# Patient Record
Sex: MALE | Race: BLACK OR AFRICAN AMERICAN | Employment: OTHER | URBAN - METROPOLITAN AREA
[De-identification: names, ages, dates, MRNs, and addresses within clinical notes are randomized per-mention and may not be internally consistent; named-entity substitution may affect disease eponyms.]

---

## 2019-01-01 ENCOUNTER — ANESTHESIA (OUTPATIENT)
Dept: SURGERY | Age: 74
DRG: 326 | End: 2019-01-01
Payer: MEDICARE

## 2019-01-01 ENCOUNTER — APPOINTMENT (OUTPATIENT)
Dept: GENERAL RADIOLOGY | Age: 74
DRG: 242 | End: 2019-01-01
Attending: INTERNAL MEDICINE
Payer: MEDICARE

## 2019-01-01 ENCOUNTER — APPOINTMENT (OUTPATIENT)
Dept: GENERAL RADIOLOGY | Age: 74
DRG: 326 | End: 2019-01-01
Attending: SURGERY
Payer: MEDICARE

## 2019-01-01 ENCOUNTER — ANESTHESIA EVENT (OUTPATIENT)
Dept: ENDOSCOPY | Age: 74
DRG: 326 | End: 2019-01-01
Payer: MEDICARE

## 2019-01-01 ENCOUNTER — APPOINTMENT (OUTPATIENT)
Dept: GENERAL RADIOLOGY | Age: 74
DRG: 242 | End: 2019-01-01
Attending: HOSPITALIST
Payer: MEDICARE

## 2019-01-01 ENCOUNTER — APPOINTMENT (OUTPATIENT)
Dept: NUCLEAR MEDICINE | Age: 74
DRG: 242 | End: 2019-01-01
Attending: PHYSICIAN ASSISTANT
Payer: MEDICARE

## 2019-01-01 ENCOUNTER — APPOINTMENT (OUTPATIENT)
Dept: NON INVASIVE DIAGNOSTICS | Age: 74
DRG: 242 | End: 2019-01-01
Attending: FAMILY MEDICINE
Payer: MEDICARE

## 2019-01-01 ENCOUNTER — APPOINTMENT (OUTPATIENT)
Dept: VASCULAR SURGERY | Age: 74
DRG: 242 | End: 2019-01-01
Attending: PHYSICIAN ASSISTANT
Payer: MEDICARE

## 2019-01-01 ENCOUNTER — HOSPITAL ENCOUNTER (INPATIENT)
Age: 74
LOS: 4 days | DRG: 326 | End: 2019-08-05
Attending: EMERGENCY MEDICINE | Admitting: INTERNAL MEDICINE
Payer: MEDICARE

## 2019-01-01 ENCOUNTER — APPOINTMENT (OUTPATIENT)
Dept: GENERAL RADIOLOGY | Age: 74
DRG: 242 | End: 2019-01-01
Attending: THORACIC SURGERY (CARDIOTHORACIC VASCULAR SURGERY)
Payer: MEDICARE

## 2019-01-01 ENCOUNTER — DOCUMENTATION ONLY (OUTPATIENT)
Dept: CASE MANAGEMENT | Age: 74
End: 2019-01-01

## 2019-01-01 ENCOUNTER — APPOINTMENT (OUTPATIENT)
Dept: ULTRASOUND IMAGING | Age: 74
DRG: 242 | End: 2019-01-01
Attending: FAMILY MEDICINE
Payer: MEDICARE

## 2019-01-01 ENCOUNTER — ANESTHESIA EVENT (OUTPATIENT)
Dept: SURGERY | Age: 74
DRG: 326 | End: 2019-01-01
Payer: MEDICARE

## 2019-01-01 ENCOUNTER — APPOINTMENT (OUTPATIENT)
Dept: INTERVENTIONAL RADIOLOGY/VASCULAR | Age: 74
DRG: 242 | End: 2019-01-01
Attending: INTERNAL MEDICINE
Payer: MEDICARE

## 2019-01-01 ENCOUNTER — APPOINTMENT (OUTPATIENT)
Dept: ULTRASOUND IMAGING | Age: 74
DRG: 242 | End: 2019-01-01
Attending: INTERNAL MEDICINE
Payer: MEDICARE

## 2019-01-01 ENCOUNTER — APPOINTMENT (OUTPATIENT)
Dept: GENERAL RADIOLOGY | Age: 74
DRG: 326 | End: 2019-01-01
Attending: NURSE PRACTITIONER
Payer: MEDICARE

## 2019-01-01 ENCOUNTER — APPOINTMENT (OUTPATIENT)
Dept: ULTRASOUND IMAGING | Age: 74
DRG: 242 | End: 2019-01-01
Attending: NURSE PRACTITIONER
Payer: MEDICARE

## 2019-01-01 ENCOUNTER — ANESTHESIA (OUTPATIENT)
Dept: ENDOSCOPY | Age: 74
DRG: 326 | End: 2019-01-01
Payer: MEDICARE

## 2019-01-01 ENCOUNTER — APPOINTMENT (OUTPATIENT)
Dept: GENERAL RADIOLOGY | Age: 74
DRG: 326 | End: 2019-01-01
Attending: INTERNAL MEDICINE
Payer: MEDICARE

## 2019-01-01 ENCOUNTER — APPOINTMENT (OUTPATIENT)
Dept: CT IMAGING | Age: 74
DRG: 242 | End: 2019-01-01
Attending: NURSE PRACTITIONER
Payer: MEDICARE

## 2019-01-01 ENCOUNTER — APPOINTMENT (OUTPATIENT)
Dept: GENERAL RADIOLOGY | Age: 74
DRG: 242 | End: 2019-01-01
Attending: PHYSICIAN ASSISTANT
Payer: MEDICARE

## 2019-01-01 ENCOUNTER — APPOINTMENT (OUTPATIENT)
Dept: GENERAL RADIOLOGY | Age: 74
DRG: 242 | End: 2019-01-01
Attending: FAMILY MEDICINE
Payer: MEDICARE

## 2019-01-01 ENCOUNTER — PATIENT OUTREACH (OUTPATIENT)
Dept: CASE MANAGEMENT | Age: 74
End: 2019-01-01

## 2019-01-01 ENCOUNTER — APPOINTMENT (OUTPATIENT)
Dept: CT IMAGING | Age: 74
DRG: 242 | End: 2019-01-01
Attending: PHYSICIAN ASSISTANT
Payer: MEDICARE

## 2019-01-01 ENCOUNTER — APPOINTMENT (OUTPATIENT)
Dept: GENERAL RADIOLOGY | Age: 74
DRG: 242 | End: 2019-01-01
Attending: NURSE PRACTITIONER
Payer: MEDICARE

## 2019-01-01 ENCOUNTER — APPOINTMENT (OUTPATIENT)
Dept: GENERAL RADIOLOGY | Age: 74
DRG: 242 | End: 2019-01-01
Attending: RADIOLOGY
Payer: MEDICARE

## 2019-01-01 ENCOUNTER — HOSPITAL ENCOUNTER (INPATIENT)
Age: 74
LOS: 24 days | Discharge: HOME | DRG: 242 | End: 2019-07-29
Attending: EMERGENCY MEDICINE | Admitting: INTERNAL MEDICINE
Payer: MEDICARE

## 2019-01-01 VITALS
BODY MASS INDEX: 20.11 KG/M2 | RESPIRATION RATE: 15 BRPM | DIASTOLIC BLOOD PRESSURE: 63 MMHG | TEMPERATURE: 97.8 F | OXYGEN SATURATION: 98 % | HEIGHT: 68 IN | WEIGHT: 132.72 LBS | HEART RATE: 78 BPM | SYSTOLIC BLOOD PRESSURE: 129 MMHG

## 2019-01-01 VITALS
BODY MASS INDEX: 21.18 KG/M2 | DIASTOLIC BLOOD PRESSURE: 41 MMHG | RESPIRATION RATE: 17 BRPM | TEMPERATURE: 100.5 F | WEIGHT: 139.77 LBS | SYSTOLIC BLOOD PRESSURE: 142 MMHG | OXYGEN SATURATION: 97 % | HEIGHT: 68 IN | HEART RATE: 90 BPM

## 2019-01-01 DIAGNOSIS — Z71.89 ADVANCED CARE PLANNING/COUNSELING DISCUSSION: ICD-10-CM

## 2019-01-01 DIAGNOSIS — R53.83 FATIGUE, UNSPECIFIED TYPE: ICD-10-CM

## 2019-01-01 DIAGNOSIS — K56.7 ILEUS (HCC): ICD-10-CM

## 2019-01-01 DIAGNOSIS — Z99.2 ESRD (END STAGE RENAL DISEASE) ON DIALYSIS (HCC): ICD-10-CM

## 2019-01-01 DIAGNOSIS — R79.89 ELEVATED BRAIN NATRIURETIC PEPTIDE (BNP) LEVEL: Primary | ICD-10-CM

## 2019-01-01 DIAGNOSIS — I50.9 CONGESTIVE HEART FAILURE, UNSPECIFIED HF CHRONICITY, UNSPECIFIED HEART FAILURE TYPE (HCC): ICD-10-CM

## 2019-01-01 DIAGNOSIS — N18.6 ESRD (END STAGE RENAL DISEASE) ON DIALYSIS (HCC): ICD-10-CM

## 2019-01-01 DIAGNOSIS — M79.89 LEG SWELLING: ICD-10-CM

## 2019-01-01 DIAGNOSIS — R00.1 BRADYCARDIA: ICD-10-CM

## 2019-01-01 DIAGNOSIS — I95.9 TRANSIENT HYPOTENSION: ICD-10-CM

## 2019-01-01 DIAGNOSIS — R06.02 SOB (SHORTNESS OF BREATH): ICD-10-CM

## 2019-01-01 DIAGNOSIS — R09.02 HYPOXIA: ICD-10-CM

## 2019-01-01 DIAGNOSIS — K92.2 UPPER GI BLEED: Primary | ICD-10-CM

## 2019-01-01 DIAGNOSIS — D50.9 HYPOCHROMIC ANEMIA: ICD-10-CM

## 2019-01-01 DIAGNOSIS — J90 PLEURAL EFFUSION: ICD-10-CM

## 2019-01-01 DIAGNOSIS — G93.40 ENCEPHALOPATHY ACUTE: ICD-10-CM

## 2019-01-01 LAB
ABO + RH BLD: NORMAL
ALBUMIN SERPL-MCNC: 1.6 G/DL (ref 3.5–5)
ALBUMIN SERPL-MCNC: 2.4 G/DL (ref 3.5–5)
ALBUMIN SERPL-MCNC: 2.5 G/DL (ref 3.5–5)
ALBUMIN SERPL-MCNC: 2.6 G/DL (ref 3.5–5)
ALBUMIN SERPL-MCNC: 2.7 G/DL (ref 3.5–5)
ALBUMIN SERPL-MCNC: 2.8 G/DL (ref 3.5–5)
ALBUMIN SERPL-MCNC: 2.9 G/DL (ref 3.5–5)
ALBUMIN SERPL-MCNC: 3 G/DL (ref 3.5–5)
ALBUMIN SERPL-MCNC: 3 G/DL (ref 3.5–5)
ALBUMIN SERPL-MCNC: 3.1 G/DL (ref 3.5–5)
ALBUMIN SERPL-MCNC: 3.2 G/DL (ref 3.5–5)
ALBUMIN SERPL-MCNC: 3.4 G/DL (ref 3.5–5)
ALBUMIN/GLOB SERPL: 0.7 {RATIO} (ref 1.1–2.2)
ALBUMIN/GLOB SERPL: 0.8 {RATIO} (ref 1.1–2.2)
ALBUMIN/GLOB SERPL: 0.9 {RATIO} (ref 1.1–2.2)
ALBUMIN/GLOB SERPL: 1 {RATIO} (ref 1.1–2.2)
ALBUMIN/GLOB SERPL: 1 {RATIO} (ref 1.1–2.2)
ALBUMIN/GLOB SERPL: 1.1 {RATIO} (ref 1.1–2.2)
ALBUMIN/GLOB SERPL: 1.2 {RATIO} (ref 1.1–2.2)
ALBUMIN/GLOB SERPL: 1.4 {RATIO} (ref 1.1–2.2)
ALBUMIN/GLOB SERPL: 1.5 {RATIO} (ref 1.1–2.2)
ALBUMIN/GLOB SERPL: 1.6 {RATIO} (ref 1.1–2.2)
ALP SERPL-CCNC: 100 U/L (ref 45–117)
ALP SERPL-CCNC: 104 U/L (ref 45–117)
ALP SERPL-CCNC: 105 U/L (ref 45–117)
ALP SERPL-CCNC: 113 U/L (ref 45–117)
ALP SERPL-CCNC: 129 U/L (ref 45–117)
ALP SERPL-CCNC: 162 U/L (ref 45–117)
ALP SERPL-CCNC: 52 U/L (ref 45–117)
ALP SERPL-CCNC: 57 U/L (ref 45–117)
ALP SERPL-CCNC: 58 U/L (ref 45–117)
ALP SERPL-CCNC: 60 U/L (ref 45–117)
ALP SERPL-CCNC: 61 U/L (ref 45–117)
ALP SERPL-CCNC: 64 U/L (ref 45–117)
ALP SERPL-CCNC: 71 U/L (ref 45–117)
ALP SERPL-CCNC: 75 U/L (ref 45–117)
ALP SERPL-CCNC: 78 U/L (ref 45–117)
ALP SERPL-CCNC: 80 U/L (ref 45–117)
ALP SERPL-CCNC: 83 U/L (ref 45–117)
ALP SERPL-CCNC: 85 U/L (ref 45–117)
ALP SERPL-CCNC: 93 U/L (ref 45–117)
ALP SERPL-CCNC: 94 U/L (ref 45–117)
ALP SERPL-CCNC: 95 U/L (ref 45–117)
ALP SERPL-CCNC: 96 U/L (ref 45–117)
ALP SERPL-CCNC: 98 U/L (ref 45–117)
ALT SERPL-CCNC: 105 U/L (ref 12–78)
ALT SERPL-CCNC: 12 U/L (ref 12–78)
ALT SERPL-CCNC: 124 U/L (ref 12–78)
ALT SERPL-CCNC: 135 U/L (ref 12–78)
ALT SERPL-CCNC: 14 U/L (ref 12–78)
ALT SERPL-CCNC: 172 U/L (ref 12–78)
ALT SERPL-CCNC: 22 U/L (ref 12–78)
ALT SERPL-CCNC: 220 U/L (ref 12–78)
ALT SERPL-CCNC: 236 U/L (ref 12–78)
ALT SERPL-CCNC: 32 U/L (ref 12–78)
ALT SERPL-CCNC: 35 U/L (ref 12–78)
ALT SERPL-CCNC: 42 U/L (ref 12–78)
ALT SERPL-CCNC: 45 U/L (ref 12–78)
ALT SERPL-CCNC: 49 U/L (ref 12–78)
ALT SERPL-CCNC: 51 U/L (ref 12–78)
ALT SERPL-CCNC: 54 U/L (ref 12–78)
ALT SERPL-CCNC: 59 U/L (ref 12–78)
ALT SERPL-CCNC: 60 U/L (ref 12–78)
ALT SERPL-CCNC: 7 U/L (ref 12–78)
ALT SERPL-CCNC: 7 U/L (ref 12–78)
ALT SERPL-CCNC: 75 U/L (ref 12–78)
ALT SERPL-CCNC: 79 U/L (ref 12–78)
ALT SERPL-CCNC: 93 U/L (ref 12–78)
AMYLASE FLD-CCNC: 32 U/L
ANION GAP BLD CALC-SCNC: 15 MMOL/L (ref 10–20)
ANION GAP SERPL CALC-SCNC: 10 MMOL/L (ref 5–15)
ANION GAP SERPL CALC-SCNC: 10 MMOL/L (ref 5–15)
ANION GAP SERPL CALC-SCNC: 11 MMOL/L (ref 5–15)
ANION GAP SERPL CALC-SCNC: 12 MMOL/L (ref 5–15)
ANION GAP SERPL CALC-SCNC: 13 MMOL/L (ref 5–15)
ANION GAP SERPL CALC-SCNC: 15 MMOL/L (ref 5–15)
ANION GAP SERPL CALC-SCNC: 17 MMOL/L (ref 5–15)
ANION GAP SERPL CALC-SCNC: 17 MMOL/L (ref 5–15)
ANION GAP SERPL CALC-SCNC: 6 MMOL/L (ref 5–15)
ANION GAP SERPL CALC-SCNC: 7 MMOL/L (ref 5–15)
ANION GAP SERPL CALC-SCNC: 8 MMOL/L (ref 5–15)
ANION GAP SERPL CALC-SCNC: 9 MMOL/L (ref 5–15)
APPEARANCE FLD: CLEAR
APTT PPP: 29.4 SEC (ref 22.1–32)
ARTERIAL PATENCY WRIST A: ABNORMAL
ARTERIAL PATENCY WRIST A: NO
ARTERIAL PATENCY WRIST A: YES
ARTERIAL PATENCY WRIST A: YES
AST SERPL-CCNC: 113 U/L (ref 15–37)
AST SERPL-CCNC: 14 U/L (ref 15–37)
AST SERPL-CCNC: 17 U/L (ref 15–37)
AST SERPL-CCNC: 178 U/L (ref 15–37)
AST SERPL-CCNC: 23 U/L (ref 15–37)
AST SERPL-CCNC: 26 U/L (ref 15–37)
AST SERPL-CCNC: 262 U/L (ref 15–37)
AST SERPL-CCNC: 27 U/L (ref 15–37)
AST SERPL-CCNC: 27 U/L (ref 15–37)
AST SERPL-CCNC: 30 U/L (ref 15–37)
AST SERPL-CCNC: 31 U/L (ref 15–37)
AST SERPL-CCNC: 32 U/L (ref 15–37)
AST SERPL-CCNC: 322 U/L (ref 15–37)
AST SERPL-CCNC: 39 U/L (ref 15–37)
AST SERPL-CCNC: 39 U/L (ref 15–37)
AST SERPL-CCNC: 41 U/L (ref 15–37)
AST SERPL-CCNC: 42 U/L (ref 15–37)
AST SERPL-CCNC: 50 U/L (ref 15–37)
AST SERPL-CCNC: 52 U/L (ref 15–37)
AST SERPL-CCNC: 56 U/L (ref 15–37)
AST SERPL-CCNC: 61 U/L (ref 15–37)
AST SERPL-CCNC: 72 U/L (ref 15–37)
AST SERPL-CCNC: 88 U/L (ref 15–37)
ATRIAL RATE: 192 BPM
ATRIAL RATE: 208 BPM
ATRIAL RATE: 63 BPM
ATRIAL RATE: 64 BPM
ATRIAL RATE: 72 BPM
BACTERIA SPEC CULT: NORMAL
BACTERIA SPEC CULT: NORMAL
BASE DEFICIT BLD-SCNC: 12 MMOL/L
BASE DEFICIT BLD-SCNC: 16 MMOL/L
BASE DEFICIT BLD-SCNC: 19 MMOL/L
BASE DEFICIT BLD-SCNC: 2 MMOL/L
BASE DEFICIT BLD-SCNC: 3 MMOL/L
BASE DEFICIT BLD-SCNC: 4 MMOL/L
BASE DEFICIT BLD-SCNC: 5 MMOL/L
BASE DEFICIT BLD-SCNC: 7 MMOL/L
BASE EXCESS BLD CALC-SCNC: 0 MMOL/L
BASE EXCESS BLD CALC-SCNC: 2 MMOL/L
BASOPHILS # BLD: 0 K/UL (ref 0–0.1)
BASOPHILS # BLD: 0.1 K/UL (ref 0–0.1)
BASOPHILS # BLD: 0.3 K/UL (ref 0–0.1)
BASOPHILS NFR BLD: 0 % (ref 0–1)
BASOPHILS NFR BLD: 1 % (ref 0–1)
BASOPHILS NFR BLD: 2 % (ref 0–1)
BDY SITE: ABNORMAL
BILIRUB SERPL-MCNC: 0.2 MG/DL (ref 0.2–1)
BILIRUB SERPL-MCNC: 0.2 MG/DL (ref 0.2–1)
BILIRUB SERPL-MCNC: 0.3 MG/DL (ref 0.2–1)
BILIRUB SERPL-MCNC: 0.4 MG/DL (ref 0.2–1)
BILIRUB SERPL-MCNC: 0.5 MG/DL (ref 0.2–1)
BILIRUB SERPL-MCNC: 0.6 MG/DL (ref 0.2–1)
BILIRUB SERPL-MCNC: 0.7 MG/DL (ref 0.2–1)
BILIRUB SERPL-MCNC: 0.8 MG/DL (ref 0.2–1)
BILIRUB SERPL-MCNC: 0.9 MG/DL (ref 0.2–1)
BILIRUB SERPL-MCNC: 1 MG/DL (ref 0.2–1)
BLD PROD TYP BPU: NORMAL
BLOOD GROUP ANTIBODIES SERPL: NORMAL
BNP SERPL-MCNC: 8324 PG/ML
BODY FLD TYPE: NORMAL
BPU ID: NORMAL
BUN BLD-MCNC: 24 MG/DL (ref 9–20)
BUN SERPL-MCNC: 10 MG/DL (ref 6–20)
BUN SERPL-MCNC: 104 MG/DL (ref 6–20)
BUN SERPL-MCNC: 104 MG/DL (ref 6–20)
BUN SERPL-MCNC: 106 MG/DL (ref 6–20)
BUN SERPL-MCNC: 107 MG/DL (ref 6–20)
BUN SERPL-MCNC: 12 MG/DL (ref 6–20)
BUN SERPL-MCNC: 13 MG/DL (ref 6–20)
BUN SERPL-MCNC: 13 MG/DL (ref 6–20)
BUN SERPL-MCNC: 14 MG/DL (ref 6–20)
BUN SERPL-MCNC: 15 MG/DL (ref 6–20)
BUN SERPL-MCNC: 16 MG/DL (ref 6–20)
BUN SERPL-MCNC: 17 MG/DL (ref 6–20)
BUN SERPL-MCNC: 17 MG/DL (ref 6–20)
BUN SERPL-MCNC: 18 MG/DL (ref 6–20)
BUN SERPL-MCNC: 18 MG/DL (ref 6–20)
BUN SERPL-MCNC: 19 MG/DL (ref 6–20)
BUN SERPL-MCNC: 21 MG/DL (ref 6–20)
BUN SERPL-MCNC: 22 MG/DL (ref 6–20)
BUN SERPL-MCNC: 23 MG/DL (ref 6–20)
BUN SERPL-MCNC: 23 MG/DL (ref 6–20)
BUN SERPL-MCNC: 25 MG/DL (ref 6–20)
BUN SERPL-MCNC: 27 MG/DL (ref 6–20)
BUN SERPL-MCNC: 27 MG/DL (ref 6–20)
BUN SERPL-MCNC: 28 MG/DL (ref 6–20)
BUN SERPL-MCNC: 29 MG/DL (ref 6–20)
BUN SERPL-MCNC: 36 MG/DL (ref 6–20)
BUN SERPL-MCNC: 39 MG/DL (ref 6–20)
BUN SERPL-MCNC: 44 MG/DL (ref 6–20)
BUN SERPL-MCNC: 55 MG/DL (ref 6–20)
BUN SERPL-MCNC: 57 MG/DL (ref 6–20)
BUN SERPL-MCNC: 75 MG/DL (ref 6–20)
BUN SERPL-MCNC: 8 MG/DL (ref 6–20)
BUN SERPL-MCNC: 86 MG/DL (ref 6–20)
BUN SERPL-MCNC: 9 MG/DL (ref 6–20)
BUN/CREAT SERPL: 10 (ref 12–20)
BUN/CREAT SERPL: 11 (ref 12–20)
BUN/CREAT SERPL: 13 (ref 12–20)
BUN/CREAT SERPL: 13 (ref 12–20)
BUN/CREAT SERPL: 15 (ref 12–20)
BUN/CREAT SERPL: 16 (ref 12–20)
BUN/CREAT SERPL: 17 (ref 12–20)
BUN/CREAT SERPL: 18 (ref 12–20)
BUN/CREAT SERPL: 19 (ref 12–20)
BUN/CREAT SERPL: 2 (ref 12–20)
BUN/CREAT SERPL: 20 (ref 12–20)
BUN/CREAT SERPL: 20 (ref 12–20)
BUN/CREAT SERPL: 21 (ref 12–20)
BUN/CREAT SERPL: 25 (ref 12–20)
BUN/CREAT SERPL: 26 (ref 12–20)
BUN/CREAT SERPL: 26 (ref 12–20)
BUN/CREAT SERPL: 3 (ref 12–20)
BUN/CREAT SERPL: 4 (ref 12–20)
BUN/CREAT SERPL: 5 (ref 12–20)
BUN/CREAT SERPL: 5 (ref 12–20)
BUN/CREAT SERPL: 6 (ref 12–20)
BUN/CREAT SERPL: 7 (ref 12–20)
BUN/CREAT SERPL: 9 (ref 12–20)
BUN/CREAT SERPL: 9 (ref 12–20)
CA-I BLD-MCNC: 1.2 MMOL/L (ref 1.12–1.32)
CALCIUM SERPL-MCNC: 10.4 MG/DL (ref 8.5–10.1)
CALCIUM SERPL-MCNC: 10.8 MG/DL (ref 8.5–10.1)
CALCIUM SERPL-MCNC: 7.2 MG/DL (ref 8.5–10.1)
CALCIUM SERPL-MCNC: 7.5 MG/DL (ref 8.5–10.1)
CALCIUM SERPL-MCNC: 7.6 MG/DL (ref 8.5–10.1)
CALCIUM SERPL-MCNC: 7.6 MG/DL (ref 8.5–10.1)
CALCIUM SERPL-MCNC: 7.7 MG/DL (ref 8.5–10.1)
CALCIUM SERPL-MCNC: 7.7 MG/DL (ref 8.5–10.1)
CALCIUM SERPL-MCNC: 7.9 MG/DL (ref 8.5–10.1)
CALCIUM SERPL-MCNC: 7.9 MG/DL (ref 8.5–10.1)
CALCIUM SERPL-MCNC: 8 MG/DL (ref 8.5–10.1)
CALCIUM SERPL-MCNC: 8.1 MG/DL (ref 8.5–10.1)
CALCIUM SERPL-MCNC: 8.2 MG/DL (ref 8.5–10.1)
CALCIUM SERPL-MCNC: 8.3 MG/DL (ref 8.5–10.1)
CALCIUM SERPL-MCNC: 8.4 MG/DL (ref 8.5–10.1)
CALCIUM SERPL-MCNC: 8.5 MG/DL (ref 8.5–10.1)
CALCIUM SERPL-MCNC: 8.6 MG/DL (ref 8.5–10.1)
CALCIUM SERPL-MCNC: 8.7 MG/DL (ref 8.5–10.1)
CALCIUM SERPL-MCNC: 8.8 MG/DL (ref 8.5–10.1)
CALCIUM SERPL-MCNC: 8.8 MG/DL (ref 8.5–10.1)
CALCIUM SERPL-MCNC: 8.9 MG/DL (ref 8.5–10.1)
CALCIUM SERPL-MCNC: 9.1 MG/DL (ref 8.5–10.1)
CALCIUM SERPL-MCNC: 9.2 MG/DL (ref 8.5–10.1)
CALCIUM SERPL-MCNC: 9.4 MG/DL (ref 8.5–10.1)
CALCIUM SERPL-MCNC: 9.9 MG/DL (ref 8.5–10.1)
CALCIUM SERPL-MCNC: 9.9 MG/DL (ref 8.5–10.1)
CALCULATED P AXIS, ECG09: -90 DEGREES
CALCULATED P AXIS, ECG09: 38 DEGREES
CALCULATED R AXIS, ECG10: -16 DEGREES
CALCULATED R AXIS, ECG10: -19 DEGREES
CALCULATED R AXIS, ECG10: -50 DEGREES
CALCULATED R AXIS, ECG10: 26 DEGREES
CALCULATED R AXIS, ECG10: 5 DEGREES
CALCULATED T AXIS, ECG11: -13 DEGREES
CALCULATED T AXIS, ECG11: 146 DEGREES
CALCULATED T AXIS, ECG11: 68 DEGREES
CALCULATED T AXIS, ECG11: 68 DEGREES
CALCULATED T AXIS, ECG11: 69 DEGREES
CHLORIDE BLD-SCNC: 112 MMOL/L (ref 98–107)
CHLORIDE SERPL-SCNC: 103 MMOL/L (ref 97–108)
CHLORIDE SERPL-SCNC: 104 MMOL/L (ref 97–108)
CHLORIDE SERPL-SCNC: 105 MMOL/L (ref 97–108)
CHLORIDE SERPL-SCNC: 106 MMOL/L (ref 97–108)
CHLORIDE SERPL-SCNC: 106 MMOL/L (ref 97–108)
CHLORIDE SERPL-SCNC: 107 MMOL/L (ref 97–108)
CHLORIDE SERPL-SCNC: 108 MMOL/L (ref 97–108)
CHLORIDE SERPL-SCNC: 109 MMOL/L (ref 97–108)
CHLORIDE SERPL-SCNC: 109 MMOL/L (ref 97–108)
CHLORIDE SERPL-SCNC: 110 MMOL/L (ref 97–108)
CHLORIDE SERPL-SCNC: 110 MMOL/L (ref 97–108)
CHLORIDE SERPL-SCNC: 111 MMOL/L (ref 97–108)
CHLORIDE SERPL-SCNC: 112 MMOL/L (ref 97–108)
CHLORIDE SERPL-SCNC: 113 MMOL/L (ref 97–108)
CHLORIDE SERPL-SCNC: 114 MMOL/L (ref 97–108)
CHLORIDE SERPL-SCNC: 116 MMOL/L (ref 97–108)
CHLORIDE SERPL-SCNC: 119 MMOL/L (ref 97–108)
CHOLEST SERPL-MCNC: 134 MG/DL
CK SERPL-CCNC: 25 U/L (ref 39–308)
CK SERPL-CCNC: 69 U/L (ref 39–308)
CO2 BLD-SCNC: 22 MMOL/L (ref 21–32)
CO2 SERPL-SCNC: 11 MMOL/L (ref 21–32)
CO2 SERPL-SCNC: 15 MMOL/L (ref 21–32)
CO2 SERPL-SCNC: 17 MMOL/L (ref 21–32)
CO2 SERPL-SCNC: 17 MMOL/L (ref 21–32)
CO2 SERPL-SCNC: 20 MMOL/L (ref 21–32)
CO2 SERPL-SCNC: 20 MMOL/L (ref 21–32)
CO2 SERPL-SCNC: 21 MMOL/L (ref 21–32)
CO2 SERPL-SCNC: 21 MMOL/L (ref 21–32)
CO2 SERPL-SCNC: 24 MMOL/L (ref 21–32)
CO2 SERPL-SCNC: 25 MMOL/L (ref 21–32)
CO2 SERPL-SCNC: 26 MMOL/L (ref 21–32)
CO2 SERPL-SCNC: 27 MMOL/L (ref 21–32)
CO2 SERPL-SCNC: 28 MMOL/L (ref 21–32)
CO2 SERPL-SCNC: 29 MMOL/L (ref 21–32)
COLOR FLD: YELLOW
COMMENT, HOLDF: NORMAL
CORTIS AM PEAK SERPL-MCNC: 13.5 UG/DL (ref 4.3–22.45)
CREAT BLD-MCNC: 4.3 MG/DL (ref 0.6–1.3)
CREAT SERPL-MCNC: 1.23 MG/DL (ref 0.7–1.3)
CREAT SERPL-MCNC: 1.32 MG/DL (ref 0.7–1.3)
CREAT SERPL-MCNC: 1.36 MG/DL (ref 0.7–1.3)
CREAT SERPL-MCNC: 1.42 MG/DL (ref 0.7–1.3)
CREAT SERPL-MCNC: 1.68 MG/DL (ref 0.7–1.3)
CREAT SERPL-MCNC: 2.1 MG/DL (ref 0.7–1.3)
CREAT SERPL-MCNC: 2.1 MG/DL (ref 0.7–1.3)
CREAT SERPL-MCNC: 2.15 MG/DL (ref 0.7–1.3)
CREAT SERPL-MCNC: 2.23 MG/DL (ref 0.7–1.3)
CREAT SERPL-MCNC: 2.31 MG/DL (ref 0.7–1.3)
CREAT SERPL-MCNC: 2.32 MG/DL (ref 0.7–1.3)
CREAT SERPL-MCNC: 2.37 MG/DL (ref 0.7–1.3)
CREAT SERPL-MCNC: 2.44 MG/DL (ref 0.7–1.3)
CREAT SERPL-MCNC: 2.45 MG/DL (ref 0.7–1.3)
CREAT SERPL-MCNC: 2.61 MG/DL (ref 0.7–1.3)
CREAT SERPL-MCNC: 2.65 MG/DL (ref 0.7–1.3)
CREAT SERPL-MCNC: 2.87 MG/DL (ref 0.7–1.3)
CREAT SERPL-MCNC: 2.98 MG/DL (ref 0.7–1.3)
CREAT SERPL-MCNC: 3 MG/DL (ref 0.7–1.3)
CREAT SERPL-MCNC: 3.3 MG/DL (ref 0.7–1.3)
CREAT SERPL-MCNC: 3.31 MG/DL (ref 0.7–1.3)
CREAT SERPL-MCNC: 3.33 MG/DL (ref 0.7–1.3)
CREAT SERPL-MCNC: 3.36 MG/DL (ref 0.7–1.3)
CREAT SERPL-MCNC: 3.41 MG/DL (ref 0.7–1.3)
CREAT SERPL-MCNC: 3.72 MG/DL (ref 0.7–1.3)
CREAT SERPL-MCNC: 3.89 MG/DL (ref 0.7–1.3)
CREAT SERPL-MCNC: 4.18 MG/DL (ref 0.7–1.3)
CREAT SERPL-MCNC: 4.27 MG/DL (ref 0.7–1.3)
CREAT SERPL-MCNC: 4.32 MG/DL (ref 0.7–1.3)
CREAT SERPL-MCNC: 4.67 MG/DL (ref 0.7–1.3)
CREAT SERPL-MCNC: 4.74 MG/DL (ref 0.7–1.3)
CREAT SERPL-MCNC: 5.05 MG/DL (ref 0.7–1.3)
CREAT SERPL-MCNC: 5.15 MG/DL (ref 0.7–1.3)
CREAT SERPL-MCNC: 5.3 MG/DL (ref 0.7–1.3)
CREAT SERPL-MCNC: 5.32 MG/DL (ref 0.7–1.3)
CREAT SERPL-MCNC: 5.54 MG/DL (ref 0.7–1.3)
CREAT SERPL-MCNC: 5.59 MG/DL (ref 0.7–1.3)
CREAT SERPL-MCNC: 5.64 MG/DL (ref 0.7–1.3)
CREAT SERPL-MCNC: 5.64 MG/DL (ref 0.7–1.3)
CREAT SERPL-MCNC: 6.84 MG/DL (ref 0.7–1.3)
CROSSMATCH RESULT,%XM: NORMAL
CRYSTALS FLD MICRO: NORMAL
DIAGNOSIS, 93000: NORMAL
DIFFERENTIAL METHOD BLD: ABNORMAL
DIFFERENTIAL METHOD BLD: NORMAL
ECHO AO ROOT DIAM: 3.16 CM
ECHO AV AREA PEAK VELOCITY: 1.9 CM2
ECHO AV PEAK GRADIENT: 8.5 MMHG
ECHO AV PEAK VELOCITY: 145.59 CM/S
ECHO EST RA PRESSURE: 8 MMHG
ECHO LA AREA 4C: 23.7 CM2
ECHO LA MAJOR AXIS: 4.06 CM
ECHO LA TO AORTIC ROOT RATIO: 1.28
ECHO LA VOL 2C: 53.81 ML (ref 18–58)
ECHO LA VOL 4C: 70.48 ML (ref 18–58)
ECHO LA VOL BP: 71.32 ML (ref 18–58)
ECHO LA VOL/BSA BIPLANE: 38.99 ML/M2 (ref 16–28)
ECHO LA VOLUME INDEX A2C: 29.42 ML/M2 (ref 16–28)
ECHO LA VOLUME INDEX A4C: 38.53 ML/M2 (ref 16–28)
ECHO LV E' LATERAL VELOCITY: 7.88 CM/S
ECHO LV E' SEPTAL VELOCITY: 3.73 CM/S
ECHO LV INTERNAL DIMENSION DIASTOLIC: 4.69 CM (ref 4.2–5.9)
ECHO LV INTERNAL DIMENSION SYSTOLIC: 3.39 CM
ECHO LV IVSD: 1.1 CM (ref 0.6–1)
ECHO LV MASS 2D: 224.6 G (ref 88–224)
ECHO LV MASS INDEX 2D: 122.8 G/M2 (ref 49–115)
ECHO LV POSTERIOR WALL DIASTOLIC: 1.14 CM (ref 0.6–1)
ECHO LVOT DIAM: 2.15 CM
ECHO LVOT PEAK GRADIENT: 2.4 MMHG
ECHO LVOT PEAK VELOCITY: 77.72 CM/S
ECHO MV A VELOCITY: 0.35 CM/S
ECHO MV AREA PHT: 5 CM2
ECHO MV E DECELERATION TIME (DT): 150.8 MS
ECHO MV E VELOCITY: 121.1 CM/S
ECHO MV E/A RATIO: 346
ECHO MV E/E' LATERAL: 15.37
ECHO MV E/E' RATIO (AVERAGED): 23.92
ECHO MV E/E' SEPTAL: 32.47
ECHO MV PRESSURE HALF TIME (PHT): 43.7 MS
ECHO MV REGURGITANT PEAK GRADIENT: 42.8 MMHG
ECHO MV REGURGITANT PEAK VELOCITY: 327.18 CM/S
ECHO PULMONARY ARTERY SYSTOLIC PRESSURE (PASP): 79 MMHG
ECHO PV MAX VELOCITY: 78.58 CM/S
ECHO PV PEAK GRADIENT: 2.5 MMHG
ECHO RIGHT VENTRICULAR SYSTOLIC PRESSURE (RVSP): 79 MMHG
ECHO RV TAPSE: 2.02 CM (ref 1.5–2)
ECHO TV REGURGITANT MAX VELOCITY: 421.37 CM/S
ECHO TV REGURGITANT PEAK GRADIENT: 71 MMHG
EOSINOPHIL # BLD: 0 K/UL (ref 0–0.4)
EOSINOPHIL # BLD: 0.1 K/UL (ref 0–0.4)
EOSINOPHIL # BLD: 0.4 K/UL (ref 0–0.4)
EOSINOPHIL # BLD: 0.4 K/UL (ref 0–0.4)
EOSINOPHIL # BLD: 0.9 K/UL (ref 0–0.4)
EOSINOPHIL # BLD: 0.9 K/UL (ref 0–0.4)
EOSINOPHIL # BLD: 1 K/UL (ref 0–0.4)
EOSINOPHIL # BLD: 1 K/UL (ref 0–0.4)
EOSINOPHIL # BLD: 1.1 K/UL (ref 0–0.4)
EOSINOPHIL # BLD: 1.2 K/UL (ref 0–0.4)
EOSINOPHIL # BLD: 1.7 K/UL (ref 0–0.4)
EOSINOPHIL # BLD: 1.7 K/UL (ref 0–0.4)
EOSINOPHIL # BLD: 1.9 K/UL (ref 0–0.4)
EOSINOPHIL # BLD: 1.9 K/UL (ref 0–0.4)
EOSINOPHIL # BLD: 2 K/UL (ref 0–0.4)
EOSINOPHIL # BLD: 2 K/UL (ref 0–0.4)
EOSINOPHIL # BLD: 2.1 K/UL (ref 0–0.4)
EOSINOPHIL # BLD: 2.1 K/UL (ref 0–0.4)
EOSINOPHIL # BLD: 2.2 K/UL (ref 0–0.4)
EOSINOPHIL # BLD: 2.2 K/UL (ref 0–0.4)
EOSINOPHIL # BLD: 2.3 K/UL (ref 0–0.4)
EOSINOPHIL # BLD: 2.3 K/UL (ref 0–0.4)
EOSINOPHIL # BLD: 2.4 K/UL (ref 0–0.4)
EOSINOPHIL # BLD: 2.5 K/UL (ref 0–0.4)
EOSINOPHIL # BLD: 2.8 K/UL (ref 0–0.4)
EOSINOPHIL # BLD: 3.1 K/UL (ref 0–0.4)
EOSINOPHIL # BLD: 4.2 K/UL (ref 0–0.4)
EOSINOPHIL NFR BLD: 0 % (ref 0–7)
EOSINOPHIL NFR BLD: 1 % (ref 0–7)
EOSINOPHIL NFR BLD: 10 % (ref 0–7)
EOSINOPHIL NFR BLD: 11 % (ref 0–7)
EOSINOPHIL NFR BLD: 12 % (ref 0–7)
EOSINOPHIL NFR BLD: 13 % (ref 0–7)
EOSINOPHIL NFR BLD: 14 % (ref 0–7)
EOSINOPHIL NFR BLD: 14 % (ref 0–7)
EOSINOPHIL NFR BLD: 15 % (ref 0–7)
EOSINOPHIL NFR BLD: 15 % (ref 0–7)
EOSINOPHIL NFR BLD: 17 % (ref 0–7)
EOSINOPHIL NFR BLD: 17 % (ref 0–7)
EOSINOPHIL NFR BLD: 21 % (ref 0–7)
EOSINOPHIL NFR BLD: 22 % (ref 0–7)
EOSINOPHIL NFR BLD: 24 % (ref 0–7)
EOSINOPHIL NFR BLD: 26 % (ref 0–7)
EOSINOPHIL NFR BLD: 28 % (ref 0–7)
EOSINOPHIL NFR BLD: 3 % (ref 0–7)
EOSINOPHIL NFR BLD: 3 % (ref 0–7)
EOSINOPHIL NFR BLD: 4 % (ref 0–7)
EOSINOPHIL NFR BLD: 4 % (ref 0–7)
EOSINOPHIL NFR BLD: 6 % (ref 0–7)
EOSINOPHIL NFR BLD: 9 % (ref 0–7)
ERYTHROCYTE [DISTWIDTH] IN BLOOD BY AUTOMATED COUNT: 13.2 % (ref 11.5–14.5)
ERYTHROCYTE [DISTWIDTH] IN BLOOD BY AUTOMATED COUNT: 13.4 % (ref 11.5–14.5)
ERYTHROCYTE [DISTWIDTH] IN BLOOD BY AUTOMATED COUNT: 13.4 % (ref 11.5–14.5)
ERYTHROCYTE [DISTWIDTH] IN BLOOD BY AUTOMATED COUNT: 13.6 % (ref 11.5–14.5)
ERYTHROCYTE [DISTWIDTH] IN BLOOD BY AUTOMATED COUNT: 13.7 % (ref 11.5–14.5)
ERYTHROCYTE [DISTWIDTH] IN BLOOD BY AUTOMATED COUNT: 13.7 % (ref 11.5–14.5)
ERYTHROCYTE [DISTWIDTH] IN BLOOD BY AUTOMATED COUNT: 13.8 % (ref 11.5–14.5)
ERYTHROCYTE [DISTWIDTH] IN BLOOD BY AUTOMATED COUNT: 13.8 % (ref 11.5–14.5)
ERYTHROCYTE [DISTWIDTH] IN BLOOD BY AUTOMATED COUNT: 13.9 % (ref 11.5–14.5)
ERYTHROCYTE [DISTWIDTH] IN BLOOD BY AUTOMATED COUNT: 14 % (ref 11.5–14.5)
ERYTHROCYTE [DISTWIDTH] IN BLOOD BY AUTOMATED COUNT: 14.1 % (ref 11.5–14.5)
ERYTHROCYTE [DISTWIDTH] IN BLOOD BY AUTOMATED COUNT: 14.6 % (ref 11.5–14.5)
ERYTHROCYTE [DISTWIDTH] IN BLOOD BY AUTOMATED COUNT: 15.1 % (ref 11.5–14.5)
ERYTHROCYTE [DISTWIDTH] IN BLOOD BY AUTOMATED COUNT: 15.1 % (ref 11.5–14.5)
ERYTHROCYTE [DISTWIDTH] IN BLOOD BY AUTOMATED COUNT: 15.5 % (ref 11.5–14.5)
ERYTHROCYTE [DISTWIDTH] IN BLOOD BY AUTOMATED COUNT: 15.6 % (ref 11.5–14.5)
ERYTHROCYTE [DISTWIDTH] IN BLOOD BY AUTOMATED COUNT: 15.7 % (ref 11.5–14.5)
ERYTHROCYTE [DISTWIDTH] IN BLOOD BY AUTOMATED COUNT: 15.8 % (ref 11.5–14.5)
ERYTHROCYTE [DISTWIDTH] IN BLOOD BY AUTOMATED COUNT: 15.9 % (ref 11.5–14.5)
ERYTHROCYTE [DISTWIDTH] IN BLOOD BY AUTOMATED COUNT: 16 % (ref 11.5–14.5)
ERYTHROCYTE [DISTWIDTH] IN BLOOD BY AUTOMATED COUNT: 16.1 % (ref 11.5–14.5)
ERYTHROCYTE [DISTWIDTH] IN BLOOD BY AUTOMATED COUNT: 16.2 % (ref 11.5–14.5)
ERYTHROCYTE [DISTWIDTH] IN BLOOD BY AUTOMATED COUNT: 16.3 % (ref 11.5–14.5)
ERYTHROCYTE [DISTWIDTH] IN BLOOD BY AUTOMATED COUNT: 16.3 % (ref 11.5–14.5)
ERYTHROCYTE [DISTWIDTH] IN BLOOD BY AUTOMATED COUNT: 16.5 % (ref 11.5–14.5)
ERYTHROCYTE [DISTWIDTH] IN BLOOD BY AUTOMATED COUNT: 16.5 % (ref 11.5–14.5)
ERYTHROCYTE [DISTWIDTH] IN BLOOD BY AUTOMATED COUNT: 16.6 % (ref 11.5–14.5)
ERYTHROCYTE [DISTWIDTH] IN BLOOD BY AUTOMATED COUNT: 16.7 % (ref 11.5–14.5)
ERYTHROCYTE [DISTWIDTH] IN BLOOD BY AUTOMATED COUNT: 17 % (ref 11.5–14.5)
ERYTHROCYTE [DISTWIDTH] IN BLOOD BY AUTOMATED COUNT: 17.1 % (ref 11.5–14.5)
ERYTHROCYTE [DISTWIDTH] IN BLOOD BY AUTOMATED COUNT: 17.4 % (ref 11.5–14.5)
ERYTHROCYTE [DISTWIDTH] IN BLOOD BY AUTOMATED COUNT: 17.6 % (ref 11.5–14.5)
ERYTHROCYTE [DISTWIDTH] IN BLOOD BY AUTOMATED COUNT: 17.6 % (ref 11.5–14.5)
FIBRINOGEN PPP-MCNC: 433 MG/DL (ref 200–475)
GAS FLOW.O2 O2 DELIVERY SYS: ABNORMAL L/MIN
GAS FLOW.O2 SETTING OXYMISER: 10 BPM
GAS FLOW.O2 SETTING OXYMISER: 14 BPM
GAS FLOW.O2 SETTING OXYMISER: 16 BPM
GAS FLOW.O2 SETTING OXYMISER: 4.5 L/M
GLOBULIN SER CALC-MCNC: 1.6 G/DL (ref 2–4)
GLOBULIN SER CALC-MCNC: 1.9 G/DL (ref 2–4)
GLOBULIN SER CALC-MCNC: 2 G/DL (ref 2–4)
GLOBULIN SER CALC-MCNC: 2.2 G/DL (ref 2–4)
GLOBULIN SER CALC-MCNC: 2.2 G/DL (ref 2–4)
GLOBULIN SER CALC-MCNC: 2.4 G/DL (ref 2–4)
GLOBULIN SER CALC-MCNC: 2.7 G/DL (ref 2–4)
GLOBULIN SER CALC-MCNC: 2.8 G/DL (ref 2–4)
GLOBULIN SER CALC-MCNC: 3 G/DL (ref 2–4)
GLOBULIN SER CALC-MCNC: 3.1 G/DL (ref 2–4)
GLOBULIN SER CALC-MCNC: 3.2 G/DL (ref 2–4)
GLOBULIN SER CALC-MCNC: 3.4 G/DL (ref 2–4)
GLOBULIN SER CALC-MCNC: 3.5 G/DL (ref 2–4)
GLOBULIN SER CALC-MCNC: 3.5 G/DL (ref 2–4)
GLOBULIN SER CALC-MCNC: 3.6 G/DL (ref 2–4)
GLOBULIN SER CALC-MCNC: 3.8 G/DL (ref 2–4)
GLOBULIN SER CALC-MCNC: 3.8 G/DL (ref 2–4)
GLUCOSE BLD STRIP.AUTO-MCNC: 101 MG/DL (ref 65–100)
GLUCOSE BLD STRIP.AUTO-MCNC: 175 MG/DL (ref 65–100)
GLUCOSE BLD STRIP.AUTO-MCNC: 208 MG/DL (ref 65–100)
GLUCOSE BLD STRIP.AUTO-MCNC: 82 MG/DL (ref 65–100)
GLUCOSE BLD STRIP.AUTO-MCNC: 83 MG/DL (ref 65–100)
GLUCOSE BLD STRIP.AUTO-MCNC: 87 MG/DL (ref 65–100)
GLUCOSE BLD STRIP.AUTO-MCNC: 89 MG/DL (ref 65–100)
GLUCOSE BLD STRIP.AUTO-MCNC: 92 MG/DL (ref 65–100)
GLUCOSE BLD-MCNC: 252 MG/DL (ref 65–100)
GLUCOSE FLD-MCNC: 113 MG/DL
GLUCOSE SERPL-MCNC: 101 MG/DL (ref 65–100)
GLUCOSE SERPL-MCNC: 103 MG/DL (ref 65–100)
GLUCOSE SERPL-MCNC: 110 MG/DL (ref 65–100)
GLUCOSE SERPL-MCNC: 116 MG/DL (ref 65–100)
GLUCOSE SERPL-MCNC: 119 MG/DL (ref 65–100)
GLUCOSE SERPL-MCNC: 123 MG/DL (ref 65–100)
GLUCOSE SERPL-MCNC: 125 MG/DL (ref 65–100)
GLUCOSE SERPL-MCNC: 126 MG/DL (ref 65–100)
GLUCOSE SERPL-MCNC: 135 MG/DL (ref 65–100)
GLUCOSE SERPL-MCNC: 135 MG/DL (ref 65–100)
GLUCOSE SERPL-MCNC: 140 MG/DL (ref 65–100)
GLUCOSE SERPL-MCNC: 140 MG/DL (ref 65–100)
GLUCOSE SERPL-MCNC: 156 MG/DL (ref 65–100)
GLUCOSE SERPL-MCNC: 175 MG/DL (ref 65–100)
GLUCOSE SERPL-MCNC: 175 MG/DL (ref 65–100)
GLUCOSE SERPL-MCNC: 179 MG/DL (ref 65–100)
GLUCOSE SERPL-MCNC: 181 MG/DL (ref 65–100)
GLUCOSE SERPL-MCNC: 185 MG/DL (ref 65–100)
GLUCOSE SERPL-MCNC: 194 MG/DL (ref 65–100)
GLUCOSE SERPL-MCNC: 220 MG/DL (ref 65–100)
GLUCOSE SERPL-MCNC: 72 MG/DL (ref 65–100)
GLUCOSE SERPL-MCNC: 73 MG/DL (ref 65–100)
GLUCOSE SERPL-MCNC: 74 MG/DL (ref 65–100)
GLUCOSE SERPL-MCNC: 77 MG/DL (ref 65–100)
GLUCOSE SERPL-MCNC: 77 MG/DL (ref 65–100)
GLUCOSE SERPL-MCNC: 80 MG/DL (ref 65–100)
GLUCOSE SERPL-MCNC: 80 MG/DL (ref 65–100)
GLUCOSE SERPL-MCNC: 84 MG/DL (ref 65–100)
GLUCOSE SERPL-MCNC: 85 MG/DL (ref 65–100)
GLUCOSE SERPL-MCNC: 86 MG/DL (ref 65–100)
GLUCOSE SERPL-MCNC: 87 MG/DL (ref 65–100)
GLUCOSE SERPL-MCNC: 88 MG/DL (ref 65–100)
GLUCOSE SERPL-MCNC: 88 MG/DL (ref 65–100)
GLUCOSE SERPL-MCNC: 92 MG/DL (ref 65–100)
GLUCOSE SERPL-MCNC: 95 MG/DL (ref 65–100)
GLUCOSE SERPL-MCNC: 98 MG/DL (ref 65–100)
GRAM STN SPEC: NORMAL
GRAM STN SPEC: NORMAL
HBV CORE AB SERPL QL IA: NEGATIVE
HBV CORE IGM SERPL QL IA: NEGATIVE
HBV SURFACE AB SER QL: NONREACTIVE
HBV SURFACE AB SER-ACNC: <3.1 MIU/ML
HBV SURFACE AG SER QL: <0.1 INDEX
HBV SURFACE AG SER QL: NEGATIVE
HCO3 BLD-SCNC: 12.2 MMOL/L (ref 22–26)
HCO3 BLD-SCNC: 15.2 MMOL/L (ref 22–26)
HCO3 BLD-SCNC: 20.1 MMOL/L (ref 22–26)
HCO3 BLD-SCNC: 20.8 MMOL/L (ref 22–26)
HCO3 BLD-SCNC: 21.9 MMOL/L (ref 22–26)
HCO3 BLD-SCNC: 22.4 MMOL/L (ref 22–26)
HCO3 BLD-SCNC: 22.9 MMOL/L (ref 22–26)
HCO3 BLD-SCNC: 24.6 MMOL/L (ref 22–26)
HCO3 BLD-SCNC: 27.2 MMOL/L (ref 22–26)
HCO3 BLD-SCNC: 9.9 MMOL/L (ref 22–26)
HCT VFR BLD AUTO: 15.6 % (ref 36.6–50.3)
HCT VFR BLD AUTO: 19 % (ref 36.6–50.3)
HCT VFR BLD AUTO: 19 % (ref 36.6–50.3)
HCT VFR BLD AUTO: 20.2 % (ref 36.6–50.3)
HCT VFR BLD AUTO: 20.6 % (ref 36.6–50.3)
HCT VFR BLD AUTO: 20.7 % (ref 36.6–50.3)
HCT VFR BLD AUTO: 20.9 % (ref 36.6–50.3)
HCT VFR BLD AUTO: 21.7 % (ref 36.6–50.3)
HCT VFR BLD AUTO: 21.8 % (ref 36.6–50.3)
HCT VFR BLD AUTO: 21.9 % (ref 36.6–50.3)
HCT VFR BLD AUTO: 21.9 % (ref 36.6–50.3)
HCT VFR BLD AUTO: 22.3 % (ref 36.6–50.3)
HCT VFR BLD AUTO: 22.3 % (ref 36.6–50.3)
HCT VFR BLD AUTO: 22.6 % (ref 36.6–50.3)
HCT VFR BLD AUTO: 22.8 % (ref 36.6–50.3)
HCT VFR BLD AUTO: 23 % (ref 36.6–50.3)
HCT VFR BLD AUTO: 23.1 % (ref 36.6–50.3)
HCT VFR BLD AUTO: 23.1 % (ref 36.6–50.3)
HCT VFR BLD AUTO: 23.2 % (ref 36.6–50.3)
HCT VFR BLD AUTO: 23.3 % (ref 36.6–50.3)
HCT VFR BLD AUTO: 23.4 % (ref 36.6–50.3)
HCT VFR BLD AUTO: 23.7 % (ref 36.6–50.3)
HCT VFR BLD AUTO: 23.7 % (ref 36.6–50.3)
HCT VFR BLD AUTO: 23.8 % (ref 36.6–50.3)
HCT VFR BLD AUTO: 23.9 % (ref 36.6–50.3)
HCT VFR BLD AUTO: 24 % (ref 36.6–50.3)
HCT VFR BLD AUTO: 24.5 % (ref 36.6–50.3)
HCT VFR BLD AUTO: 24.9 % (ref 36.6–50.3)
HCT VFR BLD AUTO: 25.6 % (ref 36.6–50.3)
HCT VFR BLD AUTO: 25.8 % (ref 36.6–50.3)
HCT VFR BLD AUTO: 26.1 % (ref 36.6–50.3)
HCT VFR BLD AUTO: 26.2 % (ref 36.6–50.3)
HCT VFR BLD AUTO: 26.7 % (ref 36.6–50.3)
HCT VFR BLD AUTO: 26.7 % (ref 36.6–50.3)
HCT VFR BLD AUTO: 26.8 % (ref 36.6–50.3)
HCT VFR BLD AUTO: 26.9 % (ref 36.6–50.3)
HCT VFR BLD AUTO: 27 % (ref 36.6–50.3)
HCT VFR BLD AUTO: 27 % (ref 36.6–50.3)
HCT VFR BLD AUTO: 27.2 % (ref 36.6–50.3)
HCT VFR BLD AUTO: 27.2 % (ref 36.6–50.3)
HCT VFR BLD AUTO: 27.4 % (ref 36.6–50.3)
HCT VFR BLD AUTO: 27.4 % (ref 36.6–50.3)
HCT VFR BLD AUTO: 27.5 % (ref 36.6–50.3)
HCT VFR BLD AUTO: 27.8 % (ref 36.6–50.3)
HCT VFR BLD AUTO: 28.1 % (ref 36.6–50.3)
HCT VFR BLD AUTO: 28.6 % (ref 36.6–50.3)
HCT VFR BLD AUTO: 32.6 % (ref 36.6–50.3)
HCT VFR BLD AUTO: 39.9 % (ref 36.6–50.3)
HCT VFR BLD AUTO: 40.7 % (ref 36.6–50.3)
HCT VFR BLD AUTO: 43.6 % (ref 36.6–50.3)
HCT VFR BLD AUTO: 45.6 % (ref 36.6–50.3)
HCT VFR BLD AUTO: 46.4 % (ref 36.6–50.3)
HCT VFR BLD CALC: 16 % (ref 36.6–50.3)
HDLC SERPL-MCNC: 56 MG/DL
HDLC SERPL: 2.4 {RATIO} (ref 0–5)
HEMOCCULT STL QL: POSITIVE
HGB BLD-MCNC: 10.7 G/DL (ref 12.1–17)
HGB BLD-MCNC: 12.4 G/DL (ref 12.1–17)
HGB BLD-MCNC: 12.8 G/DL (ref 12.1–17)
HGB BLD-MCNC: 13.7 G/DL (ref 12.1–17)
HGB BLD-MCNC: 14.3 G/DL (ref 12.1–17)
HGB BLD-MCNC: 14.7 G/DL (ref 12.1–17)
HGB BLD-MCNC: 4.7 G/DL (ref 12.1–17)
HGB BLD-MCNC: 5.6 G/DL (ref 12.1–17)
HGB BLD-MCNC: 5.7 G/DL (ref 12.1–17)
HGB BLD-MCNC: 6.5 G/DL (ref 12.1–17)
HGB BLD-MCNC: 6.7 G/DL (ref 12.1–17)
HGB BLD-MCNC: 6.9 G/DL (ref 12.1–17)
HGB BLD-MCNC: 7 G/DL (ref 12.1–17)
HGB BLD-MCNC: 7 G/DL (ref 12.1–17)
HGB BLD-MCNC: 7.1 G/DL (ref 12.1–17)
HGB BLD-MCNC: 7.1 G/DL (ref 12.1–17)
HGB BLD-MCNC: 7.2 G/DL (ref 12.1–17)
HGB BLD-MCNC: 7.2 G/DL (ref 12.1–17)
HGB BLD-MCNC: 7.3 G/DL (ref 12.1–17)
HGB BLD-MCNC: 7.3 G/DL (ref 12.1–17)
HGB BLD-MCNC: 7.4 G/DL (ref 12.1–17)
HGB BLD-MCNC: 7.4 G/DL (ref 12.1–17)
HGB BLD-MCNC: 7.5 G/DL (ref 12.1–17)
HGB BLD-MCNC: 7.6 G/DL (ref 12.1–17)
HGB BLD-MCNC: 7.7 G/DL (ref 12.1–17)
HGB BLD-MCNC: 7.8 G/DL (ref 12.1–17)
HGB BLD-MCNC: 7.9 G/DL (ref 12.1–17)
HGB BLD-MCNC: 8 G/DL (ref 12.1–17)
HGB BLD-MCNC: 8.1 G/DL (ref 12.1–17)
HGB BLD-MCNC: 8.1 G/DL (ref 12.1–17)
HGB BLD-MCNC: 8.2 G/DL (ref 12.1–17)
HGB BLD-MCNC: 8.3 G/DL (ref 12.1–17)
HGB BLD-MCNC: 8.3 G/DL (ref 12.1–17)
HGB BLD-MCNC: 8.4 G/DL (ref 12.1–17)
HGB BLD-MCNC: 8.4 G/DL (ref 12.1–17)
HGB BLD-MCNC: 8.5 G/DL (ref 12.1–17)
HGB BLD-MCNC: 8.6 G/DL (ref 12.1–17)
HGB BLD-MCNC: 8.7 G/DL (ref 12.1–17)
HGB BLD-MCNC: 9 G/DL (ref 12.1–17)
HGB BLD-MCNC: 9.2 G/DL (ref 12.1–17)
HGB BLD-MCNC: 9.8 G/DL (ref 12.1–17)
IMM GRANULOCYTES # BLD AUTO: 0 K/UL
IMM GRANULOCYTES # BLD AUTO: 0 K/UL (ref 0–0.04)
IMM GRANULOCYTES # BLD AUTO: 0.1 K/UL (ref 0–0.04)
IMM GRANULOCYTES # BLD AUTO: 0.1 K/UL (ref 0–0.04)
IMM GRANULOCYTES # BLD AUTO: 0.2 K/UL (ref 0–0.04)
IMM GRANULOCYTES # BLD AUTO: 0.3 K/UL (ref 0–0.04)
IMM GRANULOCYTES # BLD AUTO: 0.4 K/UL (ref 0–0.04)
IMM GRANULOCYTES # BLD AUTO: 0.4 K/UL (ref 0–0.04)
IMM GRANULOCYTES # BLD AUTO: 0.5 K/UL (ref 0–0.04)
IMM GRANULOCYTES # BLD AUTO: 0.5 K/UL (ref 0–0.04)
IMM GRANULOCYTES NFR BLD AUTO: 0 %
IMM GRANULOCYTES NFR BLD AUTO: 0 % (ref 0–0.5)
IMM GRANULOCYTES NFR BLD AUTO: 1 % (ref 0–0.5)
IMM GRANULOCYTES NFR BLD AUTO: 1 % (ref 0–0.5)
IMM GRANULOCYTES NFR BLD AUTO: 2 % (ref 0–0.5)
IMM GRANULOCYTES NFR BLD AUTO: 3 % (ref 0–0.5)
IMM GRANULOCYTES NFR BLD AUTO: 4 % (ref 0–0.5)
INR PPP: 1.1 (ref 0.9–1.1)
INR PPP: 1.1 (ref 0.9–1.1)
INR PPP: 1.2 (ref 0.9–1.1)
INR PPP: 1.6 (ref 0.9–1.1)
LACTATE SERPL-SCNC: 0.8 MMOL/L (ref 0.4–2)
LACTATE SERPL-SCNC: 1.7 MMOL/L (ref 0.4–2)
LACTATE SERPL-SCNC: 1.9 MMOL/L (ref 0.4–2)
LACTATE SERPL-SCNC: 3.6 MMOL/L (ref 0.4–2)
LACTATE SERPL-SCNC: 4.6 MMOL/L (ref 0.4–2)
LACTATE SERPL-SCNC: 6.2 MMOL/L (ref 0.4–2)
LACTATE SERPL-SCNC: 9.9 MMOL/L (ref 0.4–2)
LDH FLD L TO P-CCNC: 57 U/L
LDLC SERPL CALC-MCNC: 68 MG/DL (ref 0–100)
LIPASE SERPL-CCNC: 180 U/L (ref 73–393)
LIPID PROFILE,FLP: NORMAL
LYMPHOCYTES # BLD: 0.2 K/UL (ref 0.8–3.5)
LYMPHOCYTES # BLD: 0.3 K/UL (ref 0.8–3.5)
LYMPHOCYTES # BLD: 0.6 K/UL (ref 0.8–3.5)
LYMPHOCYTES # BLD: 0.7 K/UL (ref 0.8–3.5)
LYMPHOCYTES # BLD: 0.8 K/UL (ref 0.8–3.5)
LYMPHOCYTES # BLD: 0.8 K/UL (ref 0.8–3.5)
LYMPHOCYTES # BLD: 0.9 K/UL (ref 0.8–3.5)
LYMPHOCYTES # BLD: 0.9 K/UL (ref 0.8–3.5)
LYMPHOCYTES # BLD: 1 K/UL (ref 0.8–3.5)
LYMPHOCYTES # BLD: 1.1 K/UL (ref 0.8–3.5)
LYMPHOCYTES # BLD: 1.2 K/UL (ref 0.8–3.5)
LYMPHOCYTES # BLD: 1.3 K/UL (ref 0.8–3.5)
LYMPHOCYTES # BLD: 1.3 K/UL (ref 0.8–3.5)
LYMPHOCYTES # BLD: 1.4 K/UL (ref 0.8–3.5)
LYMPHOCYTES # BLD: 1.4 K/UL (ref 0.8–3.5)
LYMPHOCYTES # BLD: 1.5 K/UL (ref 0.8–3.5)
LYMPHOCYTES # BLD: 2 K/UL (ref 0.8–3.5)
LYMPHOCYTES # BLD: 2.3 K/UL (ref 0.8–3.5)
LYMPHOCYTES # BLD: 2.5 K/UL (ref 0.8–3.5)
LYMPHOCYTES # BLD: 2.5 K/UL (ref 0.8–3.5)
LYMPHOCYTES # BLD: 3.5 K/UL (ref 0.8–3.5)
LYMPHOCYTES # BLD: 3.6 K/UL (ref 0.8–3.5)
LYMPHOCYTES # BLD: 5.4 K/UL (ref 0.8–3.5)
LYMPHOCYTES NFR BLD: 1 % (ref 12–49)
LYMPHOCYTES NFR BLD: 10 % (ref 12–49)
LYMPHOCYTES NFR BLD: 12 % (ref 12–49)
LYMPHOCYTES NFR BLD: 13 % (ref 12–49)
LYMPHOCYTES NFR BLD: 14 % (ref 12–49)
LYMPHOCYTES NFR BLD: 16 % (ref 12–49)
LYMPHOCYTES NFR BLD: 16 % (ref 12–49)
LYMPHOCYTES NFR BLD: 17 % (ref 12–49)
LYMPHOCYTES NFR BLD: 20 % (ref 12–49)
LYMPHOCYTES NFR BLD: 25 % (ref 12–49)
LYMPHOCYTES NFR BLD: 37 % (ref 12–49)
LYMPHOCYTES NFR BLD: 4 % (ref 12–49)
LYMPHOCYTES NFR BLD: 4 % (ref 12–49)
LYMPHOCYTES NFR BLD: 5 % (ref 12–49)
LYMPHOCYTES NFR BLD: 6 % (ref 12–49)
LYMPHOCYTES NFR BLD: 7 % (ref 12–49)
LYMPHOCYTES NFR BLD: 89 % (ref 12–49)
LYMPHOCYTES NFR BLD: 9 % (ref 12–49)
LYMPHOCYTES NFR FLD: 55 %
MAGNESIUM SERPL-MCNC: 1.7 MG/DL (ref 1.6–2.4)
MAGNESIUM SERPL-MCNC: 1.8 MG/DL (ref 1.6–2.4)
MAGNESIUM SERPL-MCNC: 1.9 MG/DL (ref 1.6–2.4)
MAGNESIUM SERPL-MCNC: 2 MG/DL (ref 1.6–2.4)
MAGNESIUM SERPL-MCNC: 2 MG/DL (ref 1.6–2.4)
MAGNESIUM SERPL-MCNC: 2.1 MG/DL (ref 1.6–2.4)
MAGNESIUM SERPL-MCNC: 2.2 MG/DL (ref 1.6–2.4)
MAGNESIUM SERPL-MCNC: 2.2 MG/DL (ref 1.6–2.4)
MAGNESIUM SERPL-MCNC: 2.3 MG/DL (ref 1.6–2.4)
MAGNESIUM SERPL-MCNC: 2.3 MG/DL (ref 1.6–2.4)
MAGNESIUM SERPL-MCNC: 2.4 MG/DL (ref 1.6–2.4)
MAGNESIUM SERPL-MCNC: 2.5 MG/DL (ref 1.6–2.4)
MAGNESIUM SERPL-MCNC: 2.5 MG/DL (ref 1.6–2.4)
MAGNESIUM SERPL-MCNC: 2.6 MG/DL (ref 1.6–2.4)
MAGNESIUM SERPL-MCNC: 2.7 MG/DL (ref 1.6–2.4)
MAGNESIUM SERPL-MCNC: 2.7 MG/DL (ref 1.6–2.4)
MCH RBC QN AUTO: 25.8 PG (ref 26–34)
MCH RBC QN AUTO: 25.9 PG (ref 26–34)
MCH RBC QN AUTO: 26 PG (ref 26–34)
MCH RBC QN AUTO: 26 PG (ref 26–34)
MCH RBC QN AUTO: 26.2 PG (ref 26–34)
MCH RBC QN AUTO: 26.3 PG (ref 26–34)
MCH RBC QN AUTO: 26.4 PG (ref 26–34)
MCH RBC QN AUTO: 27.1 PG (ref 26–34)
MCH RBC QN AUTO: 27.3 PG (ref 26–34)
MCH RBC QN AUTO: 27.4 PG (ref 26–34)
MCH RBC QN AUTO: 27.5 PG (ref 26–34)
MCH RBC QN AUTO: 27.6 PG (ref 26–34)
MCH RBC QN AUTO: 27.7 PG (ref 26–34)
MCH RBC QN AUTO: 27.8 PG (ref 26–34)
MCH RBC QN AUTO: 27.9 PG (ref 26–34)
MCH RBC QN AUTO: 27.9 PG (ref 26–34)
MCH RBC QN AUTO: 28.2 PG (ref 26–34)
MCH RBC QN AUTO: 28.3 PG (ref 26–34)
MCH RBC QN AUTO: 28.4 PG (ref 26–34)
MCH RBC QN AUTO: 28.5 PG (ref 26–34)
MCH RBC QN AUTO: 28.6 PG (ref 26–34)
MCH RBC QN AUTO: 28.7 PG (ref 26–34)
MCH RBC QN AUTO: 28.7 PG (ref 26–34)
MCH RBC QN AUTO: 28.8 PG (ref 26–34)
MCH RBC QN AUTO: 29.7 PG (ref 26–34)
MCH RBC QN AUTO: 29.8 PG (ref 26–34)
MCH RBC QN AUTO: 29.8 PG (ref 26–34)
MCH RBC QN AUTO: 29.9 PG (ref 26–34)
MCHC RBC AUTO-ENTMCNC: 29.3 G/DL (ref 30–36.5)
MCHC RBC AUTO-ENTMCNC: 29.5 G/DL (ref 30–36.5)
MCHC RBC AUTO-ENTMCNC: 30.1 G/DL (ref 30–36.5)
MCHC RBC AUTO-ENTMCNC: 30.1 G/DL (ref 30–36.5)
MCHC RBC AUTO-ENTMCNC: 30.4 G/DL (ref 30–36.5)
MCHC RBC AUTO-ENTMCNC: 30.5 G/DL (ref 30–36.5)
MCHC RBC AUTO-ENTMCNC: 30.6 G/DL (ref 30–36.5)
MCHC RBC AUTO-ENTMCNC: 30.6 G/DL (ref 30–36.5)
MCHC RBC AUTO-ENTMCNC: 30.7 G/DL (ref 30–36.5)
MCHC RBC AUTO-ENTMCNC: 30.9 G/DL (ref 30–36.5)
MCHC RBC AUTO-ENTMCNC: 31 G/DL (ref 30–36.5)
MCHC RBC AUTO-ENTMCNC: 31 G/DL (ref 30–36.5)
MCHC RBC AUTO-ENTMCNC: 31.1 G/DL (ref 30–36.5)
MCHC RBC AUTO-ENTMCNC: 31.2 G/DL (ref 30–36.5)
MCHC RBC AUTO-ENTMCNC: 31.4 G/DL (ref 30–36.5)
MCHC RBC AUTO-ENTMCNC: 31.6 G/DL (ref 30–36.5)
MCHC RBC AUTO-ENTMCNC: 31.6 G/DL (ref 30–36.5)
MCHC RBC AUTO-ENTMCNC: 31.7 G/DL (ref 30–36.5)
MCHC RBC AUTO-ENTMCNC: 32 G/DL (ref 30–36.5)
MCHC RBC AUTO-ENTMCNC: 32.2 G/DL (ref 30–36.5)
MCHC RBC AUTO-ENTMCNC: 32.2 G/DL (ref 30–36.5)
MCHC RBC AUTO-ENTMCNC: 32.4 G/DL (ref 30–36.5)
MCHC RBC AUTO-ENTMCNC: 32.5 G/DL (ref 30–36.5)
MCHC RBC AUTO-ENTMCNC: 32.5 G/DL (ref 30–36.5)
MCHC RBC AUTO-ENTMCNC: 32.9 G/DL (ref 30–36.5)
MCHC RBC AUTO-ENTMCNC: 32.9 G/DL (ref 30–36.5)
MCHC RBC AUTO-ENTMCNC: 33.1 G/DL (ref 30–36.5)
MCHC RBC AUTO-ENTMCNC: 33.3 G/DL (ref 30–36.5)
MCHC RBC AUTO-ENTMCNC: 33.5 G/DL (ref 30–36.5)
MCHC RBC AUTO-ENTMCNC: 33.7 G/DL (ref 30–36.5)
MCHC RBC AUTO-ENTMCNC: 34 G/DL (ref 30–36.5)
MCHC RBC AUTO-ENTMCNC: 34.1 G/DL (ref 30–36.5)
MCHC RBC AUTO-ENTMCNC: 34.2 G/DL (ref 30–36.5)
MCHC RBC AUTO-ENTMCNC: 34.6 G/DL (ref 30–36.5)
MCV RBC AUTO: 80.5 FL (ref 80–99)
MCV RBC AUTO: 81 FL (ref 80–99)
MCV RBC AUTO: 82.1 FL (ref 80–99)
MCV RBC AUTO: 82.3 FL (ref 80–99)
MCV RBC AUTO: 83 FL (ref 80–99)
MCV RBC AUTO: 83.3 FL (ref 80–99)
MCV RBC AUTO: 83.7 FL (ref 80–99)
MCV RBC AUTO: 83.8 FL (ref 80–99)
MCV RBC AUTO: 83.9 FL (ref 80–99)
MCV RBC AUTO: 84.6 FL (ref 80–99)
MCV RBC AUTO: 85.4 FL (ref 80–99)
MCV RBC AUTO: 85.9 FL (ref 80–99)
MCV RBC AUTO: 86.1 FL (ref 80–99)
MCV RBC AUTO: 86.2 FL (ref 80–99)
MCV RBC AUTO: 88 FL (ref 80–99)
MCV RBC AUTO: 88.1 FL (ref 80–99)
MCV RBC AUTO: 88.4 FL (ref 80–99)
MCV RBC AUTO: 88.4 FL (ref 80–99)
MCV RBC AUTO: 88.7 FL (ref 80–99)
MCV RBC AUTO: 88.8 FL (ref 80–99)
MCV RBC AUTO: 89 FL (ref 80–99)
MCV RBC AUTO: 89.1 FL (ref 80–99)
MCV RBC AUTO: 89.3 FL (ref 80–99)
MCV RBC AUTO: 89.4 FL (ref 80–99)
MCV RBC AUTO: 89.5 FL (ref 80–99)
MCV RBC AUTO: 89.5 FL (ref 80–99)
MCV RBC AUTO: 89.7 FL (ref 80–99)
MCV RBC AUTO: 89.8 FL (ref 80–99)
MCV RBC AUTO: 89.8 FL (ref 80–99)
MCV RBC AUTO: 89.9 FL (ref 80–99)
MCV RBC AUTO: 89.9 FL (ref 80–99)
MCV RBC AUTO: 90 FL (ref 80–99)
MCV RBC AUTO: 90.1 FL (ref 80–99)
MCV RBC AUTO: 90.2 FL (ref 80–99)
MCV RBC AUTO: 90.3 FL (ref 80–99)
MCV RBC AUTO: 90.7 FL (ref 80–99)
MCV RBC AUTO: 90.8 FL (ref 80–99)
MCV RBC AUTO: 91.6 FL (ref 80–99)
MCV RBC AUTO: 92.8 FL (ref 80–99)
MCV RBC AUTO: 95.7 FL (ref 80–99)
MESOTHL CELL NFR FLD: 4 %
METAMYELOCYTES NFR BLD MANUAL: 1 %
METAMYELOCYTES NFR BLD MANUAL: 2 %
METAMYELOCYTES NFR BLD MANUAL: 2 %
METAMYELOCYTES NFR BLD MANUAL: 3 %
MONOCYTES # BLD: 0.1 K/UL (ref 0–1)
MONOCYTES # BLD: 0.1 K/UL (ref 0–1)
MONOCYTES # BLD: 0.3 K/UL (ref 0–1)
MONOCYTES # BLD: 0.5 K/UL (ref 0–1)
MONOCYTES # BLD: 0.6 K/UL (ref 0–1)
MONOCYTES # BLD: 0.6 K/UL (ref 0–1)
MONOCYTES # BLD: 0.8 K/UL (ref 0–1)
MONOCYTES # BLD: 0.9 K/UL (ref 0–1)
MONOCYTES # BLD: 1 K/UL (ref 0–1)
MONOCYTES # BLD: 1.1 K/UL (ref 0–1)
MONOCYTES # BLD: 1.2 K/UL (ref 0–1)
MONOCYTES # BLD: 1.3 K/UL (ref 0–1)
MONOCYTES # BLD: 1.4 K/UL (ref 0–1)
MONOCYTES # BLD: 1.7 K/UL (ref 0–1)
MONOCYTES # BLD: 1.7 K/UL (ref 0–1)
MONOCYTES # BLD: 3.5 K/UL (ref 0–1)
MONOCYTES NFR BLD: 1 % (ref 5–13)
MONOCYTES NFR BLD: 10 % (ref 5–13)
MONOCYTES NFR BLD: 11 % (ref 5–13)
MONOCYTES NFR BLD: 12 % (ref 5–13)
MONOCYTES NFR BLD: 13 % (ref 5–13)
MONOCYTES NFR BLD: 14 % (ref 5–13)
MONOCYTES NFR BLD: 16 % (ref 5–13)
MONOCYTES NFR BLD: 16 % (ref 5–13)
MONOCYTES NFR BLD: 2 % (ref 5–13)
MONOCYTES NFR BLD: 2 % (ref 5–13)
MONOCYTES NFR BLD: 3 % (ref 5–13)
MONOCYTES NFR BLD: 4 % (ref 5–13)
MONOCYTES NFR BLD: 5 % (ref 5–13)
MONOCYTES NFR BLD: 6 % (ref 5–13)
MONOCYTES NFR BLD: 6 % (ref 5–13)
MONOCYTES NFR BLD: 7 % (ref 5–13)
MONOCYTES NFR BLD: 8 % (ref 5–13)
MONOCYTES NFR BLD: 8 % (ref 5–13)
MONOCYTES NFR BLD: 9 % (ref 5–13)
MONOS+MACROS NFR FLD: 29 %
MYELOCYTES NFR BLD MANUAL: 1 %
MYELOCYTES NFR BLD MANUAL: 2 %
MYELOCYTES NFR BLD MANUAL: 3 %
NEUTROPHILS NFR FLD: 12 %
NEUTS BAND NFR BLD MANUAL: 1 % (ref 0–6)
NEUTS BAND NFR BLD MANUAL: 1 % (ref 0–6)
NEUTS BAND NFR BLD MANUAL: 2 % (ref 0–6)
NEUTS BAND NFR BLD MANUAL: 3 % (ref 0–6)
NEUTS BAND NFR BLD MANUAL: 5 % (ref 0–6)
NEUTS SEG # BLD: 0.5 K/UL (ref 1.8–8)
NEUTS SEG # BLD: 10.7 K/UL (ref 1.8–8)
NEUTS SEG # BLD: 11.3 K/UL (ref 1.8–8)
NEUTS SEG # BLD: 11.9 K/UL (ref 1.8–8)
NEUTS SEG # BLD: 12.2 K/UL (ref 1.8–8)
NEUTS SEG # BLD: 12.3 K/UL (ref 1.8–8)
NEUTS SEG # BLD: 12.5 K/UL (ref 1.8–8)
NEUTS SEG # BLD: 14.4 K/UL (ref 1.8–8)
NEUTS SEG # BLD: 15.1 K/UL (ref 1.8–8)
NEUTS SEG # BLD: 15.3 K/UL (ref 1.8–8)
NEUTS SEG # BLD: 18.4 K/UL (ref 1.8–8)
NEUTS SEG # BLD: 21.9 K/UL (ref 1.8–8)
NEUTS SEG # BLD: 22.2 K/UL (ref 1.8–8)
NEUTS SEG # BLD: 22.4 K/UL (ref 1.8–8)
NEUTS SEG # BLD: 3.4 K/UL (ref 1.8–8)
NEUTS SEG # BLD: 3.6 K/UL (ref 1.8–8)
NEUTS SEG # BLD: 3.7 K/UL (ref 1.8–8)
NEUTS SEG # BLD: 3.9 K/UL (ref 1.8–8)
NEUTS SEG # BLD: 4 K/UL (ref 1.8–8)
NEUTS SEG # BLD: 5 K/UL (ref 1.8–8)
NEUTS SEG # BLD: 5.6 K/UL (ref 1.8–8)
NEUTS SEG # BLD: 6.4 K/UL (ref 1.8–8)
NEUTS SEG # BLD: 7.1 K/UL (ref 1.8–8)
NEUTS SEG # BLD: 7.3 K/UL (ref 1.8–8)
NEUTS SEG # BLD: 7.4 K/UL (ref 1.8–8)
NEUTS SEG # BLD: 8.1 K/UL (ref 1.8–8)
NEUTS SEG # BLD: 8.6 K/UL (ref 1.8–8)
NEUTS SEG # BLD: 8.6 K/UL (ref 1.8–8)
NEUTS SEG # BLD: 9.3 K/UL (ref 1.8–8)
NEUTS SEG # BLD: 9.4 K/UL (ref 1.8–8)
NEUTS SEG # BLD: 9.4 K/UL (ref 1.8–8)
NEUTS SEG # BLD: 9.9 K/UL (ref 1.8–8)
NEUTS SEG NFR BLD: 39 % (ref 32–75)
NEUTS SEG NFR BLD: 41 % (ref 32–75)
NEUTS SEG NFR BLD: 43 % (ref 32–75)
NEUTS SEG NFR BLD: 48 % (ref 32–75)
NEUTS SEG NFR BLD: 49 % (ref 32–75)
NEUTS SEG NFR BLD: 52 % (ref 32–75)
NEUTS SEG NFR BLD: 58 % (ref 32–75)
NEUTS SEG NFR BLD: 60 % (ref 32–75)
NEUTS SEG NFR BLD: 65 % (ref 32–75)
NEUTS SEG NFR BLD: 65 % (ref 32–75)
NEUTS SEG NFR BLD: 67 % (ref 32–75)
NEUTS SEG NFR BLD: 68 % (ref 32–75)
NEUTS SEG NFR BLD: 69 % (ref 32–75)
NEUTS SEG NFR BLD: 7 % (ref 32–75)
NEUTS SEG NFR BLD: 71 % (ref 32–75)
NEUTS SEG NFR BLD: 72 % (ref 32–75)
NEUTS SEG NFR BLD: 73 % (ref 32–75)
NEUTS SEG NFR BLD: 73 % (ref 32–75)
NEUTS SEG NFR BLD: 74 % (ref 32–75)
NEUTS SEG NFR BLD: 75 % (ref 32–75)
NEUTS SEG NFR BLD: 76 % (ref 32–75)
NEUTS SEG NFR BLD: 77 % (ref 32–75)
NEUTS SEG NFR BLD: 78 % (ref 32–75)
NEUTS SEG NFR BLD: 79 % (ref 32–75)
NEUTS SEG NFR BLD: 79 % (ref 32–75)
NEUTS SEG NFR BLD: 85 % (ref 32–75)
NEUTS SEG NFR BLD: 88 % (ref 32–75)
NRBC # BLD: 0 K/UL (ref 0–0.01)
NRBC # BLD: 0.02 K/UL (ref 0–0.01)
NRBC # BLD: 0.04 K/UL (ref 0–0.01)
NRBC # BLD: 0.04 K/UL (ref 0–0.01)
NRBC # BLD: 0.05 K/UL (ref 0–0.01)
NRBC # BLD: 0.06 K/UL (ref 0–0.01)
NRBC # BLD: 0.07 K/UL (ref 0–0.01)
NRBC # BLD: 0.12 K/UL (ref 0–0.01)
NRBC # BLD: 0.13 K/UL (ref 0–0.01)
NRBC # BLD: 0.14 K/UL (ref 0–0.01)
NRBC # BLD: 0.25 K/UL (ref 0–0.01)
NRBC # BLD: 0.26 K/UL (ref 0–0.01)
NRBC # BLD: 0.29 K/UL (ref 0–0.01)
NRBC # BLD: 0.48 K/UL (ref 0–0.01)
NRBC # BLD: 0.51 K/UL (ref 0–0.01)
NRBC # BLD: 0.51 K/UL (ref 0–0.01)
NRBC # BLD: 0.55 K/UL (ref 0–0.01)
NRBC BLD-RTO: 0 PER 100 WBC
NRBC BLD-RTO: 0.1 PER 100 WBC
NRBC BLD-RTO: 0.2 PER 100 WBC
NRBC BLD-RTO: 0.2 PER 100 WBC
NRBC BLD-RTO: 0.3 PER 100 WBC
NRBC BLD-RTO: 0.3 PER 100 WBC
NRBC BLD-RTO: 0.4 PER 100 WBC
NRBC BLD-RTO: 0.5 PER 100 WBC
NRBC BLD-RTO: 0.7 PER 100 WBC
NRBC BLD-RTO: 0.8 PER 100 WBC
NRBC BLD-RTO: 0.8 PER 100 WBC
NRBC BLD-RTO: 1 PER 100 WBC
NRBC BLD-RTO: 1.2 PER 100 WBC
NRBC BLD-RTO: 1.9 PER 100 WBC
NRBC BLD-RTO: 2.1 PER 100 WBC
NRBC BLD-RTO: 2.5 PER 100 WBC
NRBC BLD-RTO: 2.6 PER 100 WBC
NRBC BLD-RTO: 4.8 PER 100 WBC
NUC CELL # FLD: 182 /CU MM
O2/TOTAL GAS SETTING VFR VENT: 0.3 %
O2/TOTAL GAS SETTING VFR VENT: 100 %
O2/TOTAL GAS SETTING VFR VENT: 30 %
O2/TOTAL GAS SETTING VFR VENT: 40 %
O2/TOTAL GAS SETTING VFR VENT: 50 %
O2/TOTAL GAS SETTING VFR VENT: 65 %
P-R INTERVAL, ECG05: 134 MS
PCO2 BLD: 30.2 MMHG (ref 35–45)
PCO2 BLD: 30.8 MMHG (ref 35–45)
PCO2 BLD: 32.9 MMHG (ref 35–45)
PCO2 BLD: 33.4 MMHG (ref 35–45)
PCO2 BLD: 35.1 MMHG (ref 35–45)
PCO2 BLD: 36 MMHG (ref 35–45)
PCO2 BLD: 38.8 MMHG (ref 35–45)
PCO2 BLD: 43.5 MMHG (ref 35–45)
PCO2 BLD: 46.2 MMHG (ref 35–45)
PCO2 BLD: 50.5 MMHG (ref 35–45)
PEEP RESPIRATORY: 4 CMH2O
PEEP RESPIRATORY: 5 CMH2O
PEEP RESPIRATORY: 8 CMH2O
PH BLD: 7.12 [PH] (ref 7.35–7.45)
PH BLD: 7.21 [PH] (ref 7.35–7.45)
PH BLD: 7.22 [PH] (ref 7.35–7.45)
PH BLD: 7.27 [PH] (ref 7.35–7.45)
PH BLD: 7.33 [PH] (ref 7.35–7.45)
PH BLD: 7.36 [PH] (ref 7.35–7.45)
PH BLD: 7.37 [PH] (ref 7.35–7.45)
PH BLD: 7.38 [PH] (ref 7.35–7.45)
PH BLD: 7.43 [PH] (ref 7.35–7.45)
PH BLD: 7.44 [PH] (ref 7.35–7.45)
PHOSPHATE SERPL-MCNC: 1.9 MG/DL (ref 2.6–4.7)
PHOSPHATE SERPL-MCNC: 2 MG/DL (ref 2.6–4.7)
PHOSPHATE SERPL-MCNC: 2 MG/DL (ref 2.6–4.7)
PHOSPHATE SERPL-MCNC: 2.2 MG/DL (ref 2.6–4.7)
PHOSPHATE SERPL-MCNC: 2.4 MG/DL (ref 2.6–4.7)
PHOSPHATE SERPL-MCNC: 2.4 MG/DL (ref 2.6–4.7)
PHOSPHATE SERPL-MCNC: 2.5 MG/DL (ref 2.6–4.7)
PHOSPHATE SERPL-MCNC: 2.5 MG/DL (ref 2.6–4.7)
PHOSPHATE SERPL-MCNC: 2.6 MG/DL (ref 2.6–4.7)
PHOSPHATE SERPL-MCNC: 2.7 MG/DL (ref 2.6–4.7)
PHOSPHATE SERPL-MCNC: 2.9 MG/DL (ref 2.6–4.7)
PHOSPHATE SERPL-MCNC: 3.1 MG/DL (ref 2.6–4.7)
PHOSPHATE SERPL-MCNC: 3.2 MG/DL (ref 2.6–4.7)
PHOSPHATE SERPL-MCNC: 3.3 MG/DL (ref 2.6–4.7)
PHOSPHATE SERPL-MCNC: 3.4 MG/DL (ref 2.6–4.7)
PHOSPHATE SERPL-MCNC: 3.5 MG/DL (ref 2.6–4.7)
PHOSPHATE SERPL-MCNC: 3.6 MG/DL (ref 2.6–4.7)
PHOSPHATE SERPL-MCNC: 3.7 MG/DL (ref 2.6–4.7)
PHOSPHATE SERPL-MCNC: 3.9 MG/DL (ref 2.6–4.7)
PHOSPHATE SERPL-MCNC: 3.9 MG/DL (ref 2.6–4.7)
PHOSPHATE SERPL-MCNC: 4 MG/DL (ref 2.6–4.7)
PHOSPHATE SERPL-MCNC: 4 MG/DL (ref 2.6–4.7)
PHOSPHATE SERPL-MCNC: 4.3 MG/DL (ref 2.6–4.7)
PHOSPHATE SERPL-MCNC: 5.1 MG/DL (ref 2.6–4.7)
PHOSPHATE SERPL-MCNC: 5.2 MG/DL (ref 2.6–4.7)
PHOSPHATE SERPL-MCNC: 5.7 MG/DL (ref 2.6–4.7)
PHOSPHATE SERPL-MCNC: 7.7 MG/DL (ref 2.6–4.7)
PLATELET # BLD AUTO: 100 K/UL (ref 150–400)
PLATELET # BLD AUTO: 103 K/UL (ref 150–400)
PLATELET # BLD AUTO: 110 K/UL (ref 150–400)
PLATELET # BLD AUTO: 114 K/UL (ref 150–400)
PLATELET # BLD AUTO: 114 K/UL (ref 150–400)
PLATELET # BLD AUTO: 119 K/UL (ref 150–400)
PLATELET # BLD AUTO: 123 K/UL (ref 150–400)
PLATELET # BLD AUTO: 129 K/UL (ref 150–400)
PLATELET # BLD AUTO: 134 K/UL (ref 150–400)
PLATELET # BLD AUTO: 143 K/UL (ref 150–400)
PLATELET # BLD AUTO: 146 K/UL (ref 150–400)
PLATELET # BLD AUTO: 153 K/UL (ref 150–400)
PLATELET # BLD AUTO: 154 K/UL (ref 150–400)
PLATELET # BLD AUTO: 169 K/UL (ref 150–400)
PLATELET # BLD AUTO: 169 K/UL (ref 150–400)
PLATELET # BLD AUTO: 175 K/UL (ref 150–400)
PLATELET # BLD AUTO: 184 K/UL (ref 150–400)
PLATELET # BLD AUTO: 184 K/UL (ref 150–400)
PLATELET # BLD AUTO: 187 K/UL (ref 150–400)
PLATELET # BLD AUTO: 190 K/UL (ref 150–400)
PLATELET # BLD AUTO: 193 K/UL (ref 150–400)
PLATELET # BLD AUTO: 205 K/UL (ref 150–400)
PLATELET # BLD AUTO: 209 K/UL (ref 150–400)
PLATELET # BLD AUTO: 220 K/UL (ref 150–400)
PLATELET # BLD AUTO: 232 K/UL (ref 150–400)
PLATELET # BLD AUTO: 242 K/UL (ref 150–400)
PLATELET # BLD AUTO: 245 K/UL (ref 150–400)
PLATELET # BLD AUTO: 268 K/UL (ref 150–400)
PLATELET # BLD AUTO: 271 K/UL (ref 150–400)
PLATELET # BLD AUTO: 271 K/UL (ref 150–400)
PLATELET # BLD AUTO: 275 K/UL (ref 150–400)
PLATELET # BLD AUTO: 284 K/UL (ref 150–400)
PLATELET # BLD AUTO: 288 K/UL (ref 150–400)
PLATELET # BLD AUTO: 288 K/UL (ref 150–400)
PLATELET # BLD AUTO: 289 K/UL (ref 150–400)
PLATELET # BLD AUTO: 299 K/UL (ref 150–400)
PLATELET # BLD AUTO: 300 K/UL (ref 150–400)
PLATELET # BLD AUTO: 311 K/UL (ref 150–400)
PLATELET # BLD AUTO: 48 K/UL (ref 150–400)
PLATELET # BLD AUTO: 72 K/UL (ref 150–400)
PLATELET # BLD AUTO: 91 K/UL (ref 150–400)
PLATELET # BLD AUTO: 91 K/UL (ref 150–400)
PLATELET COMMENTS,PCOM: ABNORMAL
PMV BLD AUTO: 10 FL (ref 8.9–12.9)
PMV BLD AUTO: 10 FL (ref 8.9–12.9)
PMV BLD AUTO: 10.1 FL (ref 8.9–12.9)
PMV BLD AUTO: 10.2 FL (ref 8.9–12.9)
PMV BLD AUTO: 10.3 FL (ref 8.9–12.9)
PMV BLD AUTO: 10.3 FL (ref 8.9–12.9)
PMV BLD AUTO: 10.4 FL (ref 8.9–12.9)
PMV BLD AUTO: 10.5 FL (ref 8.9–12.9)
PMV BLD AUTO: 10.6 FL (ref 8.9–12.9)
PMV BLD AUTO: 10.7 FL (ref 8.9–12.9)
PMV BLD AUTO: 10.8 FL (ref 8.9–12.9)
PMV BLD AUTO: 10.9 FL (ref 8.9–12.9)
PMV BLD AUTO: 11 FL (ref 8.9–12.9)
PMV BLD AUTO: 11.1 FL (ref 8.9–12.9)
PMV BLD AUTO: 11.4 FL (ref 8.9–12.9)
PMV BLD AUTO: 11.6 FL (ref 8.9–12.9)
PMV BLD AUTO: 11.9 FL (ref 8.9–12.9)
PMV BLD AUTO: 9.5 FL (ref 8.9–12.9)
PMV BLD AUTO: 9.6 FL (ref 8.9–12.9)
PMV BLD AUTO: 9.6 FL (ref 8.9–12.9)
PMV BLD AUTO: 9.7 FL (ref 8.9–12.9)
PMV BLD AUTO: 9.9 FL (ref 8.9–12.9)
PMV BLD AUTO: 9.9 FL (ref 8.9–12.9)
PO2 BLD: 140 MMHG (ref 80–100)
PO2 BLD: 155 MMHG (ref 80–100)
PO2 BLD: 165 MMHG (ref 80–100)
PO2 BLD: 170 MMHG (ref 80–100)
PO2 BLD: 207 MMHG (ref 80–100)
PO2 BLD: 235 MMHG (ref 80–100)
PO2 BLD: 253 MMHG (ref 80–100)
PO2 BLD: 268 MMHG (ref 80–100)
PO2 BLD: 349 MMHG (ref 80–100)
PO2 BLD: 474 MMHG (ref 80–100)
POTASSIUM BLD-SCNC: 3.3 MMOL/L (ref 3.5–5.1)
POTASSIUM SERPL-SCNC: 3.3 MMOL/L (ref 3.5–5.1)
POTASSIUM SERPL-SCNC: 3.4 MMOL/L (ref 3.5–5.1)
POTASSIUM SERPL-SCNC: 3.4 MMOL/L (ref 3.5–5.1)
POTASSIUM SERPL-SCNC: 3.5 MMOL/L (ref 3.5–5.1)
POTASSIUM SERPL-SCNC: 3.7 MMOL/L (ref 3.5–5.1)
POTASSIUM SERPL-SCNC: 3.8 MMOL/L (ref 3.5–5.1)
POTASSIUM SERPL-SCNC: 3.9 MMOL/L (ref 3.5–5.1)
POTASSIUM SERPL-SCNC: 3.9 MMOL/L (ref 3.5–5.1)
POTASSIUM SERPL-SCNC: 4 MMOL/L (ref 3.5–5.1)
POTASSIUM SERPL-SCNC: 4.1 MMOL/L (ref 3.5–5.1)
POTASSIUM SERPL-SCNC: 4.2 MMOL/L (ref 3.5–5.1)
POTASSIUM SERPL-SCNC: 4.2 MMOL/L (ref 3.5–5.1)
POTASSIUM SERPL-SCNC: 4.3 MMOL/L (ref 3.5–5.1)
POTASSIUM SERPL-SCNC: 4.6 MMOL/L (ref 3.5–5.1)
POTASSIUM SERPL-SCNC: 4.6 MMOL/L (ref 3.5–5.1)
POTASSIUM SERPL-SCNC: 4.8 MMOL/L (ref 3.5–5.1)
POTASSIUM SERPL-SCNC: 5 MMOL/L (ref 3.5–5.1)
POTASSIUM SERPL-SCNC: 5 MMOL/L (ref 3.5–5.1)
PRESSURE SUPPORT SETTING VENT: 5 CMH2O
PROT FLD-MCNC: 2.6 G/DL
PROT SERPL-MCNC: 3.2 G/DL (ref 6.4–8.2)
PROT SERPL-MCNC: 4.6 G/DL (ref 6.4–8.2)
PROT SERPL-MCNC: 4.7 G/DL (ref 6.4–8.2)
PROT SERPL-MCNC: 5 G/DL (ref 6.4–8.2)
PROT SERPL-MCNC: 5 G/DL (ref 6.4–8.2)
PROT SERPL-MCNC: 5.2 G/DL (ref 6.4–8.2)
PROT SERPL-MCNC: 5.3 G/DL (ref 6.4–8.2)
PROT SERPL-MCNC: 5.3 G/DL (ref 6.4–8.2)
PROT SERPL-MCNC: 5.4 G/DL (ref 6.4–8.2)
PROT SERPL-MCNC: 5.4 G/DL (ref 6.4–8.2)
PROT SERPL-MCNC: 5.7 G/DL (ref 6.4–8.2)
PROT SERPL-MCNC: 5.7 G/DL (ref 6.4–8.2)
PROT SERPL-MCNC: 6 G/DL (ref 6.4–8.2)
PROT SERPL-MCNC: 6.1 G/DL (ref 6.4–8.2)
PROT SERPL-MCNC: 6.2 G/DL (ref 6.4–8.2)
PROT SERPL-MCNC: 6.3 G/DL (ref 6.4–8.2)
PROT SERPL-MCNC: 6.5 G/DL (ref 6.4–8.2)
PROT SERPL-MCNC: 7.2 G/DL (ref 6.4–8.2)
PROTHROMBIN TIME: 10.9 SEC (ref 9–11.1)
PROTHROMBIN TIME: 11.4 SEC (ref 9–11.1)
PROTHROMBIN TIME: 11.9 SEC (ref 9–11.1)
PROTHROMBIN TIME: 15.9 SEC (ref 9–11.1)
Q-T INTERVAL, ECG07: 386 MS
Q-T INTERVAL, ECG07: 400 MS
Q-T INTERVAL, ECG07: 406 MS
Q-T INTERVAL, ECG07: 414 MS
Q-T INTERVAL, ECG07: 598 MS
QRS DURATION, ECG06: 100 MS
QRS DURATION, ECG06: 82 MS
QRS DURATION, ECG06: 84 MS
QRS DURATION, ECG06: 98 MS
QRS DURATION, ECG06: 98 MS
QTC CALCULATION (BEZET), ECG08: 406 MS
QTC CALCULATION (BEZET), ECG08: 410 MS
QTC CALCULATION (BEZET), ECG08: 412 MS
QTC CALCULATION (BEZET), ECG08: 425 MS
QTC CALCULATION (BEZET), ECG08: 493 MS
RBC # BLD AUTO: 1.63 M/UL (ref 4.1–5.7)
RBC # BLD AUTO: 2.15 M/UL (ref 4.1–5.7)
RBC # BLD AUTO: 2.29 M/UL (ref 4.1–5.7)
RBC # BLD AUTO: 2.48 M/UL (ref 4.1–5.7)
RBC # BLD AUTO: 2.49 M/UL (ref 4.1–5.7)
RBC # BLD AUTO: 2.58 M/UL (ref 4.1–5.7)
RBC # BLD AUTO: 2.58 M/UL (ref 4.1–5.7)
RBC # BLD AUTO: 2.6 M/UL (ref 4.1–5.7)
RBC # BLD AUTO: 2.62 M/UL (ref 4.1–5.7)
RBC # BLD AUTO: 2.67 M/UL (ref 4.1–5.7)
RBC # BLD AUTO: 2.68 M/UL (ref 4.1–5.7)
RBC # BLD AUTO: 2.7 M/UL (ref 4.1–5.7)
RBC # BLD AUTO: 2.72 M/UL (ref 4.1–5.7)
RBC # BLD AUTO: 2.72 M/UL (ref 4.1–5.7)
RBC # BLD AUTO: 2.75 M/UL (ref 4.1–5.7)
RBC # BLD AUTO: 2.77 M/UL (ref 4.1–5.7)
RBC # BLD AUTO: 2.8 M/UL (ref 4.1–5.7)
RBC # BLD AUTO: 2.82 M/UL (ref 4.1–5.7)
RBC # BLD AUTO: 2.82 M/UL (ref 4.1–5.7)
RBC # BLD AUTO: 2.89 M/UL (ref 4.1–5.7)
RBC # BLD AUTO: 2.9 M/UL (ref 4.1–5.7)
RBC # BLD AUTO: 2.91 M/UL (ref 4.1–5.7)
RBC # BLD AUTO: 2.93 M/UL (ref 4.1–5.7)
RBC # BLD AUTO: 2.97 M/UL (ref 4.1–5.7)
RBC # BLD AUTO: 2.99 M/UL (ref 4.1–5.7)
RBC # BLD AUTO: 3.01 M/UL (ref 4.1–5.7)
RBC # BLD AUTO: 3.01 M/UL (ref 4.1–5.7)
RBC # BLD AUTO: 3.03 M/UL (ref 4.1–5.7)
RBC # BLD AUTO: 3.03 M/UL (ref 4.1–5.7)
RBC # BLD AUTO: 3.04 M/UL (ref 4.1–5.7)
RBC # BLD AUTO: 3.05 M/UL (ref 4.1–5.7)
RBC # BLD AUTO: 3.08 M/UL (ref 4.1–5.7)
RBC # BLD AUTO: 3.1 M/UL (ref 4.1–5.7)
RBC # BLD AUTO: 3.14 M/UL (ref 4.1–5.7)
RBC # BLD AUTO: 3.24 M/UL (ref 4.1–5.7)
RBC # BLD AUTO: 3.33 M/UL (ref 4.1–5.7)
RBC # BLD AUTO: 3.78 M/UL (ref 4.1–5.7)
RBC # BLD AUTO: 4.79 M/UL (ref 4.1–5.7)
RBC # BLD AUTO: 4.85 M/UL (ref 4.1–5.7)
RBC # BLD AUTO: 5.3 M/UL (ref 4.1–5.7)
RBC # BLD AUTO: 5.44 M/UL (ref 4.1–5.7)
RBC # BLD AUTO: 5.65 M/UL (ref 4.1–5.7)
RBC # FLD: >100 /CU MM
RBC MORPH BLD: ABNORMAL
SAMPLES BEING HELD,HOLD: NORMAL
SAO2 % BLD: 100 % (ref 92–97)
SAO2 % BLD: 99 % (ref 92–97)
SERVICE CMNT-IMP: ABNORMAL
SERVICE CMNT-IMP: NORMAL
SODIUM BLD-SCNC: 144 MMOL/L (ref 136–145)
SODIUM SERPL-SCNC: 136 MMOL/L (ref 136–145)
SODIUM SERPL-SCNC: 137 MMOL/L (ref 136–145)
SODIUM SERPL-SCNC: 138 MMOL/L (ref 136–145)
SODIUM SERPL-SCNC: 139 MMOL/L (ref 136–145)
SODIUM SERPL-SCNC: 140 MMOL/L (ref 136–145)
SODIUM SERPL-SCNC: 141 MMOL/L (ref 136–145)
SODIUM SERPL-SCNC: 141 MMOL/L (ref 136–145)
SODIUM SERPL-SCNC: 142 MMOL/L (ref 136–145)
SODIUM SERPL-SCNC: 143 MMOL/L (ref 136–145)
SODIUM SERPL-SCNC: 145 MMOL/L (ref 136–145)
SODIUM SERPL-SCNC: 145 MMOL/L (ref 136–145)
SODIUM SERPL-SCNC: 146 MMOL/L (ref 136–145)
SODIUM SERPL-SCNC: 148 MMOL/L (ref 136–145)
SODIUM SERPL-SCNC: 149 MMOL/L (ref 136–145)
SODIUM SERPL-SCNC: 150 MMOL/L (ref 136–145)
SODIUM SERPL-SCNC: 152 MMOL/L (ref 136–145)
SODIUM SERPL-SCNC: 154 MMOL/L (ref 136–145)
SPECIMEN EXP DATE BLD: NORMAL
SPECIMEN SOURCE FLD: ABNORMAL
SPECIMEN SOURCE FLD: NORMAL
SPECIMEN TYPE: ABNORMAL
STATUS OF UNIT,%ST: NORMAL
THERAPEUTIC RANGE,PTTT: ABNORMAL SECS (ref 58–77)
TOTAL RESP. RATE, ITRR: 10
TOTAL RESP. RATE, ITRR: 14
TOTAL RESP. RATE, ITRR: 14
TOTAL RESP. RATE, ITRR: 16
TOTAL RESP. RATE, ITRR: 17
TOTAL RESP. RATE, ITRR: 20
TOTAL RESP. RATE, ITRR: 20
TOTAL RESP. RATE, ITRR: 23
TOTAL RESP. RATE, ITRR: 23
TRIGL SERPL-MCNC: 50 MG/DL (ref ?–150)
TROPONIN I SERPL-MCNC: 0.05 NG/ML
TROPONIN I SERPL-MCNC: 0.06 NG/ML
TROPONIN I SERPL-MCNC: 0.1 NG/ML
TROPONIN I SERPL-MCNC: 0.18 NG/ML
TROPONIN I SERPL-MCNC: 0.25 NG/ML
TROPONIN I SERPL-MCNC: 0.28 NG/ML
TROPONIN I SERPL-MCNC: 0.73 NG/ML
TROPONIN I SERPL-MCNC: <0.05 NG/ML
TROPONIN I SERPL-MCNC: <0.05 NG/ML
TSH SERPL DL<=0.05 MIU/L-ACNC: 1.86 UIU/ML (ref 0.36–3.74)
UNIT DIVISION, %UDIV: 0
VENTILATION MODE VENT: ABNORMAL
VENTRICULAR RATE, ECG03: 41 BPM
VENTRICULAR RATE, ECG03: 58 BPM
VENTRICULAR RATE, ECG03: 64 BPM
VENTRICULAR RATE, ECG03: 66 BPM
VENTRICULAR RATE, ECG03: 68 BPM
VLDLC SERPL CALC-MCNC: 10 MG/DL
VT SETTING VENT: 444 ML
VT SETTING VENT: 450 ML
VT SETTING VENT: 500 ML
WBC # BLD AUTO: 10.4 K/UL (ref 4.1–11.1)
WBC # BLD AUTO: 10.7 K/UL (ref 4.1–11.1)
WBC # BLD AUTO: 11.4 K/UL (ref 4.1–11.1)
WBC # BLD AUTO: 12.4 K/UL (ref 4.1–11.1)
WBC # BLD AUTO: 12.7 K/UL (ref 4.1–11.1)
WBC # BLD AUTO: 12.8 K/UL (ref 4.1–11.1)
WBC # BLD AUTO: 12.9 K/UL (ref 4.1–11.1)
WBC # BLD AUTO: 13.6 K/UL (ref 4.1–11.1)
WBC # BLD AUTO: 13.6 K/UL (ref 4.1–11.1)
WBC # BLD AUTO: 14.1 K/UL (ref 4.1–11.1)
WBC # BLD AUTO: 14.4 K/UL (ref 4.1–11.1)
WBC # BLD AUTO: 14.5 K/UL (ref 4.1–11.1)
WBC # BLD AUTO: 15.1 K/UL (ref 4.1–11.1)
WBC # BLD AUTO: 15.9 K/UL (ref 4.1–11.1)
WBC # BLD AUTO: 16.8 K/UL (ref 4.1–11.1)
WBC # BLD AUTO: 17.1 K/UL (ref 4.1–11.1)
WBC # BLD AUTO: 17.2 K/UL (ref 4.1–11.1)
WBC # BLD AUTO: 19.2 K/UL (ref 4.1–11.1)
WBC # BLD AUTO: 20.1 K/UL (ref 4.1–11.1)
WBC # BLD AUTO: 20.9 K/UL (ref 4.1–11.1)
WBC # BLD AUTO: 24.8 K/UL (ref 4.1–11.1)
WBC # BLD AUTO: 25.8 K/UL (ref 4.1–11.1)
WBC # BLD AUTO: 26.4 K/UL (ref 4.1–11.1)
WBC # BLD AUTO: 29.1 K/UL (ref 4.1–11.1)
WBC # BLD AUTO: 5.5 K/UL (ref 4.1–11.1)
WBC # BLD AUTO: 5.6 K/UL (ref 4.1–11.1)
WBC # BLD AUTO: 5.8 K/UL (ref 4.1–11.1)
WBC # BLD AUTO: 6 K/UL (ref 4.1–11.1)
WBC # BLD AUTO: 6.3 K/UL (ref 4.1–11.1)
WBC # BLD AUTO: 6.4 K/UL (ref 4.1–11.1)
WBC # BLD AUTO: 7.4 K/UL (ref 4.1–11.1)
WBC # BLD AUTO: 7.5 K/UL (ref 4.1–11.1)
WBC # BLD AUTO: 7.9 K/UL (ref 4.1–11.1)
WBC # BLD AUTO: 8 K/UL (ref 4.1–11.1)
WBC # BLD AUTO: 8.3 K/UL (ref 4.1–11.1)
WBC # BLD AUTO: 8.8 K/UL (ref 4.1–11.1)
WBC # BLD AUTO: 9 K/UL (ref 4.1–11.1)
WBC # BLD AUTO: 9.3 K/UL (ref 4.1–11.1)
WBC # BLD AUTO: 9.7 K/UL (ref 4.1–11.1)
WBC # BLD AUTO: 9.8 K/UL (ref 4.1–11.1)
WBC MORPH BLD: ABNORMAL

## 2019-01-01 PROCEDURE — 80061 LIPID PANEL: CPT

## 2019-01-01 PROCEDURE — 71045 X-RAY EXAM CHEST 1 VIEW: CPT

## 2019-01-01 PROCEDURE — 65660000000 HC RM CCU STEPDOWN

## 2019-01-01 PROCEDURE — 74011000250 HC RX REV CODE- 250: Performed by: INTERNAL MEDICINE

## 2019-01-01 PROCEDURE — 74011250636 HC RX REV CODE- 250/636: Performed by: INTERNAL MEDICINE

## 2019-01-01 PROCEDURE — 36415 COLL VENOUS BLD VENIPUNCTURE: CPT

## 2019-01-01 PROCEDURE — 85027 COMPLETE CBC AUTOMATED: CPT

## 2019-01-01 PROCEDURE — 36600 WITHDRAWAL OF ARTERIAL BLOOD: CPT

## 2019-01-01 PROCEDURE — 51798 US URINE CAPACITY MEASURE: CPT

## 2019-01-01 PROCEDURE — 74011250637 HC RX REV CODE- 250/637: Performed by: INTERNAL MEDICINE

## 2019-01-01 PROCEDURE — 74011000258 HC RX REV CODE- 258: Performed by: INTERNAL MEDICINE

## 2019-01-01 PROCEDURE — 83690 ASSAY OF LIPASE: CPT

## 2019-01-01 PROCEDURE — 74011250636 HC RX REV CODE- 250/636: Performed by: NURSE PRACTITIONER

## 2019-01-01 PROCEDURE — 82803 BLOOD GASES ANY COMBINATION: CPT

## 2019-01-01 PROCEDURE — 74011636320 HC RX REV CODE- 636/320: Performed by: RADIOLOGY

## 2019-01-01 PROCEDURE — 85018 HEMOGLOBIN: CPT

## 2019-01-01 PROCEDURE — 84100 ASSAY OF PHOSPHORUS: CPT

## 2019-01-01 PROCEDURE — 83735 ASSAY OF MAGNESIUM: CPT

## 2019-01-01 PROCEDURE — 77030013744: Performed by: INTERNAL MEDICINE

## 2019-01-01 PROCEDURE — 65620000000 HC RM CCU GENERAL

## 2019-01-01 PROCEDURE — 93005 ELECTROCARDIOGRAM TRACING: CPT

## 2019-01-01 PROCEDURE — 83605 ASSAY OF LACTIC ACID: CPT

## 2019-01-01 PROCEDURE — 74011250637 HC RX REV CODE- 250/637: Performed by: NURSE PRACTITIONER

## 2019-01-01 PROCEDURE — 76450000000

## 2019-01-01 PROCEDURE — 74011000250 HC RX REV CODE- 250: Performed by: SURGERY

## 2019-01-01 PROCEDURE — 77030018673: Performed by: INTERNAL MEDICINE

## 2019-01-01 PROCEDURE — 85025 COMPLETE CBC W/AUTO DIFF WBC: CPT

## 2019-01-01 PROCEDURE — 77030019698 HC SYR ANGI MDLON MRTM -A

## 2019-01-01 PROCEDURE — 74011000250 HC RX REV CODE- 250: Performed by: HOSPITALIST

## 2019-01-01 PROCEDURE — 84484 ASSAY OF TROPONIN QUANT: CPT

## 2019-01-01 PROCEDURE — 89060 EXAM SYNOVIAL FLUID CRYSTALS: CPT

## 2019-01-01 PROCEDURE — 77030003029 HC SUT VCRL J&J -B: Performed by: SURGERY

## 2019-01-01 PROCEDURE — 74018 RADEX ABDOMEN 1 VIEW: CPT

## 2019-01-01 PROCEDURE — 36430 TRANSFUSION BLD/BLD COMPNT: CPT

## 2019-01-01 PROCEDURE — 82962 GLUCOSE BLOOD TEST: CPT

## 2019-01-01 PROCEDURE — 80048 BASIC METABOLIC PNL TOTAL CA: CPT

## 2019-01-01 PROCEDURE — 94760 N-INVAS EAR/PLS OXIMETRY 1: CPT

## 2019-01-01 PROCEDURE — 74011636320 HC RX REV CODE- 636/320: Performed by: INTERNAL MEDICINE

## 2019-01-01 PROCEDURE — 90945 DIALYSIS ONE EVALUATION: CPT

## 2019-01-01 PROCEDURE — 97530 THERAPEUTIC ACTIVITIES: CPT

## 2019-01-01 PROCEDURE — 77030014021 HC SYR ANGI FIX LOK MRTM -A

## 2019-01-01 PROCEDURE — 82550 ASSAY OF CK (CPK): CPT

## 2019-01-01 PROCEDURE — 0DQ70ZZ REPAIR STOMACH, PYLORUS, OPEN APPROACH: ICD-10-PCS | Performed by: SURGERY

## 2019-01-01 PROCEDURE — 74011000258 HC RX REV CODE- 258

## 2019-01-01 PROCEDURE — 80053 COMPREHEN METABOLIC PANEL: CPT

## 2019-01-01 PROCEDURE — 77030008477 HC STYL SATN SLP COVD -A: Performed by: ANESTHESIOLOGY

## 2019-01-01 PROCEDURE — 77030007181 HC COIL EMB TORN COOK -B

## 2019-01-01 PROCEDURE — C1751 CATH, INF, PER/CENT/MIDLINE: HCPCS | Performed by: INTERNAL MEDICINE

## 2019-01-01 PROCEDURE — 65610000003 HC RM ICU SURGICAL

## 2019-01-01 PROCEDURE — 5A1D90Z PERFORMANCE OF URINARY FILTRATION, CONTINUOUS, GREATER THAN 18 HOURS PER DAY: ICD-10-PCS | Performed by: INTERNAL MEDICINE

## 2019-01-01 PROCEDURE — 86923 COMPATIBILITY TEST ELECTRIC: CPT

## 2019-01-01 PROCEDURE — 93306 TTE W/DOPPLER COMPLETE: CPT

## 2019-01-01 PROCEDURE — 33208 INSRT HEART PM ATRIAL & VENT: CPT | Performed by: INTERNAL MEDICINE

## 2019-01-01 PROCEDURE — 86900 BLOOD TYPING SEROLOGIC ABO: CPT

## 2019-01-01 PROCEDURE — 74011000250 HC RX REV CODE- 250: Performed by: NURSE PRACTITIONER

## 2019-01-01 PROCEDURE — 84157 ASSAY OF PROTEIN OTHER: CPT

## 2019-01-01 PROCEDURE — 02HV33Z INSERTION OF INFUSION DEVICE INTO SUPERIOR VENA CAVA, PERCUTANEOUS APPROACH: ICD-10-PCS | Performed by: RADIOLOGY

## 2019-01-01 PROCEDURE — 94003 VENT MGMT INPAT SUBQ DAY: CPT

## 2019-01-01 PROCEDURE — 77030008467 HC STPLR SKN COVD -B: Performed by: SURGERY

## 2019-01-01 PROCEDURE — 76060000033 HC ANESTHESIA 1 TO 1.5 HR: Performed by: INTERNAL MEDICINE

## 2019-01-01 PROCEDURE — 90935 HEMODIALYSIS ONE EVALUATION: CPT

## 2019-01-01 PROCEDURE — C1892 INTRO/SHEATH,FIXED,PEEL-AWAY: HCPCS | Performed by: INTERNAL MEDICINE

## 2019-01-01 PROCEDURE — C9113 INJ PANTOPRAZOLE SODIUM, VIA: HCPCS | Performed by: NURSE PRACTITIONER

## 2019-01-01 PROCEDURE — 74011000250 HC RX REV CODE- 250: Performed by: NURSE ANESTHETIST, CERTIFIED REGISTERED

## 2019-01-01 PROCEDURE — 77030020186 HC BOOT HL PROTCT SAGE -B

## 2019-01-01 PROCEDURE — 99285 EMERGENCY DEPT VISIT HI MDM: CPT

## 2019-01-01 PROCEDURE — 0BH17EZ INSERTION OF ENDOTRACHEAL AIRWAY INTO TRACHEA, VIA NATURAL OR ARTIFICIAL OPENING: ICD-10-PCS | Performed by: INTERNAL MEDICINE

## 2019-01-01 PROCEDURE — C1729 CATH, DRAINAGE: HCPCS

## 2019-01-01 PROCEDURE — 76210000003 HC OR PH I REC 3.5 TO 4 HR: Performed by: SURGERY

## 2019-01-01 PROCEDURE — 74011250636 HC RX REV CODE- 250/636: Performed by: ANESTHESIOLOGY

## 2019-01-01 PROCEDURE — C1769 GUIDE WIRE: HCPCS

## 2019-01-01 PROCEDURE — C1894 INTRO/SHEATH, NON-LASER: HCPCS

## 2019-01-01 PROCEDURE — 74011250637 HC RX REV CODE- 250/637: Performed by: FAMILY MEDICINE

## 2019-01-01 PROCEDURE — 77030013797 HC KT TRNSDUC PRSSR EDWD -A

## 2019-01-01 PROCEDURE — 97116 GAIT TRAINING THERAPY: CPT

## 2019-01-01 PROCEDURE — 77030028698 HC BLD TISS PLSM MEDT -D: Performed by: INTERNAL MEDICINE

## 2019-01-01 PROCEDURE — 77030002986 HC SUT PROL J&J -A

## 2019-01-01 PROCEDURE — 77030009378 HC DEV TORQ OLCT F/GWIRE MANIP COOK -A

## 2019-01-01 PROCEDURE — 89050 BODY FLUID CELL COUNT: CPT

## 2019-01-01 PROCEDURE — 74011250636 HC RX REV CODE- 250/636

## 2019-01-01 PROCEDURE — P9016 RBC LEUKOCYTES REDUCED: HCPCS

## 2019-01-01 PROCEDURE — 77030020291 HC FLEXSEAL FMS BMS -C

## 2019-01-01 PROCEDURE — 74011250636 HC RX REV CODE- 250/636: Performed by: PHYSICIAN ASSISTANT

## 2019-01-01 PROCEDURE — 94640 AIRWAY INHALATION TREATMENT: CPT

## 2019-01-01 PROCEDURE — 74011000250 HC RX REV CODE- 250

## 2019-01-01 PROCEDURE — 77030012935 HC DRSG AQUACEL BMS -B: Performed by: SURGERY

## 2019-01-01 PROCEDURE — 97110 THERAPEUTIC EXERCISES: CPT

## 2019-01-01 PROCEDURE — 77030022017 HC DRSG HEMO QCLOT ZMED -A: Performed by: INTERNAL MEDICINE

## 2019-01-01 PROCEDURE — 74011000258 HC RX REV CODE- 258: Performed by: HOSPITALIST

## 2019-01-01 PROCEDURE — 77030013533 HC SEAL FBRN TISSL RDYTUSE 10ML BAXT -E: Performed by: SURGERY

## 2019-01-01 PROCEDURE — 30233K1 TRANSFUSION OF NONAUTOLOGOUS FROZEN PLASMA INTO PERIPHERAL VEIN, PERCUTANEOUS APPROACH: ICD-10-PCS | Performed by: SURGERY

## 2019-01-01 PROCEDURE — P9059 PLASMA, FRZ BETWEEN 8-24HOUR: HCPCS

## 2019-01-01 PROCEDURE — B2161ZZ FLUOROSCOPY OF RIGHT AND LEFT HEART USING LOW OSMOLAR CONTRAST: ICD-10-PCS | Performed by: INTERNAL MEDICINE

## 2019-01-01 PROCEDURE — 65660000001 HC RM ICU INTERMED STEPDOWN

## 2019-01-01 PROCEDURE — P9045 ALBUMIN (HUMAN), 5%, 250 ML: HCPCS | Performed by: NURSE ANESTHETIST, CERTIFIED REGISTERED

## 2019-01-01 PROCEDURE — 74022 RADEX COMPL AQT ABD SERIES: CPT

## 2019-01-01 PROCEDURE — 76010000132 HC OR TIME 2.5 TO 3 HR: Performed by: SURGERY

## 2019-01-01 PROCEDURE — L0628 LSO FLEX NO RI STAYS PRE OTS: HCPCS | Performed by: INTERNAL MEDICINE

## 2019-01-01 PROCEDURE — 77030018836 HC SOL IRR NACL ICUM -A: Performed by: SURGERY

## 2019-01-01 PROCEDURE — 5A12012 PERFORMANCE OF CARDIAC OUTPUT, SINGLE, MANUAL: ICD-10-PCS | Performed by: SURGERY

## 2019-01-01 PROCEDURE — 85610 PROTHROMBIN TIME: CPT

## 2019-01-01 PROCEDURE — 02H63JZ INSERTION OF PACEMAKER LEAD INTO RIGHT ATRIUM, PERCUTANEOUS APPROACH: ICD-10-PCS | Performed by: INTERNAL MEDICINE

## 2019-01-01 PROCEDURE — 86920 COMPATIBILITY TEST SPIN: CPT

## 2019-01-01 PROCEDURE — 96361 HYDRATE IV INFUSION ADD-ON: CPT

## 2019-01-01 PROCEDURE — 77030013798 HC KT TRNSDUC PRSSR EDWD -B

## 2019-01-01 PROCEDURE — 77030011640 HC PAD GRND REM COVD -A: Performed by: SURGERY

## 2019-01-01 PROCEDURE — 77030040361 HC SLV COMPR DVT MDII -B

## 2019-01-01 PROCEDURE — C1752 CATH,HEMODIALYSIS,SHORT-TERM: HCPCS

## 2019-01-01 PROCEDURE — 74011250637 HC RX REV CODE- 250/637: Performed by: SURGERY

## 2019-01-01 PROCEDURE — 30233R1 TRANSFUSION OF NONAUTOLOGOUS PLATELETS INTO PERIPHERAL VEIN, PERCUTANEOUS APPROACH: ICD-10-PCS | Performed by: SURGERY

## 2019-01-01 PROCEDURE — 88305 TISSUE EXAM BY PATHOLOGIST: CPT

## 2019-01-01 PROCEDURE — 94002 VENT MGMT INPAT INIT DAY: CPT

## 2019-01-01 PROCEDURE — 94762 N-INVAS EAR/PLS OXIMTRY CONT: CPT

## 2019-01-01 PROCEDURE — 77030008684 HC TU ET CUF COVD -B: Performed by: ANESTHESIOLOGY

## 2019-01-01 PROCEDURE — 74011000250 HC RX REV CODE- 250: Performed by: PHYSICIAN ASSISTANT

## 2019-01-01 PROCEDURE — 77030031753 HC SHR ENDO COAG HARM J&J -E: Performed by: SURGERY

## 2019-01-01 PROCEDURE — 77030029684 HC NEB SM VOL KT MONA -A

## 2019-01-01 PROCEDURE — 77030039046 HC PAD DEFIB RADIOTRNSPNT CNMD -B: Performed by: INTERNAL MEDICINE

## 2019-01-01 PROCEDURE — 30233N1 TRANSFUSION OF NONAUTOLOGOUS RED BLOOD CELLS INTO PERIPHERAL VEIN, PERCUTANEOUS APPROACH: ICD-10-PCS | Performed by: INTERNAL MEDICINE

## 2019-01-01 PROCEDURE — 87205 SMEAR GRAM STAIN: CPT

## 2019-01-01 PROCEDURE — 77030002996 HC SUT SLK J&J -A

## 2019-01-01 PROCEDURE — 76060000031 HC ANESTHESIA FIRST 0.5 HR: Performed by: INTERNAL MEDICINE

## 2019-01-01 PROCEDURE — 36558 INSERT TUNNELED CV CATH: CPT

## 2019-01-01 PROCEDURE — 86706 HEP B SURFACE ANTIBODY: CPT

## 2019-01-01 PROCEDURE — 82945 GLUCOSE OTHER FLUID: CPT

## 2019-01-01 PROCEDURE — 77030008771 HC TU NG SALEM SUMP -A

## 2019-01-01 PROCEDURE — P9035 PLATELET PHERES LEUKOREDUCED: HCPCS

## 2019-01-01 PROCEDURE — 77030012407 HC DRN WND BARD -B: Performed by: SURGERY

## 2019-01-01 PROCEDURE — 77030012390 HC DRN CHST BTL GTNG -B

## 2019-01-01 PROCEDURE — C9113 INJ PANTOPRAZOLE SODIUM, VIA: HCPCS | Performed by: INTERNAL MEDICINE

## 2019-01-01 PROCEDURE — 74011250636 HC RX REV CODE- 250/636: Performed by: SURGERY

## 2019-01-01 PROCEDURE — P9045 ALBUMIN (HUMAN), 5%, 250 ML: HCPCS | Performed by: INTERNAL MEDICINE

## 2019-01-01 PROCEDURE — 77030002966 HC SUT PDS J&J -A: Performed by: SURGERY

## 2019-01-01 PROCEDURE — 88112 CYTOPATH CELL ENHANCE TECH: CPT

## 2019-01-01 PROCEDURE — P9047 ALBUMIN (HUMAN), 25%, 50ML: HCPCS | Performed by: INTERNAL MEDICINE

## 2019-01-01 PROCEDURE — 77030013079 HC BLNKT BAIR HGGR 3M -A: Performed by: ANESTHESIOLOGY

## 2019-01-01 PROCEDURE — 77030002916 HC SUT ETHLN J&J -A: Performed by: SURGERY

## 2019-01-01 PROCEDURE — 77030031139 HC SUT VCRL2 J&J -A: Performed by: SURGERY

## 2019-01-01 PROCEDURE — 77030002996 HC SUT SLK J&J -A: Performed by: SURGERY

## 2019-01-01 PROCEDURE — 70450 CT HEAD/BRAIN W/O DYE: CPT

## 2019-01-01 PROCEDURE — P9045 ALBUMIN (HUMAN), 5%, 250 ML: HCPCS

## 2019-01-01 PROCEDURE — 02HK3JZ INSERTION OF PACEMAKER LEAD INTO RIGHT VENTRICLE, PERCUTANEOUS APPROACH: ICD-10-PCS | Performed by: INTERNAL MEDICINE

## 2019-01-01 PROCEDURE — 0D778ZZ DILATION OF STOMACH, PYLORUS, VIA NATURAL OR ARTIFICIAL OPENING ENDOSCOPIC: ICD-10-PCS | Performed by: INTERNAL MEDICINE

## 2019-01-01 PROCEDURE — 74011250636 HC RX REV CODE- 250/636: Performed by: NURSE ANESTHETIST, CERTIFIED REGISTERED

## 2019-01-01 PROCEDURE — C9113 INJ PANTOPRAZOLE SODIUM, VIA: HCPCS | Performed by: PHYSICIAN ASSISTANT

## 2019-01-01 PROCEDURE — 87040 BLOOD CULTURE FOR BACTERIA: CPT

## 2019-01-01 PROCEDURE — 80047 BASIC METABLC PNL IONIZED CA: CPT

## 2019-01-01 PROCEDURE — B2111ZZ FLUOROSCOPY OF MULTIPLE CORONARY ARTERIES USING LOW OSMOLAR CONTRAST: ICD-10-PCS | Performed by: INTERNAL MEDICINE

## 2019-01-01 PROCEDURE — 77030010939 HC CLP LIG TELE -B: Performed by: SURGERY

## 2019-01-01 PROCEDURE — 97161 PT EVAL LOW COMPLEX 20 MIN: CPT

## 2019-01-01 PROCEDURE — 74177 CT ABD & PELVIS W/CONTRAST: CPT

## 2019-01-01 PROCEDURE — 77010033678 HC OXYGEN DAILY

## 2019-01-01 PROCEDURE — C1726 CATH, BAL DIL, NON-VASCULAR: HCPCS | Performed by: INTERNAL MEDICINE

## 2019-01-01 PROCEDURE — 95816 EEG AWAKE AND DROWSY: CPT | Performed by: PSYCHIATRY & NEUROLOGY

## 2019-01-01 PROCEDURE — 87075 CULTR BACTERIA EXCEPT BLOOD: CPT

## 2019-01-01 PROCEDURE — 74640000003 HC CRRT SET UP OR EXCHANGE

## 2019-01-01 PROCEDURE — C9113 INJ PANTOPRAZOLE SODIUM, VIA: HCPCS | Performed by: SURGERY

## 2019-01-01 PROCEDURE — 76040000019: Performed by: INTERNAL MEDICINE

## 2019-01-01 PROCEDURE — 93970 EXTREMITY STUDY: CPT

## 2019-01-01 PROCEDURE — 77030039266 HC ADH SKN EXOFIN S2SG -A: Performed by: SURGERY

## 2019-01-01 PROCEDURE — 5A1955Z RESPIRATORY VENTILATION, GREATER THAN 96 CONSECUTIVE HOURS: ICD-10-PCS | Performed by: INTERNAL MEDICINE

## 2019-01-01 PROCEDURE — 04L23DZ OCCLUSION OF GASTRIC ARTERY WITH INTRALUMINAL DEVICE, PERCUTANEOUS APPROACH: ICD-10-PCS | Performed by: RADIOLOGY

## 2019-01-01 PROCEDURE — 74011250637 HC RX REV CODE- 250/637: Performed by: HOSPITALIST

## 2019-01-01 PROCEDURE — C1750 CATH, HEMODIALYSIS,LONG-TERM: HCPCS

## 2019-01-01 PROCEDURE — 0JH63XZ INSERTION OF TUNNELED VASCULAR ACCESS DEVICE INTO CHEST SUBCUTANEOUS TISSUE AND FASCIA, PERCUTANEOUS APPROACH: ICD-10-PCS | Performed by: RADIOLOGY

## 2019-01-01 PROCEDURE — 74011250637 HC RX REV CODE- 250/637: Performed by: PHYSICIAN ASSISTANT

## 2019-01-01 PROCEDURE — 76705 ECHO EXAM OF ABDOMEN: CPT

## 2019-01-01 PROCEDURE — 77030005208 HC CATH HLDR GLWC -A

## 2019-01-01 PROCEDURE — 76770 US EXAM ABDO BACK WALL COMP: CPT

## 2019-01-01 PROCEDURE — 77030034700: Performed by: SURGERY

## 2019-01-01 PROCEDURE — 94664 DEMO&/EVAL PT USE INHALER: CPT

## 2019-01-01 PROCEDURE — 87015 SPECIMEN INFECT AGNT CONCNTJ: CPT

## 2019-01-01 PROCEDURE — 99284 EMERGENCY DEPT VISIT MOD MDM: CPT

## 2019-01-01 PROCEDURE — 77030010516 HC APPL HEMA CLP TELE -B: Performed by: SURGERY

## 2019-01-01 PROCEDURE — 77030027138 HC INCENT SPIROMETER -A

## 2019-01-01 PROCEDURE — 71275 CT ANGIOGRAPHY CHEST: CPT

## 2019-01-01 PROCEDURE — 77030040361 HC SLV COMPR DVT MDII -B: Performed by: SURGERY

## 2019-01-01 PROCEDURE — 0JH606Z INSERTION OF PACEMAKER, DUAL CHAMBER INTO CHEST SUBCUTANEOUS TISSUE AND FASCIA, OPEN APPROACH: ICD-10-PCS | Performed by: INTERNAL MEDICINE

## 2019-01-01 PROCEDURE — 96374 THER/PROPH/DIAG INJ IV PUSH: CPT

## 2019-01-01 PROCEDURE — 5A1D70Z PERFORMANCE OF URINARY FILTRATION, INTERMITTENT, LESS THAN 6 HOURS PER DAY: ICD-10-PCS | Performed by: INTERNAL MEDICINE

## 2019-01-01 PROCEDURE — 99153 MOD SED SAME PHYS/QHP EA: CPT | Performed by: INTERNAL MEDICINE

## 2019-01-01 PROCEDURE — 5A1935Z RESPIRATORY VENTILATION, LESS THAN 24 CONSECUTIVE HOURS: ICD-10-PCS | Performed by: INTERNAL MEDICINE

## 2019-01-01 PROCEDURE — 82150 ASSAY OF AMYLASE: CPT

## 2019-01-01 PROCEDURE — 77030018870 HC TY PARCNT BD -B

## 2019-01-01 PROCEDURE — A9558 XE133 XENON 10MCI: HCPCS

## 2019-01-01 PROCEDURE — 97165 OT EVAL LOW COMPLEX 30 MIN: CPT

## 2019-01-01 PROCEDURE — 99152 MOD SED SAME PHYS/QHP 5/>YRS: CPT | Performed by: INTERNAL MEDICINE

## 2019-01-01 PROCEDURE — 82272 OCCULT BLD FECES 1-3 TESTS: CPT

## 2019-01-01 PROCEDURE — 77030005401 HC CATH RAD ARRO -A: Performed by: ANESTHESIOLOGY

## 2019-01-01 PROCEDURE — 76060000036 HC ANESTHESIA 2.5 TO 3 HR: Performed by: SURGERY

## 2019-01-01 PROCEDURE — 0DU907Z SUPPLEMENT DUODENUM WITH AUTOLOGOUS TISSUE SUBSTITUTE, OPEN APPROACH: ICD-10-PCS | Performed by: SURGERY

## 2019-01-01 PROCEDURE — 77030018712 HC DEV BLLN INFL BSC -B: Performed by: INTERNAL MEDICINE

## 2019-01-01 PROCEDURE — 77030020268 HC MISC GENERAL SUPPLY: Performed by: INTERNAL MEDICINE

## 2019-01-01 PROCEDURE — 77030029629 HC CLP ENDOSCP OTSC DISP OVES -F: Performed by: INTERNAL MEDICINE

## 2019-01-01 PROCEDURE — 84443 ASSAY THYROID STIM HORMONE: CPT

## 2019-01-01 PROCEDURE — 4A023N8 MEASUREMENT OF CARDIAC SAMPLING AND PRESSURE, BILATERAL, PERCUTANEOUS APPROACH: ICD-10-PCS | Performed by: INTERNAL MEDICINE

## 2019-01-01 PROCEDURE — C1898 LEAD, PMKR, OTHER THAN TRANS: HCPCS | Performed by: INTERNAL MEDICINE

## 2019-01-01 PROCEDURE — 0D748ZZ DILATION OF ESOPHAGOGASTRIC JUNCTION, VIA NATURAL OR ARTIFICIAL OPENING ENDOSCOPIC: ICD-10-PCS | Performed by: INTERNAL MEDICINE

## 2019-01-01 PROCEDURE — 97164 PT RE-EVAL EST PLAN CARE: CPT

## 2019-01-01 PROCEDURE — 32557 INSERT CATH PLEURA W/ IMAGE: CPT

## 2019-01-01 PROCEDURE — 0W3P8ZZ CONTROL BLEEDING IN GASTROINTESTINAL TRACT, VIA NATURAL OR ARTIFICIAL OPENING ENDOSCOPIC: ICD-10-PCS | Performed by: INTERNAL MEDICINE

## 2019-01-01 PROCEDURE — 74011000258 HC RX REV CODE- 258: Performed by: RADIOLOGY

## 2019-01-01 PROCEDURE — 82533 TOTAL CORTISOL: CPT

## 2019-01-01 PROCEDURE — 77030011244 HC DRN WND HUBLS J&J -B: Performed by: SURGERY

## 2019-01-01 PROCEDURE — 74011250636 HC RX REV CODE- 250/636: Performed by: RADIOLOGY

## 2019-01-01 PROCEDURE — 77030018846 HC SOL IRR STRL H20 ICUM -A: Performed by: SURGERY

## 2019-01-01 PROCEDURE — 0W3P0ZZ CONTROL BLEEDING IN GASTROINTESTINAL TRACT, OPEN APPROACH: ICD-10-PCS | Performed by: SURGERY

## 2019-01-01 PROCEDURE — 77030011256 HC DRSG MEPILEX <16IN NO BORD MOLN -A

## 2019-01-01 PROCEDURE — 86704 HEP B CORE ANTIBODY TOTAL: CPT

## 2019-01-01 PROCEDURE — C1751 CATH, INF, PER/CENT/MIDLINE: HCPCS

## 2019-01-01 PROCEDURE — C1785 PMKR, DUAL, RATE-RESP: HCPCS | Performed by: INTERNAL MEDICINE

## 2019-01-01 PROCEDURE — 74011250636 HC RX REV CODE- 250/636: Performed by: EMERGENCY MEDICINE

## 2019-01-01 PROCEDURE — 74640000002 HC CRRT RECIRC/LINE CHNGE

## 2019-01-01 PROCEDURE — 83615 LACTATE (LD) (LDH) ENZYME: CPT

## 2019-01-01 PROCEDURE — 77030018798 HC PMP KT ENTRL FED COVD -A

## 2019-01-01 PROCEDURE — 77030005402 HC CATH RAD ART LN KT TELE -B

## 2019-01-01 PROCEDURE — 77030013567 HC DRN WND RESERV BARD -A: Performed by: SURGERY

## 2019-01-01 PROCEDURE — 93453 R&L HRT CATH W/VENTRICLGRPHY: CPT | Performed by: INTERNAL MEDICINE

## 2019-01-01 PROCEDURE — 77030019580 HC CBL PACE MEDT -B: Performed by: INTERNAL MEDICINE

## 2019-01-01 PROCEDURE — 0W9930Z DRAINAGE OF RIGHT PLEURAL CAVITY WITH DRAINAGE DEVICE, PERCUTANEOUS APPROACH: ICD-10-PCS | Performed by: RADIOLOGY

## 2019-01-01 PROCEDURE — 77030031139 HC SUT VCRL2 J&J -A: Performed by: INTERNAL MEDICINE

## 2019-01-01 PROCEDURE — 83880 ASSAY OF NATRIURETIC PEPTIDE: CPT

## 2019-01-01 PROCEDURE — 37244 VASC EMBOLIZE/OCCLUDE BLEED: CPT

## 2019-01-01 PROCEDURE — 77030019908 HC STETH ESOPH SIMS -A: Performed by: ANESTHESIOLOGY

## 2019-01-01 PROCEDURE — C1760 CLOSURE DEV, VASC: HCPCS

## 2019-01-01 PROCEDURE — 74011000272 HC RX REV CODE- 272: Performed by: INTERNAL MEDICINE

## 2019-01-01 PROCEDURE — 76040000008: Performed by: INTERNAL MEDICINE

## 2019-01-01 PROCEDURE — 87340 HEPATITIS B SURFACE AG IA: CPT

## 2019-01-01 PROCEDURE — 36556 INSERT NON-TUNNEL CV CATH: CPT

## 2019-01-01 PROCEDURE — C1894 INTRO/SHEATH, NON-LASER: HCPCS | Performed by: INTERNAL MEDICINE

## 2019-01-01 DEVICE — PCMKR AZURE XT DR MRI --: Type: IMPLANTABLE DEVICE | Site: HEART | Status: FUNCTIONAL

## 2019-01-01 DEVICE — LEAD PCMKR CAPSUR FIX NOVUS 52 --: Type: IMPLANTABLE DEVICE | Site: HEART | Status: FUNCTIONAL

## 2019-01-01 DEVICE — LEAD PCMKR CAPSUR SP NOVUS 58 --: Type: IMPLANTABLE DEVICE | Site: HEART | Status: FUNCTIONAL

## 2019-01-01 RX ORDER — HEPARIN SODIUM 1000 [USP'U]/ML
INJECTION, SOLUTION INTRAVENOUS; SUBCUTANEOUS
Status: COMPLETED
Start: 2019-01-01 | End: 2019-01-01

## 2019-01-01 RX ORDER — MORPHINE SULFATE 10 MG/ML
2 INJECTION, SOLUTION INTRAMUSCULAR; INTRAVENOUS
Status: DISCONTINUED | OUTPATIENT
Start: 2019-01-01 | End: 2019-01-01 | Stop reason: HOSPADM

## 2019-01-01 RX ORDER — SODIUM CHLORIDE 0.9 % (FLUSH) 0.9 %
SYRINGE (ML) INJECTION AS NEEDED
Status: DISCONTINUED | OUTPATIENT
Start: 2019-01-01 | End: 2019-01-01 | Stop reason: HOSPADM

## 2019-01-01 RX ORDER — CALCIUM CARBONATE 200(500)MG
200 TABLET,CHEWABLE ORAL
Status: DISCONTINUED | OUTPATIENT
Start: 2019-01-01 | End: 2019-01-01 | Stop reason: HOSPADM

## 2019-01-01 RX ORDER — SODIUM CHLORIDE 0.9 % (FLUSH) 0.9 %
5-40 SYRINGE (ML) INJECTION EVERY 8 HOURS
Status: DISCONTINUED | OUTPATIENT
Start: 2019-01-01 | End: 2019-01-01 | Stop reason: HOSPADM

## 2019-01-01 RX ORDER — CHLORHEXIDINE GLUCONATE 1.2 MG/ML
15 RINSE ORAL 2 TIMES DAILY
Status: DISCONTINUED | OUTPATIENT
Start: 2019-01-01 | End: 2019-01-01 | Stop reason: HOSPADM

## 2019-01-01 RX ORDER — FENTANYL CITRATE 50 UG/ML
200 INJECTION, SOLUTION INTRAMUSCULAR; INTRAVENOUS
Status: CANCELLED | OUTPATIENT
Start: 2019-01-01 | End: 2019-01-01

## 2019-01-01 RX ORDER — HEPARIN SODIUM 1000 [USP'U]/ML
3800 INJECTION, SOLUTION INTRAVENOUS; SUBCUTANEOUS ONCE
Status: COMPLETED | OUTPATIENT
Start: 2019-01-01 | End: 2019-01-01

## 2019-01-01 RX ORDER — ROCURONIUM BROMIDE 10 MG/ML
INJECTION, SOLUTION INTRAVENOUS AS NEEDED
Status: DISCONTINUED | OUTPATIENT
Start: 2019-01-01 | End: 2019-01-01 | Stop reason: HOSPADM

## 2019-01-01 RX ORDER — CALCIUM CHLORIDE INJECTION 100 MG/ML
INJECTION, SOLUTION INTRAVENOUS AS NEEDED
Status: DISCONTINUED | OUTPATIENT
Start: 2019-01-01 | End: 2019-01-01 | Stop reason: HOSPADM

## 2019-01-01 RX ORDER — ATROPINE SULFATE 0.1 MG/ML
.5-1 INJECTION INTRAVENOUS AS NEEDED
Status: DISCONTINUED | OUTPATIENT
Start: 2019-01-01 | End: 2019-01-01 | Stop reason: HOSPADM

## 2019-01-01 RX ORDER — VASOPRESSIN 20 U/ML
INJECTION PARENTERAL AS NEEDED
Status: DISCONTINUED | OUTPATIENT
Start: 2019-01-01 | End: 2019-01-01 | Stop reason: HOSPADM

## 2019-01-01 RX ORDER — AMOXICILLIN AND CLAVULANATE POTASSIUM 875; 125 MG/1; MG/1
1 TABLET, FILM COATED ORAL 2 TIMES DAILY WITH MEALS
Status: DISCONTINUED | OUTPATIENT
Start: 2019-01-01 | End: 2019-01-01

## 2019-01-01 RX ORDER — ALBUMIN HUMAN 50 G/1000ML
SOLUTION INTRAVENOUS AS NEEDED
Status: DISCONTINUED | OUTPATIENT
Start: 2019-01-01 | End: 2019-01-01 | Stop reason: HOSPADM

## 2019-01-01 RX ORDER — SODIUM CHLORIDE 9 MG/ML
75 INJECTION, SOLUTION INTRAVENOUS CONTINUOUS
Status: DISCONTINUED | OUTPATIENT
Start: 2019-01-01 | End: 2019-01-01

## 2019-01-01 RX ORDER — SODIUM CHLORIDE 9 MG/ML
INJECTION, SOLUTION INTRAVENOUS
Status: DISCONTINUED | OUTPATIENT
Start: 2019-01-01 | End: 2019-01-01 | Stop reason: HOSPADM

## 2019-01-01 RX ORDER — SODIUM CHLORIDE 0.9 % (FLUSH) 0.9 %
10 SYRINGE (ML) INJECTION
Status: COMPLETED | OUTPATIENT
Start: 2019-01-01 | End: 2019-01-01

## 2019-01-01 RX ORDER — DEXTROSE MONOHYDRATE 50 MG/ML
50 INJECTION, SOLUTION INTRAVENOUS CONTINUOUS
Status: DISPENSED | OUTPATIENT
Start: 2019-01-01 | End: 2019-01-01

## 2019-01-01 RX ORDER — SODIUM CHLORIDE 0.9 % (FLUSH) 0.9 %
5-40 SYRINGE (ML) INJECTION AS NEEDED
Status: DISCONTINUED | OUTPATIENT
Start: 2019-01-01 | End: 2019-01-01 | Stop reason: HOSPADM

## 2019-01-01 RX ORDER — ACETAMINOPHEN 325 MG/1
650 TABLET ORAL
Status: DISCONTINUED | OUTPATIENT
Start: 2019-01-01 | End: 2019-01-01 | Stop reason: HOSPADM

## 2019-01-01 RX ORDER — LIDOCAINE HYDROCHLORIDE 10 MG/ML
10 INJECTION, SOLUTION EPIDURAL; INFILTRATION; INTRACAUDAL; PERINEURAL ONCE
Status: COMPLETED | OUTPATIENT
Start: 2019-01-01 | End: 2019-01-01

## 2019-01-01 RX ORDER — ERGOCALCIFEROL 1.25 MG/1
50000 CAPSULE ORAL
COMMUNITY
End: 2019-01-01

## 2019-01-01 RX ORDER — CEFAZOLIN SODIUM/WATER 2 G/20 ML
2 SYRINGE (ML) INTRAVENOUS ONCE
Status: DISCONTINUED | OUTPATIENT
Start: 2019-01-01 | End: 2019-01-01

## 2019-01-01 RX ORDER — LIDOCAINE HYDROCHLORIDE 10 MG/ML
0.1 INJECTION, SOLUTION EPIDURAL; INFILTRATION; INTRACAUDAL; PERINEURAL AS NEEDED
Status: DISCONTINUED | OUTPATIENT
Start: 2019-01-01 | End: 2019-01-01 | Stop reason: HOSPADM

## 2019-01-01 RX ORDER — FENTANYL CITRATE 50 UG/ML
INJECTION, SOLUTION INTRAMUSCULAR; INTRAVENOUS AS NEEDED
Status: DISCONTINUED | OUTPATIENT
Start: 2019-01-01 | End: 2019-01-01 | Stop reason: HOSPADM

## 2019-01-01 RX ORDER — ALBUMIN HUMAN 50 G/1000ML
SOLUTION INTRAVENOUS
Status: COMPLETED
Start: 2019-01-01 | End: 2019-01-01

## 2019-01-01 RX ORDER — MIDAZOLAM HYDROCHLORIDE 1 MG/ML
.25-5 INJECTION, SOLUTION INTRAMUSCULAR; INTRAVENOUS
Status: CANCELLED | OUTPATIENT
Start: 2019-01-01 | End: 2019-01-01

## 2019-01-01 RX ORDER — IPRATROPIUM BROMIDE AND ALBUTEROL SULFATE 2.5; .5 MG/3ML; MG/3ML
3 SOLUTION RESPIRATORY (INHALATION)
Status: DISCONTINUED | OUTPATIENT
Start: 2019-01-01 | End: 2019-01-01

## 2019-01-01 RX ORDER — NOREPINEPHRINE BITARTRATE/D5W 8 MG/250ML
2-30 PLASTIC BAG, INJECTION (ML) INTRAVENOUS
Status: DISPENSED | OUTPATIENT
Start: 2019-01-01 | End: 2019-01-01

## 2019-01-01 RX ORDER — DEXTROMETHORPHAN/PSEUDOEPHED 2.5-7.5/.8
1.2 DROPS ORAL
Status: DISCONTINUED | OUTPATIENT
Start: 2019-01-01 | End: 2019-01-01 | Stop reason: HOSPADM

## 2019-01-01 RX ORDER — PROPOFOL 10 MG/ML
0-50 VIAL (ML) INTRAVENOUS
Status: DISCONTINUED | OUTPATIENT
Start: 2019-01-01 | End: 2019-01-01

## 2019-01-01 RX ORDER — HEPARIN SODIUM 1000 [USP'U]/ML
10000 INJECTION, SOLUTION INTRAVENOUS; SUBCUTANEOUS ONCE
Status: COMPLETED | OUTPATIENT
Start: 2019-01-01 | End: 2019-01-01

## 2019-01-01 RX ORDER — ALBUMIN HUMAN 250 G/1000ML
12.5 SOLUTION INTRAVENOUS EVERY 6 HOURS
Status: COMPLETED | OUTPATIENT
Start: 2019-01-01 | End: 2019-01-01

## 2019-01-01 RX ORDER — PROPOFOL 10 MG/ML
INJECTION, EMULSION INTRAVENOUS AS NEEDED
Status: DISCONTINUED | OUTPATIENT
Start: 2019-01-01 | End: 2019-01-01 | Stop reason: HOSPADM

## 2019-01-01 RX ORDER — SODIUM CHLORIDE 9 MG/ML
250 INJECTION, SOLUTION INTRAVENOUS AS NEEDED
Status: DISCONTINUED | OUTPATIENT
Start: 2019-01-01 | End: 2019-01-01 | Stop reason: HOSPADM

## 2019-01-01 RX ORDER — SUCRALFATE 1 G/1
1 TABLET ORAL
Status: DISCONTINUED | OUTPATIENT
Start: 2019-01-01 | End: 2019-01-01 | Stop reason: HOSPADM

## 2019-01-01 RX ORDER — SODIUM CHLORIDE 9 MG/ML
250 INJECTION, SOLUTION INTRAVENOUS AS NEEDED
Status: DISCONTINUED | OUTPATIENT
Start: 2019-01-01 | End: 2019-01-01 | Stop reason: SDUPTHER

## 2019-01-01 RX ORDER — FLUMAZENIL 0.1 MG/ML
0.2 INJECTION INTRAVENOUS
Status: ACTIVE | OUTPATIENT
Start: 2019-01-01 | End: 2019-01-01

## 2019-01-01 RX ORDER — FENTANYL CITRATE 50 UG/ML
50-100 INJECTION, SOLUTION INTRAMUSCULAR; INTRAVENOUS
Status: DISCONTINUED | OUTPATIENT
Start: 2019-01-01 | End: 2019-01-01 | Stop reason: HOSPADM

## 2019-01-01 RX ORDER — ETOMIDATE 2 MG/ML
0.15 INJECTION INTRAVENOUS ONCE
Status: COMPLETED | OUTPATIENT
Start: 2019-01-01 | End: 2019-01-01

## 2019-01-01 RX ORDER — LIDOCAINE HYDROCHLORIDE 10 MG/ML
INJECTION INFILTRATION; PERINEURAL AS NEEDED
Status: DISCONTINUED | OUTPATIENT
Start: 2019-01-01 | End: 2019-01-01 | Stop reason: HOSPADM

## 2019-01-01 RX ORDER — SODIUM CHLORIDE 9 MG/ML
250 INJECTION, SOLUTION INTRAVENOUS AS NEEDED
Status: DISCONTINUED | OUTPATIENT
Start: 2019-01-01 | End: 2019-01-01

## 2019-01-01 RX ORDER — FENTANYL CITRATE 50 UG/ML
25 INJECTION, SOLUTION INTRAMUSCULAR; INTRAVENOUS
Status: DISCONTINUED | OUTPATIENT
Start: 2019-01-01 | End: 2019-01-01 | Stop reason: HOSPADM

## 2019-01-01 RX ORDER — SODIUM CHLORIDE 9 MG/ML
50 INJECTION, SOLUTION INTRAVENOUS CONTINUOUS
Status: DISPENSED | OUTPATIENT
Start: 2019-01-01 | End: 2019-01-01

## 2019-01-01 RX ORDER — AMLODIPINE BESYLATE 5 MG/1
5 TABLET ORAL 2 TIMES DAILY
Status: DISCONTINUED | OUTPATIENT
Start: 2019-01-01 | End: 2019-01-01

## 2019-01-01 RX ORDER — MIDAZOLAM HYDROCHLORIDE 1 MG/ML
1 INJECTION, SOLUTION INTRAMUSCULAR; INTRAVENOUS AS NEEDED
Status: DISCONTINUED | OUTPATIENT
Start: 2019-01-01 | End: 2019-01-01 | Stop reason: HOSPADM

## 2019-01-01 RX ORDER — SUCCINYLCHOLINE CHLORIDE 20 MG/ML
INJECTION INTRAMUSCULAR; INTRAVENOUS
Status: COMPLETED
Start: 2019-01-01 | End: 2019-01-01

## 2019-01-01 RX ORDER — CEFAZOLIN SODIUM 1 G/3ML
2 INJECTION, POWDER, FOR SOLUTION INTRAMUSCULAR; INTRAVENOUS ONCE
Status: DISCONTINUED | OUTPATIENT
Start: 2019-01-01 | End: 2019-01-01 | Stop reason: SDUPTHER

## 2019-01-01 RX ORDER — LORAZEPAM 2 MG/ML
1 INJECTION INTRAMUSCULAR
Status: DISCONTINUED | OUTPATIENT
Start: 2019-01-01 | End: 2019-01-01 | Stop reason: HOSPADM

## 2019-01-01 RX ORDER — SODIUM CHLORIDE 9 MG/ML
250 INJECTION, SOLUTION INTRAVENOUS AS NEEDED
Status: DISCONTINUED | OUTPATIENT
Start: 2019-01-01 | End: 2019-01-01 | Stop reason: ALTCHOICE

## 2019-01-01 RX ORDER — EPINEPHRINE 0.1 MG/ML
1 INJECTION INTRACARDIAC; INTRAVENOUS
Status: CANCELLED | OUTPATIENT
Start: 2019-01-01 | End: 2019-01-01

## 2019-01-01 RX ORDER — LIDOCAINE HYDROCHLORIDE 10 MG/ML
10 INJECTION, SOLUTION EPIDURAL; INFILTRATION; INTRACAUDAL; PERINEURAL
Status: COMPLETED | OUTPATIENT
Start: 2019-01-01 | End: 2019-01-01

## 2019-01-01 RX ORDER — LIDOCAINE HYDROCHLORIDE 10 MG/ML
INJECTION, SOLUTION EPIDURAL; INFILTRATION; INTRACAUDAL; PERINEURAL
Status: COMPLETED
Start: 2019-01-01 | End: 2019-01-01

## 2019-01-01 RX ORDER — BALSAM PERU/CASTOR OIL
OINTMENT (GRAM) TOPICAL 2 TIMES DAILY
Status: DISCONTINUED | OUTPATIENT
Start: 2019-01-01 | End: 2019-01-01 | Stop reason: HOSPADM

## 2019-01-01 RX ORDER — SODIUM CHLORIDE 0.9 % (FLUSH) 0.9 %
5-40 SYRINGE (ML) INJECTION AS NEEDED
Status: CANCELLED | OUTPATIENT
Start: 2019-01-01

## 2019-01-01 RX ORDER — DEXMEDETOMIDINE HYDROCHLORIDE 4 UG/ML
INJECTION, SOLUTION INTRAVENOUS
Status: DISCONTINUED | OUTPATIENT
Start: 2019-01-01 | End: 2019-01-01 | Stop reason: HOSPADM

## 2019-01-01 RX ORDER — THERA TABS 400 MCG
1 TAB ORAL DAILY
COMMUNITY

## 2019-01-01 RX ORDER — LIDOCAINE HYDROCHLORIDE 20 MG/ML
INJECTION, SOLUTION EPIDURAL; INFILTRATION; INTRACAUDAL; PERINEURAL AS NEEDED
Status: DISCONTINUED | OUTPATIENT
Start: 2019-01-01 | End: 2019-01-01 | Stop reason: HOSPADM

## 2019-01-01 RX ORDER — FENTANYL CITRATE 50 UG/ML
100 INJECTION, SOLUTION INTRAMUSCULAR; INTRAVENOUS
Status: DISCONTINUED | OUTPATIENT
Start: 2019-01-01 | End: 2019-01-01

## 2019-01-01 RX ORDER — EPINEPHRINE 0.1 MG/ML
INJECTION INTRACARDIAC; INTRAVENOUS
Status: COMPLETED | OUTPATIENT
Start: 2019-01-01 | End: 2019-01-01

## 2019-01-01 RX ORDER — EPINEPHRINE 0.1 MG/ML
1 INJECTION INTRACARDIAC; INTRAVENOUS
Status: ACTIVE | OUTPATIENT
Start: 2019-01-01 | End: 2019-01-01

## 2019-01-01 RX ORDER — FUROSEMIDE 10 MG/ML
40 INJECTION INTRAMUSCULAR; INTRAVENOUS DAILY
Status: DISCONTINUED | OUTPATIENT
Start: 2019-01-01 | End: 2019-01-01

## 2019-01-01 RX ORDER — ACETAMINOPHEN 325 MG/1
650 TABLET ORAL ONCE
Status: DISCONTINUED | OUTPATIENT
Start: 2019-01-01 | End: 2019-01-01 | Stop reason: HOSPADM

## 2019-01-01 RX ORDER — IPRATROPIUM BROMIDE AND ALBUTEROL SULFATE 2.5; .5 MG/3ML; MG/3ML
3 SOLUTION RESPIRATORY (INHALATION)
Status: DISCONTINUED | OUTPATIENT
Start: 2019-01-01 | End: 2019-01-01 | Stop reason: HOSPADM

## 2019-01-01 RX ORDER — LISINOPRIL 20 MG/1
20 TABLET ORAL 2 TIMES DAILY
Status: DISCONTINUED | OUTPATIENT
Start: 2019-01-01 | End: 2019-01-01

## 2019-01-01 RX ORDER — THERA TABS 400 MCG
1 TAB ORAL DAILY
Status: DISCONTINUED | OUTPATIENT
Start: 2019-01-01 | End: 2019-01-01 | Stop reason: HOSPADM

## 2019-01-01 RX ORDER — SODIUM CHLORIDE 9 MG/ML
100 INJECTION, SOLUTION INTRAVENOUS CONTINUOUS
Status: DISPENSED | OUTPATIENT
Start: 2019-01-01 | End: 2019-01-01

## 2019-01-01 RX ORDER — SODIUM CHLORIDE, SODIUM LACTATE, POTASSIUM CHLORIDE, CALCIUM CHLORIDE 600; 310; 30; 20 MG/100ML; MG/100ML; MG/100ML; MG/100ML
INJECTION, SOLUTION INTRAVENOUS
Status: DISCONTINUED | OUTPATIENT
Start: 2019-01-01 | End: 2019-01-01 | Stop reason: HOSPADM

## 2019-01-01 RX ORDER — PROPOFOL 10 MG/ML
INJECTION, EMULSION INTRAVENOUS
Status: DISCONTINUED
Start: 2019-01-01 | End: 2019-01-01

## 2019-01-01 RX ORDER — SODIUM CHLORIDE 0.9 % (FLUSH) 0.9 %
5-40 SYRINGE (ML) INJECTION EVERY 8 HOURS
Status: CANCELLED | OUTPATIENT
Start: 2019-01-01

## 2019-01-01 RX ORDER — SODIUM BICARBONATE 1 MEQ/ML
100 SYRINGE (ML) INTRAVENOUS ONCE
Status: COMPLETED | OUTPATIENT
Start: 2019-01-01 | End: 2019-01-01

## 2019-01-01 RX ORDER — SODIUM BICARBONATE 1 MEQ/ML
150 SYRINGE (ML) INTRAVENOUS ONCE
Status: ACTIVE | OUTPATIENT
Start: 2019-01-01 | End: 2019-01-01

## 2019-01-01 RX ORDER — MORPHINE SULFATE 2 MG/ML
2 INJECTION, SOLUTION INTRAMUSCULAR; INTRAVENOUS
Status: DISCONTINUED | OUTPATIENT
Start: 2019-01-01 | End: 2019-01-01

## 2019-01-01 RX ORDER — ROPIVACAINE HYDROCHLORIDE 5 MG/ML
30 INJECTION, SOLUTION EPIDURAL; INFILTRATION; PERINEURAL ONCE
Status: DISCONTINUED | OUTPATIENT
Start: 2019-01-01 | End: 2019-01-01 | Stop reason: HOSPADM

## 2019-01-01 RX ORDER — FUROSEMIDE 40 MG/1
40 TABLET ORAL DAILY
Status: DISCONTINUED | OUTPATIENT
Start: 2019-01-01 | End: 2019-01-01

## 2019-01-01 RX ORDER — HYDROCODONE BITARTRATE AND ACETAMINOPHEN 5; 325 MG/1; MG/1
1 TABLET ORAL
Status: DISCONTINUED | OUTPATIENT
Start: 2019-01-01 | End: 2019-01-01

## 2019-01-01 RX ORDER — CEFAZOLIN SODIUM/WATER 2 G/20 ML
2 SYRINGE (ML) INTRAVENOUS ONCE
Status: COMPLETED | OUTPATIENT
Start: 2019-01-01 | End: 2019-01-01

## 2019-01-01 RX ORDER — ATROPINE SULFATE 0.1 MG/ML
0.5 INJECTION INTRAVENOUS
Status: CANCELLED | OUTPATIENT
Start: 2019-01-01 | End: 2019-01-01

## 2019-01-01 RX ORDER — ROCURONIUM BROMIDE 10 MG/ML
INJECTION, SOLUTION INTRAVENOUS
Status: DISPENSED
Start: 2019-01-01 | End: 2019-01-01

## 2019-01-01 RX ORDER — DOPAMINE HYDROCHLORIDE 320 MG/100ML
0-20 INJECTION, SOLUTION INTRAVENOUS
Status: DISCONTINUED | OUTPATIENT
Start: 2019-01-01 | End: 2019-01-01

## 2019-01-01 RX ORDER — MIDAZOLAM HYDROCHLORIDE 1 MG/ML
5 INJECTION, SOLUTION INTRAMUSCULAR; INTRAVENOUS
Status: DISCONTINUED | OUTPATIENT
Start: 2019-01-01 | End: 2019-01-01

## 2019-01-01 RX ORDER — ETOMIDATE 2 MG/ML
INJECTION INTRAVENOUS
Status: COMPLETED
Start: 2019-01-01 | End: 2019-01-01

## 2019-01-01 RX ORDER — PHENYLEPHRINE HCL IN 0.9% NACL 0.4MG/10ML
SYRINGE (ML) INTRAVENOUS AS NEEDED
Status: DISCONTINUED | OUTPATIENT
Start: 2019-01-01 | End: 2019-01-01 | Stop reason: HOSPADM

## 2019-01-01 RX ORDER — BUPIVACAINE HYDROCHLORIDE AND EPINEPHRINE 5; 5 MG/ML; UG/ML
INJECTION, SOLUTION EPIDURAL; INTRACAUDAL; PERINEURAL AS NEEDED
Status: DISCONTINUED | OUTPATIENT
Start: 2019-01-01 | End: 2019-01-01 | Stop reason: HOSPADM

## 2019-01-01 RX ORDER — SODIUM CHLORIDE, SODIUM LACTATE, POTASSIUM CHLORIDE, CALCIUM CHLORIDE 600; 310; 30; 20 MG/100ML; MG/100ML; MG/100ML; MG/100ML
125 INJECTION, SOLUTION INTRAVENOUS CONTINUOUS
Status: DISCONTINUED | OUTPATIENT
Start: 2019-01-01 | End: 2019-01-01 | Stop reason: HOSPADM

## 2019-01-01 RX ORDER — EPINEPHRINE 1 MG/ML
INJECTION, SOLUTION, CONCENTRATE INTRAVENOUS AS NEEDED
Status: DISCONTINUED | OUTPATIENT
Start: 2019-01-01 | End: 2019-01-01 | Stop reason: HOSPADM

## 2019-01-01 RX ORDER — HEPARIN SODIUM 1000 [USP'U]/ML
3800 INJECTION, SOLUTION INTRAVENOUS; SUBCUTANEOUS
Status: DISCONTINUED | OUTPATIENT
Start: 2019-01-01 | End: 2019-01-01 | Stop reason: HOSPADM

## 2019-01-01 RX ORDER — SODIUM CHLORIDE 9 MG/ML
10 INJECTION, SOLUTION INTRAVENOUS CONTINUOUS
Status: DISCONTINUED | OUTPATIENT
Start: 2019-01-01 | End: 2019-01-01

## 2019-01-01 RX ORDER — PANTOPRAZOLE SODIUM 40 MG/1
40 TABLET, DELAYED RELEASE ORAL DAILY
Qty: 30 TAB | Refills: 0 | Status: SHIPPED | OUTPATIENT
Start: 2019-01-01

## 2019-01-01 RX ORDER — LANOLIN ALCOHOL/MO/W.PET/CERES
100 CREAM (GRAM) TOPICAL DAILY
Status: DISCONTINUED | OUTPATIENT
Start: 2019-01-01 | End: 2019-01-01 | Stop reason: HOSPADM

## 2019-01-01 RX ORDER — SODIUM BICARBONATE 1 MEQ/ML
SYRINGE (ML) INTRAVENOUS
Status: COMPLETED | OUTPATIENT
Start: 2019-01-01 | End: 2019-01-01

## 2019-01-01 RX ORDER — SODIUM CHLORIDE 9 MG/ML
25 INJECTION, SOLUTION INTRAVENOUS CONTINUOUS
Status: DISCONTINUED | OUTPATIENT
Start: 2019-01-01 | End: 2019-01-01 | Stop reason: HOSPADM

## 2019-01-01 RX ORDER — ALBUMIN HUMAN 50 G/1000ML
12.5 SOLUTION INTRAVENOUS ONCE
Status: COMPLETED | OUTPATIENT
Start: 2019-01-01 | End: 2019-01-01

## 2019-01-01 RX ORDER — SODIUM CHLORIDE 9 MG/ML
125 INJECTION, SOLUTION INTRAVENOUS CONTINUOUS
Status: DISCONTINUED | OUTPATIENT
Start: 2019-01-01 | End: 2019-01-01

## 2019-01-01 RX ORDER — MIDAZOLAM HYDROCHLORIDE 1 MG/ML
0.5 INJECTION, SOLUTION INTRAMUSCULAR; INTRAVENOUS
Status: DISCONTINUED | OUTPATIENT
Start: 2019-01-01 | End: 2019-01-01 | Stop reason: HOSPADM

## 2019-01-01 RX ORDER — SODIUM CHLORIDE, SODIUM LACTATE, POTASSIUM CHLORIDE, CALCIUM CHLORIDE 600; 310; 30; 20 MG/100ML; MG/100ML; MG/100ML; MG/100ML
50 INJECTION, SOLUTION INTRAVENOUS CONTINUOUS
Status: DISCONTINUED | OUTPATIENT
Start: 2019-01-01 | End: 2019-01-01

## 2019-01-01 RX ORDER — SODIUM CHLORIDE, SODIUM LACTATE, POTASSIUM CHLORIDE, CALCIUM CHLORIDE 600; 310; 30; 20 MG/100ML; MG/100ML; MG/100ML; MG/100ML
75 INJECTION, SOLUTION INTRAVENOUS CONTINUOUS
Status: DISCONTINUED | OUTPATIENT
Start: 2019-01-01 | End: 2019-01-01 | Stop reason: HOSPADM

## 2019-01-01 RX ORDER — SUCCINYLCHOLINE CHLORIDE 20 MG/ML
INJECTION INTRAMUSCULAR; INTRAVENOUS AS NEEDED
Status: DISCONTINUED | OUTPATIENT
Start: 2019-01-01 | End: 2019-01-01 | Stop reason: HOSPADM

## 2019-01-01 RX ORDER — FENTANYL CITRATE 50 UG/ML
50 INJECTION, SOLUTION INTRAMUSCULAR; INTRAVENOUS AS NEEDED
Status: DISCONTINUED | OUTPATIENT
Start: 2019-01-01 | End: 2019-01-01 | Stop reason: HOSPADM

## 2019-01-01 RX ORDER — HEPARIN SODIUM 5000 [USP'U]/ML
5000 INJECTION, SOLUTION INTRAVENOUS; SUBCUTANEOUS EVERY 8 HOURS
Status: DISCONTINUED | OUTPATIENT
Start: 2019-01-01 | End: 2019-01-01

## 2019-01-01 RX ORDER — NALOXONE HYDROCHLORIDE 0.4 MG/ML
0.4 INJECTION, SOLUTION INTRAMUSCULAR; INTRAVENOUS; SUBCUTANEOUS AS NEEDED
Status: DISCONTINUED | OUTPATIENT
Start: 2019-01-01 | End: 2019-01-01 | Stop reason: HOSPADM

## 2019-01-01 RX ORDER — DICYCLOMINE HYDROCHLORIDE 20 MG/1
20 TABLET ORAL
Status: COMPLETED | OUTPATIENT
Start: 2019-01-01 | End: 2019-01-01

## 2019-01-01 RX ORDER — DEXAMETHASONE SODIUM PHOSPHATE 4 MG/ML
INJECTION, SOLUTION INTRA-ARTICULAR; INTRALESIONAL; INTRAMUSCULAR; INTRAVENOUS; SOFT TISSUE AS NEEDED
Status: DISCONTINUED | OUTPATIENT
Start: 2019-01-01 | End: 2019-01-01 | Stop reason: HOSPADM

## 2019-01-01 RX ORDER — HYDRALAZINE HYDROCHLORIDE 20 MG/ML
10 INJECTION INTRAMUSCULAR; INTRAVENOUS
Status: DISCONTINUED | OUTPATIENT
Start: 2019-01-01 | End: 2019-01-01 | Stop reason: HOSPADM

## 2019-01-01 RX ORDER — DIPHENHYDRAMINE HYDROCHLORIDE 50 MG/ML
12.5 INJECTION, SOLUTION INTRAMUSCULAR; INTRAVENOUS AS NEEDED
Status: ACTIVE | OUTPATIENT
Start: 2019-01-01 | End: 2019-01-01

## 2019-01-01 RX ORDER — FOLIC ACID 1 MG/1
1 TABLET ORAL DAILY
Status: DISCONTINUED | OUTPATIENT
Start: 2019-01-01 | End: 2019-01-01 | Stop reason: HOSPADM

## 2019-01-01 RX ORDER — SODIUM CHLORIDE 9 MG/ML
25 INJECTION, SOLUTION INTRAVENOUS CONTINUOUS
Status: DISCONTINUED | OUTPATIENT
Start: 2019-01-01 | End: 2019-01-01

## 2019-01-01 RX ORDER — FUROSEMIDE 10 MG/ML
40 INJECTION INTRAMUSCULAR; INTRAVENOUS
Status: COMPLETED | OUTPATIENT
Start: 2019-01-01 | End: 2019-01-01

## 2019-01-01 RX ORDER — HYDROMORPHONE HYDROCHLORIDE 1 MG/ML
0.2 INJECTION, SOLUTION INTRAMUSCULAR; INTRAVENOUS; SUBCUTANEOUS
Status: DISCONTINUED | OUTPATIENT
Start: 2019-01-01 | End: 2019-01-01 | Stop reason: HOSPADM

## 2019-01-01 RX ORDER — LIDOCAINE HYDROCHLORIDE AND EPINEPHRINE 10; 10 MG/ML; UG/ML
INJECTION, SOLUTION INFILTRATION; PERINEURAL AS NEEDED
Status: DISCONTINUED | OUTPATIENT
Start: 2019-01-01 | End: 2019-01-01 | Stop reason: HOSPADM

## 2019-01-01 RX ORDER — HYDROMORPHONE HYDROCHLORIDE 1 MG/ML
.5-1 INJECTION, SOLUTION INTRAMUSCULAR; INTRAVENOUS; SUBCUTANEOUS
Status: DISCONTINUED | OUTPATIENT
Start: 2019-01-01 | End: 2019-01-01 | Stop reason: HOSPADM

## 2019-01-01 RX ORDER — NALOXONE HYDROCHLORIDE 0.4 MG/ML
0.4 INJECTION, SOLUTION INTRAMUSCULAR; INTRAVENOUS; SUBCUTANEOUS
Status: ACTIVE | OUTPATIENT
Start: 2019-01-01 | End: 2019-01-01

## 2019-01-01 RX ORDER — DEXTROMETHORPHAN/PSEUDOEPHED 2.5-7.5/.8
1.2 DROPS ORAL
Status: CANCELLED | OUTPATIENT
Start: 2019-01-01

## 2019-01-01 RX ORDER — SODIUM BICARBONATE 1 MEQ/ML
150 SYRINGE (ML) INTRAVENOUS ONCE
Status: COMPLETED | OUTPATIENT
Start: 2019-01-01 | End: 2019-01-01

## 2019-01-01 RX ORDER — ONDANSETRON 2 MG/ML
4 INJECTION INTRAMUSCULAR; INTRAVENOUS
Status: COMPLETED | OUTPATIENT
Start: 2019-01-01 | End: 2019-01-01

## 2019-01-01 RX ORDER — HYDROCODONE BITARTRATE AND ACETAMINOPHEN 5; 325 MG/1; MG/1
1 TABLET ORAL
Status: COMPLETED | OUTPATIENT
Start: 2019-01-01 | End: 2019-01-01

## 2019-01-01 RX ORDER — MIDAZOLAM HYDROCHLORIDE 1 MG/ML
INJECTION, SOLUTION INTRAMUSCULAR; INTRAVENOUS AS NEEDED
Status: DISCONTINUED | OUTPATIENT
Start: 2019-01-01 | End: 2019-01-01 | Stop reason: HOSPADM

## 2019-01-01 RX ORDER — BACITRACIN 500 UNIT/G
1 PACKET (EA) TOPICAL AS NEEDED
Status: DISCONTINUED | OUTPATIENT
Start: 2019-01-01 | End: 2019-01-01 | Stop reason: HOSPADM

## 2019-01-01 RX ORDER — ATROPINE SULFATE 0.1 MG/ML
0.5 INJECTION INTRAVENOUS
Status: ACTIVE | OUTPATIENT
Start: 2019-01-01 | End: 2019-01-01

## 2019-01-01 RX ORDER — MIDAZOLAM HYDROCHLORIDE 1 MG/ML
.25-1 INJECTION, SOLUTION INTRAMUSCULAR; INTRAVENOUS
Status: ACTIVE | OUTPATIENT
Start: 2019-01-01 | End: 2019-01-01

## 2019-01-01 RX ORDER — HEPARIN SODIUM 1000 [USP'U]/ML
2800 INJECTION, SOLUTION INTRAVENOUS; SUBCUTANEOUS
Status: DISCONTINUED | OUTPATIENT
Start: 2019-01-01 | End: 2019-01-01

## 2019-01-01 RX ORDER — ALBUMIN HUMAN 250 G/1000ML
12.5 SOLUTION INTRAVENOUS EVERY 6 HOURS
Status: DISPENSED | OUTPATIENT
Start: 2019-01-01 | End: 2019-01-01

## 2019-01-01 RX ORDER — ASPIRIN 81 MG/1
81 TABLET ORAL DAILY
Status: DISCONTINUED | OUTPATIENT
Start: 2019-01-01 | End: 2019-01-01 | Stop reason: HOSPADM

## 2019-01-01 RX ORDER — MORPHINE SULFATE 4 MG/ML
INJECTION, SOLUTION INTRAMUSCULAR; INTRAVENOUS AS NEEDED
Status: DISCONTINUED | OUTPATIENT
Start: 2019-01-01 | End: 2019-01-01 | Stop reason: HOSPADM

## 2019-01-01 RX ORDER — SODIUM BICARBONATE 84 MG/ML
INJECTION, SOLUTION INTRAVENOUS AS NEEDED
Status: DISCONTINUED | OUTPATIENT
Start: 2019-01-01 | End: 2019-01-01 | Stop reason: HOSPADM

## 2019-01-01 RX ORDER — ONDANSETRON 2 MG/ML
4 INJECTION INTRAMUSCULAR; INTRAVENOUS
Status: DISCONTINUED | OUTPATIENT
Start: 2019-01-01 | End: 2019-01-01 | Stop reason: HOSPADM

## 2019-01-01 RX ORDER — ONDANSETRON 2 MG/ML
4 INJECTION INTRAMUSCULAR; INTRAVENOUS AS NEEDED
Status: DISCONTINUED | OUTPATIENT
Start: 2019-01-01 | End: 2019-01-01 | Stop reason: HOSPADM

## 2019-01-01 RX ORDER — SUCCINYLCHOLINE CHLORIDE 20 MG/ML
3 INJECTION INTRAMUSCULAR; INTRAVENOUS
Status: COMPLETED | OUTPATIENT
Start: 2019-01-01 | End: 2019-01-01

## 2019-01-01 RX ORDER — SODIUM CHLORIDE 9 MG/ML
150 INJECTION, SOLUTION INTRAVENOUS CONTINUOUS
Status: CANCELLED | OUTPATIENT
Start: 2019-01-01 | End: 2019-01-01

## 2019-01-01 RX ORDER — CEFAZOLIN SODIUM IN 0.9 % NACL 2 G/100 ML
PLASTIC BAG, INJECTION (ML) INTRAVENOUS AS NEEDED
Status: DISCONTINUED | OUTPATIENT
Start: 2019-01-01 | End: 2019-01-01 | Stop reason: HOSPADM

## 2019-01-01 RX ORDER — HEPARIN SODIUM 200 [USP'U]/100ML
INJECTION, SOLUTION INTRAVENOUS
Status: COMPLETED | OUTPATIENT
Start: 2019-01-01 | End: 2019-01-01

## 2019-01-01 RX ORDER — SODIUM CHLORIDE 9 MG/ML
3 INJECTION, SOLUTION INTRAVENOUS CONTINUOUS
Status: DISCONTINUED | OUTPATIENT
Start: 2019-01-01 | End: 2019-01-01 | Stop reason: ALTCHOICE

## 2019-01-01 RX ORDER — NOREPINEPHRINE BITARTRATE/D5W 8 MG/250ML
2-16 PLASTIC BAG, INJECTION (ML) INTRAVENOUS
Status: DISCONTINUED | OUTPATIENT
Start: 2019-01-01 | End: 2019-01-01 | Stop reason: HOSPADM

## 2019-01-01 RX ADMIN — PROPOFOL 30 MG: 10 INJECTION, EMULSION INTRAVENOUS at 12:39

## 2019-01-01 RX ADMIN — PROPOFOL 40 MCG/KG/MIN: 10 INJECTION, EMULSION INTRAVENOUS at 09:27

## 2019-01-01 RX ADMIN — THERA TABS 1 TABLET: TAB at 08:33

## 2019-01-01 RX ADMIN — IPRATROPIUM BROMIDE AND ALBUTEROL SULFATE 3 ML: .5; 3 SOLUTION RESPIRATORY (INHALATION) at 01:32

## 2019-01-01 RX ADMIN — HEPARIN SODIUM 5000 UNITS: 5000 INJECTION INTRAVENOUS; SUBCUTANEOUS at 04:10

## 2019-01-01 RX ADMIN — CALCIUM CHLORIDE, MAGNESIUM CHLORIDE, DEXTROSE MONOHYDRATE, LACTIC ACID, SODIUM CHLORIDE, SODIUM BICARBONATE AND POTASSIUM CHLORIDE 1600 ML/HR: 5.15; 2.03; 22; 5.4; 6.46; 3.09; .157 INJECTION INTRAVENOUS at 20:20

## 2019-01-01 RX ADMIN — LISINOPRIL 20 MG: 20 TABLET ORAL at 17:06

## 2019-01-01 RX ADMIN — SODIUM CHLORIDE 10 ML/HR: 900 INJECTION, SOLUTION INTRAVENOUS at 11:10

## 2019-01-01 RX ADMIN — DOPAMINE HYDROCHLORIDE 5 MCG/KG/MIN: 320 INJECTION, SOLUTION INTRAVENOUS at 10:38

## 2019-01-01 RX ADMIN — HEPARIN SODIUM 5000 UNITS: 5000 INJECTION INTRAVENOUS; SUBCUTANEOUS at 21:17

## 2019-01-01 RX ADMIN — SODIUM CHLORIDE 0.6 MCG/KG/HR: 900 INJECTION, SOLUTION INTRAVENOUS at 02:26

## 2019-01-01 RX ADMIN — CALCIUM CHLORIDE, MAGNESIUM CHLORIDE, DEXTROSE MONOHYDRATE, LACTIC ACID, SODIUM CHLORIDE, SODIUM BICARBONATE AND POTASSIUM CHLORIDE 1600 ML/HR: 3.68; 3.05; 22; 5.4; 6.46; 3.09; .314 INJECTION INTRAVENOUS at 03:46

## 2019-01-01 RX ADMIN — SODIUM BICARBONATE 150 MEQ: 84 INJECTION INTRAVENOUS at 11:18

## 2019-01-01 RX ADMIN — CHLORHEXIDINE GLUCONATE 15 ML: 1.2 RINSE ORAL at 17:58

## 2019-01-01 RX ADMIN — SODIUM CHLORIDE 40 MG: 9 INJECTION INTRAMUSCULAR; INTRAVENOUS; SUBCUTANEOUS at 22:10

## 2019-01-01 RX ADMIN — ACETAMINOPHEN 650 MG: 325 TABLET ORAL at 17:13

## 2019-01-01 RX ADMIN — PROPOFOL 30 MCG/KG/MIN: 10 INJECTION, EMULSION INTRAVENOUS at 11:04

## 2019-01-01 RX ADMIN — ACETAMINOPHEN 650 MG: 325 TABLET ORAL at 09:33

## 2019-01-01 RX ADMIN — HYDRALAZINE HYDROCHLORIDE 10 MG: 20 INJECTION INTRAMUSCULAR; INTRAVENOUS at 12:37

## 2019-01-01 RX ADMIN — CASTOR OIL AND BALSAM, PERU: 788; 87 OINTMENT TOPICAL at 08:28

## 2019-01-01 RX ADMIN — SODIUM CHLORIDE 1000 ML: 900 INJECTION, SOLUTION INTRAVENOUS at 06:31

## 2019-01-01 RX ADMIN — VASOPRESSIN 0.02 UNITS/MIN: 20 INJECTION INTRAVENOUS at 13:58

## 2019-01-01 RX ADMIN — FENTANYL CITRATE 50 MCG: 50 INJECTION, SOLUTION INTRAMUSCULAR; INTRAVENOUS at 00:43

## 2019-01-01 RX ADMIN — EPINEPHRINE 1 MG: 0.1 INJECTION INTRACARDIAC; INTRAVENOUS at 00:32

## 2019-01-01 RX ADMIN — SODIUM CHLORIDE 1000 ML: 900 INJECTION, SOLUTION INTRAVENOUS at 08:38

## 2019-01-01 RX ADMIN — AZITHROMYCIN MONOHYDRATE 500 MG: 500 INJECTION, POWDER, LYOPHILIZED, FOR SOLUTION INTRAVENOUS at 05:26

## 2019-01-01 RX ADMIN — Medication 10 ML: at 13:39

## 2019-01-01 RX ADMIN — SODIUM CHLORIDE 40 MG: 9 INJECTION INTRAMUSCULAR; INTRAVENOUS; SUBCUTANEOUS at 12:42

## 2019-01-01 RX ADMIN — ASPIRIN 81 MG: 81 TABLET ORAL at 12:41

## 2019-01-01 RX ADMIN — SODIUM CHLORIDE 40 MG: 9 INJECTION INTRAMUSCULAR; INTRAVENOUS; SUBCUTANEOUS at 09:55

## 2019-01-01 RX ADMIN — HEPARIN SODIUM 3800 UNITS: 1000 INJECTION, SOLUTION INTRAVENOUS; SUBCUTANEOUS at 22:04

## 2019-01-01 RX ADMIN — CALCIUM CHLORIDE, MAGNESIUM CHLORIDE, DEXTROSE MONOHYDRATE, LACTIC ACID, SODIUM CHLORIDE, SODIUM BICARBONATE AND POTASSIUM CHLORIDE 1600 ML/HR: 5.15; 2.03; 22; 5.4; 6.46; 3.09; .157 INJECTION INTRAVENOUS at 22:09

## 2019-01-01 RX ADMIN — PIPERACILLIN SODIUM,TAZOBACTAM SODIUM 3.38 G: 3; .375 INJECTION, POWDER, FOR SOLUTION INTRAVENOUS at 17:24

## 2019-01-01 RX ADMIN — SODIUM CHLORIDE 40 MG: 9 INJECTION INTRAMUSCULAR; INTRAVENOUS; SUBCUTANEOUS at 21:24

## 2019-01-01 RX ADMIN — SODIUM CHLORIDE 40 MG: 9 INJECTION INTRAMUSCULAR; INTRAVENOUS; SUBCUTANEOUS at 08:15

## 2019-01-01 RX ADMIN — Medication 1 CAPSULE: at 08:58

## 2019-01-01 RX ADMIN — PHENYLEPHRINE HYDROCHLORIDE 80 MCG: 10 INJECTION INTRAVENOUS at 12:45

## 2019-01-01 RX ADMIN — IOPAMIDOL 100 ML: 755 INJECTION, SOLUTION INTRAVENOUS at 01:25

## 2019-01-01 RX ADMIN — HEPARIN SODIUM 5000 UNITS: 5000 INJECTION INTRAVENOUS; SUBCUTANEOUS at 06:00

## 2019-01-01 RX ADMIN — CALCIUM CHLORIDE, MAGNESIUM CHLORIDE, DEXTROSE MONOHYDRATE, LACTIC ACID, SODIUM CHLORIDE, SODIUM BICARBONATE AND POTASSIUM CHLORIDE 1600 ML/HR: 5.15; 2.03; 22; 5.4; 6.46; 3.09; .157 INJECTION INTRAVENOUS at 02:50

## 2019-01-01 RX ADMIN — PROPOFOL 30 MCG/KG/MIN: 10 INJECTION, EMULSION INTRAVENOUS at 10:38

## 2019-01-01 RX ADMIN — IPRATROPIUM BROMIDE AND ALBUTEROL SULFATE 3 ML: .5; 3 SOLUTION RESPIRATORY (INHALATION) at 13:29

## 2019-01-01 RX ADMIN — ALBUMIN (HUMAN) 12.5 G: 0.25 INJECTION, SOLUTION INTRAVENOUS at 13:25

## 2019-01-01 RX ADMIN — THERA TABS 1 TABLET: TAB at 08:28

## 2019-01-01 RX ADMIN — HEPARIN SODIUM 3000 UNITS: 1000 INJECTION INTRAVENOUS; SUBCUTANEOUS at 16:20

## 2019-01-01 RX ADMIN — PIPERACILLIN SODIUM,TAZOBACTAM SODIUM 3.38 G: 3; .375 INJECTION, POWDER, FOR SOLUTION INTRAVENOUS at 10:45

## 2019-01-01 RX ADMIN — PIPERACILLIN SODIUM,TAZOBACTAM SODIUM 3.38 G: 3; .375 INJECTION, POWDER, FOR SOLUTION INTRAVENOUS at 09:42

## 2019-01-01 RX ADMIN — Medication 10 ML: at 21:58

## 2019-01-01 RX ADMIN — CASTOR OIL AND BALSAM, PERU: 788; 87 OINTMENT TOPICAL at 10:30

## 2019-01-01 RX ADMIN — SODIUM CHLORIDE 40 MG: 9 INJECTION INTRAMUSCULAR; INTRAVENOUS; SUBCUTANEOUS at 08:33

## 2019-01-01 RX ADMIN — Medication 1 CAPSULE: at 10:19

## 2019-01-01 RX ADMIN — ALBUMIN (HUMAN) 12.5 G: 0.25 INJECTION, SOLUTION INTRAVENOUS at 08:30

## 2019-01-01 RX ADMIN — LORAZEPAM 1 MG: 2 INJECTION INTRAMUSCULAR; INTRAVENOUS at 16:17

## 2019-01-01 RX ADMIN — NOREPINEPHRINE BITARTRATE 22 MCG/MIN: 1 INJECTION, SOLUTION, CONCENTRATE INTRAVENOUS at 04:07

## 2019-01-01 RX ADMIN — DEXAMETHASONE SODIUM PHOSPHATE 4 MG: 4 INJECTION, SOLUTION INTRAMUSCULAR; INTRAVENOUS at 18:39

## 2019-01-01 RX ADMIN — Medication 10 ML: at 06:25

## 2019-01-01 RX ADMIN — LISINOPRIL 20 MG: 20 TABLET ORAL at 18:09

## 2019-01-01 RX ADMIN — Medication 10 ML: at 08:29

## 2019-01-01 RX ADMIN — Medication 100 MG: at 09:50

## 2019-01-01 RX ADMIN — Medication 10 ML: at 21:05

## 2019-01-01 RX ADMIN — Medication 10 ML: at 06:00

## 2019-01-01 RX ADMIN — SUCCINYLCHOLINE CHLORIDE 100 MG: 20 INJECTION, SOLUTION INTRAMUSCULAR; INTRAVENOUS at 18:10

## 2019-01-01 RX ADMIN — CHLORHEXIDINE GLUCONATE 15 ML: 1.2 RINSE ORAL at 19:01

## 2019-01-01 RX ADMIN — AMLODIPINE BESYLATE 5 MG: 5 TABLET ORAL at 17:18

## 2019-01-01 RX ADMIN — LISINOPRIL 20 MG: 20 TABLET ORAL at 17:42

## 2019-01-01 RX ADMIN — CALCIUM CHLORIDE, MAGNESIUM CHLORIDE, DEXTROSE MONOHYDRATE, LACTIC ACID, SODIUM CHLORIDE, SODIUM BICARBONATE AND POTASSIUM CHLORIDE 1600 ML/HR: 3.68; 3.05; 22; 5.4; 6.46; 3.09; .314 INJECTION INTRAVENOUS at 03:31

## 2019-01-01 RX ADMIN — ALBUMIN (HUMAN) 12.5 G: 12.5 INJECTION, SOLUTION INTRAVENOUS at 10:46

## 2019-01-01 RX ADMIN — Medication 10 ML: at 21:28

## 2019-01-01 RX ADMIN — Medication 4 MCG/MIN: at 10:58

## 2019-01-01 RX ADMIN — HEPARIN SODIUM 3800 UNITS: 1000 INJECTION INTRAVENOUS; SUBCUTANEOUS at 11:13

## 2019-01-01 RX ADMIN — AMLODIPINE BESYLATE 5 MG: 5 TABLET ORAL at 17:36

## 2019-01-01 RX ADMIN — AMLODIPINE BESYLATE 5 MG: 5 TABLET ORAL at 18:10

## 2019-01-01 RX ADMIN — CASTOR OIL AND BALSAM, PERU: 788; 87 OINTMENT TOPICAL at 18:42

## 2019-01-01 RX ADMIN — CASTOR OIL AND BALSAM, PERU: 788; 87 OINTMENT TOPICAL at 09:00

## 2019-01-01 RX ADMIN — Medication 10 ML: at 14:00

## 2019-01-01 RX ADMIN — FOLIC ACID 1 MG: 1 TABLET ORAL at 09:50

## 2019-01-01 RX ADMIN — CALCIUM CHLORIDE, MAGNESIUM CHLORIDE, DEXTROSE MONOHYDRATE, LACTIC ACID, SODIUM CHLORIDE, SODIUM BICARBONATE AND POTASSIUM CHLORIDE 1600 ML/HR: 3.68; 3.05; 22; 5.4; 6.46; 3.09; .314 INJECTION INTRAVENOUS at 04:53

## 2019-01-01 RX ADMIN — LISINOPRIL 20 MG: 20 TABLET ORAL at 09:50

## 2019-01-01 RX ADMIN — SODIUM CHLORIDE 0.7 MCG/KG/HR: 900 INJECTION, SOLUTION INTRAVENOUS at 05:31

## 2019-01-01 RX ADMIN — Medication 10 ML: at 13:15

## 2019-01-01 RX ADMIN — Medication 10 ML: at 22:10

## 2019-01-01 RX ADMIN — HEPARIN SODIUM 5000 UNITS: 5000 INJECTION INTRAVENOUS; SUBCUTANEOUS at 21:39

## 2019-01-01 RX ADMIN — SODIUM CHLORIDE: 900 INJECTION, SOLUTION INTRAVENOUS at 19:38

## 2019-01-01 RX ADMIN — Medication 10 ML: at 06:30

## 2019-01-01 RX ADMIN — CALCIUM CHLORIDE, MAGNESIUM CHLORIDE, DEXTROSE MONOHYDRATE, LACTIC ACID, SODIUM CHLORIDE, SODIUM BICARBONATE AND POTASSIUM CHLORIDE 1600 ML/HR: 3.68; 3.05; 22; 5.4; 6.46; 3.09; .314 INJECTION INTRAVENOUS at 15:50

## 2019-01-01 RX ADMIN — CEFTRIAXONE 1 G: 1 INJECTION, POWDER, FOR SOLUTION INTRAMUSCULAR; INTRAVENOUS at 04:54

## 2019-01-01 RX ADMIN — ASPIRIN 81 MG: 81 TABLET ORAL at 08:15

## 2019-01-01 RX ADMIN — Medication 10 ML: at 22:00

## 2019-01-01 RX ADMIN — IPRATROPIUM BROMIDE AND ALBUTEROL SULFATE 3 ML: .5; 3 SOLUTION RESPIRATORY (INHALATION) at 08:46

## 2019-01-01 RX ADMIN — Medication 10 ML: at 05:30

## 2019-01-01 RX ADMIN — Medication 5 ML: at 15:42

## 2019-01-01 RX ADMIN — PANTOPRAZOLE SODIUM 8 MG/HR: 40 INJECTION, POWDER, FOR SOLUTION INTRAVENOUS at 17:07

## 2019-01-01 RX ADMIN — CALCIUM CHLORIDE, MAGNESIUM CHLORIDE, DEXTROSE MONOHYDRATE, LACTIC ACID, SODIUM CHLORIDE, SODIUM BICARBONATE AND POTASSIUM CHLORIDE 1600 ML/HR: 3.68; 3.05; 22; 5.4; 6.46; 3.09; .314 INJECTION INTRAVENOUS at 18:03

## 2019-01-01 RX ADMIN — CASTOR OIL AND BALSAM, PERU: 788; 87 OINTMENT TOPICAL at 09:46

## 2019-01-01 RX ADMIN — VASOPRESSIN 2 UNITS: 20 INJECTION INTRAVENOUS at 19:07

## 2019-01-01 RX ADMIN — Medication 1 CAPSULE: at 09:45

## 2019-01-01 RX ADMIN — SODIUM CHLORIDE 40 MG: 9 INJECTION INTRAMUSCULAR; INTRAVENOUS; SUBCUTANEOUS at 20:47

## 2019-01-01 RX ADMIN — SODIUM CHLORIDE 0.6 MCG/KG/HR: 900 INJECTION, SOLUTION INTRAVENOUS at 06:05

## 2019-01-01 RX ADMIN — FENTANYL CITRATE 100 MCG: 50 INJECTION, SOLUTION INTRAMUSCULAR; INTRAVENOUS at 02:39

## 2019-01-01 RX ADMIN — MIDAZOLAM HYDROCHLORIDE 1 MG: 1 INJECTION, SOLUTION INTRAMUSCULAR; INTRAVENOUS at 08:46

## 2019-01-01 RX ADMIN — ETOMIDATE 16 MG: 2 INJECTION INTRAVENOUS at 10:50

## 2019-01-01 RX ADMIN — THERA TABS 1 TABLET: TAB at 08:40

## 2019-01-01 RX ADMIN — CASTOR OIL AND BALSAM, PERU: 788; 87 OINTMENT TOPICAL at 18:21

## 2019-01-01 RX ADMIN — EPINEPHRINE 1 MG: 0.1 INJECTION INTRACARDIAC; INTRAVENOUS at 00:38

## 2019-01-01 RX ADMIN — CASTOR OIL AND BALSAM, PERU: 788; 87 OINTMENT TOPICAL at 18:00

## 2019-01-01 RX ADMIN — SUCRALFATE 1 G: 1 TABLET ORAL at 11:44

## 2019-01-01 RX ADMIN — Medication 10 ML: at 21:13

## 2019-01-01 RX ADMIN — IPRATROPIUM BROMIDE AND ALBUTEROL SULFATE 3 ML: .5; 3 SOLUTION RESPIRATORY (INHALATION) at 08:30

## 2019-01-01 RX ADMIN — FENTANYL CITRATE 25 MCG: 50 INJECTION, SOLUTION INTRAMUSCULAR; INTRAVENOUS at 08:38

## 2019-01-01 RX ADMIN — SODIUM CHLORIDE 75 ML/HR: 900 INJECTION, SOLUTION INTRAVENOUS at 03:30

## 2019-01-01 RX ADMIN — PIPERACILLIN SODIUM,TAZOBACTAM SODIUM 3.38 G: 3; .375 INJECTION, POWDER, FOR SOLUTION INTRAVENOUS at 01:34

## 2019-01-01 RX ADMIN — Medication 10 ML: at 05:57

## 2019-01-01 RX ADMIN — FENTANYL CITRATE 50 MCG: 50 INJECTION, SOLUTION INTRAMUSCULAR; INTRAVENOUS at 08:40

## 2019-01-01 RX ADMIN — FUROSEMIDE 40 MG: 10 INJECTION, SOLUTION INTRAMUSCULAR; INTRAVENOUS at 09:33

## 2019-01-01 RX ADMIN — ALBUMIN (HUMAN) 12.5 G: 12.5 INJECTION, SOLUTION INTRAVENOUS at 12:09

## 2019-01-01 RX ADMIN — Medication 10 ML: at 12:32

## 2019-01-01 RX ADMIN — PIPERACILLIN SODIUM,TAZOBACTAM SODIUM 3.38 G: 3; .375 INJECTION, POWDER, FOR SOLUTION INTRAVENOUS at 02:13

## 2019-01-01 RX ADMIN — FOLIC ACID 1 MG: 1 TABLET ORAL at 09:55

## 2019-01-01 RX ADMIN — CEFTRIAXONE 1 G: 1 INJECTION, POWDER, FOR SOLUTION INTRAMUSCULAR; INTRAVENOUS at 06:01

## 2019-01-01 RX ADMIN — LORAZEPAM 1 MG: 2 INJECTION INTRAMUSCULAR; INTRAVENOUS at 19:11

## 2019-01-01 RX ADMIN — PROPOFOL 50 MG: 10 INJECTION, EMULSION INTRAVENOUS at 12:29

## 2019-01-01 RX ADMIN — Medication 10 ML: at 21:08

## 2019-01-01 RX ADMIN — FUROSEMIDE 40 MG: 40 TABLET ORAL at 09:14

## 2019-01-01 RX ADMIN — FENTANYL CITRATE 50 MCG: 50 INJECTION, SOLUTION INTRAMUSCULAR; INTRAVENOUS at 05:15

## 2019-01-01 RX ADMIN — IPRATROPIUM BROMIDE AND ALBUTEROL SULFATE 3 ML: .5; 3 SOLUTION RESPIRATORY (INHALATION) at 20:09

## 2019-01-01 RX ADMIN — PHENOL 1 SPRAY: 1.5 LIQUID ORAL at 02:09

## 2019-01-01 RX ADMIN — CALCIUM CHLORIDE, MAGNESIUM CHLORIDE, DEXTROSE MONOHYDRATE, LACTIC ACID, SODIUM CHLORIDE, SODIUM BICARBONATE AND POTASSIUM CHLORIDE 1600 ML/HR: 3.68; 3.05; 22; 5.4; 6.46; 3.09; .314 INJECTION INTRAVENOUS at 05:10

## 2019-01-01 RX ADMIN — SODIUM CHLORIDE 40 MG: 9 INJECTION INTRAMUSCULAR; INTRAVENOUS; SUBCUTANEOUS at 08:30

## 2019-01-01 RX ADMIN — Medication 10 ML: at 05:04

## 2019-01-01 RX ADMIN — HYDROCODONE BITARTRATE AND ACETAMINOPHEN 1 TABLET: 5; 325 TABLET ORAL at 23:55

## 2019-01-01 RX ADMIN — FOLIC ACID 1 MG: 1 TABLET ORAL at 09:59

## 2019-01-01 RX ADMIN — PROPOFOL 20 MG: 10 INJECTION, EMULSION INTRAVENOUS at 12:33

## 2019-01-01 RX ADMIN — Medication 1 CAPSULE: at 08:14

## 2019-01-01 RX ADMIN — Medication 10 ML: at 13:24

## 2019-01-01 RX ADMIN — ALBUMIN (HUMAN) 250 ML: 12.5 INJECTION, SOLUTION INTRAVENOUS at 19:29

## 2019-01-01 RX ADMIN — Medication 10 ML: at 05:22

## 2019-01-01 RX ADMIN — PROPOFOL 30 MCG/KG/MIN: 10 INJECTION, EMULSION INTRAVENOUS at 01:29

## 2019-01-01 RX ADMIN — FENTANYL CITRATE 50 MCG: 50 INJECTION, SOLUTION INTRAMUSCULAR; INTRAVENOUS at 15:29

## 2019-01-01 RX ADMIN — SODIUM CHLORIDE 100 ML: 900 INJECTION, SOLUTION INTRAVENOUS at 01:25

## 2019-01-01 RX ADMIN — CASTOR OIL AND BALSAM, PERU: 788; 87 OINTMENT TOPICAL at 08:45

## 2019-01-01 RX ADMIN — ACETAMINOPHEN 650 MG: 325 TABLET ORAL at 09:16

## 2019-01-01 RX ADMIN — FENTANYL CITRATE 50 MCG: 50 INJECTION, SOLUTION INTRAMUSCULAR; INTRAVENOUS at 04:14

## 2019-01-01 RX ADMIN — Medication 100 MG: at 08:39

## 2019-01-01 RX ADMIN — PIPERACILLIN SODIUM,TAZOBACTAM SODIUM 3.38 G: 3; .375 INJECTION, POWDER, FOR SOLUTION INTRAVENOUS at 02:05

## 2019-01-01 RX ADMIN — CALCIUM CHLORIDE, MAGNESIUM CHLORIDE, DEXTROSE MONOHYDRATE, LACTIC ACID, SODIUM CHLORIDE, SODIUM BICARBONATE AND POTASSIUM CHLORIDE 1600 ML/HR: 5.15; 2.03; 22; 5.4; 6.46; 3.09; .157 INJECTION INTRAVENOUS at 15:48

## 2019-01-01 RX ADMIN — Medication 10 ML: at 21:26

## 2019-01-01 RX ADMIN — Medication 10 ML: at 21:39

## 2019-01-01 RX ADMIN — CALCIUM CHLORIDE, MAGNESIUM CHLORIDE, DEXTROSE MONOHYDRATE, LACTIC ACID, SODIUM CHLORIDE, SODIUM BICARBONATE AND POTASSIUM CHLORIDE 1600 ML/HR: 3.68; 3.05; 22; 5.4; 6.46; 3.09; .314 INJECTION INTRAVENOUS at 05:19

## 2019-01-01 RX ADMIN — PHENYLEPHRINE HYDROCHLORIDE 30 MCG/MIN: 10 INJECTION INTRAVENOUS at 09:37

## 2019-01-01 RX ADMIN — ASPIRIN 81 MG: 81 TABLET ORAL at 08:28

## 2019-01-01 RX ADMIN — Medication 2 G: at 18:28

## 2019-01-01 RX ADMIN — Medication 20 MCG/MIN: at 19:19

## 2019-01-01 RX ADMIN — PROPOFOL 20 MG: 10 INJECTION, EMULSION INTRAVENOUS at 12:41

## 2019-01-01 RX ADMIN — PHENYLEPHRINE HYDROCHLORIDE 40 MCG/MIN: 10 INJECTION INTRAVENOUS at 18:15

## 2019-01-01 RX ADMIN — LORAZEPAM 1 MG: 2 INJECTION INTRAMUSCULAR; INTRAVENOUS at 00:18

## 2019-01-01 RX ADMIN — FENTANYL CITRATE 50 MCG: 50 INJECTION, SOLUTION INTRAMUSCULAR; INTRAVENOUS at 23:37

## 2019-01-01 RX ADMIN — PHENYLEPHRINE HYDROCHLORIDE 120 MCG: 10 INJECTION INTRAVENOUS at 12:33

## 2019-01-01 RX ADMIN — LIDOCAINE HYDROCHLORIDE 10 ML: 10 INJECTION, SOLUTION EPIDURAL; INFILTRATION; INTRACAUDAL; PERINEURAL at 16:19

## 2019-01-01 RX ADMIN — PIPERACILLIN SODIUM,TAZOBACTAM SODIUM 3.38 G: 3; .375 INJECTION, POWDER, FOR SOLUTION INTRAVENOUS at 02:11

## 2019-01-01 RX ADMIN — LISINOPRIL 20 MG: 20 TABLET ORAL at 17:36

## 2019-01-01 RX ADMIN — PHENYLEPHRINE HYDROCHLORIDE 300 MCG/MIN: 10 INJECTION INTRAVENOUS at 12:18

## 2019-01-01 RX ADMIN — FOLIC ACID 1 MG: 1 TABLET ORAL at 08:33

## 2019-01-01 RX ADMIN — LISINOPRIL 20 MG: 20 TABLET ORAL at 10:06

## 2019-01-01 RX ADMIN — ASPIRIN 81 MG: 81 TABLET ORAL at 08:44

## 2019-01-01 RX ADMIN — CASTOR OIL AND BALSAM, PERU: 788; 87 OINTMENT TOPICAL at 17:45

## 2019-01-01 RX ADMIN — Medication 10 ML: at 14:26

## 2019-01-01 RX ADMIN — SODIUM CHLORIDE 0.7 MCG/KG/HR: 900 INJECTION, SOLUTION INTRAVENOUS at 21:33

## 2019-01-01 RX ADMIN — CALCIUM CHLORIDE, MAGNESIUM CHLORIDE, DEXTROSE MONOHYDRATE, LACTIC ACID, SODIUM CHLORIDE, SODIUM BICARBONATE AND POTASSIUM CHLORIDE 1600 ML/HR: 3.68; 3.05; 22; 5.4; 6.46; 3.09; .314 INJECTION INTRAVENOUS at 09:16

## 2019-01-01 RX ADMIN — AZITHROMYCIN MONOHYDRATE 500 MG: 500 INJECTION, POWDER, LYOPHILIZED, FOR SOLUTION INTRAVENOUS at 04:10

## 2019-01-01 RX ADMIN — Medication 100 MG: at 08:44

## 2019-01-01 RX ADMIN — ONDANSETRON 4 MG: 2 INJECTION INTRAMUSCULAR; INTRAVENOUS at 16:06

## 2019-01-01 RX ADMIN — PHENYLEPHRINE HYDROCHLORIDE 300 MCG/MIN: 10 INJECTION INTRAVENOUS at 20:38

## 2019-01-01 RX ADMIN — ALBUMIN HUMAN 12.5 G: 50 SOLUTION INTRAVENOUS at 10:46

## 2019-01-01 RX ADMIN — ASPIRIN 81 MG: 81 TABLET ORAL at 09:15

## 2019-01-01 RX ADMIN — LISINOPRIL 20 MG: 20 TABLET ORAL at 17:18

## 2019-01-01 RX ADMIN — CALCIUM CHLORIDE, MAGNESIUM CHLORIDE, DEXTROSE MONOHYDRATE, LACTIC ACID, SODIUM CHLORIDE, SODIUM BICARBONATE AND POTASSIUM CHLORIDE 1600 ML/HR: 3.68; 3.05; 22; 5.4; 6.46; 3.09; .314 INJECTION INTRAVENOUS at 09:55

## 2019-01-01 RX ADMIN — CALCIUM CHLORIDE, MAGNESIUM CHLORIDE, DEXTROSE MONOHYDRATE, LACTIC ACID, SODIUM CHLORIDE, SODIUM BICARBONATE AND POTASSIUM CHLORIDE 1600 ML/HR: 3.68; 3.05; 22; 5.4; 6.46; 3.09; .314 INJECTION INTRAVENOUS at 05:04

## 2019-01-01 RX ADMIN — Medication 100 MG: at 08:58

## 2019-01-01 RX ADMIN — IPRATROPIUM BROMIDE AND ALBUTEROL SULFATE 3 ML: .5; 3 SOLUTION RESPIRATORY (INHALATION) at 01:03

## 2019-01-01 RX ADMIN — Medication 10 ML: at 18:20

## 2019-01-01 RX ADMIN — Medication 10 ML: at 13:40

## 2019-01-01 RX ADMIN — SODIUM CHLORIDE 10 ML/HR: 900 INJECTION, SOLUTION INTRAVENOUS at 08:13

## 2019-01-01 RX ADMIN — CASTOR OIL AND BALSAM, PERU: 788; 87 OINTMENT TOPICAL at 09:29

## 2019-01-01 RX ADMIN — THERA TABS 1 TABLET: TAB at 08:30

## 2019-01-01 RX ADMIN — CALCIUM CHLORIDE, MAGNESIUM CHLORIDE, DEXTROSE MONOHYDRATE, LACTIC ACID, SODIUM CHLORIDE, SODIUM BICARBONATE AND POTASSIUM CHLORIDE 1600 ML/HR: 3.68; 3.05; 22; 5.4; 6.46; 3.09; .314 INJECTION INTRAVENOUS at 09:38

## 2019-01-01 RX ADMIN — SODIUM CHLORIDE 40 MG: 9 INJECTION INTRAMUSCULAR; INTRAVENOUS; SUBCUTANEOUS at 08:10

## 2019-01-01 RX ADMIN — CHLORHEXIDINE GLUCONATE 15 ML: 1.2 RINSE ORAL at 17:45

## 2019-01-01 RX ADMIN — PIPERACILLIN SODIUM,TAZOBACTAM SODIUM 3.38 G: 3; .375 INJECTION, POWDER, FOR SOLUTION INTRAVENOUS at 10:20

## 2019-01-01 RX ADMIN — EPINEPHRINE 0.75 MG: 1 INJECTION INTRAMUSCULAR; INTRAVENOUS; SUBCUTANEOUS at 19:10

## 2019-01-01 RX ADMIN — FUROSEMIDE 40 MG: 10 INJECTION, SOLUTION INTRAMUSCULAR; INTRAVENOUS at 01:36

## 2019-01-01 RX ADMIN — PHENYLEPHRINE HYDROCHLORIDE 300 MCG/MIN: 10 INJECTION INTRAVENOUS at 16:09

## 2019-01-01 RX ADMIN — CALCIUM CHLORIDE, MAGNESIUM CHLORIDE, DEXTROSE MONOHYDRATE, LACTIC ACID, SODIUM CHLORIDE, SODIUM BICARBONATE AND POTASSIUM CHLORIDE 1600 ML/HR: 3.68; 3.05; 22; 5.4; 6.46; 3.09; .314 INJECTION INTRAVENOUS at 03:04

## 2019-01-01 RX ADMIN — Medication 10 ML: at 06:10

## 2019-01-01 RX ADMIN — CALCIUM CHLORIDE, MAGNESIUM CHLORIDE, DEXTROSE MONOHYDRATE, LACTIC ACID, SODIUM CHLORIDE, SODIUM BICARBONATE AND POTASSIUM CHLORIDE 1600 ML/HR: 3.68; 3.05; 22; 5.4; 6.46; 3.09; .314 INJECTION INTRAVENOUS at 06:34

## 2019-01-01 RX ADMIN — DEXTROSE MONOHYDRATE 50 ML/HR: 5 INJECTION, SOLUTION INTRAVENOUS at 15:37

## 2019-01-01 RX ADMIN — CALCIUM CHLORIDE, MAGNESIUM CHLORIDE, DEXTROSE MONOHYDRATE, LACTIC ACID, SODIUM CHLORIDE, SODIUM BICARBONATE AND POTASSIUM CHLORIDE 1600 ML/HR: 3.68; 3.05; 22; 5.4; 6.46; 3.09; .314 INJECTION INTRAVENOUS at 00:55

## 2019-01-01 RX ADMIN — PANTOPRAZOLE SODIUM 8 MG/HR: 40 INJECTION, POWDER, FOR SOLUTION INTRAVENOUS at 03:55

## 2019-01-01 RX ADMIN — IPRATROPIUM BROMIDE AND ALBUTEROL SULFATE 3 ML: .5; 3 SOLUTION RESPIRATORY (INHALATION) at 08:41

## 2019-01-01 RX ADMIN — PIPERACILLIN SODIUM,TAZOBACTAM SODIUM 3.38 G: 3; .375 INJECTION, POWDER, FOR SOLUTION INTRAVENOUS at 02:07

## 2019-01-01 RX ADMIN — THERA TABS 1 TABLET: TAB at 10:21

## 2019-01-01 RX ADMIN — HYDROMORPHONE HYDROCHLORIDE 1 MG: 1 INJECTION, SOLUTION INTRAMUSCULAR; INTRAVENOUS; SUBCUTANEOUS at 04:18

## 2019-01-01 RX ADMIN — CEFTRIAXONE 1 G: 1 INJECTION, POWDER, FOR SOLUTION INTRAMUSCULAR; INTRAVENOUS at 08:46

## 2019-01-01 RX ADMIN — AMLODIPINE BESYLATE 5 MG: 5 TABLET ORAL at 10:06

## 2019-01-01 RX ADMIN — PIPERACILLIN SODIUM,TAZOBACTAM SODIUM 3.38 G: 3; .375 INJECTION, POWDER, FOR SOLUTION INTRAVENOUS at 21:28

## 2019-01-01 RX ADMIN — FOLIC ACID 1 MG: 1 TABLET ORAL at 08:58

## 2019-01-01 RX ADMIN — Medication 100 MG: at 08:34

## 2019-01-01 RX ADMIN — FOLIC ACID 1 MG: 1 TABLET ORAL at 08:44

## 2019-01-01 RX ADMIN — PIPERACILLIN SODIUM,TAZOBACTAM SODIUM 3.38 G: 3; .375 INJECTION, POWDER, FOR SOLUTION INTRAVENOUS at 09:55

## 2019-01-01 RX ADMIN — PROPOFOL 25 MCG/KG/MIN: 10 INJECTION, EMULSION INTRAVENOUS at 10:35

## 2019-01-01 RX ADMIN — IPRATROPIUM BROMIDE AND ALBUTEROL SULFATE 3 ML: .5; 3 SOLUTION RESPIRATORY (INHALATION) at 13:22

## 2019-01-01 RX ADMIN — LIDOCAINE HYDROCHLORIDE 15 ML: 10 INJECTION, SOLUTION EPIDURAL; INFILTRATION; INTRACAUDAL; PERINEURAL at 08:49

## 2019-01-01 RX ADMIN — SUCCINYLCHOLINE CHLORIDE 140 MG: 20 INJECTION INTRAMUSCULAR; INTRAVENOUS at 10:53

## 2019-01-01 RX ADMIN — CHLORHEXIDINE GLUCONATE 15 ML: 1.2 RINSE ORAL at 09:56

## 2019-01-01 RX ADMIN — Medication 4 MG: at 21:01

## 2019-01-01 RX ADMIN — Medication 10 ML: at 15:15

## 2019-01-01 RX ADMIN — SODIUM CHLORIDE: 900 INJECTION, SOLUTION INTRAVENOUS at 18:05

## 2019-01-01 RX ADMIN — FENTANYL CITRATE 50 MCG: 50 INJECTION, SOLUTION INTRAMUSCULAR; INTRAVENOUS at 18:20

## 2019-01-01 RX ADMIN — CHLORHEXIDINE GLUCONATE 15 ML: 1.2 RINSE ORAL at 13:25

## 2019-01-01 RX ADMIN — SODIUM CHLORIDE 40 MG: 9 INJECTION INTRAMUSCULAR; INTRAVENOUS; SUBCUTANEOUS at 21:09

## 2019-01-01 RX ADMIN — FENTANYL CITRATE 50 MCG: 50 INJECTION, SOLUTION INTRAMUSCULAR; INTRAVENOUS at 22:45

## 2019-01-01 RX ADMIN — PROPOFOL 50 MG: 10 INJECTION, EMULSION INTRAVENOUS at 12:31

## 2019-01-01 RX ADMIN — FENTANYL CITRATE 50 MCG: 50 INJECTION, SOLUTION INTRAMUSCULAR; INTRAVENOUS at 10:39

## 2019-01-01 RX ADMIN — SODIUM BICARBONATE 50 MEQ: 84 INJECTION, SOLUTION INTRAVENOUS at 00:39

## 2019-01-01 RX ADMIN — SODIUM CHLORIDE 40 MG: 9 INJECTION INTRAMUSCULAR; INTRAVENOUS; SUBCUTANEOUS at 20:23

## 2019-01-01 RX ADMIN — SODIUM CHLORIDE 40 MG: 9 INJECTION INTRAMUSCULAR; INTRAVENOUS; SUBCUTANEOUS at 21:19

## 2019-01-01 RX ADMIN — PANTOPRAZOLE SODIUM 8 MG/HR: 40 INJECTION, POWDER, FOR SOLUTION INTRAVENOUS at 09:04

## 2019-01-01 RX ADMIN — SODIUM CHLORIDE: 900 INJECTION, SOLUTION INTRAVENOUS at 12:29

## 2019-01-01 RX ADMIN — VASOPRESSIN 2 UNITS: 20 INJECTION INTRAVENOUS at 19:03

## 2019-01-01 RX ADMIN — Medication 1 CAPSULE: at 08:11

## 2019-01-01 RX ADMIN — Medication 10 ML: at 22:11

## 2019-01-01 RX ADMIN — Medication 1 CAPSULE: at 09:50

## 2019-01-01 RX ADMIN — CALCIUM CHLORIDE, MAGNESIUM CHLORIDE, DEXTROSE MONOHYDRATE, LACTIC ACID, SODIUM CHLORIDE, SODIUM BICARBONATE AND POTASSIUM CHLORIDE 1600 ML/HR: 3.68; 3.05; 22; 5.4; 6.46; 3.09; .314 INJECTION INTRAVENOUS at 09:54

## 2019-01-01 RX ADMIN — SUCRALFATE 1 G: 1 TABLET ORAL at 07:16

## 2019-01-01 RX ADMIN — FENTANYL CITRATE 50 MCG: 50 INJECTION, SOLUTION INTRAMUSCULAR; INTRAVENOUS at 13:00

## 2019-01-01 RX ADMIN — SODIUM BICARBONATE 50 MEQ: 84 INJECTION, SOLUTION INTRAVENOUS at 20:12

## 2019-01-01 RX ADMIN — AZITHROMYCIN MONOHYDRATE 500 MG: 500 INJECTION, POWDER, LYOPHILIZED, FOR SOLUTION INTRAVENOUS at 06:46

## 2019-01-01 RX ADMIN — FENTANYL CITRATE 25 MCG: 50 INJECTION, SOLUTION INTRAMUSCULAR; INTRAVENOUS at 08:45

## 2019-01-01 RX ADMIN — HEPARIN SODIUM 5000 UNITS: 5000 INJECTION INTRAVENOUS; SUBCUTANEOUS at 12:16

## 2019-01-01 RX ADMIN — Medication 10 ML: at 22:51

## 2019-01-01 RX ADMIN — PROPOFOL 45 MCG/KG/MIN: 10 INJECTION, EMULSION INTRAVENOUS at 08:03

## 2019-01-01 RX ADMIN — FOLIC ACID 1 MG: 1 TABLET ORAL at 08:11

## 2019-01-01 RX ADMIN — AMLODIPINE BESYLATE 5 MG: 5 TABLET ORAL at 17:06

## 2019-01-01 RX ADMIN — IPRATROPIUM BROMIDE AND ALBUTEROL SULFATE 3 ML: .5; 3 SOLUTION RESPIRATORY (INHALATION) at 14:13

## 2019-01-01 RX ADMIN — FENTANYL CITRATE 50 MCG: 50 INJECTION, SOLUTION INTRAMUSCULAR; INTRAVENOUS at 02:59

## 2019-01-01 RX ADMIN — IPRATROPIUM BROMIDE AND ALBUTEROL SULFATE 3 ML: .5; 3 SOLUTION RESPIRATORY (INHALATION) at 14:34

## 2019-01-01 RX ADMIN — PIPERACILLIN SODIUM,TAZOBACTAM SODIUM 3.38 G: 3; .375 INJECTION, POWDER, FOR SOLUTION INTRAVENOUS at 17:31

## 2019-01-01 RX ADMIN — THERA TABS 1 TABLET: TAB at 12:41

## 2019-01-01 RX ADMIN — Medication 10 ML: at 06:53

## 2019-01-01 RX ADMIN — ALBUMIN (HUMAN) 250 ML: 12.5 INJECTION, SOLUTION INTRAVENOUS at 19:19

## 2019-01-01 RX ADMIN — PANTOPRAZOLE SODIUM 8 MG/HR: 40 INJECTION, POWDER, FOR SOLUTION INTRAVENOUS at 11:44

## 2019-01-01 RX ADMIN — FOLIC ACID 1 MG: 1 TABLET ORAL at 09:16

## 2019-01-01 RX ADMIN — Medication 100 MG: at 09:34

## 2019-01-01 RX ADMIN — LIDOCAINE HYDROCHLORIDE 10 ML: 10 INJECTION, SOLUTION EPIDURAL; INFILTRATION; INTRACAUDAL; PERINEURAL at 12:30

## 2019-01-01 RX ADMIN — ASPIRIN 81 MG: 81 TABLET ORAL at 09:58

## 2019-01-01 RX ADMIN — Medication 10 ML: at 10:36

## 2019-01-01 RX ADMIN — ASPIRIN 81 MG: 81 TABLET ORAL at 08:11

## 2019-01-01 RX ADMIN — FENTANYL CITRATE 50 MCG: 50 INJECTION, SOLUTION INTRAMUSCULAR; INTRAVENOUS at 22:03

## 2019-01-01 RX ADMIN — FOLIC ACID 1 MG: 1 TABLET ORAL at 09:33

## 2019-01-01 RX ADMIN — Medication 10 ML: at 13:53

## 2019-01-01 RX ADMIN — SODIUM CHLORIDE 0.6 MCG/KG/HR: 900 INJECTION, SOLUTION INTRAVENOUS at 08:13

## 2019-01-01 RX ADMIN — Medication 10 ML: at 13:38

## 2019-01-01 RX ADMIN — FOLIC ACID 1 MG: 1 TABLET ORAL at 08:30

## 2019-01-01 RX ADMIN — THERA TABS 1 TABLET: TAB at 08:58

## 2019-01-01 RX ADMIN — PROPOFOL 40 MCG/KG/MIN: 10 INJECTION, EMULSION INTRAVENOUS at 01:28

## 2019-01-01 RX ADMIN — PHENYLEPHRINE HYDROCHLORIDE 300 MCG/MIN: 10 INJECTION INTRAVENOUS at 06:42

## 2019-01-01 RX ADMIN — CALCIUM CHLORIDE, MAGNESIUM CHLORIDE, DEXTROSE MONOHYDRATE, LACTIC ACID, SODIUM CHLORIDE, SODIUM BICARBONATE AND POTASSIUM CHLORIDE 1600 ML/HR: 3.68; 3.05; 22; 5.4; 6.46; 3.09; .314 INJECTION INTRAVENOUS at 03:49

## 2019-01-01 RX ADMIN — LORAZEPAM 1 MG: 2 INJECTION INTRAMUSCULAR; INTRAVENOUS at 20:19

## 2019-01-01 RX ADMIN — Medication 10 ML: at 13:12

## 2019-01-01 RX ADMIN — CHLORHEXIDINE GLUCONATE 15 ML: 1.2 RINSE ORAL at 08:46

## 2019-01-01 RX ADMIN — Medication 10 ML: at 04:53

## 2019-01-01 RX ADMIN — PANTOPRAZOLE SODIUM 8 MG/HR: 40 INJECTION, POWDER, FOR SOLUTION INTRAVENOUS at 18:02

## 2019-01-01 RX ADMIN — Medication 1 CAPSULE: at 10:05

## 2019-01-01 RX ADMIN — PIPERACILLIN SODIUM,TAZOBACTAM SODIUM 3.38 G: 3; .375 INJECTION, POWDER, FOR SOLUTION INTRAVENOUS at 19:00

## 2019-01-01 RX ADMIN — IPRATROPIUM BROMIDE AND ALBUTEROL SULFATE 3 ML: .5; 3 SOLUTION RESPIRATORY (INHALATION) at 19:16

## 2019-01-01 RX ADMIN — PIPERACILLIN SODIUM,TAZOBACTAM SODIUM 3.38 G: 3; .375 INJECTION, POWDER, FOR SOLUTION INTRAVENOUS at 17:51

## 2019-01-01 RX ADMIN — FOLIC ACID 1 MG: 1 TABLET ORAL at 09:14

## 2019-01-01 RX ADMIN — PIPERACILLIN SODIUM,TAZOBACTAM SODIUM 3.38 G: 3; .375 INJECTION, POWDER, FOR SOLUTION INTRAVENOUS at 09:35

## 2019-01-01 RX ADMIN — LISINOPRIL 20 MG: 20 TABLET ORAL at 08:40

## 2019-01-01 RX ADMIN — EPINEPHRINE 1 MG: 0.1 INJECTION INTRACARDIAC; INTRAVENOUS at 01:53

## 2019-01-01 RX ADMIN — CASTOR OIL AND BALSAM, PERU: 788; 87 OINTMENT TOPICAL at 09:43

## 2019-01-01 RX ADMIN — IPRATROPIUM BROMIDE AND ALBUTEROL SULFATE 3 ML: .5; 3 SOLUTION RESPIRATORY (INHALATION) at 01:18

## 2019-01-01 RX ADMIN — IPRATROPIUM BROMIDE AND ALBUTEROL SULFATE 3 ML: .5; 3 SOLUTION RESPIRATORY (INHALATION) at 13:40

## 2019-01-01 RX ADMIN — THERA TABS 1 TABLET: TAB at 09:50

## 2019-01-01 RX ADMIN — SODIUM CHLORIDE 3 ML/HR: 900 INJECTION, SOLUTION INTRAVENOUS at 08:14

## 2019-01-01 RX ADMIN — Medication 10 ML: at 21:31

## 2019-01-01 RX ADMIN — Medication 10 ML: at 14:05

## 2019-01-01 RX ADMIN — FENTANYL CITRATE 50 MCG: 50 INJECTION, SOLUTION INTRAMUSCULAR; INTRAVENOUS at 20:48

## 2019-01-01 RX ADMIN — FOLIC ACID 1 MG: 1 TABLET ORAL at 09:43

## 2019-01-01 RX ADMIN — CALCIUM CHLORIDE, MAGNESIUM CHLORIDE, DEXTROSE MONOHYDRATE, LACTIC ACID, SODIUM CHLORIDE, SODIUM BICARBONATE AND POTASSIUM CHLORIDE 1600 ML/HR: 3.68; 3.05; 22; 5.4; 6.46; 3.09; .314 INJECTION INTRAVENOUS at 11:00

## 2019-01-01 RX ADMIN — FENTANYL CITRATE 25 MCG: 50 INJECTION, SOLUTION INTRAMUSCULAR; INTRAVENOUS at 18:38

## 2019-01-01 RX ADMIN — Medication 100 MG: at 12:42

## 2019-01-01 RX ADMIN — Medication 100 MG: at 09:58

## 2019-01-01 RX ADMIN — Medication 1 CAPSULE: at 12:41

## 2019-01-01 RX ADMIN — Medication 10 ML: at 21:17

## 2019-01-01 RX ADMIN — Medication 10 ML: at 05:48

## 2019-01-01 RX ADMIN — CALCIUM CHLORIDE, MAGNESIUM CHLORIDE, DEXTROSE MONOHYDRATE, LACTIC ACID, SODIUM CHLORIDE, SODIUM BICARBONATE AND POTASSIUM CHLORIDE 1600 ML/HR: 3.68; 3.05; 22; 5.4; 6.46; 3.09; .314 INJECTION INTRAVENOUS at 13:08

## 2019-01-01 RX ADMIN — THERA TABS 1 TABLET: TAB at 09:08

## 2019-01-01 RX ADMIN — SODIUM CHLORIDE 40 MG: 9 INJECTION INTRAMUSCULAR; INTRAVENOUS; SUBCUTANEOUS at 10:06

## 2019-01-01 RX ADMIN — SODIUM CHLORIDE: 900 INJECTION, SOLUTION INTRAVENOUS at 19:12

## 2019-01-01 RX ADMIN — CASTOR OIL AND BALSAM, PERU: 788; 87 OINTMENT TOPICAL at 19:01

## 2019-01-01 RX ADMIN — PHENYLEPHRINE HYDROCHLORIDE 280 MCG/MIN: 10 INJECTION INTRAVENOUS at 19:10

## 2019-01-01 RX ADMIN — Medication 10 ML: at 04:08

## 2019-01-01 RX ADMIN — CALCIUM CHLORIDE, MAGNESIUM CHLORIDE, DEXTROSE MONOHYDRATE, LACTIC ACID, SODIUM CHLORIDE, SODIUM BICARBONATE AND POTASSIUM CHLORIDE 1600 ML/HR: 5.15; 2.03; 22; 5.4; 6.46; 3.09; .157 INJECTION INTRAVENOUS at 20:28

## 2019-01-01 RX ADMIN — LISINOPRIL 20 MG: 20 TABLET ORAL at 09:14

## 2019-01-01 RX ADMIN — Medication 1 CAPSULE: at 09:43

## 2019-01-01 RX ADMIN — FUROSEMIDE 40 MG: 40 TABLET ORAL at 10:19

## 2019-01-01 RX ADMIN — PROPOFOL 45 MCG/KG/MIN: 10 INJECTION, EMULSION INTRAVENOUS at 04:50

## 2019-01-01 RX ADMIN — FENTANYL CITRATE 100 MCG: 50 INJECTION, SOLUTION INTRAMUSCULAR; INTRAVENOUS at 08:30

## 2019-01-01 RX ADMIN — PANTOPRAZOLE SODIUM 8 MG/HR: 40 INJECTION, POWDER, FOR SOLUTION INTRAVENOUS at 21:27

## 2019-01-01 RX ADMIN — Medication 10 ML: at 05:44

## 2019-01-01 RX ADMIN — DEXMEDETOMIDINE HYDROCHLORIDE 0.6 MCG/KG/HR: 4 INJECTION, SOLUTION INTRAVENOUS at 20:26

## 2019-01-01 RX ADMIN — FOLIC ACID 1 MG: 1 TABLET ORAL at 09:45

## 2019-01-01 RX ADMIN — HEPARIN SODIUM 5000 UNITS: 5000 INJECTION INTRAVENOUS; SUBCUTANEOUS at 05:56

## 2019-01-01 RX ADMIN — CALCIUM CHLORIDE, MAGNESIUM CHLORIDE, DEXTROSE MONOHYDRATE, LACTIC ACID, SODIUM CHLORIDE, SODIUM BICARBONATE AND POTASSIUM CHLORIDE 1600 ML/HR: 5.15; 2.03; 22; 5.4; 6.46; 3.09; .157 INJECTION INTRAVENOUS at 22:04

## 2019-01-01 RX ADMIN — CHLORHEXIDINE GLUCONATE 15 ML: 1.2 RINSE ORAL at 09:00

## 2019-01-01 RX ADMIN — IPRATROPIUM BROMIDE AND ALBUTEROL SULFATE 3 ML: .5; 3 SOLUTION RESPIRATORY (INHALATION) at 02:10

## 2019-01-01 RX ADMIN — FENTANYL CITRATE 50 MCG: 50 INJECTION, SOLUTION INTRAMUSCULAR; INTRAVENOUS at 05:14

## 2019-01-01 RX ADMIN — LIDOCAINE HYDROCHLORIDE 60 MG: 20 INJECTION, SOLUTION EPIDURAL; INFILTRATION; INTRACAUDAL; PERINEURAL at 18:11

## 2019-01-01 RX ADMIN — SODIUM CHLORIDE 0.7 MCG/KG/HR: 900 INJECTION, SOLUTION INTRAVENOUS at 21:46

## 2019-01-01 RX ADMIN — ASPIRIN 81 MG: 81 TABLET ORAL at 09:43

## 2019-01-01 RX ADMIN — LISINOPRIL 20 MG: 20 TABLET ORAL at 10:19

## 2019-01-01 RX ADMIN — FENTANYL CITRATE 50 MCG: 50 INJECTION, SOLUTION INTRAMUSCULAR; INTRAVENOUS at 12:15

## 2019-01-01 RX ADMIN — CHLORHEXIDINE GLUCONATE 15 ML: 1.2 RINSE ORAL at 10:22

## 2019-01-01 RX ADMIN — CASTOR OIL AND BALSAM, PERU: 788; 87 OINTMENT TOPICAL at 08:59

## 2019-01-01 RX ADMIN — Medication 1 CAPSULE: at 08:44

## 2019-01-01 RX ADMIN — CHLORHEXIDINE GLUCONATE 15 ML: 1.2 RINSE ORAL at 18:41

## 2019-01-01 RX ADMIN — SUCRALFATE 1 G: 1 TABLET ORAL at 12:00

## 2019-01-01 RX ADMIN — PROPOFOL 40 MCG/KG/MIN: 10 INJECTION, EMULSION INTRAVENOUS at 07:28

## 2019-01-01 RX ADMIN — PANTOPRAZOLE SODIUM 8 MG/HR: 40 INJECTION, POWDER, FOR SOLUTION INTRAVENOUS at 18:38

## 2019-01-01 RX ADMIN — LISINOPRIL 20 MG: 20 TABLET ORAL at 17:41

## 2019-01-01 RX ADMIN — SODIUM CHLORIDE 25 ML/HR: 900 INJECTION, SOLUTION INTRAVENOUS at 08:27

## 2019-01-01 RX ADMIN — FENTANYL CITRATE 100 MCG: 50 INJECTION, SOLUTION INTRAMUSCULAR; INTRAVENOUS at 03:52

## 2019-01-01 RX ADMIN — CASTOR OIL AND BALSAM, PERU: 788; 87 OINTMENT TOPICAL at 08:16

## 2019-01-01 RX ADMIN — CASTOR OIL AND BALSAM, PERU: 788; 87 OINTMENT TOPICAL at 17:52

## 2019-01-01 RX ADMIN — Medication 10 ML: at 15:14

## 2019-01-01 RX ADMIN — SODIUM CHLORIDE 40 MG: 9 INJECTION INTRAMUSCULAR; INTRAVENOUS; SUBCUTANEOUS at 08:44

## 2019-01-01 RX ADMIN — EPINEPHRINE 1 MG: 0.1 INJECTION INTRACARDIAC; INTRAVENOUS at 01:06

## 2019-01-01 RX ADMIN — CALCIUM CHLORIDE, MAGNESIUM CHLORIDE, DEXTROSE MONOHYDRATE, LACTIC ACID, SODIUM CHLORIDE, SODIUM BICARBONATE AND POTASSIUM CHLORIDE 1600 ML/HR: 3.68; 3.05; 22; 5.4; 6.46; 3.09; .314 INJECTION INTRAVENOUS at 22:35

## 2019-01-01 RX ADMIN — PIPERACILLIN SODIUM,TAZOBACTAM SODIUM 3.38 G: 3; .375 INJECTION, POWDER, FOR SOLUTION INTRAVENOUS at 18:15

## 2019-01-01 RX ADMIN — WATER: 1000 INJECTION, SOLUTION INTRAVENOUS at 12:12

## 2019-01-01 RX ADMIN — SODIUM CHLORIDE 0.6 MCG/KG/HR: 900 INJECTION, SOLUTION INTRAVENOUS at 17:01

## 2019-01-01 RX ADMIN — PHENYLEPHRINE HYDROCHLORIDE 300 MCG/MIN: 10 INJECTION INTRAVENOUS at 22:37

## 2019-01-01 RX ADMIN — PIPERACILLIN SODIUM,TAZOBACTAM SODIUM 3.38 G: 3; .375 INJECTION, POWDER, FOR SOLUTION INTRAVENOUS at 02:35

## 2019-01-01 RX ADMIN — CALCIUM CHLORIDE, MAGNESIUM CHLORIDE, DEXTROSE MONOHYDRATE, LACTIC ACID, SODIUM CHLORIDE, SODIUM BICARBONATE AND POTASSIUM CHLORIDE 1600 ML/HR: 3.68; 3.05; 22; 5.4; 6.46; 3.09; .314 INJECTION INTRAVENOUS at 17:59

## 2019-01-01 RX ADMIN — LIDOCAINE HYDROCHLORIDE 100 MG: 20 INJECTION, SOLUTION EPIDURAL; INFILTRATION; INTRACAUDAL; PERINEURAL at 12:29

## 2019-01-01 RX ADMIN — NOREPINEPHRINE BITARTRATE 3 MCG/MIN: 1 INJECTION, SOLUTION, CONCENTRATE INTRAVENOUS at 04:39

## 2019-01-01 RX ADMIN — FOLIC ACID 1 MG: 1 TABLET ORAL at 12:42

## 2019-01-01 RX ADMIN — EPINEPHRINE 1 MG: 1 INJECTION INTRAMUSCULAR; INTRAVENOUS; SUBCUTANEOUS at 19:04

## 2019-01-01 RX ADMIN — Medication 10 ML: at 05:13

## 2019-01-01 RX ADMIN — CHLORHEXIDINE GLUCONATE 15 ML: 1.2 RINSE ORAL at 10:09

## 2019-01-01 RX ADMIN — PROPOFOL 40 MCG/KG/MIN: 10 INJECTION, EMULSION INTRAVENOUS at 17:01

## 2019-01-01 RX ADMIN — PIPERACILLIN SODIUM,TAZOBACTAM SODIUM 3.38 G: 3; .375 INJECTION, POWDER, FOR SOLUTION INTRAVENOUS at 22:19

## 2019-01-01 RX ADMIN — Medication 10 ML: at 18:15

## 2019-01-01 RX ADMIN — CHLORHEXIDINE GLUCONATE 15 ML: 1.2 RINSE ORAL at 09:29

## 2019-01-01 RX ADMIN — PROPOFOL 45 MCG/KG/MIN: 10 INJECTION, EMULSION INTRAVENOUS at 17:57

## 2019-01-01 RX ADMIN — Medication 10 ML: at 05:56

## 2019-01-01 RX ADMIN — Medication 10 ML: at 23:54

## 2019-01-01 RX ADMIN — SODIUM CHLORIDE 50 ML/HR: 900 INJECTION, SOLUTION INTRAVENOUS at 10:35

## 2019-01-01 RX ADMIN — ACETAMINOPHEN 650 MG: 325 TABLET ORAL at 14:33

## 2019-01-01 RX ADMIN — MIDAZOLAM HYDROCHLORIDE 1 MG: 1 INJECTION, SOLUTION INTRAMUSCULAR; INTRAVENOUS at 08:39

## 2019-01-01 RX ADMIN — ACETAMINOPHEN 650 MG: 325 TABLET ORAL at 09:13

## 2019-01-01 RX ADMIN — ASPIRIN 81 MG: 81 TABLET ORAL at 09:50

## 2019-01-01 RX ADMIN — SUCRALFATE 1 G: 1 TABLET ORAL at 15:37

## 2019-01-01 RX ADMIN — CASTOR OIL AND BALSAM, PERU: 788; 87 OINTMENT TOPICAL at 08:11

## 2019-01-01 RX ADMIN — SODIUM CHLORIDE 10 ML/HR: 900 INJECTION, SOLUTION INTRAVENOUS at 02:12

## 2019-01-01 RX ADMIN — Medication 100 MG: at 10:21

## 2019-01-01 RX ADMIN — CASTOR OIL AND BALSAM, PERU: 788; 87 OINTMENT TOPICAL at 20:29

## 2019-01-01 RX ADMIN — SODIUM CHLORIDE 40 MG: 9 INJECTION INTRAMUSCULAR; INTRAVENOUS; SUBCUTANEOUS at 22:48

## 2019-01-01 RX ADMIN — CALCIUM CHLORIDE, MAGNESIUM CHLORIDE, DEXTROSE MONOHYDRATE, LACTIC ACID, SODIUM CHLORIDE, SODIUM BICARBONATE AND POTASSIUM CHLORIDE 1600 ML/HR: 3.68; 3.05; 22; 5.4; 6.46; 3.09; .314 INJECTION INTRAVENOUS at 13:05

## 2019-01-01 RX ADMIN — IPRATROPIUM BROMIDE AND ALBUTEROL SULFATE 3 ML: .5; 3 SOLUTION RESPIRATORY (INHALATION) at 08:36

## 2019-01-01 RX ADMIN — PROPOFOL 45 MCG/KG/MIN: 10 INJECTION, EMULSION INTRAVENOUS at 11:44

## 2019-01-01 RX ADMIN — ROCURONIUM BROMIDE 10 MG: 10 SOLUTION INTRAVENOUS at 19:18

## 2019-01-01 RX ADMIN — PANTOPRAZOLE SODIUM 8 MG/HR: 40 INJECTION, POWDER, FOR SOLUTION INTRAVENOUS at 17:44

## 2019-01-01 RX ADMIN — HEPARIN SODIUM 3800 UNITS: 1000 INJECTION INTRAVENOUS; SUBCUTANEOUS at 17:53

## 2019-01-01 RX ADMIN — ASPIRIN 81 MG: 81 TABLET ORAL at 10:20

## 2019-01-01 RX ADMIN — CASTOR OIL AND BALSAM, PERU: 788; 87 OINTMENT TOPICAL at 17:12

## 2019-01-01 RX ADMIN — PROPOFOL 30 MCG/KG/MIN: 10 INJECTION, EMULSION INTRAVENOUS at 19:41

## 2019-01-01 RX ADMIN — Medication 10 ML: at 22:59

## 2019-01-01 RX ADMIN — IPRATROPIUM BROMIDE AND ALBUTEROL SULFATE 3 ML: .5; 3 SOLUTION RESPIRATORY (INHALATION) at 01:42

## 2019-01-01 RX ADMIN — SODIUM CHLORIDE: 900 INJECTION, SOLUTION INTRAVENOUS at 19:37

## 2019-01-01 RX ADMIN — Medication 20 ML: at 22:33

## 2019-01-01 RX ADMIN — SUCRALFATE 1 G: 1 TABLET ORAL at 16:22

## 2019-01-01 RX ADMIN — PIPERACILLIN SODIUM,TAZOBACTAM SODIUM 3.38 G: 3; .375 INJECTION, POWDER, FOR SOLUTION INTRAVENOUS at 09:19

## 2019-01-01 RX ADMIN — ACETAMINOPHEN 650 MG: 325 TABLET ORAL at 19:50

## 2019-01-01 RX ADMIN — CALCIUM CHLORIDE, MAGNESIUM CHLORIDE, DEXTROSE MONOHYDRATE, LACTIC ACID, SODIUM CHLORIDE, SODIUM BICARBONATE AND POTASSIUM CHLORIDE 1600 ML/HR: 3.68; 3.05; 22; 5.4; 6.46; 3.09; .314 INJECTION INTRAVENOUS at 16:47

## 2019-01-01 RX ADMIN — SODIUM CHLORIDE, SODIUM LACTATE, POTASSIUM CHLORIDE, AND CALCIUM CHLORIDE 125 ML/HR: 600; 310; 30; 20 INJECTION, SOLUTION INTRAVENOUS at 21:45

## 2019-01-01 RX ADMIN — SODIUM BICARBONATE 50 MEQ: 84 INJECTION, SOLUTION INTRAVENOUS at 19:10

## 2019-01-01 RX ADMIN — ACETAMINOPHEN 650 MG: 325 TABLET ORAL at 21:17

## 2019-01-01 RX ADMIN — PANTOPRAZOLE SODIUM 8 MG/HR: 40 INJECTION, POWDER, FOR SOLUTION INTRAVENOUS at 22:11

## 2019-01-01 RX ADMIN — NOREPINEPHRINE BITARTRATE 2.5 MCG/MIN: 1 INJECTION, SOLUTION, CONCENTRATE INTRAVENOUS at 18:05

## 2019-01-01 RX ADMIN — CHLORHEXIDINE GLUCONATE 15 ML: 1.2 RINSE ORAL at 19:17

## 2019-01-01 RX ADMIN — PANTOPRAZOLE SODIUM 8 MG/HR: 40 INJECTION, POWDER, FOR SOLUTION INTRAVENOUS at 06:09

## 2019-01-01 RX ADMIN — FENTANYL CITRATE 50 MCG: 50 INJECTION, SOLUTION INTRAMUSCULAR; INTRAVENOUS at 00:15

## 2019-01-01 RX ADMIN — ASPIRIN 81 MG: 81 TABLET ORAL at 08:30

## 2019-01-01 RX ADMIN — DOPAMINE HYDROCHLORIDE 7 MCG/KG/MIN: 320 INJECTION, SOLUTION INTRAVENOUS at 18:13

## 2019-01-01 RX ADMIN — CALCIUM CHLORIDE, MAGNESIUM CHLORIDE, DEXTROSE MONOHYDRATE, LACTIC ACID, SODIUM CHLORIDE, SODIUM BICARBONATE AND POTASSIUM CHLORIDE 1600 ML/HR: 3.68; 3.05; 22; 5.4; 6.46; 3.09; .314 INJECTION INTRAVENOUS at 00:57

## 2019-01-01 RX ADMIN — HEPARIN SODIUM 5000 UNITS: 5000 INJECTION INTRAVENOUS; SUBCUTANEOUS at 15:13

## 2019-01-01 RX ADMIN — EPINEPHRINE 1 MG: 0.1 INJECTION INTRACARDIAC; INTRAVENOUS at 01:00

## 2019-01-01 RX ADMIN — HEPARIN SODIUM 5000 UNITS: 5000 INJECTION INTRAVENOUS; SUBCUTANEOUS at 05:23

## 2019-01-01 RX ADMIN — CALCIUM CHLORIDE 1 G: 100 INJECTION, SOLUTION INTRAVENOUS; INTRAVENTRICULAR at 19:10

## 2019-01-01 RX ADMIN — FOLIC ACID 1 MG: 1 TABLET ORAL at 08:28

## 2019-01-01 RX ADMIN — PROPOFOL 30 MCG/KG/MIN: 10 INJECTION, EMULSION INTRAVENOUS at 08:12

## 2019-01-01 RX ADMIN — ACETAMINOPHEN 650 MG: 325 TABLET ORAL at 04:07

## 2019-01-01 RX ADMIN — HYDROCODONE BITARTRATE AND ACETAMINOPHEN 1 TABLET: 5; 325 TABLET ORAL at 00:31

## 2019-01-01 RX ADMIN — CHLORHEXIDINE GLUCONATE 15 ML: 1.2 RINSE ORAL at 08:04

## 2019-01-01 RX ADMIN — CHLORHEXIDINE GLUCONATE 15 ML: 1.2 RINSE ORAL at 17:13

## 2019-01-01 RX ADMIN — PIPERACILLIN SODIUM,TAZOBACTAM SODIUM 3.38 G: 3; .375 INJECTION, POWDER, FOR SOLUTION INTRAVENOUS at 10:25

## 2019-01-01 RX ADMIN — Medication 1 CAPSULE: at 09:14

## 2019-01-01 RX ADMIN — SODIUM BICARBONATE 50 MEQ: 84 INJECTION, SOLUTION INTRAVENOUS at 01:54

## 2019-01-01 RX ADMIN — THERA TABS 1 TABLET: TAB at 09:33

## 2019-01-01 RX ADMIN — CALCIUM CHLORIDE, MAGNESIUM CHLORIDE, DEXTROSE MONOHYDRATE, LACTIC ACID, SODIUM CHLORIDE, SODIUM BICARBONATE AND POTASSIUM CHLORIDE 1600 ML/HR: 3.68; 3.05; 22; 5.4; 6.46; 3.09; .314 INJECTION INTRAVENOUS at 21:55

## 2019-01-01 RX ADMIN — FENTANYL CITRATE 100 MCG: 50 INJECTION, SOLUTION INTRAMUSCULAR; INTRAVENOUS at 01:11

## 2019-01-01 RX ADMIN — CALCIUM CHLORIDE, MAGNESIUM CHLORIDE, DEXTROSE MONOHYDRATE, LACTIC ACID, SODIUM CHLORIDE, SODIUM BICARBONATE AND POTASSIUM CHLORIDE 1600 ML/HR: 3.68; 3.05; 22; 5.4; 6.46; 3.09; .314 INJECTION INTRAVENOUS at 16:48

## 2019-01-01 RX ADMIN — CALCIUM CHLORIDE, MAGNESIUM CHLORIDE, DEXTROSE MONOHYDRATE, LACTIC ACID, SODIUM CHLORIDE, SODIUM BICARBONATE AND POTASSIUM CHLORIDE 1600 ML/HR: 3.68; 3.05; 22; 5.4; 6.46; 3.09; .314 INJECTION INTRAVENOUS at 21:51

## 2019-01-01 RX ADMIN — FUROSEMIDE 40 MG: 40 TABLET ORAL at 09:16

## 2019-01-01 RX ADMIN — Medication 10 ML: at 05:43

## 2019-01-01 RX ADMIN — PHENYLEPHRINE HYDROCHLORIDE 120 MCG: 10 INJECTION INTRAVENOUS at 12:37

## 2019-01-01 RX ADMIN — SODIUM CHLORIDE 500 ML: 900 INJECTION, SOLUTION INTRAVENOUS at 05:49

## 2019-01-01 RX ADMIN — Medication 10 ML: at 05:58

## 2019-01-01 RX ADMIN — ACETAMINOPHEN 650 MG: 325 TABLET ORAL at 18:14

## 2019-01-01 RX ADMIN — ONDANSETRON 4 MG: 2 INJECTION INTRAMUSCULAR; INTRAVENOUS at 19:49

## 2019-01-01 RX ADMIN — PIPERACILLIN SODIUM,TAZOBACTAM SODIUM 3.38 G: 3; .375 INJECTION, POWDER, FOR SOLUTION INTRAVENOUS at 09:36

## 2019-01-01 RX ADMIN — Medication 10 ML: at 16:22

## 2019-01-01 RX ADMIN — Medication 10 ML: at 07:12

## 2019-01-01 RX ADMIN — PIPERACILLIN SODIUM,TAZOBACTAM SODIUM 3.38 G: 3; .375 INJECTION, POWDER, FOR SOLUTION INTRAVENOUS at 10:55

## 2019-01-01 RX ADMIN — ONDANSETRON 4 MG: 2 INJECTION INTRAMUSCULAR; INTRAVENOUS at 06:31

## 2019-01-01 RX ADMIN — PIPERACILLIN SODIUM,TAZOBACTAM SODIUM 3.38 G: 3; .375 INJECTION, POWDER, FOR SOLUTION INTRAVENOUS at 17:54

## 2019-01-01 RX ADMIN — Medication 10 ML: at 21:40

## 2019-01-01 RX ADMIN — AMLODIPINE BESYLATE 5 MG: 5 TABLET ORAL at 09:14

## 2019-01-01 RX ADMIN — PROPOFOL 45 MCG/KG/MIN: 10 INJECTION, EMULSION INTRAVENOUS at 16:06

## 2019-01-01 RX ADMIN — AZITHROMYCIN MONOHYDRATE 500 MG: 500 INJECTION, POWDER, LYOPHILIZED, FOR SOLUTION INTRAVENOUS at 06:05

## 2019-01-01 RX ADMIN — CASTOR OIL AND BALSAM, PERU: 788; 87 OINTMENT TOPICAL at 10:10

## 2019-01-01 RX ADMIN — Medication 1 CAPSULE: at 08:33

## 2019-01-01 RX ADMIN — CALCIUM CHLORIDE, MAGNESIUM CHLORIDE, DEXTROSE MONOHYDRATE, LACTIC ACID, SODIUM CHLORIDE, SODIUM BICARBONATE AND POTASSIUM CHLORIDE 1600 ML/HR: 3.68; 3.05; 22; 5.4; 6.46; 3.09; .314 INJECTION INTRAVENOUS at 23:06

## 2019-01-01 RX ADMIN — THERA TABS 1 TABLET: TAB at 08:11

## 2019-01-01 RX ADMIN — THERA TABS 1 TABLET: TAB at 09:58

## 2019-01-01 RX ADMIN — LISINOPRIL 20 MG: 20 TABLET ORAL at 09:34

## 2019-01-01 RX ADMIN — HEPARIN SODIUM 5000 UNITS: 5000 INJECTION INTRAVENOUS; SUBCUTANEOUS at 04:53

## 2019-01-01 RX ADMIN — FENTANYL CITRATE 25 MCG: 50 INJECTION, SOLUTION INTRAMUSCULAR; INTRAVENOUS at 15:10

## 2019-01-01 RX ADMIN — IPRATROPIUM BROMIDE AND ALBUTEROL SULFATE 3 ML: .5; 3 SOLUTION RESPIRATORY (INHALATION) at 20:11

## 2019-01-01 RX ADMIN — IPRATROPIUM BROMIDE AND ALBUTEROL SULFATE 3 ML: .5; 3 SOLUTION RESPIRATORY (INHALATION) at 01:09

## 2019-01-01 RX ADMIN — FENTANYL CITRATE 50 MCG: 50 INJECTION, SOLUTION INTRAMUSCULAR; INTRAVENOUS at 18:37

## 2019-01-01 RX ADMIN — NOREPINEPHRINE BITARTRATE 4 MCG/MIN: 1 INJECTION, SOLUTION, CONCENTRATE INTRAVENOUS at 19:02

## 2019-01-01 RX ADMIN — HYDROMORPHONE HYDROCHLORIDE 0.5 MG: 1 INJECTION, SOLUTION INTRAMUSCULAR; INTRAVENOUS; SUBCUTANEOUS at 10:07

## 2019-01-01 RX ADMIN — IPRATROPIUM BROMIDE AND ALBUTEROL SULFATE 3 ML: .5; 3 SOLUTION RESPIRATORY (INHALATION) at 20:18

## 2019-01-01 RX ADMIN — ALBUMIN (HUMAN) 250 ML: 12.5 INJECTION, SOLUTION INTRAVENOUS at 20:06

## 2019-01-01 RX ADMIN — PIPERACILLIN SODIUM,TAZOBACTAM SODIUM 3.38 G: 3; .375 INJECTION, POWDER, FOR SOLUTION INTRAVENOUS at 21:23

## 2019-01-01 RX ADMIN — IPRATROPIUM BROMIDE AND ALBUTEROL SULFATE 3 ML: .5; 3 SOLUTION RESPIRATORY (INHALATION) at 22:34

## 2019-01-01 RX ADMIN — IPRATROPIUM BROMIDE AND ALBUTEROL SULFATE 3 ML: .5; 3 SOLUTION RESPIRATORY (INHALATION) at 09:00

## 2019-01-01 RX ADMIN — CALCIUM CHLORIDE, MAGNESIUM CHLORIDE, DEXTROSE MONOHYDRATE, LACTIC ACID, SODIUM CHLORIDE, SODIUM BICARBONATE AND POTASSIUM CHLORIDE 1600 ML/HR: 3.68; 3.05; 22; 5.4; 6.46; 3.09; .314 INJECTION INTRAVENOUS at 15:51

## 2019-01-01 RX ADMIN — Medication 10 ML: at 21:18

## 2019-01-01 RX ADMIN — CALCIUM CHLORIDE, MAGNESIUM CHLORIDE, DEXTROSE MONOHYDRATE, LACTIC ACID, SODIUM CHLORIDE, SODIUM BICARBONATE AND POTASSIUM CHLORIDE 1600 ML/HR: 3.68; 3.05; 22; 5.4; 6.46; 3.09; .314 INJECTION INTRAVENOUS at 15:40

## 2019-01-01 RX ADMIN — THERA TABS 1 TABLET: TAB at 09:16

## 2019-01-01 RX ADMIN — Medication 10 ML: at 14:07

## 2019-01-01 RX ADMIN — THERA TABS 1 TABLET: TAB at 09:45

## 2019-01-01 RX ADMIN — SODIUM CHLORIDE 80 MG: 9 INJECTION INTRAMUSCULAR; INTRAVENOUS; SUBCUTANEOUS at 14:14

## 2019-01-01 RX ADMIN — Medication 1 CAPSULE: at 08:40

## 2019-01-01 RX ADMIN — SODIUM CHLORIDE 40 MG: 9 INJECTION INTRAMUSCULAR; INTRAVENOUS; SUBCUTANEOUS at 21:37

## 2019-01-01 RX ADMIN — SODIUM CHLORIDE 40 MG: 9 INJECTION INTRAMUSCULAR; INTRAVENOUS; SUBCUTANEOUS at 21:38

## 2019-01-01 RX ADMIN — SODIUM CHLORIDE 40 MG: 9 INJECTION INTRAMUSCULAR; INTRAVENOUS; SUBCUTANEOUS at 10:36

## 2019-01-01 RX ADMIN — LISINOPRIL 20 MG: 20 TABLET ORAL at 18:30

## 2019-01-01 RX ADMIN — SODIUM BICARBONATE 50 MEQ: 84 INJECTION, SOLUTION INTRAVENOUS at 01:03

## 2019-01-01 RX ADMIN — SODIUM CHLORIDE 10 ML/HR: 900 INJECTION, SOLUTION INTRAVENOUS at 17:47

## 2019-01-01 RX ADMIN — ASPIRIN 81 MG: 81 TABLET ORAL at 09:55

## 2019-01-01 RX ADMIN — Medication 100 MG: at 08:15

## 2019-01-01 RX ADMIN — EPINEPHRINE 1 MG: 0.1 INJECTION INTRACARDIAC; INTRAVENOUS at 00:35

## 2019-01-01 RX ADMIN — SODIUM CHLORIDE 40 MG: 9 INJECTION INTRAMUSCULAR; INTRAVENOUS; SUBCUTANEOUS at 08:59

## 2019-01-01 RX ADMIN — Medication 10 ML: at 21:53

## 2019-01-01 RX ADMIN — FENTANYL CITRATE 50 MCG: 50 INJECTION, SOLUTION INTRAMUSCULAR; INTRAVENOUS at 03:16

## 2019-01-01 RX ADMIN — LORAZEPAM 1 MG: 2 INJECTION INTRAMUSCULAR; INTRAVENOUS at 19:59

## 2019-01-01 RX ADMIN — SUCRALFATE 1 G: 1 TABLET ORAL at 22:00

## 2019-01-01 RX ADMIN — Medication 10 ML: at 17:07

## 2019-01-01 RX ADMIN — Medication 120 MCG: at 18:11

## 2019-01-01 RX ADMIN — FOLIC ACID 1 MG: 1 TABLET ORAL at 08:39

## 2019-01-01 RX ADMIN — Medication 10 ML: at 22:34

## 2019-01-01 RX ADMIN — IPRATROPIUM BROMIDE AND ALBUTEROL SULFATE 3 ML: .5; 3 SOLUTION RESPIRATORY (INHALATION) at 08:19

## 2019-01-01 RX ADMIN — ACETAMINOPHEN 650 MG: 325 TABLET ORAL at 05:47

## 2019-01-01 RX ADMIN — AMLODIPINE BESYLATE 5 MG: 5 TABLET ORAL at 18:30

## 2019-01-01 RX ADMIN — Medication 100 MG: at 08:11

## 2019-01-01 RX ADMIN — SODIUM CHLORIDE 40 MG: 9 INJECTION INTRAMUSCULAR; INTRAVENOUS; SUBCUTANEOUS at 21:00

## 2019-01-01 RX ADMIN — SODIUM CHLORIDE 40 MG: 9 INJECTION INTRAMUSCULAR; INTRAVENOUS; SUBCUTANEOUS at 12:28

## 2019-01-01 RX ADMIN — VASOPRESSIN 0.03 UNITS/MIN: 20 INJECTION INTRAVENOUS at 00:25

## 2019-01-01 RX ADMIN — AMLODIPINE BESYLATE 5 MG: 5 TABLET ORAL at 09:33

## 2019-01-01 RX ADMIN — Medication 100 MG: at 09:43

## 2019-01-01 RX ADMIN — IPRATROPIUM BROMIDE AND ALBUTEROL SULFATE 3 ML: .5; 3 SOLUTION RESPIRATORY (INHALATION) at 19:52

## 2019-01-01 RX ADMIN — SODIUM CHLORIDE 40 MG: 9 INJECTION INTRAMUSCULAR; INTRAVENOUS; SUBCUTANEOUS at 22:21

## 2019-01-01 RX ADMIN — CALCIUM CHLORIDE, MAGNESIUM CHLORIDE, DEXTROSE MONOHYDRATE, LACTIC ACID, SODIUM CHLORIDE, SODIUM BICARBONATE AND POTASSIUM CHLORIDE 1600 ML/HR: 3.68; 3.05; 22; 5.4; 6.46; 3.09; .314 INJECTION INTRAVENOUS at 16:05

## 2019-01-01 RX ADMIN — Medication 100 MG: at 09:45

## 2019-01-01 RX ADMIN — HYDROMORPHONE HYDROCHLORIDE 0.5 MG: 1 INJECTION, SOLUTION INTRAMUSCULAR; INTRAVENOUS; SUBCUTANEOUS at 00:23

## 2019-01-01 RX ADMIN — Medication 10 ML: at 13:03

## 2019-01-01 RX ADMIN — SODIUM CHLORIDE 40 MG: 9 INJECTION INTRAMUSCULAR; INTRAVENOUS; SUBCUTANEOUS at 22:24

## 2019-01-01 RX ADMIN — ALBUMIN (HUMAN) 250 ML: 12.5 INJECTION, SOLUTION INTRAVENOUS at 19:04

## 2019-01-01 RX ADMIN — CALCIUM CHLORIDE, MAGNESIUM CHLORIDE, DEXTROSE MONOHYDRATE, LACTIC ACID, SODIUM CHLORIDE, SODIUM BICARBONATE AND POTASSIUM CHLORIDE 1600 ML/HR: 3.68; 3.05; 22; 5.4; 6.46; 3.09; .314 INJECTION INTRAVENOUS at 00:26

## 2019-01-01 RX ADMIN — CALCIUM CHLORIDE, MAGNESIUM CHLORIDE, DEXTROSE MONOHYDRATE, LACTIC ACID, SODIUM CHLORIDE, SODIUM BICARBONATE AND POTASSIUM CHLORIDE 1600 ML/HR: 5.15; 2.03; 22; 5.4; 6.46; 3.09; .157 INJECTION INTRAVENOUS at 09:04

## 2019-01-01 RX ADMIN — Medication 1 CAPSULE: at 08:30

## 2019-01-01 RX ADMIN — CEFTRIAXONE 1 G: 1 INJECTION, POWDER, FOR SOLUTION INTRAMUSCULAR; INTRAVENOUS at 07:07

## 2019-01-01 RX ADMIN — IPRATROPIUM BROMIDE AND ALBUTEROL SULFATE 3 ML: .5; 3 SOLUTION RESPIRATORY (INHALATION) at 07:14

## 2019-01-01 RX ADMIN — SODIUM CHLORIDE 40 MG: 9 INJECTION INTRAMUSCULAR; INTRAVENOUS; SUBCUTANEOUS at 21:12

## 2019-01-01 RX ADMIN — EPINEPHRINE 10 MCG/MIN: 1 INJECTION PARENTERAL at 00:55

## 2019-01-01 RX ADMIN — FENTANYL CITRATE 50 MCG: 50 INJECTION, SOLUTION INTRAMUSCULAR; INTRAVENOUS at 09:00

## 2019-01-01 RX ADMIN — Medication 5 ML: at 05:12

## 2019-01-01 RX ADMIN — EPINEPHRINE 1 MG: 0.1 INJECTION INTRACARDIAC; INTRAVENOUS at 00:41

## 2019-01-01 RX ADMIN — ALBUMIN (HUMAN) 12.5 G: 0.25 INJECTION, SOLUTION INTRAVENOUS at 05:47

## 2019-01-01 RX ADMIN — SODIUM CHLORIDE 40 MG: 9 INJECTION INTRAMUSCULAR; INTRAVENOUS; SUBCUTANEOUS at 21:28

## 2019-01-01 RX ADMIN — Medication 100 MG: at 09:16

## 2019-01-01 RX ADMIN — CALCIUM CHLORIDE, MAGNESIUM CHLORIDE, DEXTROSE MONOHYDRATE, LACTIC ACID, SODIUM CHLORIDE, SODIUM BICARBONATE AND POTASSIUM CHLORIDE 1600 ML/HR: 3.68; 3.05; 22; 5.4; 6.46; 3.09; .314 INJECTION INTRAVENOUS at 18:00

## 2019-01-01 RX ADMIN — Medication 1 CAPSULE: at 09:33

## 2019-01-01 RX ADMIN — Medication 2 G: at 08:23

## 2019-01-01 RX ADMIN — HEPARIN SODIUM 3000 UNITS: 1000 INJECTION INTRAVENOUS; SUBCUTANEOUS at 08:55

## 2019-01-01 RX ADMIN — Medication 10 ML: at 13:14

## 2019-01-01 RX ADMIN — CALCIUM CHLORIDE, MAGNESIUM CHLORIDE, DEXTROSE MONOHYDRATE, LACTIC ACID, SODIUM CHLORIDE, SODIUM BICARBONATE AND POTASSIUM CHLORIDE 1600 ML/HR: 5.15; 2.03; 22; 5.4; 6.46; 3.09; .157 INJECTION INTRAVENOUS at 04:30

## 2019-01-01 RX ADMIN — CHLORHEXIDINE GLUCONATE 15 ML: 1.2 RINSE ORAL at 08:59

## 2019-01-01 RX ADMIN — Medication 100 MG: at 09:14

## 2019-01-01 RX ADMIN — Medication 10 ML: at 21:09

## 2019-01-01 RX ADMIN — FUROSEMIDE 40 MG: 40 TABLET ORAL at 08:39

## 2019-01-01 RX ADMIN — FENTANYL CITRATE 50 MCG: 50 INJECTION, SOLUTION INTRAMUSCULAR; INTRAVENOUS at 23:28

## 2019-01-01 RX ADMIN — CHLORHEXIDINE GLUCONATE 15 ML: 1.2 RINSE ORAL at 08:29

## 2019-01-01 RX ADMIN — CALCIUM CHLORIDE, MAGNESIUM CHLORIDE, DEXTROSE MONOHYDRATE, LACTIC ACID, SODIUM CHLORIDE, SODIUM BICARBONATE AND POTASSIUM CHLORIDE 1600 ML/HR: 3.68; 3.05; 22; 5.4; 6.46; 3.09; .314 INJECTION INTRAVENOUS at 09:17

## 2019-01-01 RX ADMIN — PROPOFOL 30 MCG/KG/MIN: 10 INJECTION, EMULSION INTRAVENOUS at 12:06

## 2019-01-01 RX ADMIN — PROPOFOL 100 MG: 10 INJECTION, EMULSION INTRAVENOUS at 18:11

## 2019-01-01 RX ADMIN — PHENYLEPHRINE HYDROCHLORIDE 175 MCG/MIN: 10 INJECTION INTRAVENOUS at 13:28

## 2019-01-01 RX ADMIN — PHENYLEPHRINE HYDROCHLORIDE 300 MCG/MIN: 10 INJECTION INTRAVENOUS at 13:58

## 2019-01-01 RX ADMIN — Medication 100 MG: at 09:55

## 2019-01-01 RX ADMIN — Medication 1 CAPSULE: at 08:27

## 2019-01-01 RX ADMIN — SODIUM CHLORIDE 10 ML/HR: 900 INJECTION, SOLUTION INTRAVENOUS at 05:17

## 2019-01-01 RX ADMIN — NOREPINEPHRINE BITARTRATE 5 MCG/MIN: 1 INJECTION, SOLUTION, CONCENTRATE INTRAVENOUS at 14:59

## 2019-01-01 RX ADMIN — Medication 1 CAPSULE: at 10:20

## 2019-01-01 RX ADMIN — PHENOL 1 SPRAY: 1.5 LIQUID ORAL at 20:23

## 2019-01-01 RX ADMIN — CHLORHEXIDINE GLUCONATE 15 ML: 1.2 RINSE ORAL at 20:19

## 2019-01-01 RX ADMIN — Medication 10 ML: at 15:52

## 2019-01-01 RX ADMIN — CASTOR OIL AND BALSAM, PERU: 788; 87 OINTMENT TOPICAL at 19:16

## 2019-01-01 RX ADMIN — Medication 100 MG: at 10:19

## 2019-01-01 RX ADMIN — PROPOFOL 30 MCG/KG/MIN: 10 INJECTION, EMULSION INTRAVENOUS at 18:19

## 2019-01-01 RX ADMIN — ASPIRIN 81 MG: 81 TABLET ORAL at 10:05

## 2019-01-01 RX ADMIN — THERA TABS 1 TABLET: TAB at 08:15

## 2019-01-01 RX ADMIN — FENTANYL CITRATE 50 MCG: 50 INJECTION, SOLUTION INTRAMUSCULAR; INTRAVENOUS at 17:53

## 2019-01-01 RX ADMIN — Medication 10 ML: at 21:19

## 2019-01-01 RX ADMIN — PANTOPRAZOLE SODIUM 8 MG/HR: 40 INJECTION, POWDER, FOR SOLUTION INTRAVENOUS at 06:40

## 2019-01-01 RX ADMIN — CALCIUM CHLORIDE, MAGNESIUM CHLORIDE, DEXTROSE MONOHYDRATE, LACTIC ACID, SODIUM CHLORIDE, SODIUM BICARBONATE AND POTASSIUM CHLORIDE 1600 ML/HR: 5.15; 2.03; 22; 5.4; 6.46; 3.09; .157 INJECTION INTRAVENOUS at 09:09

## 2019-01-01 RX ADMIN — PROPOFOL 20 MCG/KG/MIN: 10 INJECTION, EMULSION INTRAVENOUS at 00:43

## 2019-01-01 RX ADMIN — PHENYLEPHRINE HYDROCHLORIDE 60 MCG/MIN: 10 INJECTION INTRAVENOUS at 01:17

## 2019-01-01 RX ADMIN — CALCIUM CHLORIDE, MAGNESIUM CHLORIDE, DEXTROSE MONOHYDRATE, LACTIC ACID, SODIUM CHLORIDE, SODIUM BICARBONATE AND POTASSIUM CHLORIDE 1600 ML/HR: 3.68; 3.05; 22; 5.4; 6.46; 3.09; .314 INJECTION INTRAVENOUS at 21:50

## 2019-01-01 RX ADMIN — CASTOR OIL AND BALSAM, PERU: 788; 87 OINTMENT TOPICAL at 18:26

## 2019-01-01 RX ADMIN — PIPERACILLIN SODIUM,TAZOBACTAM SODIUM 3.38 G: 3; .375 INJECTION, POWDER, FOR SOLUTION INTRAVENOUS at 01:31

## 2019-01-01 RX ADMIN — CALCIUM CHLORIDE, MAGNESIUM CHLORIDE, DEXTROSE MONOHYDRATE, LACTIC ACID, SODIUM CHLORIDE, SODIUM BICARBONATE AND POTASSIUM CHLORIDE 1600 ML/HR: 5.15; 2.03; 22; 5.4; 6.46; 3.09; .157 INJECTION INTRAVENOUS at 09:22

## 2019-01-01 RX ADMIN — PIPERACILLIN SODIUM,TAZOBACTAM SODIUM 3.38 G: 3; .375 INJECTION, POWDER, FOR SOLUTION INTRAVENOUS at 17:56

## 2019-01-01 RX ADMIN — CASTOR OIL AND BALSAM, PERU: 788; 87 OINTMENT TOPICAL at 09:56

## 2019-01-01 RX ADMIN — EPINEPHRINE 1 MG: 0.1 INJECTION INTRACARDIAC; INTRAVENOUS at 01:03

## 2019-01-01 RX ADMIN — PROPOFOL 45 MCG/KG/MIN: 10 INJECTION, EMULSION INTRAVENOUS at 21:09

## 2019-01-01 RX ADMIN — Medication 40 ML: at 13:39

## 2019-01-01 RX ADMIN — IPRATROPIUM BROMIDE AND ALBUTEROL SULFATE 3 ML: .5; 3 SOLUTION RESPIRATORY (INHALATION) at 01:20

## 2019-01-01 RX ADMIN — FUROSEMIDE 40 MG: 10 INJECTION, SOLUTION INTRAMUSCULAR; INTRAVENOUS at 23:27

## 2019-01-01 RX ADMIN — Medication 1 CAPSULE: at 09:58

## 2019-01-01 RX ADMIN — IPRATROPIUM BROMIDE AND ALBUTEROL SULFATE 3 ML: .5; 3 SOLUTION RESPIRATORY (INHALATION) at 14:55

## 2019-01-01 RX ADMIN — CHLORHEXIDINE GLUCONATE 15 ML: 1.2 RINSE ORAL at 18:06

## 2019-01-01 RX ADMIN — Medication 100 MG: at 08:27

## 2019-01-01 RX ADMIN — Medication 10 ML: at 05:12

## 2019-01-01 RX ADMIN — AMLODIPINE BESYLATE 5 MG: 5 TABLET ORAL at 08:40

## 2019-01-01 RX ADMIN — PROPOFOL 30 MCG/KG/MIN: 10 INJECTION, EMULSION INTRAVENOUS at 20:28

## 2019-01-01 RX ADMIN — Medication 10 ML: at 06:29

## 2019-01-01 RX ADMIN — Medication 10 ML: at 22:25

## 2019-01-01 RX ADMIN — FENTANYL CITRATE 100 MCG: 50 INJECTION, SOLUTION INTRAMUSCULAR; INTRAVENOUS at 02:00

## 2019-01-01 RX ADMIN — FENTANYL CITRATE 50 MCG: 50 INJECTION, SOLUTION INTRAMUSCULAR; INTRAVENOUS at 21:42

## 2019-01-01 RX ADMIN — ROCURONIUM BROMIDE 10 MG: 10 SOLUTION INTRAVENOUS at 18:39

## 2019-01-01 RX ADMIN — EPINEPHRINE 1 MG: 0.1 INJECTION INTRACARDIAC; INTRAVENOUS at 00:43

## 2019-01-01 RX ADMIN — THERA TABS 1 TABLET: TAB at 09:14

## 2019-01-01 RX ADMIN — PIPERACILLIN SODIUM,TAZOBACTAM SODIUM 3.38 G: 3; .375 INJECTION, POWDER, FOR SOLUTION INTRAVENOUS at 09:57

## 2019-01-01 RX ADMIN — SODIUM CHLORIDE 40 MG: 9 INJECTION INTRAMUSCULAR; INTRAVENOUS; SUBCUTANEOUS at 09:45

## 2019-01-01 RX ADMIN — CALCIUM CHLORIDE, MAGNESIUM CHLORIDE, DEXTROSE MONOHYDRATE, LACTIC ACID, SODIUM CHLORIDE, SODIUM BICARBONATE AND POTASSIUM CHLORIDE 1600 ML/HR: 3.68; 3.05; 22; 5.4; 6.46; 3.09; .314 INJECTION INTRAVENOUS at 16:49

## 2019-01-01 RX ADMIN — CALCIUM CARBONATE (ANTACID) CHEW TAB 500 MG 200 MG: 500 CHEW TAB at 10:19

## 2019-01-01 RX ADMIN — ALBUMIN (HUMAN) 250 ML: 12.5 INJECTION, SOLUTION INTRAVENOUS at 19:21

## 2019-01-01 RX ADMIN — Medication 10 ML: at 19:26

## 2019-01-01 RX ADMIN — ALBUMIN (HUMAN) 12.5 G: 0.25 INJECTION, SOLUTION INTRAVENOUS at 17:58

## 2019-01-01 RX ADMIN — FOLIC ACID 1 MG: 1 TABLET ORAL at 10:20

## 2019-01-01 RX ADMIN — LORAZEPAM 1 MG: 2 INJECTION INTRAMUSCULAR; INTRAVENOUS at 13:43

## 2019-01-01 RX ADMIN — ALBUMIN (HUMAN) 12.5 G: 0.25 INJECTION, SOLUTION INTRAVENOUS at 23:57

## 2019-01-01 RX ADMIN — Medication 10 ML: at 06:01

## 2019-01-01 RX ADMIN — CALCIUM CHLORIDE, MAGNESIUM CHLORIDE, DEXTROSE MONOHYDRATE, LACTIC ACID, SODIUM CHLORIDE, SODIUM BICARBONATE AND POTASSIUM CHLORIDE 1600 ML/HR: 3.68; 3.05; 22; 5.4; 6.46; 3.09; .314 INJECTION INTRAVENOUS at 23:03

## 2019-01-01 RX ADMIN — CALCIUM CHLORIDE, MAGNESIUM CHLORIDE, DEXTROSE MONOHYDRATE, LACTIC ACID, SODIUM CHLORIDE, SODIUM BICARBONATE AND POTASSIUM CHLORIDE 1600 ML/HR: 3.68; 3.05; 22; 5.4; 6.46; 3.09; .314 INJECTION INTRAVENOUS at 15:52

## 2019-01-01 RX ADMIN — PIPERACILLIN SODIUM,TAZOBACTAM SODIUM 3.38 G: 3; .375 INJECTION, POWDER, FOR SOLUTION INTRAVENOUS at 10:38

## 2019-01-01 RX ADMIN — CALCIUM CHLORIDE, MAGNESIUM CHLORIDE, DEXTROSE MONOHYDRATE, LACTIC ACID, SODIUM CHLORIDE, SODIUM BICARBONATE AND POTASSIUM CHLORIDE 1600 ML/HR: 3.68; 3.05; 22; 5.4; 6.46; 3.09; .314 INJECTION INTRAVENOUS at 12:34

## 2019-01-01 RX ADMIN — LISINOPRIL 20 MG: 20 TABLET ORAL at 17:19

## 2019-01-01 RX ADMIN — PROPOFOL 40 MCG/KG/MIN: 10 INJECTION, EMULSION INTRAVENOUS at 23:31

## 2019-01-01 RX ADMIN — PIPERACILLIN SODIUM,TAZOBACTAM SODIUM 3.38 G: 3; .375 INJECTION, POWDER, FOR SOLUTION INTRAVENOUS at 12:42

## 2019-01-01 RX ADMIN — FENTANYL CITRATE 100 MCG: 50 INJECTION, SOLUTION INTRAMUSCULAR; INTRAVENOUS at 20:38

## 2019-01-01 RX ADMIN — PIPERACILLIN SODIUM,TAZOBACTAM SODIUM 3.38 G: 3; .375 INJECTION, POWDER, FOR SOLUTION INTRAVENOUS at 18:12

## 2019-01-01 RX ADMIN — ASPIRIN 81 MG: 81 TABLET ORAL at 08:39

## 2019-01-01 RX ADMIN — FENTANYL CITRATE 50 MCG: 50 INJECTION, SOLUTION INTRAMUSCULAR; INTRAVENOUS at 22:59

## 2019-01-01 RX ADMIN — PANTOPRAZOLE SODIUM 8 MG/HR: 40 INJECTION, POWDER, FOR SOLUTION INTRAVENOUS at 03:17

## 2019-01-01 RX ADMIN — LORAZEPAM 1 MG: 2 INJECTION INTRAMUSCULAR; INTRAVENOUS at 05:09

## 2019-01-01 RX ADMIN — DICYCLOMINE HYDROCHLORIDE 20 MG: 20 TABLET ORAL at 23:55

## 2019-01-01 RX ADMIN — Medication 300 MCG: at 18:59

## 2019-01-01 RX ADMIN — IPRATROPIUM BROMIDE AND ALBUTEROL SULFATE 3 ML: .5; 3 SOLUTION RESPIRATORY (INHALATION) at 20:46

## 2019-01-01 RX ADMIN — AMLODIPINE BESYLATE 5 MG: 5 TABLET ORAL at 09:50

## 2019-01-01 RX ADMIN — CALCIUM CHLORIDE, MAGNESIUM CHLORIDE, DEXTROSE MONOHYDRATE, LACTIC ACID, SODIUM CHLORIDE, SODIUM BICARBONATE AND POTASSIUM CHLORIDE 1600 ML/HR: 5.15; 2.03; 22; 5.4; 6.46; 3.09; .157 INJECTION INTRAVENOUS at 20:30

## 2019-01-01 RX ADMIN — THERA TABS 1 TABLET: TAB at 10:19

## 2019-01-01 RX ADMIN — ASPIRIN 81 MG: 81 TABLET ORAL at 10:19

## 2019-01-01 RX ADMIN — HYDROMORPHONE HYDROCHLORIDE 0.5 MG: 1 INJECTION, SOLUTION INTRAMUSCULAR; INTRAVENOUS; SUBCUTANEOUS at 16:22

## 2019-01-01 RX ADMIN — PIPERACILLIN SODIUM,TAZOBACTAM SODIUM 3.38 G: 3; .375 INJECTION, POWDER, FOR SOLUTION INTRAVENOUS at 18:05

## 2019-01-01 RX ADMIN — SODIUM CHLORIDE 40 MG: 9 INJECTION INTRAMUSCULAR; INTRAVENOUS; SUBCUTANEOUS at 08:34

## 2019-01-01 RX ADMIN — CHLORHEXIDINE GLUCONATE 15 ML: 1.2 RINSE ORAL at 18:42

## 2019-01-01 RX ADMIN — SUCRALFATE 1 G: 1 TABLET ORAL at 21:12

## 2019-01-01 RX ADMIN — WATER: 1000 INJECTION, SOLUTION INTRAVENOUS at 20:24

## 2019-01-01 RX ADMIN — ASPIRIN 81 MG: 81 TABLET ORAL at 09:14

## 2019-01-01 RX ADMIN — AMLODIPINE BESYLATE 5 MG: 5 TABLET ORAL at 23:28

## 2019-01-01 RX ADMIN — ALBUMIN (HUMAN) 12.5 G: 0.25 INJECTION, SOLUTION INTRAVENOUS at 19:13

## 2019-01-01 RX ADMIN — Medication 10 ML: at 15:38

## 2019-01-01 RX ADMIN — PANTOPRAZOLE SODIUM 8 MG/HR: 40 INJECTION, POWDER, FOR SOLUTION INTRAVENOUS at 02:04

## 2019-01-01 RX ADMIN — PIPERACILLIN SODIUM,TAZOBACTAM SODIUM 3.38 G: 3; .375 INJECTION, POWDER, FOR SOLUTION INTRAVENOUS at 09:58

## 2019-01-01 RX ADMIN — LORAZEPAM 1 MG: 2 INJECTION INTRAMUSCULAR; INTRAVENOUS at 01:53

## 2019-01-01 RX ADMIN — CHLORHEXIDINE GLUCONATE 15 ML: 1.2 RINSE ORAL at 08:11

## 2019-01-01 RX ADMIN — CALCIUM CHLORIDE, MAGNESIUM CHLORIDE, DEXTROSE MONOHYDRATE, LACTIC ACID, SODIUM CHLORIDE, SODIUM BICARBONATE AND POTASSIUM CHLORIDE 1600 ML/HR: 3.68; 3.05; 22; 5.4; 6.46; 3.09; .314 INJECTION INTRAVENOUS at 19:08

## 2019-01-01 RX ADMIN — PIPERACILLIN SODIUM,TAZOBACTAM SODIUM 3.38 G: 3; .375 INJECTION, POWDER, FOR SOLUTION INTRAVENOUS at 09:53

## 2019-01-01 RX ADMIN — CHLORHEXIDINE GLUCONATE 15 ML: 1.2 RINSE ORAL at 18:21

## 2019-01-01 RX ADMIN — PROPOFOL 35 MCG/KG/MIN: 10 INJECTION, EMULSION INTRAVENOUS at 13:53

## 2019-01-01 RX ADMIN — Medication 10 ML: at 23:09

## 2019-01-01 RX ADMIN — Medication 30 MCG/MIN: at 00:50

## 2019-01-01 RX ADMIN — AMLODIPINE BESYLATE 5 MG: 5 TABLET ORAL at 10:19

## 2019-01-01 RX ADMIN — FUROSEMIDE 40 MG: 10 INJECTION, SOLUTION INTRAMUSCULAR; INTRAVENOUS at 10:06

## 2019-01-01 RX ADMIN — HEPARIN SODIUM 3800 UNITS: 1000 INJECTION INTRAVENOUS; SUBCUTANEOUS at 22:04

## 2019-01-01 RX ADMIN — ONDANSETRON 4 MG: 2 INJECTION INTRAMUSCULAR; INTRAVENOUS at 03:32

## 2019-01-01 RX ADMIN — MORPHINE SULFATE 2 MG: 2 INJECTION, SOLUTION INTRAMUSCULAR; INTRAVENOUS at 06:57

## 2019-01-01 RX ADMIN — CHLORHEXIDINE GLUCONATE 15 ML: 1.2 RINSE ORAL at 09:43

## 2019-01-01 RX ADMIN — IPRATROPIUM BROMIDE AND ALBUTEROL SULFATE 3 ML: .5; 3 SOLUTION RESPIRATORY (INHALATION) at 21:51

## 2019-01-01 RX ADMIN — THERA TABS 1 TABLET: TAB at 08:45

## 2019-01-01 RX ADMIN — IPRATROPIUM BROMIDE AND ALBUTEROL SULFATE 3 ML: .5; 3 SOLUTION RESPIRATORY (INHALATION) at 07:56

## 2019-01-01 RX ADMIN — SODIUM CHLORIDE 40 MG: 9 INJECTION INTRAMUSCULAR; INTRAVENOUS; SUBCUTANEOUS at 10:21

## 2019-01-01 RX ADMIN — SODIUM CHLORIDE 0.7 MCG/KG/HR: 900 INJECTION, SOLUTION INTRAVENOUS at 06:29

## 2019-01-01 RX ADMIN — CHLORHEXIDINE GLUCONATE 15 ML: 1.2 RINSE ORAL at 09:46

## 2019-01-01 RX ADMIN — Medication 10 ML: at 06:49

## 2019-01-01 RX ADMIN — Medication 40 ML: at 22:00

## 2019-01-01 RX ADMIN — Medication 10 ML: at 21:29

## 2019-01-01 RX ADMIN — SUCCINYLCHOLINE CHLORIDE 140 MG: 20 INJECTION, SOLUTION INTRAMUSCULAR; INTRAVENOUS at 10:53

## 2019-01-01 RX ADMIN — PANTOPRAZOLE SODIUM 8 MG/HR: 40 INJECTION, POWDER, FOR SOLUTION INTRAVENOUS at 00:20

## 2019-01-01 RX ADMIN — THERA TABS 1 TABLET: TAB at 09:55

## 2019-01-01 RX ADMIN — IPRATROPIUM BROMIDE AND ALBUTEROL SULFATE 3 ML: .5; 3 SOLUTION RESPIRATORY (INHALATION) at 13:12

## 2019-01-01 RX ADMIN — PROPOFOL 40 MCG/KG/MIN: 10 INJECTION, EMULSION INTRAVENOUS at 05:43

## 2019-01-01 RX ADMIN — Medication 1 CAPSULE: at 09:55

## 2019-01-01 RX ADMIN — FENTANYL CITRATE 50 MCG: 50 INJECTION, SOLUTION INTRAMUSCULAR; INTRAVENOUS at 05:19

## 2019-01-01 RX ADMIN — FOLIC ACID 1 MG: 1 TABLET ORAL at 10:19

## 2019-01-01 RX ADMIN — CALCIUM CHLORIDE, MAGNESIUM CHLORIDE, DEXTROSE MONOHYDRATE, LACTIC ACID, SODIUM CHLORIDE, SODIUM BICARBONATE AND POTASSIUM CHLORIDE 1600 ML/HR: 5.15; 2.03; 22; 5.4; 6.46; 3.09; .157 INJECTION INTRAVENOUS at 22:07

## 2019-01-01 RX ADMIN — PIPERACILLIN SODIUM,TAZOBACTAM SODIUM 3.38 G: 3; .375 INJECTION, POWDER, FOR SOLUTION INTRAVENOUS at 01:57

## 2019-01-01 RX ADMIN — SODIUM CHLORIDE 0.2 MCG/KG/HR: 900 INJECTION, SOLUTION INTRAVENOUS at 17:03

## 2019-01-01 RX ADMIN — SODIUM CHLORIDE, POTASSIUM CHLORIDE, SODIUM LACTATE AND CALCIUM CHLORIDE: 600; 310; 30; 20 INJECTION, SOLUTION INTRAVENOUS at 20:50

## 2019-01-01 RX ADMIN — FOLIC ACID 1 MG: 1 TABLET ORAL at 10:06

## 2019-01-01 RX ADMIN — FENTANYL CITRATE 50 MCG: 50 INJECTION, SOLUTION INTRAMUSCULAR; INTRAVENOUS at 11:47

## 2019-01-01 RX ADMIN — PHENYLEPHRINE HYDROCHLORIDE 80 MCG: 10 INJECTION INTRAVENOUS at 12:41

## 2019-01-01 RX ADMIN — THERA TABS 1 TABLET: TAB at 10:06

## 2019-01-01 RX ADMIN — Medication 300 MCG: at 19:02

## 2019-01-01 RX ADMIN — PIPERACILLIN SODIUM,TAZOBACTAM SODIUM 3.38 G: 3; .375 INJECTION, POWDER, FOR SOLUTION INTRAVENOUS at 22:10

## 2019-01-01 RX ADMIN — FENTANYL CITRATE 25 MCG: 50 INJECTION, SOLUTION INTRAMUSCULAR; INTRAVENOUS at 15:39

## 2019-01-01 RX ADMIN — FENTANYL CITRATE 50 MCG: 50 INJECTION, SOLUTION INTRAMUSCULAR; INTRAVENOUS at 09:20

## 2019-01-01 RX ADMIN — Medication 20 ML: at 15:37

## 2019-01-01 RX ADMIN — FUROSEMIDE 40 MG: 40 TABLET ORAL at 09:50

## 2019-01-01 RX ADMIN — AMLODIPINE BESYLATE 5 MG: 5 TABLET ORAL at 17:41

## 2019-01-01 RX ADMIN — ASPIRIN 81 MG: 81 TABLET ORAL at 09:33

## 2019-01-01 RX ADMIN — PROPOFOL 45 MCG/KG/MIN: 10 INJECTION, EMULSION INTRAVENOUS at 22:29

## 2019-01-01 RX ADMIN — FENTANYL CITRATE 75 MCG: 50 INJECTION, SOLUTION INTRAMUSCULAR; INTRAVENOUS at 20:06

## 2019-01-01 RX ADMIN — Medication 1 CAPSULE: at 09:16

## 2019-01-01 RX ADMIN — PIPERACILLIN SODIUM,TAZOBACTAM SODIUM 3.38 G: 3; .375 INJECTION, POWDER, FOR SOLUTION INTRAVENOUS at 09:04

## 2019-01-01 RX ADMIN — PANTOPRAZOLE SODIUM 8 MG/HR: 40 INJECTION, POWDER, FOR SOLUTION INTRAVENOUS at 22:29

## 2019-01-01 RX ADMIN — FOLIC ACID 1 MG: 1 TABLET ORAL at 08:15

## 2019-01-01 RX ADMIN — WATER: 1000 INJECTION, SOLUTION INTRAVENOUS at 04:24

## 2019-01-01 RX ADMIN — AZITHROMYCIN MONOHYDRATE 500 MG: 500 INJECTION, POWDER, LYOPHILIZED, FOR SOLUTION INTRAVENOUS at 05:56

## 2019-01-01 RX ADMIN — CALCIUM CHLORIDE, MAGNESIUM CHLORIDE, DEXTROSE MONOHYDRATE, LACTIC ACID, SODIUM CHLORIDE, SODIUM BICARBONATE AND POTASSIUM CHLORIDE 1600 ML/HR: 3.68; 3.05; 22; 5.4; 6.46; 3.09; .314 INJECTION INTRAVENOUS at 01:03

## 2019-01-01 RX ADMIN — SODIUM BICARBONATE 100 MEQ: 84 INJECTION INTRAVENOUS at 09:15

## 2019-01-01 RX ADMIN — LORAZEPAM 1 MG: 2 INJECTION INTRAMUSCULAR; INTRAVENOUS at 00:48

## 2019-01-01 RX ADMIN — SODIUM CHLORIDE 40 MG: 9 INJECTION INTRAMUSCULAR; INTRAVENOUS; SUBCUTANEOUS at 21:55

## 2019-01-01 RX ADMIN — SODIUM CHLORIDE 40 MG: 9 INJECTION INTRAMUSCULAR; INTRAVENOUS; SUBCUTANEOUS at 09:37

## 2019-01-01 RX ADMIN — SODIUM CHLORIDE 0.7 MCG/KG/HR: 900 INJECTION, SOLUTION INTRAVENOUS at 14:36

## 2019-01-01 RX ADMIN — Medication 10 ML: at 21:02

## 2019-01-01 RX ADMIN — SODIUM CHLORIDE 40 MG: 9 INJECTION INTRAMUSCULAR; INTRAVENOUS; SUBCUTANEOUS at 08:28

## 2019-01-01 RX ADMIN — LORAZEPAM 1 MG: 2 INJECTION INTRAMUSCULAR; INTRAVENOUS at 12:32

## 2019-01-01 RX ADMIN — FENTANYL CITRATE 50 MCG: 50 INJECTION, SOLUTION INTRAMUSCULAR; INTRAVENOUS at 18:05

## 2019-01-01 RX ADMIN — SODIUM CHLORIDE 250 ML: 900 INJECTION, SOLUTION INTRAVENOUS at 20:26

## 2019-01-01 RX ADMIN — CALCIUM CHLORIDE, MAGNESIUM CHLORIDE, DEXTROSE MONOHYDRATE, LACTIC ACID, SODIUM CHLORIDE, SODIUM BICARBONATE AND POTASSIUM CHLORIDE 1600 ML/HR: 3.68; 3.05; 22; 5.4; 6.46; 3.09; .314 INJECTION INTRAVENOUS at 09:15

## 2019-01-01 RX ADMIN — PANTOPRAZOLE SODIUM 8 MG/HR: 40 INJECTION, POWDER, FOR SOLUTION INTRAVENOUS at 13:35

## 2019-01-01 RX ADMIN — CASTOR OIL AND BALSAM, PERU: 788; 87 OINTMENT TOPICAL at 08:04

## 2019-01-01 RX ADMIN — CALCIUM CHLORIDE, MAGNESIUM CHLORIDE, DEXTROSE MONOHYDRATE, LACTIC ACID, SODIUM CHLORIDE, SODIUM BICARBONATE AND POTASSIUM CHLORIDE 1600 ML/HR: 3.68; 3.05; 22; 5.4; 6.46; 3.09; .314 INJECTION INTRAVENOUS at 23:05

## 2019-01-01 RX ADMIN — HEPARIN SODIUM 5000 UNITS: 5000 INJECTION INTRAVENOUS; SUBCUTANEOUS at 21:01

## 2019-01-01 RX ADMIN — FENTANYL CITRATE 50 MCG: 50 INJECTION, SOLUTION INTRAMUSCULAR; INTRAVENOUS at 14:59

## 2019-01-01 RX ADMIN — ALBUMIN (HUMAN) 12.5 G: 0.25 INJECTION, SOLUTION INTRAVENOUS at 23:24

## 2019-01-01 RX ADMIN — Medication 100 MG: at 08:33

## 2019-01-01 RX ADMIN — SODIUM CHLORIDE 10 ML/HR: 900 INJECTION, SOLUTION INTRAVENOUS at 06:04

## 2019-01-01 RX ADMIN — Medication 10 ML: at 01:25

## 2019-01-01 RX ADMIN — CEFTRIAXONE 1 G: 1 INJECTION, POWDER, FOR SOLUTION INTRAMUSCULAR; INTRAVENOUS at 06:59

## 2019-01-01 RX ADMIN — PIPERACILLIN SODIUM,TAZOBACTAM SODIUM 3.38 G: 3; .375 INJECTION, POWDER, FOR SOLUTION INTRAVENOUS at 22:25

## 2019-01-01 RX ADMIN — Medication 100 MG: at 10:05

## 2019-01-01 RX ADMIN — CASTOR OIL AND BALSAM, PERU: 788; 87 OINTMENT TOPICAL at 18:03

## 2019-01-01 RX ADMIN — PIPERACILLIN SODIUM,TAZOBACTAM SODIUM 3.38 G: 3; .375 INJECTION, POWDER, FOR SOLUTION INTRAVENOUS at 21:38

## 2019-01-01 RX ADMIN — HEPARIN SODIUM 5000 UNITS: 5000 INJECTION INTRAVENOUS; SUBCUTANEOUS at 14:26

## 2019-01-01 RX ADMIN — SUCRALFATE 1 G: 1 TABLET ORAL at 17:00

## 2019-01-01 RX ADMIN — CALCIUM CHLORIDE, MAGNESIUM CHLORIDE, DEXTROSE MONOHYDRATE, LACTIC ACID, SODIUM CHLORIDE, SODIUM BICARBONATE AND POTASSIUM CHLORIDE 1600 ML/HR: 3.68; 3.05; 22; 5.4; 6.46; 3.09; .314 INJECTION INTRAVENOUS at 09:56

## 2019-01-01 RX ADMIN — SODIUM CHLORIDE 40 MG: 9 INJECTION INTRAMUSCULAR; INTRAVENOUS; SUBCUTANEOUS at 21:52

## 2019-01-01 RX ADMIN — SUCRALFATE 1 G: 1 TABLET ORAL at 12:36

## 2019-01-01 RX ADMIN — SODIUM CHLORIDE 0.7 MCG/KG/HR: 900 INJECTION, SOLUTION INTRAVENOUS at 13:32

## 2019-01-01 RX ADMIN — FENTANYL CITRATE 50 MCG: 50 INJECTION, SOLUTION INTRAMUSCULAR; INTRAVENOUS at 02:15

## 2019-01-01 RX ADMIN — CASTOR OIL AND BALSAM, PERU: 788; 87 OINTMENT TOPICAL at 10:09

## 2019-07-04 NOTE — Clinical Note
TRANSFER - OUT REPORT:  
 
Verbal report given to: Josette Dominique. Report consisted of patient's Situation, Background, Assessment and  
Recommendations(SBAR). Opportunity for questions and clarification was provided. Patient transported with a Registered Nurse. Patient transported to: [facility].

## 2019-07-04 NOTE — Clinical Note
TRANSFER - IN REPORT:  
 
Verbal report received from: TRACI MOROCHO RN, AMISHA LOVE RN, AND JEREMY LAZO RT(R) . Report consisted of patient's Situation, Background, Assessment and  
Recommendations(SBAR). Opportunity for questions and clarification was provided. Assessment completed upon patient's arrival to unit and care assumed. Patient transported with a Registered Nurse, [address] [place] [address] / Patient Care [facility] and Monitor.

## 2019-07-04 NOTE — Clinical Note
TRANSFER - OUT REPORT:  
 
Verbal report given to: [facility] . Report consisted of patient's Situation, Background, Assessment and  
Recommendations(SBAR). Opportunity for questions and clarification was provided. Patient transported with a Registered Nurse. Patient transported to: [facility].

## 2019-07-05 PROBLEM — I50.9 ACUTE CHF (CONGESTIVE HEART FAILURE) (HCC): Status: ACTIVE | Noted: 2019-01-01

## 2019-07-05 PROBLEM — J90 PLEURAL EFFUSION, RIGHT: Status: ACTIVE | Noted: 2019-01-01

## 2019-07-05 NOTE — ED NOTES
TRANSFER - OUT REPORT: 
 
Verbal report given to CHRISTINA Manuel on Ibrahima Geiger  being transferred to [place] [id number] (unit) for routine progression of care Report consisted of patients Situation, Background, Assessment and  
Recommendations(SBAR). Information from the following report(s) ED Summary was reviewed with the receiving nurse. Lines:  
Peripheral IV 07/04/19 Right Forearm (Active) Site Assessment Clean, dry, & intact 7/4/2019 11:47 PM  
Phlebitis Assessment 0 7/4/2019 11:47 PM  
Infiltration Assessment 0 7/4/2019 11:47 PM  
Dressing Status Clean, dry, & intact 7/4/2019 11:47 PM  
  
 
Opportunity for questions and clarification was provided. Patient transported with: 
 Monitor Registered Nurse

## 2019-07-05 NOTE — PROGRESS NOTES
1940- Bedside shift change report given to Mar RN by Antelmo Patel RN. Report included the following information SBAR, Kardex, ED Summary, Intake/Output, MAR, Recent Results and Cardiac Rhythm Aflutter. Problem: Heart Failure: Day 2 Goal: Activity/Safety Outcome: Progressing Towards Goal 
Note:  
Patient is able to ambulate to the bathroom with 1 person assistance. Patient educated to call out for assistance. Patient calls out appropriately. Goal: Medications Outcome: Progressing Towards Goal 
Note:  
Patient is on a clear liquid diet. Physicians will advance patients diet as tolerated. Problem: Activity Intolerance Goal: *Oxygen saturation during activity within specified parameters Outcome: Progressing Towards Goal 
Note:  
Oxygen saturations are within defined normal limits on room air. Will continue to monitor.

## 2019-07-05 NOTE — PROGRESS NOTES
Spiritual Care Partner Volunteer visited patient in room 442/01 on 7.05.19. Documented by: : Rev. Giovana Donnelly; [facility], to contact [phone] [address] call: [phone]

## 2019-07-05 NOTE — ED PROVIDER NOTES
73 y/o male with PMHx of HTN, presenting with complaint of abdominal pain. The patient reports a 1 week history of epigastric abdominal pain and constipation. He has been taking miralax and mag citrate for constipation, and reports having a small BM this morning but has not had a normal BM for 3 or 4 days. His abdominal pain is 8/10, aching, non-radiating. He has not taken anything for pain. He endorses chest pain, leg swelling, abdominal distention, and upper back pain. He denies fevers, shortness of breath, nausea, vomiting or syncope. The history is provided by the patient. Past Medical History:  
Diagnosis Date  Arthritic-like pain  Hypertension History reviewed. No pertinent surgical history. Family History:  
Problem Relation Age of Onset  Diabetes Mother  Hypertension Mother  Heart Disease Mother  Asthma Mother [employer] Arthritis-osteo Mother  Diabetes Father  Hypertension Father  Asthma Father  Arthritis-osteo Father  Diabetes Sister  Gout Sister  Asthma Brother Social History Socioeconomic History  Marital status:  Spouse name: Not on file  Number of children: Not on file  Years of education: Not on file  Highest education level: Not on file Occupational History  Not on file Social Needs  Financial resource strain: Not on file  Food insecurity:  
  Worry: Not on file Inability: Not on file  Transportation needs:  
  Medical: Not on file Non-medical: Not on file Tobacco Use  Smoking status: Former Smoker Start date: 1966 Last attempt to quit: 1984 Years since quittin.6  Smokeless tobacco: Never Used Substance and Sexual Activity  Alcohol use: Yes Alcohol/week: 7.0 oz Types: 14 drink(s) per week  Drug use: No  
 Sexual activity: Yes Lifestyle  Physical activity:  
  Days per week: Not on file Minutes per session: Not on file  Stress: Not on file Relationships  Social connections:  
  Talks on phone: Not on file Gets together: Not on file Attends [employer] service: Not on file Active member of club or organization: Not on file Attends meetings of clubs or organizations: Not on file Relationship status: Not on file  Intimate partner violence:  
  Fear of current or ex partner: Not on file Emotionally abused: Not on file Physically abused: Not on file Forced sexual activity: Not on file Other Topics Concern  Not on file Social History Narrative  Not on file ALLERGIES: Patient has no known allergies. Review of Systems Constitutional: Negative for chills and fever. Respiratory: Negative for shortness of breath. Cardiovascular: Positive for chest pain and leg swelling. Gastrointestinal: Positive for abdominal distention, abdominal pain and constipation. Negative for nausea and vomiting. Musculoskeletal: Positive for back pain. Skin: Negative for color change. Neurological: Negative for syncope. All other systems reviewed and are negative. Vitals:  
 07/04/19 2343 BP: 149/67 Pulse: 97 Resp: 30 Temp: 98 °F (36.7 °C) SpO2: (!) 86% Physical Exam  
Constitutional: He is oriented to person, place, and time. He appears well-developed and well-nourished. No distress. HENT:  
Head: Normocephalic and atraumatic. Eyes: Conjunctivae and EOM are normal.  
Cardiovascular: Normal rate, regular rhythm and normal heart sounds. 2+ pitting edema of bilateral lower extremities. Pulmonary/Chest: Effort normal and breath sounds normal.  
Abdominal: Soft. Bowel sounds are normal. He exhibits distension. There is tenderness (RUQ, LUQ, epigastric). There is no rebound and no guarding. Umbilical hernia, easily reducible, non-tender to palpation, no erythema or increased warmth of skin. Neurological: He is alert and oriented to person, place, and time. Skin: Skin is warm and dry. He is not diaphoretic. Nursing note and vitals reviewed. MDM Number of Diagnoses or Management Options Elevated brain natriuretic peptide (BNP) level: Hypoxia:  
[facility]):  
Leg swelling:  
Pleural effusion:  
SOB (shortness of breath):  
  
Amount and/or Complexity of Data Reviewed Clinical lab tests: ordered and reviewed Tests in the radiology section of CPT®: ordered and reviewed Discuss the patient with other providers: yes (Dr. Dinah Nguyen, ED attending) Patient Progress Patient progress: stable Procedures ED EKG interpretation: 
Rhythm: normal sinus rhythm; and regular . Rate (approx.): 64; Axis: left axis deviation; ST/T wave: non-specific changes. Interpreted by Dr. Dinah Nguyen, 11:50 PM 
 
 
 
73 y/o male with PMHx of HTN, presenting with complaint of abdominal pain. No acute distress, SpO2 86% on room air but improved to 97% on 2L NC. CT chest/abd/pelvis reveals atelectasis and right pleural effusion, as well as ileus vs bowel obstruction; doubt obstruction due to umbilical hernia as the hernia is non-tender and easily reducible. BNP 8324, negative troponin, no acute ischemic changes on EKG. Duplex ultrasound of bilateral lower extremities is negative for DVT. Will consult the hospitalist service for admission. Consult Note - 2:05 AM 
I have spoken with Dr. Salvador Bush. Discussed patient's presentation, history, physical assessment, and available diagnostic results. He will write orders and admit the patient to the hospital. All available results have been reviewed with the patient and any available family. Care plan has been outlined and questions have been answered. Patient and any available family understands and agrees to need for admission to hospital for further treatment not available in ED. Patient is ready for admission.

## 2019-07-05 NOTE — H&P
[address]  HISTORY AND PHYSICAL Name:  Samuel Lopez 
MR#:  [id number] :  1945 ACCOUNT #:  [de-identified] ADMIT DATE:  2019 PATIENT WAS SEEN AND ADMITTED BY ME ON 2019 DATE OF DICTATION:  2019 PRIMARY CARE PHYSICIAN:  Eileen Peña DO 
 
SOURCE OF INFORMATION:  The patient and the patient's relatives who were present at the bedside. CHIEF COMPLAINT:  Abdominal pain. HISTORY OF PRESENT ILLNESS:  This is a 30-year-old man with a past medical history significant for hypertension, who was in his usual state of health until about a week ago when the patient developed abdominal pain. The pain is located at the epigastric region, 8/10 in severity. No radiation. No known aggravating or relieving factors. The patient described the pain as a dull ache associated with constipation but no nausea, no vomiting. The patient took laxative without any significant relief of the constipation. The patient was brought to the emergency room for further evaluation. When the EMS arrived at the scene, the patient's oxygen saturation was 86% on room air. The oxygen saturation improved to 97% when the patient was started on 2 liters of nasal cannula. The patient denies COPD, congestive heart failure. No heart disease. When the patient arrived at the emergency room, he was found to have elevated BNP level. CT scan of the abdomen and pelvis performed by the emergency room provider shows right pleural effusion and suspected small bowel obstruction. The patient was subsequently referred to the hospitalist service for evaluation for admission. The patient denies fever. No rigors. No chills. No record of prior admission to this hospital.  The patient has not seen his primary care physician for a while; according to him, he does not like going to the doctor. PAST MEDICAL HISTORY:  Hypertension. ALLERGIES:  NO KNOWN DRUG ALLERGIES. MEDICATIONS: 
 1.  Lotrel 5/20 one capsule twice daily. 2.  Aspirin 81 mg daily. 3.  Hydrochlorothiazide 25 mg daily. 4.  Tapering dose of prednisone. FAMILY HISTORY:  This was reviewed. His mother had diabetes, hypertension, heart disease, asthma, and osteoarthritis. His father had diabetes, hypertension, and asthma. PAST SURGICAL HISTORY:  Not significant. SOCIAL HISTORY:  The patient is a former smoker, quit tobacco abuse more than 30 years ago. The patient admits to 14 drinks per week. REVIEW OF SYSTEMS: 
HEAD, EYES, EARS, NOSE, AND THROAT:  No headache, no dizziness, no blurring of vision, no photophobia. RESPIRATORY SYSTEM:  This is positive for shortness of breath. No cough, no hemoptysis. CARDIOVASCULAR SYSTEM:  This is positive for chest pain and orthopnea. No palpitations. GASTROINTESTINAL SYSTEM:  This is positive for abdominal pain and constipation. No nausea, no vomiting, no diarrhea. GENITOURINARY SYSTEM:  No dysuria, no urgency and no frequency. All other systems are reviewed and they are negative. PHYSICAL EXAMINATION: 
GENERAL APPEARANCE:  The patient appeared ill, in moderate distress. VITAL SIGNS:  On arrival at the emergency room; temperature 98, pulse 97, respiratory rate 30, blood pressure 149/67, oxygen saturation 86% on room air. HEAD:  Normocephalic, atraumatic. EYES:  Normal eye movement. No redness, no drainage, no discharge. EARS:  Normal external ears with no evidence of drainage. NOSE:  No deformity, no drainage. MOUTH AND THROAT:  No visible oral lesion. NECK:  Neck is supple. Mild JVD. No thyromegaly. CHEST:  Bilateral basilar crackles. Reduced breath sounds in right lung field. HEART:  Normal S1 and S2, regular. No clinically appreciable murmur. ABDOMEN:  Soft, nontender. Normal bowel sounds. CNS:  Alert and oriented x3. No gross focal neurological deficit. EXTREMITIES:  Edema 2+. Pulses 2+ bilaterally. MUSCULOSKELETAL SYSTEM:  No obvious joint deformity or swelling. SKIN:  No active skin lesions seen in the exposed part of the body. PSYCHIATRY:  Normal mood and affect. LYMPHATIC SYSTEM:  No cervical lymphadenopathy. DIAGNOSTIC DATA:  EKG shows normal sinus rhythm. Evidence of anterior infarct, age undetermined. CT scan of the chest, abdomen and pelvis, no evidence of acute pulmonary embolus. Bilateral airspace disease, may represent atelectasis or pneumonia, large right pleural effusion,  partial small bowel obstruction secondary to umbilical hernia, small ascites. LABORATORY DATA:  Hematology; WBC 9.7, hemoglobin 14.3, hematocrit 45.6, platelets 886. Pro-BNP 8324. Cardiac profile, troponin less than 0.05. Lipase 180. Chemistry; sodium 139, potassium 4.6, chloride 104, CO2 of 24, glucose 135, BUN 13, creatinine 1.32, calcium 9.2, total bilirubin 0.9, ALT 22, AST 27, alkaline phosphatase 162, total protein 7.2, albumin level 3.4, globulin 3.8. ASSESSMENT: 
1. Suspected acute congestive heart failure. 2.  Right pleural effusion. 3.  Suspected small bowel obstruction. 4.  Hypertension. 5.  Hyperglycemia. 6.  Bilateral leg swelling. 7.  Bacterial pneumonia. 8.  Alcohol abuse. PLAN: 
1. Suspected acute congestive heart failure. We will admit the patient for further evaluation and treatment. We will start the patient on Lasix. We will obtain echocardiogram to determine the ejection fraction and type of congestive heart failure. We will obtain serial cardiac markers to rule out acute myocardial infarction as a possible cause of acute congestive heart failure. Cardiology consult will be requested to assist in further evaluation and treatment. The diagnosis of suspected acute congestive heart failure is supported by the elevated BNP level, bilateral leg swelling, and hypoxia on room air. 2.  Right pleural effusion.   Cardiothoracic surgery consult will be requested for further evaluation of the right pleural effusion. We will schedule the patient for ultrasound-guided thoracentesis. This may be parapneumonic effusion. 3.  Suspected small bowel obstruction. The patient will be n.p.o. except for medication. General surgery consult will be requested to assist in further evaluation and treatment. 4.  Hypertension. We will resume preadmission and medication. We will monitor the patient's blood pressure closely. 5.  Hyperglycemia. We will check hemoglobin A1c level. The patient has no history of diabetes. 6.  Bilateral leg swelling. This is most likely coming from the suspected acute congestive heart failure. We will await the results of ultrasound of the lower extremity ordered by the emergency room provider for DVT. 7.  Bacterial pneumonia. We will start the patient on Rocephin and Zithromax for suspected bacterial pneumonia. This is based on the fact that the patient was hypoxic on room air and CT scan shows evidence of pneumonia even though the patient has no leukocytosis or fever. We will monitor the patient's clinical response to treatment. 8.  Alcohol abuse. The patient drinks about 14 drinks per week. We will start the patient on thiamine, folic acid, and multivitamin. We will initiate a CIWA protocol, if the patient's CIWA score becomes elevated; the patient will be placed on Ativan as needed for withdrawal potential. 
 
OTHER ISSUES:  Code status, the patient is a full code. We will place the patient on heparin for DVT prophylaxis. FUNCTIONAL STATUS PRIOR TO ADMISSION:  The patient is ambulatory without assistant or device. Azul Ramirez MD 
 
 
[id number] 
D:  07/05/2019 5:12 
T:  07/05/2019 5:22 
JOB #:  [id number] CC:  Wicho Albert DO

## 2019-07-05 NOTE — CONSULTS
Thoracic Surgery Consultation Admit Date: 2019 Reason for Consultation: Right Pleural effusion HPI: 
Michelle Cutler is a 72 y.o. male with extensive PMH as noted below & including HTN & CHF who we are asked to see in Thoracic Surgical consultation for a right Pleural effusion. He presented to [facility] ED overnight with complaints of abdominal pain for a few days, associated with bilat lower extremity swelling and testicular pain. Upon arrival to ED, his oxygen saturation was 88%. He is currently on 2L O2 via NC with O2 sats 97-99%. He reports persistent SOB x 1 week, associated with a nonproductive cough x 2-3 weeks. He denies hemoptysis. No h/o pulmonary issues. Denies prior history of pleural effusion. Former smoker, quit 35 years ago. CT Chest (2019) shows: 
IMPRESSION: 
No evidence of acute pulmonary embolus. Bilateral airspace disease may represent 
atelectasis or pneumonia. Large right pleural effusion. Early versus partial 
small bowel obstruction secondary to umbilical hernia. Small ascites. Patient Active Problem List  
 Diagnosis Date Noted  Acute CHF (congestive heart failure) ([facility] [address] 2019  Pleural effusion, right 2019  Vitamin D deficiency 2014  Arthritis of wrist, right, degenerative 2014  History of wrist fracture, Right 2014  Overweight (BMI 25.0-29.9) 2014  
 HTN (hypertension) 2014  Blurred vision, bilateral 2014 Past Medical History:  
Diagnosis Date  Arthritic-like pain  Hypertension History reviewed. No pertinent surgical history. Social History Tobacco Use  Smoking status: Former Smoker Start date: 1966 Last attempt to quit: 1984 Years since quittin.6  Smokeless tobacco: Never Used Substance Use Topics  Alcohol use: Yes Alcohol/week: 7.0 oz Types: 14 drink(s) per week Family History Problem Relation Age of Onset  Diabetes Mother  Hypertension Mother  Heart Disease Mother  Asthma Mother [facility] Arthritis-osteo Mother  Diabetes Father  Hypertension Father  Asthma Father  Arthritis-osteo Father  Diabetes Sister  Gout Sister  Asthma Brother Prior to Admission medications Medication Sig Start Date End Date Taking? Authorizing Provider  
multivit, iron, mineral, folic acid (OPTISOURCE) chew tablet Take  by mouth daily. Yes Provider, Historical  
OTHER Pain medication from [facility]   Yes Provider, Historical  
aspirin delayed-release 81 mg tablet Take  by mouth daily. Yes Provider, Historical  
hydrochlorothiazide (HYDRODIURIL) 25 mg tablet Take 1 tablet by mouth daily. 12/16/14  Yes Jannette Stephen,   
amLODIPine-benazepril (LOTREL) 5-20 mg per capsule Take 1 capsule by mouth two (2) times a day. 11/18/14  Yes Jannette Stephen,   
ergocalciferol (ERGOCALCIFEROL) 50,000 unit capsule Take 1 capsule by mouth every seven (7) days. 11/18/14  Yes Hemalatha Montalvo, DO  
methylPREDNISolone (MEDROL DOSEPACK) 4 mg tablet Per dose pack instructions 11/11/14  Yes Jannette Stephen DO No Known Allergies Subjective:  
 
Review of Systems: A comprehensive review of systems was negative except for that written in the History of Present Illness. Objective:  
 
Blood pressure 164/72, pulse 66, temperature 98 °F (36.7 °C), resp. rate 27, weight 164 lb 10.9 oz (74.7 kg), SpO2 98 %. Recent Results (from the past 24 hour(s)) CBC WITH AUTOMATED DIFF Collection Time: 07/04/19 11:48 PM  
Result Value Ref Range WBC 9.7 4.1 - 11.1 K/uL  
 RBC 5.44 4.10 - 5.70 M/uL  
 HGB 14.3 12.1 - 17.0 g/dL HCT 45.6 36.6 - 50.3 % MCV 83.8 80.0 - 99.0 FL  
 MCH 26.3 26.0 - 34.0 PG  
 MCHC 31.4 30.0 - 36.5 g/dL  
 RDW 13.8 11.5 - 14.5 % PLATELET 431 176 - 245 K/uL MPV 11.6 8.9 - 12.9 FL  
 NRBC 0.0 0  WBC ABSOLUTE NRBC 0.00 0.00 - 0.01 K/uL NEUTROPHILS 73 32 - 75 % LYMPHOCYTES 16 12 - 49 % MONOCYTES 9 5 - 13 % EOSINOPHILS 1 0 - 7 % BASOPHILS 1 0 - 1 % IMMATURE GRANULOCYTES 0 0.0 - 0.5 % ABS. NEUTROPHILS 7.1 1.8 - 8.0 K/UL  
 ABS. LYMPHOCYTES 1.5 0.8 - 3.5 K/UL  
 ABS. MONOCYTES 0.9 0.0 - 1.0 K/UL  
 ABS. EOSINOPHILS 0.1 0.0 - 0.4 K/UL  
 ABS. BASOPHILS 0.1 0.0 - 0.1 K/UL  
 ABS. IMM. GRANS. 0.0 0.00 - 0.04 K/UL  
 DF AUTOMATED METABOLIC PANEL, COMPREHENSIVE Collection Time: 07/04/19 11:48 PM  
Result Value Ref Range Sodium 139 136 - 145 mmol/L Potassium 4.6 3.5 - 5.1 mmol/L Chloride 104 97 - 108 mmol/L  
 CO2 24 21 - 32 mmol/L Anion gap 11 5 - 15 mmol/L Glucose 135 (H) 65 - 100 mg/dL BUN 13 6 - 20 MG/DL Creatinine 1.32 (H) 0.70 - 1.30 MG/DL  
 BUN/Creatinine ratio 10 (L) 12 - 20 GFR est AA >60 >60 ml/min/1.73m2 GFR est non-AA 53 (L) >60 ml/min/1.73m2 Calcium 9.2 8.5 - 10.1 MG/DL Bilirubin, total 0.9 0.2 - 1.0 MG/DL  
 ALT (SGPT) 22 12 - 78 U/L  
 AST (SGOT) 27 15 - 37 U/L Alk. phosphatase 162 (H) 45 - 117 U/L Protein, total 7.2 6.4 - 8.2 g/dL Albumin 3.4 (L) 3.5 - 5.0 g/dL Globulin 3.8 2.0 - 4.0 g/dL A-G Ratio 0.9 (L) 1.1 - 2.2 LIPASE Collection Time: 07/04/19 11:48 PM  
Result Value Ref Range Lipase 180 73 - 393 U/L  
SAMPLES BEING HELD Collection Time: 07/04/19 11:48 PM  
Result Value Ref Range SAMPLES BEING HELD 1red 1blue COMMENT Add-on orders for these samples will be processed based on acceptable specimen integrity and analyte stability, which may vary by analyte. TROPONIN I Collection Time: 07/04/19 11:48 PM  
Result Value Ref Range Troponin-I, Qt. <0.05 <0.05 ng/mL NT-PRO BNP Collection Time: 07/04/19 11:48 PM  
Result Value Ref Range NT pro-BNP 8,324 (H) <125 PG/ML  
EKG, 12 LEAD, INITIAL Collection Time: 07/04/19 11:48 PM  
Result Value Ref Range Ventricular Rate 64 BPM  
 Atrial Rate 64 BPM  
 P-R Interval 134 ms QRS Duration 100 ms Q-T Interval 400 ms QTC Calculation (Bezet) 412 ms Calculated P Axis 38 degrees Calculated R Axis -50 degrees Calculated T Axis 146 degrees Diagnosis Normal sinus rhythm Left axis deviation Anterior infarct , age undetermined No previous ECGs available TSH 3RD GENERATION Collection Time: 07/05/19  4:07 AM  
Result Value Ref Range TSH 1.86 0.36 - 3.74 uIU/mL MAGNESIUM Collection Time: 07/05/19  4:07 AM  
Result Value Ref Range Magnesium 2.1 1.6 - 2.4 mg/dL PHOSPHORUS Collection Time: 07/05/19  4:07 AM  
Result Value Ref Range Phosphorus 3.2 2.6 - 4.7 MG/DL  
TROPONIN I Collection Time: 07/05/19  4:07 AM  
Result Value Ref Range Troponin-I, Qt. <0.05 <0.05 ng/mL  
 
_____________________ Physical Exam:  
 
General:  Alert, cooperative, no distress, appears stated age. Eyes:   Sclera clear. Throat: Lips, mucosa, and tongue normal.  
Neck: Supple, symmetrical, trachea midline. Lungs:   Clear to auscultation bilaterally. No wheezes or rales. +2L O2 via NC. Heart:  Regular rate and rhythm. Abdomen:   Round, firm, +tender. Extremities: Extremities normal, atraumatic. Skin: W/d/i. No rashes or lesions. Assessment:  
Principal Problem: 
  Acute CHF (congestive heart failure) ([facility] [address] (7/5/2019) Active Problems: 
  Pleural effusion, right (7/5/2019) Plan:  
 
U/s guided Thoracentesis ordered for today. Agree with that plan. OOB & ambulation as tolerated IS Further Thoracic surgical plan per Dr. Merline Dust Thank you for including us in the care of your patient. Total time spent with patient: 20 minutes. Signed By: YURY Lama   
 July 5, 2019

## 2019-07-05 NOTE — PROGRESS NOTES
Patient admitted early this am-  
 
CT chest reviewed and large R pleural effusion/hypoxia Order placed for US guided pleural drain tube with fluid analysis and cytology CT surgery following 
coags ordered and heparin put on hold but was given earlier this am 
 
CT abd/pelvis showing umbilical hernia and some dilated small bowel Gen Surgery consulted- patient not in any pain and no vomiting Keep NPO for now Patient Vitals for the past 24 hrs: 
 Temp Pulse Resp BP SpO2  
07/05/19 0843 98.6 °F (37 °C) 68 16 159/72 98 % 07/05/19 0503     98 % 07/05/19 0300 98 °F (36.7 °C) 66 27 164/72 98 % 07/05/19 0200  77 26 172/72 99 % 07/05/19 0100  70 (!) 32 157/67 97 % 07/04/19 2348     94 % 07/04/19 2343 98 °F (36.7 °C) 97 30 149/67 (!) 86 % Recent Results (from the past 12 hour(s)) CBC WITH AUTOMATED DIFF Collection Time: 07/04/19 11:48 PM  
Result Value Ref Range WBC 9.7 4.1 - 11.1 K/uL  
 RBC 5.44 4.10 - 5.70 M/uL  
 HGB 14.3 12.1 - 17.0 g/dL HCT 45.6 36.6 - 50.3 % MCV 83.8 80.0 - 99.0 FL  
 MCH 26.3 26.0 - 34.0 PG  
 MCHC 31.4 30.0 - 36.5 g/dL  
 RDW 13.8 11.5 - 14.5 % PLATELET 665 445 - 550 K/uL MPV 11.6 8.9 - 12.9 FL  
 NRBC 0.0 0  WBC ABSOLUTE NRBC 0.00 0.00 - 0.01 K/uL NEUTROPHILS 73 32 - 75 % LYMPHOCYTES 16 12 - 49 % MONOCYTES 9 5 - 13 % EOSINOPHILS 1 0 - 7 % BASOPHILS 1 0 - 1 % IMMATURE GRANULOCYTES 0 0.0 - 0.5 % ABS. NEUTROPHILS 7.1 1.8 - 8.0 K/UL  
 ABS. LYMPHOCYTES 1.5 0.8 - 3.5 K/UL  
 ABS. MONOCYTES 0.9 0.0 - 1.0 K/UL  
 ABS. EOSINOPHILS 0.1 0.0 - 0.4 K/UL  
 ABS. BASOPHILS 0.1 0.0 - 0.1 K/UL  
 ABS. IMM. GRANS. 0.0 0.00 - 0.04 K/UL  
 DF AUTOMATED METABOLIC PANEL, COMPREHENSIVE Collection Time: 07/04/19 11:48 PM  
Result Value Ref Range Sodium 139 136 - 145 mmol/L Potassium 4.6 3.5 - 5.1 mmol/L Chloride 104 97 - 108 mmol/L  
 CO2 24 21 - 32 mmol/L Anion gap 11 5 - 15 mmol/L Glucose 135 (H) 65 - 100 mg/dL BUN 13 6 - 20 MG/DL Creatinine 1.32 (H) 0.70 - 1.30 MG/DL  
 BUN/Creatinine ratio 10 (L) 12 - 20 GFR est AA >60 >60 ml/min/1.73m2 GFR est non-AA 53 (L) >60 ml/min/1.73m2 Calcium 9.2 8.5 - 10.1 MG/DL Bilirubin, total 0.9 0.2 - 1.0 MG/DL  
 ALT (SGPT) 22 12 - 78 U/L  
 AST (SGOT) 27 15 - 37 U/L Alk. phosphatase 162 (H) 45 - 117 U/L Protein, total 7.2 6.4 - 8.2 g/dL Albumin 3.4 (L) 3.5 - 5.0 g/dL Globulin 3.8 2.0 - 4.0 g/dL A-G Ratio 0.9 (L) 1.1 - 2.2 LIPASE Collection Time: 07/04/19 11:48 PM  
Result Value Ref Range Lipase 180 73 - 393 U/L  
SAMPLES BEING HELD Collection Time: 07/04/19 11:48 PM  
Result Value Ref Range SAMPLES BEING HELD 1red 1blue COMMENT Add-on orders for these samples will be processed based on acceptable specimen integrity and analyte stability, which may vary by analyte. TROPONIN I Collection Time: 07/04/19 11:48 PM  
Result Value Ref Range Troponin-I, Qt. <0.05 <0.05 ng/mL NT-PRO BNP Collection Time: 07/04/19 11:48 PM  
Result Value Ref Range NT pro-BNP 8,324 (H) <125 PG/ML  
EKG, 12 LEAD, INITIAL Collection Time: 07/04/19 11:48 PM  
Result Value Ref Range Ventricular Rate 64 BPM  
 Atrial Rate 64 BPM  
 P-R Interval 134 ms QRS Duration 100 ms Q-T Interval 400 ms QTC Calculation (Bezet) 412 ms Calculated P Axis 38 degrees Calculated R Axis -50 degrees Calculated T Axis 146 degrees Diagnosis Atrial flutter Left axis deviation Anterior infarct , age undetermined No previous ECGs available Confirmed by Jose Antonio Mari MD, Kendra Jackson ([id number]) on 7/5/2019 8:16:59 AM 
  
TSH 3RD GENERATION Collection Time: 07/05/19  4:07 AM  
Result Value Ref Range TSH 1.86 0.36 - 3.74 uIU/mL MAGNESIUM Collection Time: 07/05/19  4:07 AM  
Result Value Ref Range Magnesium 2.1 1.6 - 2.4 mg/dL PHOSPHORUS Collection Time: 07/05/19  4:07 AM  
Result Value Ref Range  Phosphorus 3.2 2.6 - 4.7 MG/DL  
 TROPONIN I Collection Time: 07/05/19  4:07 AM  
Result Value Ref Range Troponin-I, Qt. <0.05 <0.05 ng/mL GLUCOSE, POC Collection Time: 07/05/19  8:47 AM  
Result Value Ref Range Glucose (POC) 101 (H) 65 - 100 mg/dL Performed by Ayaan Chiang

## 2019-07-05 NOTE — PROGRESS NOTES
[facility] met with patient today to provide an introduction to self, the [address] [address] at [facility] Bundled Payment Care Program: 
 
Patient was provided a copy of the [facility] letter. Patient states that they have a functioning scale at home. Patient was not provided a scale during this visit. Reinforced the importance of checking their weight at the same time daily and calling the cardiologist if their weight is up 3 lbs. in a day or 5 lbs. in a week (or per MD guidelines). Patient  has received a \"Living with Heart Failure\" patient education book. [facility] At Home Program: 
 
[address] [facility] program deferred as patient is being seen by the provider All questions answered at this time. Patient verbalized understanding of all information discussed. Appointments:  
 
Patient stated best day(s) of the week for provider appointment(s): no preference Patient stated best time of day for provider appointment(s):  none given

## 2019-07-05 NOTE — ED NOTES
Patient boosted in bed to a position more compatible with deep breaths. Urinal placed at the bedside.

## 2019-07-05 NOTE — NURSE NAVIGATOR
Chart reviewed by Heart Failure Nurse Navigator. Heart Failure database completed. EF:  Awaiting echo ACEi/ARB/ARNi: lisinopril 20 mg daily BB: ** Aldosterone Antagonist: ** 
 
Obstructive Sleep Apnea Screening: STOP-BANG score: 
 Referred to Sleep Medicine: CRT **. NYHA Functional Class documentation requested. Heart Failure Teach Back in Patient Education. Heart Failure Avoiding Triggers on Discharge Instructions. Cardiologist: consulted, not yet assigned Post discharge follow up phone call to be made within 48-72 hours of discharge.    
 
MEDICARE HF BUNDLE

## 2019-07-05 NOTE — PROGRESS NOTES
RODRÍGUEZ: acute CHF, disposition TBD Reason for Admission: Rt pleural effusion, possible SBO, h/o HTN   
               
RRAT Score: RRAT 18 Do you (patient/family) have any concerns for transition/discharge? None expressed Plan for utilizing home health: has not used [facility] or been in rehab in past.    
 
Current Advanced Directive/Advance Care Plan:   Full code, does not have an AMD 
         
Transition of Care Plan: CM will follow for discharge planning. Pt does not use DME nor home O2. He uses [address] on [address]. Care Management Interventions PCP Verified by CM: Yes(Stephanie Montalvo DO, pt has not seen this doctor in a few yrs) Palliative Care Criteria Met (RRAT>21 & CHF Dx)?: No 
Mode of Transport at Discharge: Other (see comment)(family) Current Support Network: Lives with . [employer], spouse, [phone] (m)) Confirm Follow Up Transport: Self Freedom of Choice Offered: Yes Discharge Location Discharge Placement: Home Marilyn Keen RN ACM CRM

## 2019-07-05 NOTE — ED TRIAGE NOTES
Pt arrives via EMS d/t Upper abdominal pain x a couple days. Pt believes he might be constipated. Upon EMS arrival, pt's O2 saturation was 88%. He also reports bilateral lower extremity swelling. Reports some pain to his testicles as well

## 2019-07-05 NOTE — CONSULTS
[facility] General Surgery Consultation for: Dr Frankie Edward Requesting Physician: SBO, umbilical hernia History of Present Illness:  
  
Latricia Narayanan is a 72 y.o. male who has been admitted for possible SBO and possible CHF and right pleural effusion. He has been having some upper abdominal pain over the past day or so. The pain has been mild to moderate. He denies any N/V, he did have a BM yesterday and flatus but no BM or flatus today yet. No fevers or chills. He says the pain is about the same in the upper abdomen and is a 2 of 10 and mild. Past Medical History:  
Diagnosis Date  Arthritic-like pain  Hypertension History reviewed. No pertinent surgical history. Current Facility-Administered Medications:  
  aspirin delayed-release tablet 81 mg, 81 mg, Oral, DAILY, Marli Melton MD, 81 mg at 07/05/19 0950   [Held by provider] furosemide (LASIX) injection 40 mg, 40 mg, IntraVENous, DAILY, Marli Melton MD, Stopped at 07/05/19 9022   sodium chloride (NS) flush 5-40 mL, 5-40 mL, IntraVENous, Q8H, Marli Melton MD 
  sodium chloride (NS) flush 5-40 mL, 5-40 mL, IntraVENous, PRN, Marli Melton MD, 10 mL at 07/05/19 0453   acetaminophen (TYLENOL) tablet 650 mg, 650 mg, Oral, Q4H PRN, Marli Melton MD 
  ondansetron (ZOFRAN) injection 4 mg, 4 mg, IntraVENous, Q4H PRN, Marli Melton MD 
  morphine injection 2 mg, 2 mg, IntraVENous, Q4H PRN, Marli Melton MD 
  [Held by provider] heparin (porcine) injection 5,000 Units, 5,000 Units, SubCUTAneous, Q8H, Marli Melton MD, 5,000 Units at 07/05/19 1805   lactobac ac& pc-s.therm-b.anim (KERLINE Q/RISAQUAD), 1 Cap, Oral, DAILY, Marli Melton MD, 1 Cap at 07/05/19 0892   cefTRIAXone (ROCEPHIN) 1 g in 0.9% sodium chloride (MBP/ADV) 50 mL, 1 g, IntraVENous, Q24H, Marli Melton MD, Last Rate: 100 mL/hr at 07/05/19 0454, 1 g at 07/05/19 0454   azithromycin (ZITHROMAX) 500 mg in 0.9% sodium chloride (MBP/ADV) 250 mL, 500 mg, IntraVENous, Q24H, Marli Melton MD, Last Rate: 250 mL/hr at 19 0646, 500 mg at 19 4548   LORazepam (ATIVAN) injection 1 mg, 1 mg, IntraVENous, Q6H PRN, Marli Melton MD 
  naloxone (NARCAN) injection 0.4 mg, 0.4 mg, IntraVENous, PRN, Marli Melton MD 
  thiamine HCL (B-1) tablet 100 mg, 100 mg, Oral, DAILY, Marli Melton MD, 100 mg at 19 9812   folic acid (FOLVITE) tablet 1 mg, 1 mg, Oral, DAILY, Marli Melton MD, 1 mg at 19 6992   therapeutic multivitamin (THERAGRAN) tablet 1 Tab, 1 Tab, Oral, DAILY, Marli Melton MD, 1 Tab at 19 8961   [Held by provider] amLODIPine (NORVASC) tablet 5 mg, 5 mg, Oral, BID, Stopped at 19 0950 **AND** lisinopril (PRINIVIL, ZESTRIL) tablet 20 mg, 20 mg, Oral, BID, Marli Melton MD, 20 mg at 19 0950 No Known Allergies Social History Socioeconomic History  Marital status:  Spouse name: Not on file  Number of children: Not on file  Years of education: Not on file  Highest education level: Not on file Occupational History  Not on file Social Needs  Financial resource strain: Not on file  Food insecurity:  
  Worry: Not on file Inability: Not on file  Transportation needs:  
  Medical: Not on file Non-medical: Not on file Tobacco Use  Smoking status: Former Smoker Start date: 1966 Last attempt to quit: 1984 Years since quittin.6  Smokeless tobacco: Never Used Substance and Sexual Activity  Alcohol use: Yes Alcohol/week: 7.0 oz Types: 14 drink(s) per week  Drug use: No  
 Sexual activity: Yes Lifestyle  Physical activity:  
  Days per week: Not on file Minutes per session: Not on file  Stress: Not on file Relationships  Social connections:  
  Talks on phone: Not on file Gets together: Not on file Attends Sabianist service: Not on file Active member of club or organization: Not on file Attends meetings of clubs or organizations: Not on file Relationship status: Not on file  Intimate partner violence:  
  Fear of current or ex partner: Not on file Emotionally abused: Not on file Physically abused: Not on file Forced sexual activity: Not on file Other Topics Concern  Not on file Social History Narrative  Not on file Family History Problem Relation Age of Onset  Diabetes Mother  Hypertension Mother  Heart Disease Mother  Asthma Mother [facility] Arthritis-osteo Mother  Diabetes Father  Hypertension Father  Asthma Father  Arthritis-osteo Father  Diabetes Sister  Gout Sister  Asthma Brother ROS Constitutional: negative Ears, Nose, Mouth, Throat, and Face: negative Respiratory: negative Cardiovascular: negative Gastrointestinal: positive for abdominal pain Genitourinary:negative Integument/Breast: negative Hematologic/Lymphatic: negative Behavioral/Psychiatric: negative Allergic/Immunologic: negative Physical Exam:  
 
Visit Vitals /72 (BP 1 Location: Left arm, BP Patient Position: At rest) Pulse 68 Temp 98.6 °F (37 °C) Resp 16 Wt 164 lb 10.9 oz (74.7 kg) SpO2 98% BMI 25.04 kg/m² General - alert and oriented, no apparent distress, well developed HEENT - no jaundice, no hearing imparement Pulm - CTAB, no C/W/R 
CV - RRR, no M/R/G Abd - soft, mild distention, BS present, umbilical hernia small 8-8YY and reducible, mild TTP at the hernia, mild TTP upper abdomen Ext - pulses intact in UE and LE bilaterally, no edema Skin - supple and no rashes Psychiatric - normal affect, good mood Labs Recent Results (from the past 12 hour(s)) CBC WITH AUTOMATED DIFF Collection Time: 07/04/19 11:48 PM  
Result Value Ref Range WBC 9.7 4.1 - 11.1 K/uL  
 RBC 5.44 4.10 - 5.70 M/uL HGB 14.3 12.1 - 17.0 g/dL HCT 45.6 36.6 - 50.3 % MCV 83.8 80.0 - 99.0 FL  
 MCH 26.3 26.0 - 34.0 PG  
 MCHC 31.4 30.0 - 36.5 g/dL  
 RDW 13.8 11.5 - 14.5 % PLATELET 475 353 - 674 K/uL MPV 11.6 8.9 - 12.9 FL  
 NRBC 0.0 0  WBC ABSOLUTE NRBC 0.00 0.00 - 0.01 K/uL NEUTROPHILS 73 32 - 75 % LYMPHOCYTES 16 12 - 49 % MONOCYTES 9 5 - 13 % EOSINOPHILS 1 0 - 7 % BASOPHILS 1 0 - 1 % IMMATURE GRANULOCYTES 0 0.0 - 0.5 % ABS. NEUTROPHILS 7.1 1.8 - 8.0 K/UL  
 ABS. LYMPHOCYTES 1.5 0.8 - 3.5 K/UL  
 ABS. MONOCYTES 0.9 0.0 - 1.0 K/UL  
 ABS. EOSINOPHILS 0.1 0.0 - 0.4 K/UL  
 ABS. BASOPHILS 0.1 0.0 - 0.1 K/UL  
 ABS. IMM. GRANS. 0.0 0.00 - 0.04 K/UL  
 DF AUTOMATED METABOLIC PANEL, COMPREHENSIVE Collection Time: 07/04/19 11:48 PM  
Result Value Ref Range Sodium 139 136 - 145 mmol/L Potassium 4.6 3.5 - 5.1 mmol/L Chloride 104 97 - 108 mmol/L  
 CO2 24 21 - 32 mmol/L Anion gap 11 5 - 15 mmol/L Glucose 135 (H) 65 - 100 mg/dL BUN 13 6 - 20 MG/DL Creatinine 1.32 (H) 0.70 - 1.30 MG/DL  
 BUN/Creatinine ratio 10 (L) 12 - 20 GFR est AA >60 >60 ml/min/1.73m2 GFR est non-AA 53 (L) >60 ml/min/1.73m2 Calcium 9.2 8.5 - 10.1 MG/DL Bilirubin, total 0.9 0.2 - 1.0 MG/DL  
 ALT (SGPT) 22 12 - 78 U/L  
 AST (SGOT) 27 15 - 37 U/L Alk. phosphatase 162 (H) 45 - 117 U/L Protein, total 7.2 6.4 - 8.2 g/dL Albumin 3.4 (L) 3.5 - 5.0 g/dL Globulin 3.8 2.0 - 4.0 g/dL A-G Ratio 0.9 (L) 1.1 - 2.2 LIPASE Collection Time: 07/04/19 11:48 PM  
Result Value Ref Range Lipase 180 73 - 393 U/L  
SAMPLES BEING HELD Collection Time: 07/04/19 11:48 PM  
Result Value Ref Range SAMPLES BEING HELD 1red 1blue COMMENT Add-on orders for these samples will be processed based on acceptable specimen integrity and analyte stability, which may vary by analyte. TROPONIN I Collection Time: 07/04/19 11:48 PM  
Result Value Ref Range Troponin-I, Qt. <0.05 <0.05 ng/mL NT-PRO BNP Collection Time: 07/04/19 11:48 PM  
Result Value Ref Range NT pro-BNP 8,324 (H) <125 PG/ML  
EKG, 12 LEAD, INITIAL Collection Time: 07/04/19 11:48 PM  
Result Value Ref Range Ventricular Rate 64 BPM  
 Atrial Rate 64 BPM  
 P-R Interval 134 ms QRS Duration 100 ms Q-T Interval 400 ms QTC Calculation (Bezet) 412 ms Calculated P Axis 38 degrees Calculated R Axis -50 degrees Calculated T Axis 146 degrees Diagnosis Atrial flutter Left axis deviation Anterior infarct , age undetermined No previous ECGs available Confirmed by Tommie East MD, Katrina Tobar ([id number]) on 7/5/2019 8:16:59 AM 
  
TSH 3RD GENERATION Collection Time: 07/05/19  4:07 AM  
Result Value Ref Range TSH 1.86 0.36 - 3.74 uIU/mL MAGNESIUM Collection Time: 07/05/19  4:07 AM  
Result Value Ref Range Magnesium 2.1 1.6 - 2.4 mg/dL PHOSPHORUS Collection Time: 07/05/19  4:07 AM  
Result Value Ref Range Phosphorus 3.2 2.6 - 4.7 MG/DL  
TROPONIN I Collection Time: 07/05/19  4:07 AM  
Result Value Ref Range Troponin-I, Qt. <0.05 <0.05 ng/mL GLUCOSE, POC Collection Time: 07/05/19  8:47 AM  
Result Value Ref Range Glucose (POC) 101 (H) 65 - 100 mg/dL Performed by [facility] - THYROID: No nodule. MEDIASTINUM: No mass or lymphadenopathy. YANG: No mass or lymphadenopathy. Small calcified left hilar lymph nodes. THORACIC AORTA: Atherosclerotic and mildly ectatic. PULMONARY ARTERIES: Main pulmonary artery is mildly dilated, measuring 3.0 cm in 
diameter. No evidence of acute pulmonary emboli. TRACHEA/BRONCHI: Patent. ESOPHAGUS: No wall thickening or dilatation. HEART: Enlarged. Moderate coronary artery calcifications PLEURA: Large right pleural effusion. LUNGS: Right lower lobe collapse. Patchy airspace disease in the left lower lobe 
and right upper lobe. LIVER: No mass or biliary dilatation. Contains calcified granulomata. GALLBLADDER: Unremarkable. SPLEEN: Contains numerous calcified granulomata. PANCREAS: No mass or ductal dilatation. ADRENALS: Unremarkable. KIDNEYS: 4 mm nonobstructing right renal calculus. No hydronephrosis. STOMACH: Unremarkable. SMALL BOWEL: Mild proximal small bowel distention to the level of a small bowel 
containing umbilical hernia. COLON: No dilatation or wall thickening. Mild sigmoid diverticulosis. APPENDIX: Unremarkable. PERITONEUM: Small ascites. RETROPERITONEUM: No lymphadenopathy or aortic aneurysm. REPRODUCTIVE ORGANS: Normal sized prostate gland. URINARY BLADDER: No mass or calculus. BONES: No destructive bone lesion. ADDITIONAL COMMENTS: N/A 
  
IMPRESSION IMPRESSION: 
No evidence of acute pulmonary embolus. Bilateral airspace disease may represent 
atelectasis or pneumonia. Large right pleural effusion. Early versus partial 
small bowel obstruction secondary to umbilical hernia. Small ascites. I have personally reviewed all of the pertinent images Assessment:  
 
Clint Moe is a 72 y.o. male with umbilical hernia, possible SBO Recommendations: 1. He does have an umbilical hernia, it is soft and reducible. He may have had a SBO from the hernia but it looks the the bowel are decompressed mostly proximal and distal to the hernia. He is getting a cardiac work up for possible CHF and a thoracetesis today. He can continue this work up and we will follow along. I suspect the SBO if he had one may be better. He can try clears today after his procedure. If he does not improve he may need repair while he is here vs in the near future. The hernia defect is about 1.8cm and may plan for laparoscopic repair.    
 
Ines Colunga MD

## 2019-07-05 NOTE — PROGRESS NOTES
Admission Medication Reconciliation: 
 
Information obtained from:  patient, chart review Comments/Recommendations: All medications/allergies have been reviewed and updated. The patient reports that the only 2 medications he is taking daily including the aspirin and multivitamin. No insurance claim information was available to confirm if pt taking any other prescription medications. Changes made to Prior to Admission (PTA) Medication List: ? Medications Added:  
- None ? Medications Changed:  
- None ? Medications Removed: - Medrol dose pack (completed) 
- HCTZ 25 mg daily, pt reports not taking 
- amlodipine/benazepril 5/20 mg Po daily (pt reports not taking) Significant PMH/Disease States:  
Past Medical History:  
Diagnosis Date Arthritic-like pain Hypertension Chief Complaint for this Admission: Chief Complaint Patient presents with Abdominal Pain Allergies:  Patient has no known allergies. Prior to Admission Medications:  
Prior to Admission Medications Prescriptions Last Dose Informant Patient Reported? Taking?  
aspirin delayed-release 81 mg tablet 7/4/2019  Yes Yes Sig: Take  by mouth daily. ergocalciferol (VITAMIN D2) 50,000 unit capsule   Yes Yes Sig: Take 50,000 Units by mouth every Sunday. therapeutic multivitamin (THERAGRAN) tablet 7/4/2019 at Unknown time  Yes Yes Sig: Take 1 Tab by mouth daily. Facility-Administered Medications: None Thank you for allowing pharmacy to participate in the coordination of this patient's care. If you have any other questions, please contact the medication reconciliation pharmacist at x [phone]. Reji Gross, Pharm. D., BCPS

## 2019-07-06 NOTE — PROGRESS NOTES
Hospitalist Progress Note Clemetine Kehr, MD 
Answering service: [phone] from in house phone Date of Service:  2019 NAME:  Thomas Hsu :  1945 MRN:  [id number] Admission Summary: This is a 42-year-old man with a past medical history significant for hypertension, who was in his usual state of health until about a week ago when the patient developed abdominal pain. The pain is located at the epigastric region, 8/10 in severity. No radiation. No known aggravating or relieving factors. The patient described the pain as a dull ache associated with constipation but no nausea, no vomiting. The patient took laxative without any significant relief of the constipation. The patient was brought to the emergency room for further evaluation. When the EMS arrived at the scene, the patient's oxygen saturation was 86% on room air. CT scan of the abdomen and pelvis performed by the emergency room provider shows right pleural effusion and suspected small bowel obstruction. Interval history / Subjective:  
Patient feeling better today- No shortness of breath- stable SO2 off oxygen No chest pain with pleural drain tubes except w deep breath Family at his bedside Assessment & Plan: 1. CV- Htn, elevated BNP, Cont lasix and home meds, troponin neg x3 
echocardiogram to rule out CHF Cardiology consulted 2. Acute hypoxemic resp failure- 
 due to large Right pleural effusion and compressive atelectasis. -pleural drain placed 19- 1600cc output in last 24hrs 
-follow pleural fluid analysis and culture CXR showing well expanded right lung 3. Partial small bowel obstruction due to small umbilical hernia. General surgery consulted Hernia reduced in ER and at bedside Advance diet as tolerated- full liq today 4. Bilateral leg swelling. - dopplers were negative for DVT. 5.  Bacterial pneumonia. cont Rocephin and Zithromax for suspected bacterial pneumonia. 6.  Reported hx of alcohol abuse. The patient drinks about 14 drinks per week. cont thiamine, folic acid, and multivitamin. CIWA protocol Code status:FULL 
DVT prophylaxis: Heparin Care Plan discussed with: Patient/Family Disposition: Home w/Family and TBD Hospital Problems  Date Reviewed: 7/5/2019 Codes Class Noted POA * (Principal) Acute CHF (congestive heart failure) (HCC) ICD-10-CM: I50.9 ICD-9-CM: 428.0  7/5/2019 Yes Pleural effusion, right ICD-10-CM: J90 ICD-9-CM: 511.9  7/5/2019 Unknown Review of Systems: A comprehensive review of systems was negative except for that written in the subjective section Vital Signs:  
 Last 24hrs VS reviewed since prior progress note. Most recent are: 
Visit Vitals /71 (BP 1 Location: Right arm, BP Patient Position: At rest) Pulse 60 Temp 98.9 °F (37.2 °C) Resp 16 Wt 72.1 kg (158 lb 15.2 oz) SpO2 97% BMI 24.17 kg/m² Intake/Output Summary (Last 24 hours) at 7/6/2019 1328 Last data filed at 7/6/2019 1219 Gross per 24 hour Intake 120 ml Output 1928 ml Net -1808 ml Physical Examination:  
 
 
     
Constitutional:  No acute distress, cooperative, pleasant   
ENT:  Oral mucous moist, oropharynx benign. Neck supple, Resp:  CTA bilaterally. No wheezing/rhonchi/rales. Chest tube right lower chest  
CV:  Regular rhythm, normal rate, no murmurs, gallops, rubs GI:  Soft, non distended, non tender. normoactive bowel sounds, no hepatosplenomegaly Musculoskeletal:  No edema, warm, 2+ pulses throughout Neurologic:  Moves all extremities. AAOx3, CN II-XII reviewed Data Review:  
 Review and/or order of tests in the radiology section of CPT Review and/or order of tests in the medicine section of CPT Labs:  
 
Recent Labs  
  07/06/19 
0350 07/04/19 
2348 WBC 8.8 9.7 HGB 14.7 14.3 HCT 46.4 45.6  205 Recent Labs  
  07/06/19 
0350 07/05/19 
0407 07/04/19 
2348   --  139  
K 4.1  --  4.6   --  104 CO2 28  --  24 BUN 17  --  13  
CREA 1.36*  --  1.32* GLU 88  --  135* CA 9.1  --  9.2 MG  --  2.1  --   
PHOS  --  3.2  --   
 
Recent Labs  
  07/06/19 
0350 07/04/19 
2348 SGOT 14* 27 ALT 14 22 * 162* TBILI 1.0 0.9 TP 6.5 7.2 ALB 2.7* 3.4*  
GLOB 3.8 3.8 LPSE  --  180 Recent Labs  
  07/05/19 
1014 INR 1.2* PTP 11.9* APTT 29.4 No results for input(s): FE, TIBC, PSAT, FERR in the last 72 hours. No results found for: FOL, RBCF No results for input(s): PH, PCO2, PO2 in the last 72 hours. Recent Labs  
  07/05/19 
1014 07/05/19 
0407 07/04/19 
2348 TROIQ 0.05* <0.05 <0.05 Lab Results Component Value Date/Time Cholesterol, total 134 07/06/2019 03:50 AM  
 HDL Cholesterol 56 07/06/2019 03:50 AM  
 LDL, calculated 68 07/06/2019 03:50 AM  
 Triglyceride 50 07/06/2019 03:50 AM  
 CHOL/HDL Ratio 2.4 07/06/2019 03:50 AM  
 
Lab Results Component Value Date/Time Glucose (POC) 87 07/05/2019 12:02 PM  
 Glucose (POC) 101 (H) 07/05/2019 08:47 AM  
 
No results found for: COLOR, APPRN, SPGRU, REFSG, ANGELI, PROTU, GLUCU, KETU, BILU, UROU, TREVOR, LEUKU, GLUKE, EPSU, BACTU, WBCU, RBCU, CASTS, UCRY Medications Reviewed:  
 
Current Facility-Administered Medications Medication Dose Route Frequency  aspirin delayed-release tablet 81 mg  81 mg Oral DAILY  furosemide (LASIX) injection 40 mg  40 mg IntraVENous DAILY  sodium chloride (NS) flush 5-40 mL  5-40 mL IntraVENous Q8H  
 sodium chloride (NS) flush 5-40 mL  5-40 mL IntraVENous PRN  
 acetaminophen (TYLENOL) tablet 650 mg  650 mg Oral Q4H PRN  
 ondansetron (ZOFRAN) injection 4 mg  4 mg IntraVENous Q4H PRN  
 morphine injection 2 mg  2 mg IntraVENous Q4H PRN  
 heparin (porcine) injection 5,000 Units  5,000 Units SubCUTAneous Q8H  
  lactobac ac& pc-s.therm-b.anim (KERLINE Q/RISAQUAD)  1 Cap Oral DAILY  cefTRIAXone (ROCEPHIN) 1 g in 0.9% sodium chloride (MBP/ADV) 50 mL  1 g IntraVENous Q24H  
 azithromycin (ZITHROMAX) 500 mg in 0.9% sodium chloride (MBP/ADV) 250 mL  500 mg IntraVENous Q24H  
 LORazepam (ATIVAN) injection 1 mg  1 mg IntraVENous Q6H PRN  
 naloxone (NARCAN) injection 0.4 mg  0.4 mg IntraVENous PRN  thiamine HCL (B-1) tablet 100 mg  100 mg Oral DAILY  folic acid (FOLVITE) tablet 1 mg  1 mg Oral DAILY  therapeutic multivitamin (THERAGRAN) tablet 1 Tab  1 Tab Oral DAILY  amLODIPine (NORVASC) tablet 5 mg  5 mg Oral BID And  
 lisinopril (PRINIVIL, ZESTRIL) tablet 20 mg  20 mg Oral BID  
 
______________________________________________________________________ EXPECTED LENGTH OF STAY: 4d 2h 
ACTUAL LENGTH OF STAY:          1 Vicki Escobar MD

## 2019-07-06 NOTE — PROGRESS NOTES
Progress Note Patient: Portillo Robertson MRN: [id number]  SSN: [id number] YOB: 1945  Age: 72 y.o. Sex: male Admit Date: 2019 * No surgery found * Procedure:   
 
Subjective:  
 
Patient without complaints this am. Feeling much better since the chest tube was placed. NO abdominal pain. No N/V. + flatus. Objective:  
 
Visit Vitals /67 (BP 1 Location: Right arm, BP Patient Position: At rest) Pulse 60 Temp 98.5 °F (36.9 °C) Resp 16 Wt 158 lb 15.2 oz (72.1 kg) SpO2 98% BMI 24.17 kg/m² Temp (24hrs), Av.1 °F (37.3 °C), Min:98.5 °F (36.9 °C), Max:99.8 °F (37.7 °C) Physical Exam:   
Gen- Alert in NAd Abd- S/NT/ND hernia is reducible. Data Review: images and reports reviewed Lab Review: All lab results for the last 24 hours reviewed. Recent Results (from the past 24 hour(s)) TROPONIN I Collection Time: 19 10:14 AM  
Result Value Ref Range Troponin-I, Qt. 0.05 (H) <0.05 ng/mL COAGULATION SCREEN Collection Time: 19 10:14 AM  
Result Value Ref Range INR 1.2 (H) 0.9 - 1.1 Prothrombin time 11.9 (H) 9.0 - 11.1 sec  
 aPTT 29.4 22.1 - 32.0 sec  
 aPTT, therapeutic range     58.0 - 77.0 SECS Fibrinogen 433 200 - 475 mg/dL GLUCOSE, POC Collection Time: 19 12:02 PM  
Result Value Ref Range Glucose (POC) 87 65 - 100 mg/dL Performed by [employer] AMYLASE, FLUID Collection Time: 19 12:25 PM  
Result Value Ref Range Fluid Type: PLEURAL FLUID Amylase, body fld. 32 U/L  
CELL COUNT, BODY FLUID Collection Time: 19 12:25 PM  
Result Value Ref Range BODY FLUID TYPE PLEURAL FLUID FLUID COLOR YELLOW    
 FLUID APPEARANCE CLEAR    
 FLUID RBC CT. >100 (H) 0 /cu mm FLUID NUCLEATED CELLS 182 /cu mm  
 FLD NEUTROPHILS 12 (A) NRRE % FLD LYMPHS 55 (A) NRRE % FLD MONO/MACROPHAGES 29 (A) NRRE % FLUID MESOTHELIAL 4 (A) NRRE % GLUCOSE, FLUID  
 Collection Time: 07/05/19 12:25 PM  
Result Value Ref Range Fluid Type: PLEURAL FLUID Glucose, body fld. 113 MG/DL  
CRYSTALS, SYNOVIAL FLUID Collection Time: 07/05/19 12:25 PM  
Result Value Ref Range FLUID TYPE(7) PLEURAL FLUID Crystals, body fluid NO CRYSTALS SEEN WITH POLARIZED LIGHT    
PROTEIN TOTAL, FLUID Collection Time: 07/05/19 12:25 PM  
Result Value Ref Range Fluid Type: PLEURAL FLUID Protein total, body fld. 2.6 g/dL LDH, BODY FLUID Collection Time: 07/05/19 12:25 PM  
Result Value Ref Range LD, body fld. 57 U/L  
CULTURE, BODY FLUID W GRAM STAIN Collection Time: 07/05/19 12:25 PM  
Result Value Ref Range Special Requests: NO SPECIAL REQUESTS    
 GRAM STAIN FEW WBCS SEEN    
 GRAM STAIN NO ORGANISMS SEEN Culture result: PENDING   
CULTURE, ANAEROBIC Collection Time: 07/05/19 12:25 PM  
Result Value Ref Range Special Requests: NO SPECIAL REQUESTS Culture result: PENDING   
METABOLIC PANEL, COMPREHENSIVE Collection Time: 07/06/19  3:50 AM  
Result Value Ref Range Sodium 138 136 - 145 mmol/L Potassium 4.1 3.5 - 5.1 mmol/L Chloride 103 97 - 108 mmol/L  
 CO2 28 21 - 32 mmol/L Anion gap 7 5 - 15 mmol/L Glucose 88 65 - 100 mg/dL BUN 17 6 - 20 MG/DL Creatinine 1.36 (H) 0.70 - 1.30 MG/DL  
 BUN/Creatinine ratio 13 12 - 20 GFR est AA >60 >60 ml/min/1.73m2 GFR est non-AA 52 (L) >60 ml/min/1.73m2 Calcium 9.1 8.5 - 10.1 MG/DL Bilirubin, total 1.0 0.2 - 1.0 MG/DL  
 ALT (SGPT) 14 12 - 78 U/L  
 AST (SGOT) 14 (L) 15 - 37 U/L Alk. phosphatase 129 (H) 45 - 117 U/L Protein, total 6.5 6.4 - 8.2 g/dL Albumin 2.7 (L) 3.5 - 5.0 g/dL Globulin 3.8 2.0 - 4.0 g/dL A-G Ratio 0.7 (L) 1.1 - 2.2 LIPID PANEL Collection Time: 07/06/19  3:50 AM  
Result Value Ref Range LIPID PROFILE Cholesterol, total 134 <200 MG/DL  Triglyceride 50 <150 MG/DL  
 HDL Cholesterol 56 MG/DL  
 LDL, calculated 68 0 - 100 MG/DL VLDL, calculated 10 MG/DL  
 CHOL/HDL Ratio 2.4 0.0 - 5.0    
CBC WITH AUTOMATED DIFF Collection Time: 07/06/19  3:50 AM  
Result Value Ref Range WBC 8.8 4.1 - 11.1 K/uL  
 RBC 5.65 4.10 - 5.70 M/uL  
 HGB 14.7 12.1 - 17.0 g/dL HCT 46.4 36.6 - 50.3 % MCV 82.1 80.0 - 99.0 FL  
 MCH 26.0 26.0 - 34.0 PG  
 MCHC 31.7 30.0 - 36.5 g/dL  
 RDW 13.7 11.5 - 14.5 % PLATELET 837 764 - 052 K/uL MPV 11.4 8.9 - 12.9 FL  
 NRBC 0.0 0  WBC ABSOLUTE NRBC 0.00 0.00 - 0.01 K/uL NEUTROPHILS 73 32 - 75 % LYMPHOCYTES 7 (L) 12 - 49 % MONOCYTES 10 5 - 13 % EOSINOPHILS 10 (H) 0 - 7 % BASOPHILS 0 0 - 1 % IMMATURE GRANULOCYTES 0 0.0 - 0.5 % ABS. NEUTROPHILS 6.4 1.8 - 8.0 K/UL  
 ABS. LYMPHOCYTES 0.6 (L) 0.8 - 3.5 K/UL  
 ABS. MONOCYTES 0.9 0.0 - 1.0 K/UL  
 ABS. EOSINOPHILS 0.9 (H) 0.0 - 0.4 K/UL  
 ABS. BASOPHILS 0.0 0.0 - 0.1 K/UL  
 ABS. IMM. GRANS. 0.0 0.00 - 0.04 K/UL  
 DF SMEAR SCANNED    
 RBC COMMENTS AURELIA CELLS 
PRESENT 
    
 RBC COMMENTS OVALOCYTES PRESENT Assessment:  
 
Hospital Problems  Date Reviewed: 7/5/2019 Codes Class Noted POA * (Principal) Acute CHF (congestive heart failure) (HCC) ICD-10-CM: I50.9 ICD-9-CM: 428.0  7/5/2019 Yes Pleural effusion, right ICD-10-CM: J90 ICD-9-CM: 511.9  7/5/2019 Unknown Plan/Recommendations/Medical Decision Making:  
Umbilical hernia reducible and there is no sign of current obstruction. He was made NPO last night after MN for some reason though I Cant tell if he is having some sort of procedure today. If there is no other contraindication to diet I would start him on clear liquids and advance slowly as tolerated.

## 2019-07-06 NOTE — PROGRESS NOTES
[facility] [address] 
____________________________________________________________ Admit Date: 2019 POD/HD * No surgery found * Procedure:  * No surgery found * Subjective:  
 
Patient has no new complaints. Objective:  
 
Blood pressure 148/71, pulse 60, temperature 98.9 °F (37.2 °C), resp. rate 16, weight 72.1 kg (158 lb 15.2 oz), SpO2 97 %. Temp (24hrs), Av.2 °F (37.3 °C), Min:98.5 °F (36.9 °C), Max:99.8 °F (37.7 °C) Date 19 - 19 4413 Shift 0439-6948 9460-3551 1457-3305 24 Hour Total  
INTAKE Shift Total(mL/kg) OUTPUT Chest Tube 68   68 Shift Total(mL/kg) 68(0.9)   68(0.9) Weight (kg) 72.1 72.1 72.1 72.1 Date 19 - 19 5185 Shift 6949-0314 6175-2879 6152-6430 24 Hour Total  
INTAKE Shift Total(mL/kg) OUTPUT Chest Tube 68   68 Shift Total(mL/kg) 68(0.9)   68(0.9) Weight (kg) 72.1 72.1 72.1 72.1 Physical Exam:  GENERAL: alert, cooperative, no distress, LUNG: clear to auscultation bilaterally, HEART: regular rate and rhythm, S1, S2 normal, no murmur, click, rub or gallop Incision:clean, dry and intact Chest Tube: over 2000ml straw colored effusion Labs:  
Recent Results (from the past 24 hour(s)) METABOLIC PANEL, COMPREHENSIVE Collection Time: 19  3:50 AM  
Result Value Ref Range Sodium 138 136 - 145 mmol/L Potassium 4.1 3.5 - 5.1 mmol/L Chloride 103 97 - 108 mmol/L  
 CO2 28 21 - 32 mmol/L Anion gap 7 5 - 15 mmol/L Glucose 88 65 - 100 mg/dL BUN 17 6 - 20 MG/DL Creatinine 1.36 (H) 0.70 - 1.30 MG/DL  
 BUN/Creatinine ratio 13 12 - 20 GFR est AA >60 >60 ml/min/1.73m2 GFR est non-AA 52 (L) >60 ml/min/1.73m2 Calcium 9.1 8.5 - 10.1 MG/DL Bilirubin, total 1.0 0.2 - 1.0 MG/DL  
 ALT (SGPT) 14 12 - 78 U/L  
 AST (SGOT) 14 (L) 15 - 37 U/L Alk. phosphatase 129 (H) 45 - 117 U/L Protein, total 6.5 6.4 - 8.2 g/dL Albumin 2.7 (L) 3.5 - 5.0 g/dL Globulin 3.8 2.0 - 4.0 g/dL A-G Ratio 0.7 (L) 1.1 - 2.2 LIPID PANEL Collection Time: 07/06/19  3:50 AM  
Result Value Ref Range LIPID PROFILE Cholesterol, total 134 <200 MG/DL Triglyceride 50 <150 MG/DL  
 HDL Cholesterol 56 MG/DL  
 LDL, calculated 68 0 - 100 MG/DL VLDL, calculated 10 MG/DL  
 CHOL/HDL Ratio 2.4 0.0 - 5.0    
CBC WITH AUTOMATED DIFF Collection Time: 07/06/19  3:50 AM  
Result Value Ref Range WBC 8.8 4.1 - 11.1 K/uL  
 RBC 5.65 4.10 - 5.70 M/uL  
 HGB 14.7 12.1 - 17.0 g/dL HCT 46.4 36.6 - 50.3 % MCV 82.1 80.0 - 99.0 FL  
 MCH 26.0 26.0 - 34.0 PG  
 MCHC 31.7 30.0 - 36.5 g/dL  
 RDW 13.7 11.5 - 14.5 % PLATELET 342 660 - 120 K/uL MPV 11.4 8.9 - 12.9 FL  
 NRBC 0.0 0  WBC ABSOLUTE NRBC 0.00 0.00 - 0.01 K/uL NEUTROPHILS 73 32 - 75 % LYMPHOCYTES 7 (L) 12 - 49 % MONOCYTES 10 5 - 13 % EOSINOPHILS 10 (H) 0 - 7 % BASOPHILS 0 0 - 1 % IMMATURE GRANULOCYTES 0 0.0 - 0.5 % ABS. NEUTROPHILS 6.4 1.8 - 8.0 K/UL  
 ABS. LYMPHOCYTES 0.6 (L) 0.8 - 3.5 K/UL  
 ABS. MONOCYTES 0.9 0.0 - 1.0 K/UL  
 ABS. EOSINOPHILS 0.9 (H) 0.0 - 0.4 K/UL  
 ABS. BASOPHILS 0.0 0.0 - 0.1 K/UL  
 ABS. IMM. GRANS. 0.0 0.00 - 0.04 K/UL  
 DF SMEAR SCANNED    
 RBC COMMENTS AURELIA CELLS 
PRESENT 
    
 RBC COMMENTS OVALOCYTES PRESENT Data Review images and reports reviewed resolution of right effusion Assessment:  
 
Principal Problem: 
  Acute CHF (congestive heart failure) ([facility] [address] (7/5/2019) Active Problems: 
  Pleural effusion, right (7/5/2019) Plan/Recommendations/Medical Decision Making:  
Keep CT to suciton for now Thank you for allowing us to participate in the care of your patient.  
 
Vickie Gan MD

## 2019-07-07 NOTE — PROGRESS NOTES
Progress Note Patient: Chikis Sebastian MRN: [id number]  SSN: [id number] YOB: 1945  Age: 72 y.o. Sex: male Admit Date: 2019 * No surgery found * Procedure:   
 
Subjective:  
 
Patient tolerated Full liquids without difficulty> no Pain Objective:  
 
Visit Vitals /59 Pulse 62 Temp 98.7 °F (37.1 °C) Resp 16 Ht 5' 8\" (1.727 m) Wt 154 lb (69.9 kg) SpO2 98% BMI 23.42 kg/m² Temp (24hrs), Av.6 °F (37 °C), Min:98.3 °F (36.8 °C), Max:98.8 °F (37.1 °C) Physical Exam:   
Deferred as he is in the middle of echo cardio gram  
Data Review: images and reports reviewed Lab Review: All lab results for the last 24 hours reviewed. No results found for this or any previous visit (from the past 24 hour(s)). Assessment:  
 
Hospital Problems  Date Reviewed: 2019 Codes Class Noted POA * (Principal) Acute CHF (congestive heart failure) (HCC) ICD-10-CM: I50.9 ICD-9-CM: 428.0  2019 Yes Pleural effusion, right ICD-10-CM: J90 ICD-9-CM: 511.9  2019 Unknown Plan/Recommendations/Medical Decision Making: No evidence of SBO Would start Gi Lite.

## 2019-07-07 NOTE — PROGRESS NOTES
[facility] [address] 
____________________________________________________________ Admit Date: 2019 POD/HD * No surgery found * Procedure:  * No surgery found * Subjective:  
 
Patient has no new complaints. Objective:  
 
Blood pressure 141/54, pulse 66, temperature 98.8 °F (37.1 °C), resp. rate 16, weight 69.9 kg (154 lb 1.6 oz), SpO2 97 %. Temp (24hrs), Av.7 °F (37.1 °C), Min:98.3 °F (36.8 °C), Max:99.1 °F (37.3 °C) Physical Exam:  GENERAL: alert, cooperative, no distress, LUNG: clear to auscultation bilaterally, HEART: regular rate and rhythm, S1, S2 normal, no murmur, click, rub or gallop Chest Tube: no air leak and minmal drainage overnight Labs: No results found for this or any previous visit (from the past 24 hour(s)). Data Review images and reports reviewed Assessment:  
 
Principal Problem: 
  Acute CHF (congestive heart failure) ([address] (2019) Active Problems: 
  Pleural effusion, right (2019) Plan/Recommendations/Medical Decision Making: Will DC pigtail today AM CXR Thank you for allowing us to participate in the care of your patient.  
 
Vickie Gan MD

## 2019-07-07 NOTE — PROGRESS NOTES
Problem: Heart Failure: Day 1 Goal: Consults, if ordered Outcome: Progressing Towards Goal 
  
Problem: Patient Education: Go to Patient Education Activity Goal: Patient/Family Education Outcome: Progressing Towards Goal 
  
Problem: Heart Failure: Day 1 Goal: Off Pathway (Use only if patient is Off Pathway) Outcome: Progressing Towards Goal 
Goal: Activity/Safety Outcome: Progressing Towards Goal 
Goal: Consults, if ordered Outcome: Progressing Towards Goal 
Goal: Diagnostic Test/Procedures Outcome: Progressing Towards Goal 
Goal: Nutrition/Diet Outcome: Progressing Towards Goal 
Goal: Discharge Planning Outcome: Progressing Towards Goal 
Goal: Medications Outcome: Progressing Towards Goal 
Goal: Respiratory Outcome: Progressing Towards Goal 
Goal: Treatments/Interventions/Procedures Outcome: Progressing Towards Goal 
Goal: Psychosocial 
Outcome: Progressing Towards Goal 
Goal: *Oxygen saturation within defined limits Outcome: Progressing Towards Goal 
Goal: *Hemodynamically stable Outcome: Progressing Towards Goal 
Goal: *Optimal pain control at patient's stated goal 
Outcome: Progressing Towards Goal 
Goal: *Anxiety reduced or absent Outcome: Progressing Towards Goal 
  
Problem: Heart Failure: Day 2 Goal: Off Pathway (Use only if patient is Off Pathway) Outcome: Progressing Towards Goal 
Goal: Activity/Safety Outcome: Progressing Towards Goal 
Goal: Consults, if ordered Outcome: Progressing Towards Goal 
Goal: Diagnostic Test/Procedures Outcome: Progressing Towards Goal 
Goal: Nutrition/Diet Outcome: Progressing Towards Goal 
Goal: Discharge Planning Outcome: Progressing Towards Goal 
Goal: Medications Outcome: Progressing Towards Goal 
Goal: Respiratory Outcome: Progressing Towards Goal 
Goal: Treatments/Interventions/Procedures Outcome: Progressing Towards Goal 
Goal: Psychosocial 
Outcome: Progressing Towards Goal 
Goal: *Oxygen saturation within defined limits Outcome: Progressing Towards Goal 
Goal: *Hemodynamically stable Outcome: Progressing Towards Goal 
Goal: *Optimal pain control at patient's stated goal 
Outcome: Progressing Towards Goal 
Goal: *Anxiety reduced or absent Outcome: Progressing Towards Goal 
Goal: *Demonstrates progressive activity Outcome: Progressing Towards Goal 
  
Problem: Heart Failure: Day 3 Goal: Off Pathway (Use only if patient is Off Pathway) Outcome: Progressing Towards Goal 
Goal: Activity/Safety Outcome: Progressing Towards Goal 
Goal: Diagnostic Test/Procedures Outcome: Progressing Towards Goal 
Goal: Nutrition/Diet Outcome: Progressing Towards Goal 
Goal: Discharge Planning Outcome: Progressing Towards Goal 
Goal: Medications Outcome: Progressing Towards Goal 
Goal: Respiratory Outcome: Progressing Towards Goal 
Goal: Treatments/Interventions/Procedures Outcome: Progressing Towards Goal 
Goal: Psychosocial 
Outcome: Progressing Towards Goal 
Goal: *Oxygen saturation within defined limits Outcome: Progressing Towards Goal 
Goal: *Hemodynamically stable Outcome: Progressing Towards Goal 
Goal: *Optimal pain control at patient's stated goal 
Outcome: Progressing Towards Goal 
Goal: *Anxiety reduced or absent Outcome: Progressing Towards Goal 
Goal: *Demonstrates progressive activity Outcome: Progressing Towards Goal 
  
Problem: Heart Failure: Day 4 Goal: Off Pathway (Use only if patient is Off Pathway) Outcome: Progressing Towards Goal 
Goal: Activity/Safety Outcome: Progressing Towards Goal 
Goal: Diagnostic Test/Procedures Outcome: Progressing Towards Goal 
Goal: Nutrition/Diet Outcome: Progressing Towards Goal 
Goal: Discharge Planning Outcome: Progressing Towards Goal 
Goal: Medications Outcome: Progressing Towards Goal 
Goal: Respiratory Outcome: Progressing Towards Goal 
Goal: Treatments/Interventions/Procedures Outcome: Progressing Towards Goal 
Goal: Psychosocial 
Outcome: Progressing Towards Goal 
 Goal: *Oxygen saturation within defined limits Outcome: Progressing Towards Goal 
Goal: *Hemodynamically stable Outcome: Progressing Towards Goal 
Goal: *Optimal pain control at patient's stated goal 
Outcome: Progressing Towards Goal 
Goal: *Anxiety reduced or absent Outcome: Progressing Towards Goal 
Goal: *Demonstrates progressive activity Outcome: Progressing Towards Goal 
  
Problem: Heart Failure: Day 5 Goal: Off Pathway (Use only if patient is Off Pathway) Outcome: Progressing Towards Goal 
Goal: Activity/Safety Outcome: Progressing Towards Goal 
Goal: Diagnostic Test/Procedures Outcome: Progressing Towards Goal 
Goal: Nutrition/Diet Outcome: Progressing Towards Goal 
Goal: Discharge Planning Outcome: Progressing Towards Goal 
Goal: Medications Outcome: Progressing Towards Goal 
Goal: Respiratory Outcome: Progressing Towards Goal 
Goal: Treatments/Interventions/Procedures Outcome: Progressing Towards Goal 
Goal: Psychosocial 
Outcome: Progressing Towards Goal 
  
Problem: Heart Failure: Discharge Outcomes Goal: *Demonstrates ability to perform prescribed activity without shortness of breath or discomfort Outcome: Progressing Towards Goal 
Goal: *Left ventricular function assessment completed prior to or during stay, or planned for post-discharge Outcome: Progressing Towards Goal 
Goal: *ACEI prescribed if LVEF less than 40% and no contraindications or ARB prescribed Outcome: Progressing Towards Goal 
Goal: *Verbalizes understanding and describes prescribed diet Outcome: Progressing Towards Goal 
Goal: *Verbalizes understanding/describes prescribed medications Outcome: Progressing Towards Goal 
Goal: *Describes available resources and support systems Description 
(eg: Home Health, Palliative Care, Advanced Medical Directive) Outcome: Progressing Towards Goal 
Goal: *Describes smoking cessation resources Outcome: Progressing Towards Goal 
 Goal: *Understands and describes signs and symptoms to report to providers(Stroke Metric) Outcome: Progressing Towards Goal 
Goal: *Describes/verbalizes understanding of follow-up/return appt Description 
(eg: to physicians, diabetes treatment coordinator, and other resources Outcome: Progressing Towards Goal 
Goal: *Describes importance of continuing daily weights and changes to report to physician Outcome: Progressing Towards Goal

## 2019-07-07 NOTE — ROUTINE PROCESS
Bedside and Verbal shift change report given to Merlinda Gross (oncoming nurse) by Alejandro Saini (offgoing nurse). Report included the following information SBAR, Intake/Output, MAR and Cardiac Rhythm A. Flutter.

## 2019-07-07 NOTE — PROGRESS NOTES
Bedside shift change report given to Landen Valdez (oncoming nurse) by Otilio Garcia (offgoing nurse). Report included the following information SBAR and Cardiac Rhythm A flutter.

## 2019-07-08 NOTE — PROGRESS NOTES
[facility] Subjective Doing well, tolerating diet, passing BM and flatus, no N/V, no pain Objective Patient Vitals for the past 24 hrs: 
 Temp Pulse Resp BP SpO2  
07/08/19 0716 98.1 °F (36.7 °C) (!) 57 18 131/51 95 % 07/08/19 0428 98.2 °F (36.8 °C) (!) 48 16 131/54 98 % 07/07/19 2314 98.3 °F (36.8 °C) (!) 57 16 133/60 94 % 07/07/19 1925 98.3 °F (36.8 °C) (!) 58 18 152/63 99 % 07/07/19 1550    159/59   
07/07/19 1516 98.7 °F (37.1 °C)  16 159/59 98 % 07/07/19 1151 98.8 °F (37.1 °C) 62 16 152/72 98 % 07/07/19 0938 98.8 °F (37.1 °C)  16 141/54 97 % Date 07/07/19 0700 - 07/08/19 9102 07/08/19 0700 - 07/09/19 0136 Shift 1218-1217 0638-4004 24 Hour Total 1077-1662 8778-8090 24 Hour Total  
INTAKE  
P.O. 720  720 P. O. 720  720 Shift Total(mL/kg) 720(10.3)  720(10.3) OUTPUT Urine(mL/kg/hr) Urine Occurrence(s)  1 x 1 x Shift Total(mL/kg)   720 Weight (kg) 69.9 69.8 69.8 69.8 69.8 69.8 PE 
Pulm - CTAB 
CV - RRR Abd - soft, min distention, BS presnt, NTTP, umbilical hernia reducible Labs Recent Results (from the past 12 hour(s)) CBC W/O DIFF Collection Time: 07/08/19  2:40 AM  
Result Value Ref Range WBC 6.4 4.1 - 11.1 K/uL  
 RBC 5.30 4. 10 - 5.70 M/uL  
 HGB 13.7 12.1 - 17.0 g/dL HCT 43.6 36.6 - 50.3 % MCV 82.3 80.0 - 99.0 FL  
 MCH 25.8 (L) 26.0 - 34.0 PG  
 MCHC 31.4 30.0 - 36.5 g/dL  
 RDW 13.4 11.5 - 14.5 % PLATELET 555 936 - 736 K/uL MPV 10.9 8.9 - 12.9 FL  
 NRBC 0.0 0  WBC ABSOLUTE NRBC 0.00 0.00 - 0.01 K/uL METABOLIC PANEL, BASIC Collection Time: 07/08/19  2:40 AM  
Result Value Ref Range Sodium 137 136 - 145 mmol/L Potassium 3.8 3.5 - 5.1 mmol/L Chloride 103 97 - 108 mmol/L  
 CO2 27 21 - 32 mmol/L Anion gap 7 5 - 15 mmol/L Glucose 119 (H) 65 - 100 mg/dL  BUN 23 (H) 6 - 20 MG/DL  
 Creatinine 1.42 (H) 0.70 - 1.30 MG/DL  
 BUN/Creatinine ratio 16 12 - 20 GFR est AA 60 (L) >60 ml/min/1.73m2 GFR est non-AA 49 (L) >60 ml/min/1.73m2 Calcium 8.7 8.5 - 10.1 MG/DL Assessment Santosh Beltran is a 72 y. o.yr old male with possible SBO and umbilical hernia Plan Once he is cleared for DC home from a cardiac and thoracic stand point, he will follow up with me in the office to elective robotic umbilical hernia repair with mesh. Continue diet DC info in the chart for follow up Paula Garcia MD

## 2019-07-08 NOTE — PROGRESS NOTES
Hospitalist Progress Note Cookie Marcus MD 
Answering service: [phone] from in house phone Date of Service:  2019 NAME:  Yuko Leiva :  1945 MRN:  [id number] Admission Summary: This is a 72-year-old man with a past medical history significant for hypertension, who was in his usual state of health until about a week ago when the patient developed abdominal pain. The pain is located at the epigastric region, 8/10 in severity. No radiation. No known aggravating or relieving factors. The patient described the pain as a dull ache associated with constipation but no nausea, no vomiting. The patient took laxative without any significant relief of the constipation. The patient was brought to the emergency room for further evaluation. When the EMS arrived at the scene, the patient's oxygen saturation was 86% on room air. CT scan of the abdomen and pelvis performed by the emergency room provider shows right pleural effusion and suspected small bowel obstruction. Interval history / Subjective:  
Patient feeling better today- No shortness of breath- stable SO2 off oxygen Mild chest pain today after removal of pleural drain tubes -worse w deep breath Assessment & Plan: 1. CV- Htn, elevated BNP, Cont lasix and home meds, troponin neg x3 
echocardiogram to rule out CHF- done but report pending Cardiology consulted 2. Acute hypoxemic resp failure-resolved 
 due to large Right pleural effusion and compressive atelectasis. -pleural drain placed 19- 2L output in [id number] 
-follow pleural fluid analysis and culture CXR showing well expanded right lung Pleural drain removed 19 3. Partial small bowel obstruction due to small umbilical hernia. General surgery consulted Hernia reduced in ER and at bedside Advance diet as tolerated- GI lite diet today 4.  Bilateral leg swelling. - dopplers were negative for DVT. 5.  Bacterial pneumonia. cont Rocephin and Zithromax for suspected bacterial pneumonia. 6.  Reported hx of alcohol abuse. The patient drinks about 14 drinks per week. cont thiamine, folic acid, and multivitamin. CIWA protocol No signs of withdrawl Code status:FULL 
DVT prophylaxis: Heparin Care Plan discussed with: Patient/Family Disposition: Home w/Family and TBD Hospital Problems  Date Reviewed: 7/5/2019 Codes Class Noted POA * (Principal) Acute CHF (congestive heart failure) (HCC) ICD-10-CM: I50.9 ICD-9-CM: 428.0  7/5/2019 Yes Pleural effusion, right ICD-10-CM: J90 ICD-9-CM: 511.9  7/5/2019 Unknown Review of Systems: A comprehensive review of systems was negative except for that written in the subjective section Vital Signs:  
 Last 24hrs VS reviewed since prior progress note. Most recent are: 
Visit Vitals /63 (BP 1 Location: Right arm, BP Patient Position: At rest) Pulse (!) 58 Temp 98.3 °F (36.8 °C) Resp 18 Ht 5' 8\" (1.727 m) Wt 69.9 kg (154 lb) SpO2 99% BMI 23.42 kg/m² Intake/Output Summary (Last 24 hours) at 7/7/2019 2140 Last data filed at 7/7/2019 1743 Gross per 24 hour Intake 720 ml Output 10 ml Net 710 ml Physical Examination:  
 
 
     
Constitutional:  No acute distress, cooperative, pleasant   
ENT:  Oral mucous moist, oropharynx benign. Neck supple, Resp:  CTA bilaterally. No wheezing/rhonchi/rales. CV:  Regular rhythm, normal rate, no murmurs, gallops, rubs GI:  Soft, non distended, non tender. normoactive bowel sounds, no hepatosplenomegaly Musculoskeletal:  mild LE edema, warm, 2+ pulses throughout Neurologic:  Moves all extremities. AAOx3, CN II-XII reviewed Data Review:  
 Review and/or order of tests in the radiology section of CPT Review and/or order of tests in the medicine section of CPT Labs:  
 
Recent Labs  
  07/06/19 
0350 07/04/19 
2348 WBC 8.8 9.7 HGB 14.7 14.3 HCT 46.4 45.6  205 Recent Labs  
  07/06/19 
0350 07/05/19 
0407 07/04/19 
2348   --  139  
K 4.1  --  4.6   --  104 CO2 28  --  24 BUN 17  --  13  
CREA 1.36*  --  1.32* GLU 88  --  135* CA 9.1  --  9.2 MG  --  2.1  --   
PHOS  --  3.2  --   
 
Recent Labs  
  07/06/19 
0350 07/04/19 
2348 SGOT 14* 27 ALT 14 22 * 162* TBILI 1.0 0.9 TP 6.5 7.2 ALB 2.7* 3.4*  
GLOB 3.8 3.8 LPSE  --  180 Recent Labs  
  07/05/19 
1014 INR 1.2* PTP 11.9* APTT 29.4 No results for input(s): FE, TIBC, PSAT, FERR in the last 72 hours. No results found for: FOL, RBCF No results for input(s): PH, PCO2, PO2 in the last 72 hours. Recent Labs  
  07/05/19 
1014 07/05/19 0407 07/04/19 2348 TROIQ 0.05* <0.05 <0.05 Lab Results Component Value Date/Time Cholesterol, total 134 07/06/2019 03:50 AM  
 HDL Cholesterol 56 07/06/2019 03:50 AM  
 LDL, calculated 68 07/06/2019 03:50 AM  
 Triglyceride 50 07/06/2019 03:50 AM  
 CHOL/HDL Ratio 2.4 07/06/2019 03:50 AM  
 
Lab Results Component Value Date/Time Glucose (POC) 87 07/05/2019 12:02 PM  
 Glucose (POC) 101 (H) 07/05/2019 08:47 AM  
 
No results found for: COLOR, APPRN, SPGRU, REFSG, ANGELI, PROTU, GLUCU, KETU, BILU, UROU, TREVOR, LEUKU, GLUKE, EPSU, BACTU, WBCU, RBCU, CASTS, UCRY Medications Reviewed:  
 
Current Facility-Administered Medications Medication Dose Route Frequency  aspirin delayed-release tablet 81 mg  81 mg Oral DAILY  furosemide (LASIX) injection 40 mg  40 mg IntraVENous DAILY  sodium chloride (NS) flush 5-40 mL  5-40 mL IntraVENous Q8H  
 sodium chloride (NS) flush 5-40 mL  5-40 mL IntraVENous PRN  
 acetaminophen (TYLENOL) tablet 650 mg  650 mg Oral Q4H PRN  
 ondansetron (ZOFRAN) injection 4 mg  4 mg IntraVENous Q4H PRN  
 morphine injection 2 mg  2 mg IntraVENous Q4H PRN  
  heparin (porcine) injection 5,000 Units  5,000 Units SubCUTAneous Q8H  
 lactobac ac& pc-s.therm-b.anim (KERLINE Q/RISAQUAD)  1 Cap Oral DAILY  cefTRIAXone (ROCEPHIN) 1 g in 0.9% sodium chloride (MBP/ADV) 50 mL  1 g IntraVENous Q24H  
 azithromycin (ZITHROMAX) 500 mg in 0.9% sodium chloride (MBP/ADV) 250 mL  500 mg IntraVENous Q24H  
 LORazepam (ATIVAN) injection 1 mg  1 mg IntraVENous Q6H PRN  
 naloxone (NARCAN) injection 0.4 mg  0.4 mg IntraVENous PRN  thiamine HCL (B-1) tablet 100 mg  100 mg Oral DAILY  folic acid (FOLVITE) tablet 1 mg  1 mg Oral DAILY  therapeutic multivitamin (THERAGRAN) tablet 1 Tab  1 Tab Oral DAILY  amLODIPine (NORVASC) tablet 5 mg  5 mg Oral BID And  
 lisinopril (PRINIVIL, ZESTRIL) tablet 20 mg  20 mg Oral BID  
 
______________________________________________________________________ EXPECTED LENGTH OF STAY: 4d 2h 
ACTUAL LENGTH OF STAY:          2 Tiffanie Jones MD

## 2019-07-08 NOTE — PROGRESS NOTES
[facility] Transitional Care Team: Discharge HUG Note    Date of Assessment: 07/08/19  Time of Assessment:  4:01 PM    Assessment & Plan   RODRÍGUEZ Diagnoses:  --HTN, elevated BNP (8324 on 7/4/19),  Echocardiogram 7/7/19 with the following findings: mild global systolic dysfunction. Estimated left ventricular ejection fraction is 46 - 50%. Left ventricular global hypokinesis. Right Ventricle: Moderately dilated right ventricle. Left Atrium: Severely dilated left atrium. Right Atrium: Severely dilated right atrium. Pulmonary Artery: Severe pulmonary hypertension. --Acute hypoxemic resp failure-resolved   due to large Right pleural effusion and compressive atelectasis.    -pleural drain placed 7/5/19- 2L output in last 48hrs  Pleural drain removed 7/7/19  --Partial small bowel obstruction due to small umbilical hernia.    General surgery consulted and hernia reduced in ER and at bedside  --Bilateral leg swelling--dopplers were negative for DVT. Suspected bacterial pneumonia     Readmission Risks:   moderate    1. Knowledge deficit regarding conditions and self-management  2. Lack of F/U with PCP for the last 5 years  3. ETOH use/abuse? No hx of falls documented. Nurse Navigator: likely to have HF NN  RODRÍGUEZ appointment TBD    Code status: Full  Recommended Disposition: TBD     F/U Concerns:  BUN/Cr trending up--repeat BMP soon O/P? Pleural fluid and anerobic fluid culture final results pending--NG3D on 7/8/19--follow to final        Number of Admissions this year:  only current one    Heart Failure Bundle:  yes      Advance Care Plan: not on file; will ask tomorrow if not later today    Met  and Mrs. Alvarado today and introduced [facility] program and its goals to them. Also, met their two daughters at the same time. Discussed a few aspects of HF with Mr. Gale Moya and his family and answered their questions to the best of my ability. They are eager to see the hospitalist, Dr. Katy Leary, and he has been advised.     Admission medication reconciliation was performed by PharmD. Discharge Needs: needs to reestablish care with Dr. Mali Kline for PCP; will need support and education regarding heart failure self-management at home. Briefly began to talk about HF today and a few of the key self-management aspects: daily weights, salt restriction, medication adherence and F/U appts. Gave him a copy of the heart failure patient education booklet. Daughters and wife also present during this time. Reassured  and Mrs. Alvarado that the NNs will reinforce this teaching with them and be available to help answer their questions once home. Awareness of medical conditions: able to tell me what happened during the course of this hospitalization; does not know he has heart failure--this sounds like a new diagnosis for him. Patient's willingness to go to SNF or inpt rehab if necessary: will likely not be necessary after this admission     HUG NP will return with [facility] calender/follow up appointments/Ambulatory Nurse Navigation information when patient ready for discharge. [facility] information given to Mrs. Alvarado and explained to all. Continue to follow CM documentation. Patient/family education focused on readmission zones as described as: The Red Zone: High risk for readmission, days 1-21                          The Yellow Zone: Moderate risk for readmission, days 22-29                          The Green Zone: Lower risk for readmission, days 30 and after    Dora Richardson is a 72 y.o. male inpatient at [facility] admitted with abdominal pain on  7/5/19; reported epigastric pain and constipation; chest pain, leg swelling, abdominal distention, and upper back pain. Chart reviewed by Rufino Moscoso NP and [facility] Understanding of Goals) program introduced to patient/family. The Transitional Care Team bridges the gaps in care and education surrounding discharge from the acute care facility.  The objective is to empower the patient and family in taking a proactive role in the task of preventing readmission within the first thirty days after discharge from the acute care setting. The team is also involved in the efforts to reduce readmission to the acute care setting after stabilization and discharge from the acute care environment either to the skilled nursing facilities or community. The TC Team will follow patient from a distance while inpatient as well as be available for further transition disposition as needed. The RODRÍGUEZ TEAM will continue to offer support during the 30- 90 day discharge from acute care setting. Will notify Ambulatory HF Nurse Navigators Kelvin Zuniga RN and Ila Sena RN, when patient is ready for discharge.     Past Medical History:   Diagnosis Date    Arthritic-like pain     Hypertension        Advance Care Planning 7/5/2019   Confirm Advance Directive None   Patient Would Like to Complete Advance Directive No

## 2019-07-08 NOTE — ADVANCED PRACTICE NURSE
Asked to see pt on consult for cardiology. Spoke with pt and wife. Pt desires to follow with cardiology practice near his home near [facility]- wife requested [facility] (Dr. Kathia Ken office at [facility]).   Notified primary team and  to forward consult to [facility] per pt request.

## 2019-07-08 NOTE — ROUTINE PROCESS
Bedside and Verbal shift change report given to Naina Thomson (oncoming nurse) by Jose Donnelly (offgoing nurse). Report included the following information SBAR, Intake/Output, MAR and Cardiac Rhythm A. Flutter.

## 2019-07-08 NOTE — PROGRESS NOTES
RODRÍGUEZ PLAN: 
 
1. Patient will be discharged home in care of his wife and daughter. 2. Patient will follow up with PCP following acute care discharge. 3. Patient may need Home Health for chest drain education and teaching

## 2019-07-08 NOTE — PROGRESS NOTES
Faculty or Preceptor Review of Student Work 
 
7/8/2019  - Shift times - 0730 to 1300 The student documentation of patient care for Santosh Beltran has been reviewed and approved. All medications have been administered under the direct supervision of the faculty or preceptor.  
 
Shahram Boyd RN

## 2019-07-09 NOTE — CONSULTS
[facility] Pulmonary, Critical Care, and Sleep Medicine Initial Patient Consult Name: Dana Jay MRN: [id number] : 1945 [facility] 2019 IMPRESSION:  
· Severe PHTN- by ECHO PASP 79 EF 50% no AV/MV disease. Portopulmonary HTN- ETOH abuse and hypoalbuminemic ? Occult cirrhosis · HTN 
· Right effusion-exudate- ? From [facility] or other DDX is hepatic hydrothorax · SBO- conservative MGMT  
  
RECOMMENDATIONS:  
· Agree with [facility] tomorrow and LH- d/w cards · Await cytology from right effusion · If PH confirmed will need w/u- VQ scan/sleep study/CTD panel- will get RUQ liver US eval for cirrhosis and PV patency - direction of flow. · Pt is acutely ill and at risk for decline due to PHTN Subjective: This patient has been seen and evaluated at the request of Dr. Larissa West for PHTN. Patient is a 72 y.o. male h/o ETOH abuse presents with 6 months progressive Lozano and presented with large right effusion- tapped- exudative. Negative micro. Thoracic placed pigtail and now out. ECHO with severe PHTN. Pt alert denies h/o PE, CTD, no h/o lung disease. Past Medical History:  
Diagnosis Date  Arthritic-like pain  Hypertension History reviewed. No pertinent surgical history. Prior to Admission medications Medication Sig Start Date End Date Taking? Authorizing Provider  
therapeutic multivitamin [facility]) tablet Take 1 Tab by mouth daily. Yes Provider, Historical  
ergocalciferol (VITAMIN D2) 50,000 unit capsule Take 50,000 Units by mouth every . Yes Provider, Historical  
aspirin delayed-release 81 mg tablet Take  by mouth daily. Yes Provider, Historical  
 
No Known Allergies Social History Tobacco Use  Smoking status: Former Smoker Start date: 1966 Last attempt to quit: 1984 Years since quittin.6  Smokeless tobacco: Never Used Substance Use Topics  Alcohol use:  Yes  
 Alcohol/week: 7.0 oz Types: 14 drink(s) per week Family History Problem Relation Age of Onset  Diabetes Mother  Hypertension Mother  Heart Disease Mother  Asthma Mother Marialuisa Arthritis-osteo Mother  Diabetes Father  Hypertension Father  Asthma Father  Arthritis-osteo Father  Diabetes Sister  Gout Sister  Asthma Brother Current Facility-Administered Medications Medication Dose Route Frequency  furosemide (LASIX) tablet 40 mg  40 mg Oral DAILY  aspirin delayed-release tablet 81 mg  81 mg Oral DAILY  sodium chloride (NS) flush 5-40 mL  5-40 mL IntraVENous Q8H  
 [Held by provider] heparin (porcine) injection 5,000 Units  5,000 Units SubCUTAneous Q8H  
 lactobac ac& pc-s.therm-b.anim (KERLINE Q/RISAQUAD)  1 Cap Oral DAILY  thiamine HCL (B-1) tablet 100 mg  100 mg Oral DAILY  folic acid (FOLVITE) tablet 1 mg  1 mg Oral DAILY  therapeutic multivitamin (THERAGRAN) tablet 1 Tab  1 Tab Oral DAILY  amLODIPine (NORVASC) tablet 5 mg  5 mg Oral BID And  
 lisinopril (PRINIVIL, ZESTRIL) tablet 20 mg  20 mg Oral BID Review of Systems: A comprehensive review of systems was negative except for that written in the HPI. Objective:  
Vital Signs:   
Visit Vitals /56 (BP 1 Location: Right arm, BP Patient Position: At rest) Pulse (!) 59 Temp 98.4 °F (36.9 °C) Resp 20 Ht 5' 8\" (1.727 m) Wt 68.9 kg (151 lb 14.4 oz) SpO2 97% BMI 23.10 kg/m² O2 Device: Room air O2 Flow Rate (L/min): 2 l/min Temp (24hrs), Av.3 °F (36.8 °C), Min:97.5 °F (36.4 °C), Max:98.6 °F (37 °C) Intake/Output:  
Last shift:      701 - 1900 In: 343 [P.O.:369; I.V.:350] Out: - Last 3 shifts: 1901 -  0700 In: 720 [P.O.:720] Out: 1 Intake/Output Summary (Last 24 hours) at 2019 1606 Last data filed at 2019 1222 Gross per 24 hour Intake 959 ml Output  Net 959 ml Physical Exam: General:  Alert, cooperative, no distress, appears stated age. Head:  Normocephalic, without obvious abnormality, atraumatic. Eyes:  Conjunctivae/corneas clear. Nose: Nares normal. Septum midline Neck: Supple, symmetrical, trachea midline Lungs:   Clear to auscultation bilaterally. Heart:  Regular rate and rhythm, S1, S2 normal, no murmur, click, rub or gallop. Abdomen:   Soft, non-tender. Bowel sounds normal. No masses,  No organomegaly. Extremities: Extremities normal, atraumatic, no cyanosis or edema. Pulses: 2+ and symmetric all extremities. Skin: Skin color, texture, turgor normal. No rashes or lesions Data review:  
 
Recent Results (from the past 24 hour(s)) CBC W/O DIFF Collection Time: 07/09/19  1:53 AM  
Result Value Ref Range WBC 5.6 4.1 - 11.1 K/uL  
 RBC 4.85 4.10 - 5.70 M/uL  
 HGB 12.8 12.1 - 17.0 g/dL HCT 40.7 36.6 - 50.3 % MCV 83.9 80.0 - 99.0 FL  
 MCH 26.4 26.0 - 34.0 PG  
 MCHC 31.4 30.0 - 36.5 g/dL  
 RDW 13.2 11.5 - 14.5 % PLATELET 007 023 - 843 K/uL MPV 11.0 8.9 - 12.9 FL  
 NRBC 0.0 0  WBC ABSOLUTE NRBC 0.00 0.00 - 0.01 K/uL METABOLIC PANEL, BASIC Collection Time: 07/09/19  1:53 AM  
Result Value Ref Range Sodium 136 136 - 145 mmol/L Potassium 4.0 3.5 - 5.1 mmol/L Chloride 105 97 - 108 mmol/L  
 CO2 25 21 - 32 mmol/L Anion gap 6 5 - 15 mmol/L Glucose 110 (H) 65 - 100 mg/dL BUN 25 (H) 6 - 20 MG/DL Creatinine 1.23 0.70 - 1.30 MG/DL  
 BUN/Creatinine ratio 20 12 - 20 GFR est AA >60 >60 ml/min/1.73m2 GFR est non-AA 58 (L) >60 ml/min/1.73m2 Calcium 9.4 8.5 - 10.1 MG/DL Imaging: 
I have personally reviewed the patients radiographs and have reviewed the reports: 
7/5 CT chest moderate to large layering right effusion and RLL ATX LLL ASD no ILD changes no masses- main PA large Khadijah Tuttle MD

## 2019-07-09 NOTE — NURSE NAVIGATOR
Heart Failure Nurse Navigator met with patient and his spouse. LIVING WITH HEART FAILURE book given to patient. Educated using teach back method. Discussed diagnosis definition and assessed patient understanding. Reviewed importance of daily weight monitoring. Patient states he has a scale. Weight calendar given with instructions for use. Today's weight written on calendar. Reviewed low sodium diet. Reviewed foods high in salt. Reviewed signs and symptoms of heart failure and heart failure zones. A magnet with this information was given.

## 2019-07-09 NOTE — PROGRESS NOTES
IV to Right forearm infiltrated during antibiotic infusion. Moderate swelling. IV discontinued. Warm compress applied and extremity elevated. Prn Pain medication given for Pt. Comfort.

## 2019-07-09 NOTE — PROGRESS NOTES
Hospitalist Progress Note Medhat Olsen MD 
Answering service: [phone] OR [phone] from in house phone Date of Service:  2019 NAME:  Kendall Ramirez :  1945 MRN:  [id number] Admission Summary: This is a 77-year-old man with a past medical history significant for hypertension, who was in his usual state of health until about a week ago when the patient developed abdominal pain. The pain is located at the epigastric region, 8/10 in severity. No radiation. No known aggravating or relieving factors. The patient described the pain as a dull ache associated with constipation but no nausea, no vomiting. The patient took laxative without any significant relief of the constipation. The patient was brought to the emergency room for further evaluation. When the EMS arrived at the scene, the patient's oxygen saturation was 86% on room air. CT scan of the abdomen and pelvis performed by the emergency room provider shows right pleural effusion and suspected small bowel obstruction. Interval history / Subjective:  
Patient feels ok, worries about how steady on his feet, wonders if walking stick will help. With severe pulm HTN will get pulm consult too. Assessment & Plan: 1. CV- Htn, elevated BNP, Cont lasix and home meds, troponin neg x3 
echocardiogram showing left and right heart failure EF 45-50, mod-severe TR with pulm HTN= PaPressure = 79 Cardiology consult pending, 
- with severe pulm HTN: consult pulm for eval. 
 
2.  Acute hypoxemic resp failure-resolved 
 due to large Right pleural effusion and compressive atelectasis. -pleural drain placed 19- 2L output in [id number] 
-follow pleural fluid analysis and culture CXR showing well expanded right lung Pleural drain removed 19 Repeat CXR tomorrow in am 
3. Partial small bowel obstruction due to small umbilical hernia- resolved General surgery consulted Hernia reduced in ER and at bedside Advance diet as tolerated- GI lite diet today 4. Bilateral leg swelling. - dopplers were negative for DVT. 5.  Bacterial pneumonia. cont Rocephin and Zithromax for suspected bacterial pneumonia. 6.  Reported hx of alcohol abuse. The patient drinks about 14 drinks per week. cont thiamine, folic acid, and multivitamin. CIWA protocol No signs of withdrawl Code status:FULL 
DVT prophylaxis: Heparin Care Plan discussed with: Patient/Family Disposition: Home w/Family and TBD Hospital Problems  Date Reviewed: 7/5/2019 Codes Class Noted POA * (Principal) Acute CHF (congestive heart failure) (HCC) ICD-10-CM: I50.9 ICD-9-CM: 428.0  7/5/2019 Yes Pleural effusion, right ICD-10-CM: J90 ICD-9-CM: 511.9  7/5/2019 Unknown Review of Systems: A comprehensive review of systems was negative except for that written in the subjective section Vital Signs:  
 Last 24hrs VS reviewed since prior progress note. Most recent are: 
Visit Vitals /40 (BP 1 Location: Left arm, BP Patient Position: At rest) Pulse (!) 50 Temp 98.4 °F (36.9 °C) Resp 24 Ht 5' 8\" (1.727 m) Wt 68.9 kg (151 lb 14.4 oz) SpO2 94% BMI 23.10 kg/m² Intake/Output Summary (Last 24 hours) at 7/9/2019 8689 Last data filed at 7/9/2019 0683 Gross per 24 hour Intake 959 ml Output  Net 959 ml Physical Examination:  
 
Constitutional:  No acute distress, cooperative, pleasant   
   
Resp:  CTA bilaterally. No wheezing/rhonchi/rales. CV:  Regular rhythm, normal rate GI:  Soft, non distended, non tender Musculoskeletal:  mild LE edema, warm, 2+ pulses throughout Neurologic:  Moves all extremities. AAOx3, CN II-XII reviewed Data Review:  
 Review and/or order of tests in the radiology section of CPT Review and/or order of tests in the medicine section of CPT Labs:  
 
Recent Labs  
  07/09/19 8543 07/08/19 
0240 WBC 5.6 6.4 HGB 12.8 13.7 HCT 40.7 43.6  187 Recent Labs  
  07/09/19 
0153 07/08/19 
0240  137  
K 4.0 3.8  103 CO2 25 27 BUN 25* 23* CREA 1.23 1.42* * 119* CA 9.4 8.7 No results for input(s): SGOT, GPT, ALT, AP, TBIL, TBILI, TP, ALB, GLOB, GGT, AML, LPSE in the last 72 hours. No lab exists for component: AMYP, HLPSE No results for input(s): INR, PTP, APTT in the last 72 hours. No lab exists for component: INREXT, INREXT No results for input(s): FE, TIBC, PSAT, FERR in the last 72 hours. No results found for: FOL, RBCF No results for input(s): PH, PCO2, PO2 in the last 72 hours. No results for input(s): CPK, CKNDX, TROIQ in the last 72 hours. No lab exists for component: CPKMB Lab Results Component Value Date/Time Cholesterol, total 134 07/06/2019 03:50 AM  
 HDL Cholesterol 56 07/06/2019 03:50 AM  
 LDL, calculated 68 07/06/2019 03:50 AM  
 Triglyceride 50 07/06/2019 03:50 AM  
 CHOL/HDL Ratio 2.4 07/06/2019 03:50 AM  
 
Lab Results Component Value Date/Time Glucose (POC) 87 07/05/2019 12:02 PM  
 Glucose (POC) 101 (H) 07/05/2019 08:47 AM  
 
No results found for: COLOR, APPRN, SPGRU, REFSG, ANGELI, PROTU, GLUCU, KETU, BILU, UROU, TREVOR, LEUKU, GLUKE, EPSU, BACTU, WBCU, RBCU, CASTS, UCRY Medications Reviewed:  
 
Current Facility-Administered Medications Medication Dose Route Frequency  amoxicillin-clavulanate (AUGMENTIN) 875-125 mg per tablet 1 Tab  1 Tab Oral BID WITH MEALS  furosemide (LASIX) tablet 40 mg  40 mg Oral DAILY  aspirin delayed-release tablet 81 mg  81 mg Oral DAILY  sodium chloride (NS) flush 5-40 mL  5-40 mL IntraVENous Q8H  
 sodium chloride (NS) flush 5-40 mL  5-40 mL IntraVENous PRN  
 acetaminophen (TYLENOL) tablet 650 mg  650 mg Oral Q4H PRN  
 ondansetron (ZOFRAN) injection 4 mg  4 mg IntraVENous Q4H PRN  
 morphine injection 2 mg  2 mg IntraVENous Q4H PRN  
  heparin (porcine) injection 5,000 Units  5,000 Units SubCUTAneous Q8H  
 lactobac ac& pc-s.therm-b.anim (KERLINE Q/RISAQUAD)  1 Cap Oral DAILY  LORazepam (ATIVAN) injection 1 mg  1 mg IntraVENous Q6H PRN  
 naloxone (NARCAN) injection 0.4 mg  0.4 mg IntraVENous PRN  thiamine HCL (B-1) tablet 100 mg  100 mg Oral DAILY  folic acid (FOLVITE) tablet 1 mg  1 mg Oral DAILY  therapeutic multivitamin (THERAGRAN) tablet 1 Tab  1 Tab Oral DAILY  amLODIPine (NORVASC) tablet 5 mg  5 mg Oral BID And  
 lisinopril (PRINIVIL, ZESTRIL) tablet 20 mg  20 mg Oral BID  
 
______________________________________________________________________ EXPECTED LENGTH OF STAY: 4d 2h 
ACTUAL LENGTH OF STAY:          4 Amy Whitley MD

## 2019-07-09 NOTE — CONSULTS
Cardiology Consult Note CC: CHF Reason for consult:  CHF Requesting MD:  Dr. Neena Dumont Subjective:  
  
Date of  Admission: 7/4/2019 11:31 PM  
 
Admission type:Emergency Alexander Patel is a 72 y.o. male admitted for Acute CHF (congestive heart failure) ([facility] [I50.9]. Patient complains of progressive SOB/JEAN over last three months. He used to walk a lot but stopped it 6 months ago. He drinks regularly about 3 drinks a night. He comes in with acute diastolic HF, pleural effusion, DONNY. His echo showed EF 45% with mild global hypokinesis, mild MR and moderate TR with moderate to severe pulmonary HTN. His breathing is better and is s/p therapeutic thoracentesis/CT tube drainage. He has strongly positive family history for CHF; his father and sister. He has h/o HTN. In two weeks he is scheduled to undergo a umbilical hernia repair. Patient Active Problem List  
 Diagnosis Date Noted  Acute CHF (congestive heart failure) ([facility] 07/05/2019  Pleural effusion, right 07/05/2019  Vitamin D deficiency 11/18/2014  Arthritis of wrist, right, degenerative 11/18/2014  History of wrist fracture, Right 11/18/2014  Overweight (BMI 25.0-29.9) 11/18/2014  
 HTN (hypertension) 11/11/2014  Blurred vision, bilateral 11/11/2014 Suzette Banda DO Past Medical History:  
Diagnosis Date  Arthritic-like pain  Hypertension History reviewed. No pertinent surgical history. No Known Allergies Family History Problem Relation Age of Onset  Diabetes Mother  Hypertension Mother  Heart Disease Mother  Asthma Mother Dexter Flax Arthritis-osteo Mother  Diabetes Father  Hypertension Father  Asthma Father  Arthritis-osteo Father  Diabetes Sister  Gout Sister  Asthma Brother Current Facility-Administered Medications Medication Dose Route Frequency  furosemide (LASIX) tablet 40 mg  40 mg Oral DAILY  aspirin delayed-release tablet 81 mg  81 mg Oral DAILY  sodium chloride (NS) flush 5-40 mL  5-40 mL IntraVENous Q8H  
 sodium chloride (NS) flush 5-40 mL  5-40 mL IntraVENous PRN  
 acetaminophen (TYLENOL) tablet 650 mg  650 mg Oral Q4H PRN  
 ondansetron (ZOFRAN) injection 4 mg  4 mg IntraVENous Q4H PRN  
 morphine injection 2 mg  2 mg IntraVENous Q4H PRN  
 heparin (porcine) injection 5,000 Units  5,000 Units SubCUTAneous Q8H  
 lactobac ac& pc-s.therm-b.anim (KERLINE Q/RISAQUAD)  1 Cap Oral DAILY  LORazepam (ATIVAN) injection 1 mg  1 mg IntraVENous Q6H PRN  
 naloxone (NARCAN) injection 0.4 mg  0.4 mg IntraVENous PRN  thiamine HCL (B-1) tablet 100 mg  100 mg Oral DAILY  folic acid (FOLVITE) tablet 1 mg  1 mg Oral DAILY  therapeutic multivitamin (THERAGRAN) tablet 1 Tab  1 Tab Oral DAILY  amLODIPine (NORVASC) tablet 5 mg  5 mg Oral BID And  
 lisinopril (PRINIVIL, ZESTRIL) tablet 20 mg  20 mg Oral BID Prior to Admission Medications: 
Prior to Admission medications Medication Sig Start Date End Date Taking? Authorizing Provider  
therapeutic multivitamin [facility]) tablet Take 1 Tab by mouth daily. Yes Provider, Historical  
ergocalciferol (VITAMIN D2) 50,000 unit capsule Take 50,000 Units by mouth every Sunday. Yes Provider, Historical  
aspirin delayed-release 81 mg tablet Take  by mouth daily. Yes Provider, Historical  
 
 
 Review of Symptoms: 
Except as noted in HPI, patient denies recent fever or chills, nausea, vomiting, diarrhea, hemoptysis, hematemesis, dysuria, myalgias, focal neurologic symptoms, ecchymosis, angioedema, odynophagia, dysphagia, sore throat, earache,rash, melena, hematochezia, depression, GERD, cold intolerance, petechia, bleeding gums, or significant weight loss. A comprehensive review of systems was negative except for that written in the HPI.  
 
 Subjective:  
 24 hr VS reviewed, overall VSSAF 
 Temp (24hrs), Av.3 °F (36.8 °C), Min:97.5 °F (36.4 °C), Max:98.6 °F (37 °C) Patient Vitals for the past 8 hrs: 
 Pulse 19 1110 (!) 56  
19 0741 (!) 50 Patient Vitals for the past 8 hrs: 
 Resp  
19 1110 21  
19 0741 24 Patient Vitals for the past 8 hrs: 
 BP  
19 1110 128/52  
19 0741 103/40 Intake/Output Summary (Last 24 hours) at 2019 1357 Last data filed at 2019 1222 Gross per 24 hour Intake 959 ml Output  Net 959 ml Physical Exam (complete single organ system exam) Visit Vitals /52 (BP 1 Location: Right arm, BP Patient Position: At rest) Pulse (!) 56 Temp 98.3 °F (36.8 °C) Resp 21 Ht 5' 8\" (1.727 m) Wt 151 lb 14.4 oz (68.9 kg) SpO2 98% BMI 23.10 kg/m² General Appearance:  Well developed, well nourished,alert and oriented x 3, and individual in no acute distress. Ears/Nose/Mouth/Throat:   Hearing grossly normal. 
  
    Neck: Supple. Chest:   Lungs with decreased BS rt base Cardiovascular:  irregular rate and rhythm, S1, S2 normal, no murmur. Abdomen:   Soft, non-tender, bowel sounds are active. Extremities: 2+ edema bilaterally. Skin: Warm and dry. Cardiographics Telemetry: Aflutter EKG; Aflutter with controlled VR Echocardiogram: Abnormal, and reviewed by myself:  
 
Labs:  
Recent Results (from the past 24 hour(s)) CBC W/O DIFF Collection Time: 19  1:53 AM  
Result Value Ref Range WBC 5.6 4.1 - 11.1 K/uL  
 RBC 4.85 4.10 - 5.70 M/uL  
 HGB 12.8 12.1 - 17.0 g/dL HCT 40.7 36.6 - 50.3 % MCV 83.9 80.0 - 99.0 FL  
 MCH 26.4 26.0 - 34.0 PG  
 MCHC 31.4 30.0 - 36.5 g/dL  
 RDW 13.2 11.5 - 14.5 % PLATELET 829 754 - 566 K/uL MPV 11.0 8.9 - 12.9 FL  
 NRBC 0.0 0  WBC ABSOLUTE NRBC 0.00 0.00 - 0.01 K/uL METABOLIC PANEL, BASIC Collection Time: 19  1:53 AM  
Result Value Ref Range  Sodium 136 136 - 145 mmol/L  
 Potassium 4.0 3.5 - 5.1 mmol/L Chloride 105 97 - 108 mmol/L  
 CO2 25 21 - 32 mmol/L Anion gap 6 5 - 15 mmol/L Glucose 110 (H) 65 - 100 mg/dL BUN 25 (H) 6 - 20 MG/DL Creatinine 1.23 0.70 - 1.30 MG/DL  
 BUN/Creatinine ratio 20 12 - 20 GFR est AA >60 >60 ml/min/1.73m2 GFR est non-AA 58 (L) >60 ml/min/1.73m2 Calcium 9.4 8.5 - 10.1 MG/DL Assessment: 
 
 Assessment:  
CHF; new onset and etiology is not totally clear yet and ischemic cause is not rule out prior to any surgery Aflutter; new onset but rate is well controlled HTN Pulmonary HTN; unknown etiology Pleural effusion; cytology is pending; he did have recent unexplained weight loss Plan:  
Tele He need a full RHC and LHC with possible PTCA/stent Agree with current regimen Needs anticoagulation for his Aflutter which appears to be chronic Yayo Jimenez MD

## 2019-07-09 NOTE — PROGRESS NOTES
Transitional Care Team: Follow-Up [facility] Note        Assessment & Plan   Stopped by to see patient, whose wife and other family members are visiting at this time. I asked if he has ever completed an AMD and he replies that he has not. His family is very interested in having one completed while he is able to do so, but tell me he is having cardiac cath tomorrow. I advised I will try to F/U with him on Thursday and wished him well on his procedures tomorrow.

## 2019-07-09 NOTE — PROGRESS NOTES
[id number] [facility] Surgery Subjective DOing well, tolerating diet, having BM, no pain Objective Patient Vitals for the past 24 hrs: 
 Temp Pulse Resp BP SpO2  
07/09/19 0741 98.4 °F (36.9 °C) (!) 50 24 103/40 94 % 07/09/19 0306 97.5 °F (36.4 °C) (!) 55 18 136/64 98 % 07/08/19 2306 98.5 °F (36.9 °C) (!) 58 18 122/56 98 % 07/08/19 2140     92 % 07/08/19 2015     99 % 07/08/19 1859 98.3 °F (36.8 °C) (!) 55 18 121/55 98 % 07/08/19 1631 98.6 °F (37 °C) 64 18 163/59 97 % 07/08/19 1112 98.5 °F (36.9 °C) (!) 59 20 132/60 97 % Date 07/08/19 0700 - 07/09/19 2429 07/09/19 0700 - 07/10/19 2049 Shift 7086-9623 5644-8204 24 Hour Total 1108-9427 8212-7236 24 Hour Total  
INTAKE  
P.O. 480 240 720 129  129  
  P. O. 480 240 720 129  129  
I. V.(mL/kg/hr)    350  350 I.V.    350  350 Shift Total(mL/kg) 480(6.9) 240(3.5) 720(10.5) 479(7)  479(7) OUTPUT Urine(mL/kg/hr) Urine Occurrence(s) 3 x  3 x Stool 1  1 Stool Occurrence(s) 3 x  3 x Stool 1  1 Shift Total(mL/kg) 1(0)  1(0)  240 719 479  479 Weight (kg) 69.8 68.9 68.9 68.9 68.9 68.9 PE 
Pulm - CTAB 
CV - RRR Abd - soft, ND, BS presnet, umbilical hernia soft and reducible Labs Recent Results (from the past 12 hour(s)) CBC W/O DIFF Collection Time: 07/09/19  1:53 AM  
Result Value Ref Range WBC 5.6 4.1 - 11.1 K/uL  
 RBC 4.85 4.10 - 5.70 M/uL  
 HGB 12.8 12.1 - 17.0 g/dL HCT 40.7 36.6 - 50.3 % MCV 83.9 80.0 - 99.0 FL  
 MCH 26.4 26.0 - 34.0 PG  
 MCHC 31.4 30.0 - 36.5 g/dL  
 RDW 13.2 11.5 - 14.5 % PLATELET 139 352 - 298 K/uL MPV 11.0 8.9 - 12.9 FL  
 NRBC 0.0 0  WBC ABSOLUTE NRBC 0.00 0.00 - 0.01 K/uL METABOLIC PANEL, BASIC Collection Time: 07/09/19  1:53 AM  
Result Value Ref Range Sodium 136 136 - 145 mmol/L Potassium 4.0 3.5 - 5.1 mmol/L  Chloride 105 97 - 108 mmol/L  
 CO2 25 21 - 32 mmol/L Anion gap 6 5 - 15 mmol/L Glucose 110 (H) 65 - 100 mg/dL BUN 25 (H) 6 - 20 MG/DL Creatinine 1.23 0.70 - 1.30 MG/DL  
 BUN/Creatinine ratio 20 12 - 20 GFR est AA >60 >60 ml/min/1.73m2 GFR est non-AA 58 (L) >60 ml/min/1.73m2 Calcium 9.4 8.5 - 10.1 MG/DL Assessment Robert Ellis is a 72 y. o.yr old male with umbilical hernia resolved SBO and CHF Plan Pt will follow up with me as OP for hernia repair DC follow up info is in the chart Arlene Brothers MD

## 2019-07-09 NOTE — PROGRESS NOTES
Problem: Mobility Impaired (Adult and Pediatric) Goal: *Acute Goals and Plan of Care (Insert Text) Description Physical Therapy Goals Initiated 7/9/2019 1. Patient will move from supine to sit and sit to supine  in bed with independence within 7 day(s). 2.  Patient will transfer from bed to chair and chair to bed with independence using the least restrictive device within 7 day(s). 3.  Patient will perform sit to stand with independence within 7 day(s). 4.  Patient will ambulate with modified independence for 150 feet with the least restrictive device within 7 day(s). 5.  Patient will ascend/descend 13 stairs with 1 handrail(s) with modified independence within 7 day(s). Outcome: Progressing Towards Goal 
 PHYSICAL THERAPY EVALUATION Patient: Wai West [de-identified]72 y.o. male) Date: 7/9/2019 Primary Diagnosis: Acute [facility]est[facility] [I50.9] Precautions:    
 
ASSESSMENT : 
Based on the objective data described below, the patient presents with decreased balance and decreased strength S/P diagnosis for CHF. Patient lives with his wife in a two story home with 3 steps to enter. Patient reports the master bedroom is on the first floor but he has to travel to the second floor to complete laundry. Patient reports increasing difficulty with stairs. On evaluation patient demonstrates the need for SBA for supine<>sit. He ambulates with gait belt and close SBA 50ft. Patient ascends and descends 3 stairs with bilateral railings and close SBA. Patient ambulates with increased WIN, significantly reduced gait speed, and intermittent touching of furniture for balance. He reports he has never used an assistive device. Patient balance and gait is assessed via the Tinetti indicating a high fall risk. He would benefit from gait training with least restrictive device secondary to decreased endurance and increased risk of falls. Patient would also benefit from [facility] for a home safety assessment. Patient will benefit from skilled intervention to address the above impairments. Patient?s rehabilitation potential is considered to be Good Factors which may influence rehabilitation potential include:  
? None noted ? Mental ability/status ? Medical condition ? Home/family situation and support systems ? Safety awareness 
? Pain tolerance/management 
? Other: PLAN : 
Recommendations and Planned Interventions: 
?           Bed Mobility Training             ? Neuromuscular Re-Education ? Transfer Training                   ? Orthotic/Prosthetic Training 
? Gait Training                         ? Modalities ? Therapeutic Exercises           ? Edema Management/Control ? Therapeutic Activities            ? Patient and Family Training/Education ? Other (comment): Frequency/Duration: Patient will be followed by physical therapy  5 times a week to address goals. Discharge Recommendations: Home Health for home safety assessment Further Equipment Recommendations for Discharge: TBD SUBJECTIVE:  
Patient stated ?it felt a little different but I haven't been up in awhile. ? OBJECTIVE DATA SUMMARY:  
HISTORY:   
Past Medical History:  
Diagnosis Date Arthritic-like pain Hypertension History reviewed. No pertinent surgical history. Prior Level of Function/Home Situation: independent for mobility Personal factors and/or comorbidities impacting plan of care:  
 
Home Situation Home Environment: Private residence # Steps to Enter: 3 Rails to Enter: Yes Hand Rails : Bilateral 
One/Two Story Residence: Two story # of Interior Steps: 15 Interior Rails: Right Living Alone: No 
Support Systems: Spouse/Significant Other/Partner Patient Expects to be Discharged to[de-identified] Private residence Current DME Used/Available at Home: None Tub or Shower Type: Tub/Shower combination EXAMINATION/PRESENTATION/DECISION MAKING:  
Critical Behavior: 
Neurologic State: Alert, Appropriate for age Orientation Level: Oriented X4 Cognition: Appropriate decision making, Appropriate for age attention/concentration, Appropriate safety awareness, Follows commands Hearing: Auditory Auditory Impairment: None Skin:   
Edema:  
Range Of Motion: 
AROM: Within functional limits Strength:   
Strength: Generally decreased, functional 
  
  
  
  
  
  
Tone & Sensation:  
  
  
  
  
  
  
  
  
  
   
Coordination: 
  
Vision:  
  
Functional Mobility: 
Bed Mobility: 
  
Supine to Sit: Stand-by assistance Transfers: 
Sit to Stand: Stand-by assistance Stand to Sit: Stand-by assistance Balance:  
Sitting: Intact Standing: Impaired Standing - Static: Fair Standing - Dynamic : Fair Ambulation/Gait Training: 
Distance (ft): 50 Feet (ft) Assistive Device: Gait belt Ambulation - Level of Assistance: Stand-by assistance;Contact guard assistance Gait Abnormalities: Decreased step clearance Base of Support: Widened Speed/Melida: Pace decreased (<100 feet/min) Step Length: Right shortened;Left shortened Stairs: 
Number of Stairs Trained: 3 Stairs - Level of Assistance: Stand-by assistance Rail Use: Both Therapeutic Exercises:  
 
 
Functional Measure: 
Tinetti test: 
 
Sitting Balance: 1 Arises: 1 Attempts to Rise: 2 Immediate Standing Balance: 1 Standing Balance: 1 Nudged: 1 Eyes Closed: 0 Turn 360 Degrees - Continuous/Discontinuous: 1 Turn 360 Degrees - Steady/Unsteady: 0 Sitting Down: 1 Balance Score: 9 Indication of Gait: 1 
R Step Length/Height: 0 
L Step Length/Height: 0 
R Foot Clearance: 1 L Foot Clearance: 1 Step Symmetry: 1 Step Continuity: 1 Path: 1 Trunk: 1 Walking Time: 0 Gait Score: 7 Total Score: 16 Tinetti Tool Score Risk of Falls 
<19 = High Fall Risk 19-24 = Moderate Fall Risk 25-28 = Low Fall Risk Tinetti ME. Performance-Oriented Assessment of Mobility Problems in Elderly Patients. [facility]; [id number]. (Scoring Description: PT Bulletin Feb. 10, 1993) Older adults: Michael Pallas et al, 2009; n = [address] elderly evaluated with ABC, BRENDON, ADL, and IADL) · Mean BRENDON score for males aged 69-68 years = 26.21(3.40) · Mean BRENDON score for females age 69-68 years = 25.16(4.30) · Mean BRENDON score for males over 80 years = 23.29(6.02) · Mean BRENDON score for females over 80 years = 17.20(8.32) Physical Therapy Evaluation Charge Determination History Examination Presentation Decision-Making MEDIUM  Complexity : 1-2 comorbidities / personal factors will impact the outcome/ POC  LOW Complexity : 1-2 Standardized tests and measures addressing body structure, function, activity limitation and / or participation in recreation  LOW Complexity : Stable, uncomplicated  Other outcome measures Tinetti  HIGH Based on the above components, the patient evaluation is determined to be of the following complexity level: LOW Pain: 
Pain Scale 1: Numeric (0 - 10) Pain Intensity 1: 0 Activity Tolerance:  
Patient demonstrates fair activity tolerance this session Please refer to the flowsheet for vital signs taken during this treatment. After treatment:  
?         Patient left in no apparent distress sitting up in chair ? Patient left in no apparent distress in bed 
? Call bell left within reach ? Nursing notified ? Caregiver present- patients wife ? Bed alarm activated COMMUNICATION/EDUCATION:  
The patient?s plan of care was discussed with: Registered Nurse and . ?         Fall prevention education was provided and the patient/caregiver indicated understanding. ?         Patient/family have participated as able in goal setting and plan of care. ?         Patient/family agree to work toward stated goals and plan of care. ?         Patient understands intent and goals of therapy, but is neutral about his/her participation. ? Patient is unable to participate in goal setting and plan of care. Thank you for this referral. 
Mane France, PT Time Calculation: 17 mins

## 2019-07-09 NOTE — NURSE NAVIGATOR
Called [facility] to check on consult. They have not received a consult order. Spoke to patient's nurse Jennifer Gomes, made him aware that [facility] did not receive a cardiology consult for this patient and requested he charly have the unit secretary follow up as directed in [address] [facility] NP ([facility]) note.

## 2019-07-10 NOTE — PROGRESS NOTES
TRANSFER - OUT REPORT: 
 
Verbal report given to [facility] celeste Steel being transferred to Room 442 for routine progression of care Report consisted of patients Situation, Background, Assessment and  
Recommendations(SBAR). Information from the following report(s) SBAR, MAR, Recent Results and Cardiac Rhythm Aflutter was reviewed with the receiving nurse. Opportunity for questions and clarification was provided.

## 2019-07-10 NOTE — PROGRESS NOTES
Physical Therapy 7/10/2019 Chart reviewed. Patient currently off the floor in Cardiac Cath Lab. Acute PT services to follow up later as able/appropriate. Thank you.  
Munir Loving, PT, DPT

## 2019-07-10 NOTE — PROGRESS NOTES
Cardiac Cath Lab Procedure Area Arrival Note: 
 
[facility] arrived to Cardiac Cath Lab, Procedure Area. Patient identifiers verified with NAME and DATE OF BIRTH. Procedure verified with patient. Consent forms verified. Allergies verified. Patient informed of procedure and plan of care. Questions answered with review. Patient voiced understanding of procedure and plan of care. Patient on cardiac monitor, non-invasive blood pressure, SPO2 monitor. On  O2 @ 2 lpm via NC.  IV of NS on pump at 25 ml/hr. Patient status doing well without problems. Patient is A&Ox 4. Patient reports no pain. Patient medicated during procedure with orders obtained and verified by Dr. Yanely Kwok. Refer to patients Cardiac Cath Lab PROCEDURE REPORT for vital signs, assessment, status, and response during procedure, printed at end of case. Printed report on chart or scanned into chart. TRANSFER - OUT REPORT: 
 
Verbal report given to BRITTANY Ball on [facility] being transferred to cath lab recovery for routine progression of care Report consisted of patients Situation, Background, Assessment and  
Recommendations(SBAR). Information from the following report(s) Procedure Summary was reviewed with the receiving nurse. Opportunity for questions and clarification was provided.

## 2019-07-10 NOTE — PROGRESS NOTES
Problem: Heart Failure: Day 5 Goal: Medications Outcome: Progressing Towards Goal 
Goal: Treatments/Interventions/Procedures Outcome: Progressing Towards Goal 
 Pt got cath today; no blockages found. Pt tolerating RA throughout the shift. 1930: Bedside shift change report given to Genia Becerra RN (oncoming nurse) by Ricardo Patel RN (offgoing nurse). Report included the following information SBAR, ED Summary, Intake/Output, MAR, Recent Results, Med Rec Status and Cardiac Rhythm A flutter.

## 2019-07-10 NOTE — PROGRESS NOTES
Hospitalist Progress Note Cheryl Lai MD 
Answering service: [phone] OR [phone] from in house phone Date of Service:  7/10/2019 NAME:  Javier Brothers :  1945 MRN:  [id number] Admission Summary: This is a 79-year-old man with a past medical history significant for hypertension, who was in his usual state of health until about a week ago when the patient developed abdominal pain. The pain is located at the epigastric region, 8/10 in severity. No radiation. No known aggravating or relieving factors. The patient described the pain as a dull ache associated with constipation but no nausea, no vomiting. The patient took laxative without any significant relief of the constipation. The patient was brought to the emergency room for further evaluation. When the EMS arrived at the scene, the patient's oxygen saturation was 86% on room air. CT scan of the abdomen and pelvis performed by the emergency room provider shows right pleural effusion and suspected small bowel obstruction. Interval history / Subjective:  
Patient feels ok, just back from Cath lab, LHC revealed clear arteries. RHC no results in notes yet. Pulmonary following, running some tests to evaluate for pHTN Assessment & Plan: 1. Pulm HTN: severe, PaPressure = 79 on TTE  
- Cont lasix and home meds, troponin neg x3 
- echo left and right heart failure EF 45-50, mod-severe TR with pulm - Cardiology following, s/p LHC: clear arteries, RHC 
- Pulmonary following, running tests to evaluate for possible cause for pHTN 2. Acute hypoxemic resp failure-resolved - due to large Right pleural effusion and compressive atelectasis. - pleural drain placed 19- 2L output in [id number] - CXR showing well expanded right lung- Pleural drain removed 19 3. Partial small bowel obstruction due to small umbilical hernia- resolved General surgery consulted- Hernia reduced in ER and at bedside- Advanced diet 4. Bilateral leg swelling. - dopplers were negative for DVT. 5.  Bacterial pneumonia. cont Rocephin and Zithromax for suspected bacterial pneumonia. 6.  Reported hx of alcohol abuse. The patient drinks about 14 drinks per week. cont thiamine, folic acid, and multivitamin. [facility] protocol- No signs of withdrawl Code status:FULL 
DVT prophylaxis: Heparin Care Plan discussed with: Patient/Family Disposition: Home w/Family and TBD Hospital Problems  Date Reviewed: 7/5/2019 Codes Class Noted POA * (Principal) Acute CHF (congestive heart failure) ([facility]) ICD-10-CM: I50.9 ICD-9-CM: 428.0  7/5/2019 Yes Pleural effusion, right ICD-10-CM: J90 ICD-9-CM: 511.9  7/5/2019 Unknown Review of Systems: A comprehensive review of systems was negative except for that written in the subjective section Vital Signs:  
 Last 24hrs VS reviewed since prior progress note. Most recent are: 
Visit Vitals /44 (BP 1 Location: Right arm, BP Patient Position: At rest) Pulse 61 Temp 98.1 °F (36.7 °C) Resp 21 Ht 5' 8\" (1.727 m) Wt 68.4 kg (150 lb 12.7 oz) SpO2 98% BMI 22.93 kg/m² Intake/Output Summary (Last 24 hours) at 7/10/2019 6372 Last data filed at 7/9/2019 1222 Gross per 24 hour Intake 240 ml Output  Net 240 ml Physical Examination:  
 
Constitutional:  No acute distress, cooperative, pleasant   
   
Resp:  CTA bilaterally. No wheezing/rhonchi/rales. CV:  Regular rhythm, normal rate GI:  Soft, non distended, non tender Musculoskeletal:  mild LE edema, warm Neurologic:  Moves all extremities. AAOx3, Data Review:  
 Review and/or order of tests in the radiology section of CPT Review and/or order of tests in the medicine section of CPT Labs:  
 
Recent Labs  
  07/09/19 
0153 07/08/19 
0240 WBC 5.6 6.4 HGB 12.8 13.7 HCT 40.7 43.6  187 Recent Labs  
  07/09/19 
0153 07/08/19 
0240  137  
K 4.0 3.8  103 CO2 25 27 BUN 25* 23* CREA 1.23 1.42* * 119* CA 9.4 8.7 No results for input(s): SGOT, GPT, ALT, AP, TBIL, TBILI, TP, ALB, GLOB, GGT, AML, LPSE in the last 72 hours. No lab exists for component: AMYP, HLPSE No results for input(s): INR, PTP, APTT in the last 72 hours. No lab exists for component: INREXT, INREXT No results for input(s): FE, TIBC, PSAT, FERR in the last 72 hours. No results found for: FOL, RBCF No results for input(s): PH, PCO2, PO2 in the last 72 hours. No results for input(s): CPK, CKNDX, TROIQ in the last 72 hours. No lab exists for component: CPKMB Lab Results Component Value Date/Time Cholesterol, total 134 07/06/2019 03:50 AM  
 HDL Cholesterol 56 07/06/2019 03:50 AM  
 LDL, calculated 68 07/06/2019 03:50 AM  
 Triglyceride 50 07/06/2019 03:50 AM  
 CHOL/HDL Ratio 2.4 07/06/2019 03:50 AM  
 
Lab Results Component Value Date/Time Glucose (POC) 87 07/05/2019 12:02 PM  
 Glucose (POC) 101 (H) 07/05/2019 08:47 AM  
 
No results found for: COLOR, APPRN, SPGRU, REFSG, ANGELI, PROTU, GLUCU, KETU, BILU, UROU, TREVOR, LEUKU, GLUKE, EPSU, BACTU, WBCU, RBCU, CASTS, UCRY Medications Reviewed:  
 
Current Facility-Administered Medications Medication Dose Route Frequency  furosemide (LASIX) tablet 40 mg  40 mg Oral DAILY  aspirin delayed-release tablet 81 mg  81 mg Oral DAILY  sodium chloride (NS) flush 5-40 mL  5-40 mL IntraVENous Q8H  
 sodium chloride (NS) flush 5-40 mL  5-40 mL IntraVENous PRN  
 acetaminophen (TYLENOL) tablet 650 mg  650 mg Oral Q4H PRN  
 ondansetron (ZOFRAN) injection 4 mg  4 mg IntraVENous Q4H PRN  
 morphine injection 2 mg  2 mg IntraVENous Q4H PRN  
 [Held by provider] heparin (porcine) injection 5,000 Units  5,000 Units SubCUTAneous Q8H  
 lactobac ac& pc-s.therm-b.anim (KERLINE Q/RISAQUAD)  1 Cap Oral DAILY  LORazepam (ATIVAN) injection 1 mg  1 mg IntraVENous Q6H PRN  
 naloxone (NARCAN) injection 0.4 mg  0.4 mg IntraVENous PRN  thiamine HCL (B-1) tablet 100 mg  100 mg Oral DAILY  folic acid (FOLVITE) tablet 1 mg  1 mg Oral DAILY  therapeutic multivitamin (THERAGRAN) tablet 1 Tab  1 Tab Oral DAILY  amLODIPine (NORVASC) tablet 5 mg  5 mg Oral BID And  
 lisinopril (PRINIVIL, ZESTRIL) tablet 20 mg  20 mg Oral BID  
 
______________________________________________________________________ EXPECTED LENGTH OF STAY: 4d 2h 
ACTUAL LENGTH OF STAY:          5 Lucille Chang MD

## 2019-07-10 NOTE — PROGRESS NOTES
TRANSFER - IN REPORT: 
 
Verbal report received from [facility] CVT on Yuko Leiva  being received from procedure area for routine progression of care. Report consisted of patients Situation, Background, Assessment and Recommendations(SBAR). Information from the following report(s) SBAR, Procedure Summary, MAR, Recent Results and Cardiac Rhythm Afib was reviewed with the receiving clinician. Opportunity for questions and clarification was provided. Assessment completed upon patients arrival to [address] [place] [id number] and care assumed. Cardiac Cath Lab Recovery Arrival Note: 
 
Yuko Leiva arrived to [facility] recovery area. Patient procedure=R/LHC. Patient on cardiac monitor, non-invasive blood pressure, SPO2 monitor. On Room air. IV  of NS on pump at 20 ml/hr. Patient status doing well without problems. Patient is A&Ox 4. Patient reports No pain. PROCEDURE SITE CHECK: 
 
Procedure site:without any bleeding and No Hematoma, No pain/discomfort reported at procedure site. No change in patient status. Continue to monitor patient and status.

## 2019-07-11 NOTE — PROGRESS NOTES
Spiritual Care Assessment/Progress Note [facility][note 3] [facility] 
 
 
NAME: Alexander Patel      MRN: [id number] AGE: 72 y.o. SEX: male Jainism Affiliation: No preference Language: [place] 7/11/2019     Total Time (in minutes): 4439 Spiritual Assessment begun in [address] [facility] [address] through conversation with: 
  
    [x]Patient        [x] Family    [] Friend(s) Reason for Consult: Initial/Spiritual assessment, patient floor, Initial visit Spiritual beliefs: (Please include comment if needed) [x] Identifies with a elenita tradition:     
   [] Supported by a elenita community:        
   [] Claims no spiritual orientation:       
   [] Seeking spiritual identity:            
   [] Adheres to an individual form of spirituality:       
   [] Not able to assess:                   
 
    
Identified resources for coping:  
   [x] Prayer                           
   [] Music                  [] Guided Imagery [x] Family/friends                 [] Pet visits [] Devotional reading                         [] Unknown 
   [] Other:                                          
 
 
Interventions offered during this visit: (See comments for more details) Patient Interventions: Affirmation of elenita, Affirmation of emotions/emotional suffering, Initial/Spiritual assessment, patient floor, Initial visit, Prayer (assurance of) Plan of Care: 
 
 [x] Support spiritual and/or cultural needs  
 [] Support AMD and/or advance care planning process    
 [] Support grieving process 
 [] Coordinate Rites and/or Rituals  
 [] Coordination with community clergy [] No spiritual needs identified at this time 
 [] Detailed Plan of Care below (See Comments)  [] Make referral to Music Therapy 
[] Make referral to Pet Therapy    
[] Make referral to Addiction services 
[] Make referral to [facility] 
[] Make referral to Spiritual Care Partner 
[] No future visits requested [x] Follow up visits as needed Comments:  made initial visit to patients room in Tele. Patient was lying in bed and patients wife was just leaving the room to go get some coffee when  arrived. Patient said he was doing better and he hoped to be released soon. He explained why he was here. Patients support group his his wife, son and daughter. His son and daughter visit in the evenings when they get off work. Patient said he goes to [place] sometimes with his wife, but is not as active as she is. When he goes it is to a [place].  provided pastoral care and support to the patient. Spiritual Care will follow up as needed. Petey Ureña MDiv Pager: [phone]

## 2019-07-11 NOTE — PROGRESS NOTES
07/11/19 1924 Vitals Temp 97.4 °F (36.3 °C) Temp Source Oral  
Pulse (Heart Rate) 81 Heart Rate Source Monitor Resp Rate 18  
O2 Sat (%) 100 % Level of Consciousness Alert  
BP (!) 59/37 (RN Notified) MAP (Calculated) (!) 44 BP 1 Location Right arm BP 1 Method Automatic  
BP Patient Position At rest  
MEWS Score 4  
rechecked bp 91/38, partient alert but feeling weak and diaphoretic. Checked blood glucose which was 175. Patient reports he had a small amount of emesis and is having lower back pain rating 6/10, \"the same pain I get when I'm feeling sick\". Administered zofran and tylenol and will report findings to hospitalist on call. Arthur Ballard NP aware of findings and will place orders accordingly.

## 2019-07-11 NOTE — PROGRESS NOTES
[facility] Pulmonary, Critical Care, and Sleep Medicine Progress note Name: Maggi Phelps MRN: [id number] : 1945 [facility] Date: 2019 IMPRESSION:  
· Mild PHTN- by ECHO PASP 79 EF 50% no AV/MV disease, but RHC data (below) a bit better. Portopulmonary HTN- ETOH abuse and hypoalbuminemic ? Occult cirrhosis · S/p LHC/RCH 7/10: RV 60/4, PA 61/15 mean 29, /7, /48, RA mean 4, PCW mean 7, CO 2.85 
· HTN 
· Right effusion-exudate- ? From [place] [address] or other DDX is hepatic hydrothorax - cultures and cytology negative · SBO- conservative MGMT  
  
RECOMMENDATIONS:  
· Despite multiple medical issues appears that there may be some pre-capillary pulmonary hypertension. Will need further workup, this can be done inpatient or outpatient. Will order VQ scan and CTD panel for today. · liver US - congestion suspect steatosis, + hepatopetal flow · Pt is acutely ill and at risk for decline due to PHTN 
· O2 titraiton above 90% · Chest x-ray this AM -- personally reviewed, much better · Would ambulate, and if functionally does well from a pulmonary perspective we can continue workup outpatient Subjective:  
 
 States that his breathing feels much better, wife at bedside tells me that he looks better and much more comfortable than previously. CXR much improved. 7/10 
stable  This patient has been seen and evaluated at the request of Dr. Antelmo Jones for PHTN. Patient is a 72 y.o. male h/o ETOH abuse presents with 6 months progressive Lozano and presented with large right effusion- tapped- exudative. Negative micro. Thoracic placed pigtail and now out. ECHO with severe PHTN. Pt alert denies h/o PE, CTD, no h/o lung disease. Past Medical History:  
Diagnosis Date  Arthritic-like pain  Hypertension History reviewed. No pertinent surgical history. Prior to Admission medications Medication Sig Start Date End Date Taking? Authorizing Provider  
therapeutic multivitamin [facility]) tablet Take 1 Tab by mouth daily. Yes Provider, Historical  
ergocalciferol (VITAMIN D2) 50,000 unit capsule Take 50,000 Units by mouth every . Yes Provider, Historical  
aspirin delayed-release 81 mg tablet Take  by mouth daily. Yes Provider, Historical  
 
No Known Allergies Social History Tobacco Use  Smoking status: Former Smoker Start date: 1966 Last attempt to quit: 1984 Years since quittin.6  Smokeless tobacco: Never Used Substance Use Topics  Alcohol use: Yes Alcohol/week: 7.0 oz Types: 14 drink(s) per week Family History Problem Relation Age of Onset  Diabetes Mother  Hypertension Mother  Heart Disease Mother  Asthma Mother [facility] Arthritis-osteo Mother  Diabetes Father  Hypertension Father  Asthma Father  Arthritis-osteo Father  Diabetes Sister  Gout Sister  Asthma Brother Current Facility-Administered Medications Medication Dose Route Frequency  furosemide (LASIX) tablet 40 mg  40 mg Oral DAILY  aspirin delayed-release tablet 81 mg  81 mg Oral DAILY  sodium chloride (NS) flush 5-40 mL  5-40 mL IntraVENous Q8H  
 [Held by provider] heparin (porcine) injection 5,000 Units  5,000 Units SubCUTAneous Q8H  
 lactobac ac& pc-s.therm-b.anim (KERLINE Q/RISAQUAD)  1 Cap Oral DAILY  thiamine HCL (B-1) tablet 100 mg  100 mg Oral DAILY  folic acid (FOLVITE) tablet 1 mg  1 mg Oral DAILY  therapeutic multivitamin (THERAGRAN) tablet 1 Tab  1 Tab Oral DAILY  amLODIPine (NORVASC) tablet 5 mg  5 mg Oral BID And  
 lisinopril (PRINIVIL, ZESTRIL) tablet 20 mg  20 mg Oral BID Review of Systems: A comprehensive review of systems was negative except for that written in the HPI. Objective:  
Vital Signs:   
Visit Vitals /45 (BP 1 Location: Right arm, BP Patient Position: At rest) Pulse (!) 53 Temp 97.7 °F (36.5 °C) Resp 16 Ht 5' 8\" (1.727 m) Wt 64.2 kg (141 lb 8.6 oz) SpO2 99% BMI 21.52 kg/m² O2 Device: Room air O2 Flow Rate (L/min): 2 l/min Temp (24hrs), Av.2 °F (36.8 °C), Min:97.7 °F (36.5 °C), Max:98.5 °F (36.9 °C) Intake/Output:  
Last shift:      No intake/output data recorded. Last 3 shifts:  1901 -  0700 In: 240 [P.O.:240] Out: - Intake/Output Summary (Last 24 hours) at 2019 1130 Last data filed at 2019 7961 Gross per 24 hour Intake 240 ml Output  Net 240 ml Physical Exam:  
General:  Alert, cooperative, no distress, appears stated age. Head:  Normocephalic, without obvious abnormality, atraumatic. Eyes:  Conjunctivae/corneas clear. Nose: Nares normal. Septum midline Neck: Supple, symmetrical, trachea midline Lungs:   Clear to auscultation bilaterally. Heart:  Regular rate and rhythm, S1, S2 normal, no murmur, click, rub or gallop. Abdomen:   Soft, non-tender. Bowel sounds normal. No masses,  No organomegaly. Extremities: Extremities normal, atraumatic, no cyanosis or edema. Pulses: 2+ and symmetric all extremities. Skin: Skin color, texture, turgor normal. No rashes or lesions Data review:  
 
Recent Results (from the past 24 hour(s)) METABOLIC PANEL, BASIC Collection Time: 19  4:04 AM  
Result Value Ref Range Sodium 138 136 - 145 mmol/L Potassium 4.3 3.5 - 5.1 mmol/L Chloride 109 (H) 97 - 108 mmol/L  
 CO2 21 21 - 32 mmol/L Anion gap 8 5 - 15 mmol/L Glucose 116 (H) 65 - 100 mg/dL BUN 44 (H) 6 - 20 MG/DL Creatinine 1.68 (H) 0.70 - 1.30 MG/DL  
 BUN/Creatinine ratio 26 (H) 12 - 20 GFR est AA 49 (L) >60 ml/min/1.73m2 GFR est non-AA 40 (L) >60 ml/min/1.73m2 Calcium 10.8 (H) 8.5 - 10.1 MG/DL  
CBC W/O DIFF  Collection Time: 19  4:04 AM  
 Result Value Ref Range WBC 7.4 4.1 - 11.1 K/uL  
 RBC 4.79 4.10 - 5.70 M/uL  
 HGB 12.4 12.1 - 17.0 g/dL HCT 39.9 36.6 - 50.3 % MCV 83.3 80.0 - 99.0 FL  
 MCH 25.9 (L) 26.0 - 34.0 PG  
 MCHC 31.1 30.0 - 36.5 g/dL  
 RDW 13.4 11.5 - 14.5 % PLATELET 946 516 - 196 K/uL MPV 10.7 8.9 - 12.9 FL  
 NRBC 0.0 0  WBC ABSOLUTE NRBC 0.00 0.00 - 0.01 K/uL Imaging: 
I have personally reviewed the patients radiographs and have reviewed the reports: 
7/5 CT chest moderate to large layering right effusion and RLL ATX LLL ASD no ILD changes no masses- main PA large YURY Syed

## 2019-07-11 NOTE — PROGRESS NOTES
Problem: Mobility Impaired (Adult and Pediatric) Goal: *Acute Goals and Plan of Care (Insert Text) Description Physical Therapy Goals Initiated 7/9/2019 1. Patient will move from supine to sit and sit to supine  in bed with independence within 7 day(s). 2.  Patient will transfer from bed to chair and chair to bed with independence using the least restrictive device within 7 day(s). 3.  Patient will perform sit to stand with independence within 7 day(s). 4.  Patient will ambulate with modified independence for 150 feet with the least restrictive device within 7 day(s). 5.  Patient will ascend/descend 13 stairs with 1 handrail(s) with modified independence within 7 day(s). Outcome: Progressing Towards Goal 
 PHYSICAL THERAPY TREATMENT Patient: Acacia Abarca [de-identified]72 y.o. male) Date: 7/11/2019 Diagnosis: Acute CHF (congestive heart failure) ([facility]) [I50.9] Acute CHF (congestive heart failure) ([facility] [address] Procedure(s) (LRB): 
Left And Right Heart Cath / Coronary Angiography (N/A) Left Ventriculography (N/A) 1 Day Post-Op Precautions:   
Chart, physical therapy assessment, plan of care and goals were reviewed. ASSESSMENT Based on the objective data described below, the patient is progressing very well towards functional goals. He was able to gait train 140' with SBA with a safe casey and good gait mechanics. No LOB during gait and good overall safety awareness. Discussed continued gait with his wife and encouraged him to ambulate with family or staff assistance. Will plan to follow x1-2 additional visits, complete a 6MWT, and likely sign off with no discharge recommendations needed. Current Level of Function Impacting Discharge (mobility/balance): SBA overall for transfers PLAN : 
Patient continues to benefit from skilled intervention to address the above impairments. Continue treatment per established plan of care. to address goals. Recommendation for discharge: (in order for the patient to meet his/her long term goals) No therapy/ follow up rehabilitation needs This discharge recommendation: 
Has been made in collaboration with the attending provider and/or case management Equipment recommendations for successful discharge (if) home: None SUBJECTIVE:  
Patient stated ? I think I would do better with my shoes. ? OBJECTIVE DATA SUMMARY:  
Critical Behavior: 
Neurologic State: Alert Orientation Level: Oriented X4 Cognition: Follows commands Functional Mobility Training: 
Bed Mobility: 
  
Supine to Sit: Stand-by assistance Transfers: 
Sit to Stand: Stand-by assistance Stand to Sit: Stand-by assistance Balance: 
Sitting: Intact Standing: Impaired Standing - Static: Fair Standing - Dynamic : Fair Ambulation/Gait Training: 
Distance (ft): 140 Feet (ft) Assistive Device: Gait belt Ambulation - Level of Assistance: Stand-by assistance Base of Support: Widened Activity Tolerance:  
Fair Please refer to the flowsheet for vital signs taken during this treatment. After treatment patient left:  
Up in chair Call light within reach RN notified Family at bedside COMMUNICATION/COLLABORATION:  
The patient?s plan of care was discussed with: Registered Nurse Burke Wilson, PT, DPT Time Calculation: 16 mins

## 2019-07-11 NOTE — PROGRESS NOTES
Hospitalist Progress Note Davey Manzanares MD 
Answering service: [phone] OR [phone] from in house phone Date of Service:  2019 NAME:  Nury Lewis :  1945 MRN:  [id number] Admission Summary: This is a 65-year-old man with a past medical history significant for hypertension, who was in his usual state of health until about a week ago when the patient developed abdominal pain. The pain is located at the epigastric region, 8/10 in severity. No radiation. No known aggravating or relieving factors. The patient described the pain as a dull ache associated with constipation but no nausea, no vomiting. The patient took laxative without any significant relief of the constipation. The patient was brought to the emergency room for further evaluation. When the EMS arrived at the scene, the patient's oxygen saturation was 86% on room air. CT scan of the abdomen and pelvis performed by the emergency room provider shows right pleural effusion and suspected small bowel obstruction. Interval history / Subjective:  
Patient feels nausea, vomited once this morning, no abdo pain, no cp/sob. Wife at bedside Assessment & Plan: #. Pulm HTN: severe, PaPressure = 79 on TTE  
- Cont lasix and home meds, troponin neg x3 
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 - Pulmonary following, running tests to evaluate for causes for pHTN and Tx options #. Acute hypoxemic resp failure-resolved - due to large Right pleural effusion and compressive atelectasis. - pleural drain placed 19- 2L output in [id number] - CXR showing well expanded right lung- Pleural drain removed 19 3. Partial SBO due to small umbilical hernia- resolved- Hernia reduced in ER 
4. Bilateral leg swelling. - dopplers were negative for DVT. 5.  Bacterial PNA- resolved. 6.  Alcohol abuse. The patient drinks about 14 drinks per week. thiamine, folic acid, and multivitamin. WA protocol- No signs of withdrawl Code status:FULL 
DVT prophylaxis: Heparin Care Plan discussed with: Patient/Family Disposition: Home w/Family and TBD Hospital Problems  Date Reviewed: 7/5/2019 Codes Class Noted POA * (Principal) Acute CHF (congestive heart failure) (HCC) ICD-10-CM: I50.9 ICD-9-CM: 428.0  7/5/2019 Yes Pleural effusion, right ICD-10-CM: J90 ICD-9-CM: 511.9  7/5/2019 Unknown Review of Systems: A comprehensive review of systems was negative except for that written in the subjective section Vital Signs:  
 Last 24hrs VS reviewed since prior progress note. Most recent are: 
Visit Vitals /46 (BP 1 Location: Right arm, BP Patient Position: At rest) Pulse 67 Temp 98.3 °F (36.8 °C) Resp 16 Ht 5' 8\" (1.727 m) Wt 64.2 kg (141 lb 8.6 oz) SpO2 100% BMI 21.52 kg/m² Intake/Output Summary (Last 24 hours) at 7/11/2019 0915 Last data filed at 7/11/2019 8823 Gross per 24 hour Intake 240 ml Output  Net 240 ml Physical Examination:  
 
Constitutional:  No acute distress, cooperative, pleasant   
   
Resp:  CTA bilaterally. No wheezing/rhonchi/rales. CV:  Regular rhythm, normal rate GI:  Soft, non distended, non tender Musculoskeletal:  mild LE edema, warm Neurologic:  Moves all extremities. AAOx3, Data Review:  
 Review and/or order of tests in the radiology section of CPT Review and/or order of tests in the medicine section of CPT Labs:  
 
Recent Labs  
  07/11/19 
0404 07/09/19 
0153 WBC 7.4 5.6 HGB 12.4 12.8 HCT 39.9 40.7  190 Recent Labs  
  07/11/19 
0404 07/09/19 
0153  136  
K 4.3 4.0  
* 105 CO2 21 25 BUN 44* 25* CREA 1.68* 1.23  
* 110* CA 10.8* 9.4 No results for input(s): SGOT, GPT, ALT, AP, TBIL, TBILI, TP, ALB, GLOB, GGT, AML, LPSE in the last 72 hours. No lab exists for component: AMYP, HLPSE No results for input(s): INR, PTP, APTT in the last 72 hours. No lab exists for component: INREXT, INREXT No results for input(s): FE, TIBC, PSAT, FERR in the last 72 hours. No results found for: FOL, RBCF No results for input(s): PH, PCO2, PO2 in the last 72 hours. No results for input(s): CPK, CKNDX, TROIQ in the last 72 hours. No lab exists for component: CPKMB Lab Results Component Value Date/Time Cholesterol, total 134 07/06/2019 03:50 AM  
 HDL Cholesterol 56 07/06/2019 03:50 AM  
 LDL, calculated 68 07/06/2019 03:50 AM  
 Triglyceride 50 07/06/2019 03:50 AM  
 CHOL/HDL Ratio 2.4 07/06/2019 03:50 AM  
 
Lab Results Component Value Date/Time Glucose (POC) 87 07/05/2019 12:02 PM  
 Glucose (POC) 101 (H) 07/05/2019 08:47 AM  
 
No results found for: COLOR, APPRN, SPGRU, REFSG, ANGELI, PROTU, GLUCU, KETU, BILU, UROU, TREVOR, LEUKU, GLUKE, EPSU, BACTU, WBCU, RBCU, CASTS, UCRY Medications Reviewed:  
 
Current Facility-Administered Medications Medication Dose Route Frequency  furosemide (LASIX) tablet 40 mg  40 mg Oral DAILY  aspirin delayed-release tablet 81 mg  81 mg Oral DAILY  sodium chloride (NS) flush 5-40 mL  5-40 mL IntraVENous Q8H  
 sodium chloride (NS) flush 5-40 mL  5-40 mL IntraVENous PRN  
 acetaminophen (TYLENOL) tablet 650 mg  650 mg Oral Q4H PRN  
 ondansetron (ZOFRAN) injection 4 mg  4 mg IntraVENous Q4H PRN  
 morphine injection 2 mg  2 mg IntraVENous Q4H PRN  
 [Held by provider] heparin (porcine) injection 5,000 Units  5,000 Units SubCUTAneous Q8H  
 lactobac ac& pc-s.therm-b.anim (KERLINE Q/RISAQUAD)  1 Cap Oral DAILY  LORazepam (ATIVAN) injection 1 mg  1 mg IntraVENous Q6H PRN  
 naloxone (NARCAN) injection 0.4 mg  0.4 mg IntraVENous PRN  thiamine HCL (B-1) tablet 100 mg  100 mg Oral DAILY  folic acid (FOLVITE) tablet 1 mg  1 mg Oral DAILY  therapeutic multivitamin (THERAGRAN) tablet 1 Tab  1 Tab Oral DAILY  amLODIPine (NORVASC) tablet 5 mg  5 mg Oral BID And  
 lisinopril (PRINIVIL, ZESTRIL) tablet 20 mg  20 mg Oral BID  
 
______________________________________________________________________ EXPECTED LENGTH OF STAY: 4d 2h 
ACTUAL LENGTH OF STAY:          6 Xochilt Castellanos MD

## 2019-07-12 NOTE — ACP (ADVANCE CARE PLANNING)
Advance Care Planning Note Name: Maggi Phelps YOB: 1945 MRN: [id number] Admission Date: 7/4/2019 11:31 PM 
 
Date of discussion: 7/12/2019 Active Diagnoses: 
 
Hospital Problems  Date Reviewed: 7/5/2019 Codes Class Noted POA * (Principal) Acute CHF (congestive heart failure) (HCC) ICD-10-CM: I50.9 ICD-9-CM: 428.0  7/5/2019 Yes Pleural effusion, right ICD-10-CM: J90 ICD-9-CM: 511.9  7/5/2019 Unknown These active diagnoses are of sufficient risk that focused discussion on advance care planning is indicated in order to allow the patient to thoughtfully consider personal goals of care, and if situations arise that prevent the ability to personally give input, to ensure appropriate representation of their personal desires for different levels and aggressiveness of care. Discussion:  
 
Persons present and participating in discussion: Akil Dallas MD, Wife Discussion: discussed current condition, short and long term prognosis, Code status, wants to be DNR, will get his wife and daughter as mPOA. Time Spent:  
 
Total time spent face-to-face in education and discussion: 17 minutes.   
 
Keyana Grider MD 
7/12/2019 
9:20 AM

## 2019-07-12 NOTE — PROGRESS NOTES
Bedside shift change report given to CHRISTINA Mcgee (oncoming nurse) by Darylene Radish, RN (offgoing nurse). Report included the following information SBAR.

## 2019-07-12 NOTE — PROGRESS NOTES
NUTRITION COMPLETE ASSESSMENT 
 
RECOMMENDATIONS:  
1. Continue Cardiac diet 2. Continue Ensure Enlive BID- chocolate 3. Watch BG- slightly elevated currently, no A1C. Phos elevated, Cr trending up- monitor renal function. Interventions/Plan:  
Food/Nutrient Delivery:  General/healthful diet Commercial supplement Nutrition Education:    Heart healthy diet info given Assessment:  
Reason for Assessment:  
[] Provider Consult 
[]BPA/MST Referral  
[x]LOS []Reassessment  
[]NPO/Clear Liquid []At Nutrition Risk []Other Diet: Cardiac Supplements: Ensure enlive BID - watch BG 
Nutritionally Significant Medications: [x] Reviewed Meal Intake:  
Patient Vitals for the past 100 hrs: 
 % Diet Eaten 07/09/19 1222 80 % Pre-Hospitalization: 
Usual Appetite: Good Current Hospitalization:  
Fluid Restriction:   
Appetite: Poor PO Ability: Independent Average po intake:25-50% Average supplements intake:    
  
Subjective: Wife asking about tomato soup kitchen uses because pt likes it. Asking good questions about heart healthy foods. Objective: 
72 yr old male admitted with abdominal pain, Acute CHF. PMhx: HTN, Arthritis. Pt screened for LOS. Pt not eating well last 2 days or so. Spoke with family and pt. Pt states he is nauseated. Notified RN of need for meds and Zofran is PRN and told pt to ask for meds if nausea continues. Ensure started today per by MD. Continue add chocolate flavor pref. No weight loss recently. Wt loss previously a few years ago. No chew/swallow difficulties. Pt vomited yesterday per family. No emesis today, but some phlegm spitting. Pt had Ensure at bedside table for PM snack. Check PO and nausea. Noted Cr trending up quickly. Estimated Nutrition Needs:  
Kcals/day: 0134 Kcals/day(BMR(1361x1.3)) Protein: 63 g(-76g/day(1.0-1.2g/kg)) Fluid:   1750 mL Based On: [place] 1898 Weight Used: Actual wt(63.2kg) Pt expected to meet estimated nutrient needs:  []   Yes     []  No [] Unable to predict at this time Nutrition Diagnosis:  
1. Inadequate protein-energy intake related to decreased ability to consume sufficient energy as evidenced by poor PO currently, nauseated. Goals: Pt will consume >50% of meals and ONS within 3-5 days Monitoring & Evaluation: - Total energy intake - Weight/weight change, GI profile, GI 
 -   
 
Previous Nutrition Goals Met:   N/A Previous Recommendations:    N/A Education & Discharge Needs: 
 [] None Identified 
 [x] Identified and addressed  
 [] Participated in care plan, discharge planning, and/or interdisciplinary rounds Cultural, Hinduism and ethnic food preferences identified: None Skin Integrity: [x]Intact  []Other Edema: []None [x]Other: trace Last BM: 7/12 Food Allergies: []None []Other Diet Restrictions: Cultural/Bahai Preference(s): None Anthropometrics: Wt Readings from Last 30 Encounters:  
07/12/19 63.2 kg (139 lb 5.3 oz)  
12/16/14 81.2 kg (179 lb)  
11/18/14 82.6 kg (182 lb)  
11/11/14 85.6 kg (188 lb 12.8 oz) Weight Loss Metrics 7/12/2019 12/16/2014 11/18/2014 11/11/2014 Today's Wt 139 lb 5.3 oz 179 lb 182 lb 188 lb 12.8 oz  
BMI 21.19 kg/m2 27.22 kg/m2 27.68 kg/m2 28.71 kg/m2 Weight Source: Standing scale (comment) Height: 5' 8\" (172.7 cm), Body mass index is 21.19 kg/m². Labs:   
Lab Results Component Value Date/Time  Sodium 141 07/12/2019 10:57 AM  
 Potassium 4.8 07/12/2019 10:57 AM  
 Chloride 113 (H) 07/12/2019 10:57 AM  
 CO2 20 (L) 07/12/2019 10:57 AM  
 Glucose 125 (H) 07/12/2019 10:57 AM  
 BUN 86 (H) 07/12/2019 10:57 AM  
 Creatinine 3.41 (H) 07/12/2019 10:57 AM  
 Calcium 9.9 07/12/2019 10:57 AM  
 Magnesium 2.5 (H) 07/11/2019 08:37 PM  
 Phosphorus 5.7 (H) 07/11/2019 08:37 PM  
 Albumin 2.5 (L) 07/11/2019 08:37 PM  
 
No results found for: HBA1C, HGBE8, [id number], [id number] 
 
 Elba Gunn, ROSALIO Pager: [phone]

## 2019-07-12 NOTE — PROGRESS NOTES
PHYSICAL THERAPY TREATMENT Patient: Robert Ellis [de-identified]72 y.o. male) Date: 7/12/2019 Diagnosis: Acute CHF (congestive heart failure) ([facility]) [I50.9] Acute CHF (congestive heart failure) ([facility] [address] Procedure(s) (LRB): 
Left And Right Heart Cath / Coronary Angiography (N/A) Left Ventriculography (N/A) 2 Days Post-Op Precautions:  standard Chart, physical therapy assessment, plan of care and goals were reviewed. ASSESSMENT: Patient demonstrating safe ambulation and able to complete 6 minute walk test( see results below). He was able to ambulate 446.4 feet with supervision and after a seated rest negotiate 8 steps with 1 rail with supervision. He was able to navigate in tight spaces in room to utilize toilet with safety. Patient safe to return home with assist as needed from family. Progression toward goals: 
?    Improving appropriately and progressing toward goals ? Improving slowly and progressing toward goals ? Not making progress toward goals and plan of care will be adjusted PLAN: 
Patient continues to benefit from skilled intervention to address the above impairments. Continue treatment per established plan of care. Discharge Recommendations:  None Further Equipment Recommendations for Discharge:  none SUBJECTIVE:  
Patient stated ? I can walk.? OBJECTIVE DATA SUMMARY:  
Critical Behavior: 
Neurologic State: Alert Orientation Level: Oriented X4 Cognition: Appropriate decision making Functional Mobility Training: 
Bed Mobility: 
 Supine to Sit: Modified independent Transfers: 
Sit to Stand: Modified independent Stand to Sit: Modified independent Balance: 
Sitting: Intact Standing: Intact Standing - Static: Good Standing - Dynamic : Good Ambulation/Gait Training: 
Distance (ft): 446 Feet (ft) Assistive Device: Gait belt Ambulation - Level of Assistance: Supervision Gait Abnormalities: Decreased step clearance Base of Support: Narrowed Speed/Melida: Slow Stairs: 
Number of Stairs Trained: 8 Stairs - Level of Assistance: Supervision Rail Use: Right 6 MWT results: (Specify if any supplemental oxygen is used, the type, pre, during and post sats.) Paul Rating of Perceived exertion (0-10 point scale):  
Pre HR  73 Beginnin Pre  O2 sats 100% Mid walk: 5 Post HR  84 End: 7 Post  O2 sats 93% Assistive device used: Assistive Device: Gait belt Normative data:  
Men 39-80 years old = 1889 feet; Women 3680 years yai=9649 feet Modified 10 point Paul RPE scale utilized: 
0 = no breathlessness at all ---> 10 = maximum exertion Please refer to the flowsheet for any additional vital signs taken during this treatment. Pain: 
Pain Scale 1: Numeric (0 - 10) Pain Intensity 1: 2 Activity Tolerance:  
 
Please refer to the flowsheet for vital signs taken during this treatment. After treatment:  
?    Patient left in no apparent distress sitting up in chair ? Patient left in no apparent distress in bed 
? Call bell left within reach ? Nursing notified ? Caregiver present ? Bed alarm activated COMMUNICATION/COLLABORATION:  
The patient?s plan of care was discussed with: Registered Nurse Jasen Samaniego, PT Time Calculation: 23 mins

## 2019-07-12 NOTE — ACP (ADVANCE CARE PLANNING)
Visited Mr Solange Black in room 442 regarding a consult for AMD information. Mr Solange Black was lying quietly in bed & his wife, daughter, & granddaughter were also present. Patient and family appeared to be in good spirits; they stated that they would like to receive information about AMDs. Provided patient and family a copy of the booklet, \"Your Right to Decide,\" and a blank AMD form. Briefly reviewed the AMD with patient and family, explaining the purpose of each section of the document; provided sufficient time for patient and family to express concerns and to have them answered to their stated satisfaction. With their permission, left the information with them for discussion and encouraged them to have  contacted if they had further questions &/or if patient would like to complete an AMD.  : Rev. Petar Harris. Manuel Quintero; [facility], to contact [phone] [address] call: [phone]

## 2019-07-12 NOTE — NURSE NAVIGATOR
Follow up scheduled with [facility] with Dr. Yulisa Guzmán for 7/19/1 at 3 pm.  Information on After Visit Summary.

## 2019-07-12 NOTE — PROGRESS NOTES
Visited Mr Sendy Toussaint in room 442 regarding a consult for AMD information. Mr Sendy Toussaint was lying quietly in bed & his wife, daughter, & granddaughter were also present. Patient and family appeared to be in good spirits; they stated that they would like to receive information about AMDs. Provided patient and family a copy of the booklet, \"Your Right to Decide,\" and a blank AMD form. Briefly reviewed the AMD with patient and family, explaining the purpose of each section of the document; provided sufficient time for patient and family to express concerns and to have them answered to their stated satisfaction. With their permission, left the information with them for discussion and encouraged them to have  contacted if they had further questions &/or if patient would like to complete an AMD. Assured patient and family of ongoing  availability for support. : Rev. Trang Mclaughlin. Carlos Alberto Marrufo; [facility], to contact [phone] [address] Smith call: [phone]

## 2019-07-12 NOTE — ROUTINE PROCESS
Bedside and Verbal shift change report given to Linh (oncoming nurse) by Sheryl Rubio (offgoing nurse). Report included the following information SBAR, Kardex, MAR and Cardiac Rhythm A. Flutter.

## 2019-07-12 NOTE — PROGRESS NOTES
Hospitalist Progress Note Jimbo Veronica MD 
Answering service: [phone] OR [phone] from in house phone Date of Service:  2019 NAME:  Colin Luna :  1945 MRN:  [id number] Admission Summary: This is a 68-year-old man with a past medical history significant for hypertension, who was in his usual state of health until about a week ago when the patient developed abdominal pain. The pain is located at the epigastric region, 8/10 in severity. No radiation. No known aggravating or relieving factors. The patient described the pain as a dull ache associated with constipation but no nausea, no vomiting. The patient took laxative without any significant relief of the constipation. The patient was brought to the emergency room for further evaluation. When the EMS arrived at the scene, the patient's oxygen saturation was 86% on room air. CT scan of the abdomen and pelvis performed by the emergency room provider shows right pleural effusion and suspected small bowel obstruction. Interval history / Subjective:  
Patient feels ok, wife at bedside, still has poor appetite, occasional vomiting, some back pain when eating. Will see if can get about ok, BP was low yesterday, hold BP meds. If stable could dc later today or tomorrow Assessment & Plan: 1. Pulm HTN: severe, PaPressure = 79 on TTE  
- Cont lasix and home meds, troponin neg x3 
- echo left and right heart failure EF 45-50, mod-severe TR with pulm - Cardiology following, s/p LHC: clear arteries, RHC 
- Pulmonary following, running tests to evaluate for possible cause for pHTN 
- running low BP, will hold meds today. 2.  Acute hypoxemic resp failure-resolved - due to large Right pleural effusion and compressive atelectasis. - pleural drain placed 19- 2L output in [id number] - CXR showing well expanded right lung- Pleural drain removed 19 3.  Partial small bowel obstruction due to small umbilical hernia- resolved General surgery consulted- Hernia reduced in ER and at bedside- Advanced diet 4. Bilateral leg swelling. - dopplers were negative for DVT. 5.  Bacterial pneumonia. cont Rocephin and Zithromax for suspected bacterial pneumonia. 6.  Reported hx of alcohol abuse. The patient drinks about 14 drinks per week. cont thiamine, folic acid, and multivitamin. WA protocol- No signs of withdrawl Code status:FULL 
DVT prophylaxis: Heparin Care Plan discussed with: Patient/Family Disposition: Home w/Family and TBD Hospital Problems  Date Reviewed: 7/5/2019 Codes Class Noted POA * (Principal) Acute CHF (congestive heart failure) (HCC) ICD-10-CM: I50.9 ICD-9-CM: 428.0  7/5/2019 Yes Pleural effusion, right ICD-10-CM: J90 ICD-9-CM: 511.9  7/5/2019 Unknown Review of Systems: A comprehensive review of systems was negative except for that written in the subjective section Vital Signs:  
 Last 24hrs VS reviewed since prior progress note. Most recent are: 
Visit Vitals /45 (BP 1 Location: Right arm, BP Patient Position: At rest) Pulse 65 Temp 97.9 °F (36.6 °C) Resp 18 Ht 5' 8\" (1.727 m) Wt 63.2 kg (139 lb 5.3 oz) SpO2 100% BMI 21.19 kg/m² No intake or output data in the 24 hours ending 07/12/19 0845 Physical Examination:  
 
Constitutional:  No acute distress, cooperative, pleasant   
   
Resp:  CTA bilaterally. No wheezing/rhonchi/rales. GI:  Soft, non distended, non tender Musculoskeletal:  mild LE edema, warm Neurologic:  Moves all extremities. AAOx3, Data Review:  
 Review and/or order of tests in the radiology section of CPT Review and/or order of tests in the medicine section of CPT Labs:  
 
Recent Labs  
  07/11/19 
2037 07/11/19 
0404 WBC 12.4* 7.4 HGB 9.8* 12.4 HCT 32.6* 39.9  242 Recent Labs  
  07/11/19 2037 07/11/19 
0404  138  
K 4.1 4.3 * 109* CO2 17* 21 BUN 55* 44* CREA 2.10* 1.68* * 116* CA 10.4* 10.8* MG 2.5*  --   
PHOS 5.7*  --   
 
Recent Labs  
  07/11/19 2037 SGOT 56* ALT 54  TBILI 0.2 TP 6.1* ALB 2.5*  
GLOB 3.6 No results for input(s): INR, PTP, APTT in the last 72 hours. No lab exists for component: INREXT, INREXT No results for input(s): FE, TIBC, PSAT, FERR in the last 72 hours. No results found for: FOL, RBCF No results for input(s): PH, PCO2, PO2 in the last 72 hours. Recent Labs  
  07/11/19 2037 TROIQ 0.06* Lab Results Component Value Date/Time Cholesterol, total 134 07/06/2019 03:50 AM  
 HDL Cholesterol 56 07/06/2019 03:50 AM  
 LDL, calculated 68 07/06/2019 03:50 AM  
 Triglyceride 50 07/06/2019 03:50 AM  
 CHOL/HDL Ratio 2.4 07/06/2019 03:50 AM  
 
Lab Results Component Value Date/Time Glucose (POC) 175 (H) 07/11/2019 07:56 PM  
 Glucose (POC) 87 07/05/2019 12:02 PM  
 Glucose (POC) 101 (H) 07/05/2019 08:47 AM  
 
No results found for: COLOR, APPRN, SPGRU, REFSG, ANGELI, PROTU, GLUCU, KETU, BILU, UROU, TREVOR, LEUKU, GLUKE, EPSU, BACTU, WBCU, RBCU, CASTS, UCRY Medications Reviewed:  
 
Current Facility-Administered Medications Medication Dose Route Frequency  calcium carbonate (TUMS) chewable tablet 200 mg [elemental]  200 mg Oral TID PRN  
 furosemide (LASIX) tablet 40 mg  40 mg Oral DAILY  aspirin delayed-release tablet 81 mg  81 mg Oral DAILY  sodium chloride (NS) flush 5-40 mL  5-40 mL IntraVENous Q8H  
 sodium chloride (NS) flush 5-40 mL  5-40 mL IntraVENous PRN  
 acetaminophen (TYLENOL) tablet 650 mg  650 mg Oral Q4H PRN  
 ondansetron (ZOFRAN) injection 4 mg  4 mg IntraVENous Q4H PRN  
 morphine injection 2 mg  2 mg IntraVENous Q4H PRN  
 [Held by provider] heparin (porcine) injection 5,000 Units  5,000 Units SubCUTAneous Q8H  
  lactobac ac& pc-s.therm-b.anim (KERLINE Q/RISAQUAD)  1 Cap Oral DAILY  LORazepam (ATIVAN) injection 1 mg  1 mg IntraVENous Q6H PRN  
 naloxone (NARCAN) injection 0.4 mg  0.4 mg IntraVENous PRN  thiamine HCL (B-1) tablet 100 mg  100 mg Oral DAILY  folic acid (FOLVITE) tablet 1 mg  1 mg Oral DAILY  therapeutic multivitamin (THERAGRAN) tablet 1 Tab  1 Tab Oral DAILY  amLODIPine (NORVASC) tablet 5 mg  5 mg Oral BID And  
 lisinopril (PRINIVIL, ZESTRIL) tablet 20 mg  20 mg Oral BID  
 
______________________________________________________________________ EXPECTED LENGTH OF STAY: 4d 2h 
ACTUAL LENGTH OF STAY:          7 Amy Whitley MD

## 2019-07-12 NOTE — PROGRESS NOTES
[facility] Progress note Name: Latricia Narayanan MRN: [id number] : 1945 [facility] Date: 2019 IMPRESSION:  
· Mild PHTN- by ECHO PASP 79 EF 50% no AV/MV disease, but RHC data (below) a bit better. Portopulmonary HTN- ETOH abuse and hypoalbuminemic ? Occult cirrhosis · S/p LHC/RCH 7/10: RV 60/4, PA 61/15 mean 29, /7, /48, RA mean 4, PCW mean 7, CO 2.85 
· HTN 
· Right effusion-exudate- ? From [place] 30 or other DDX is hepatic hydrothorax - cultures and cytology negative · SBO- conservative MGMT  
  
RECOMMENDATIONS:  
· Despite multiple medical issues appears that there may be some pre-capillary pulmonary hypertension. Will need further workup, this can be done inpatient or outpatient. Will order VQ scan normal and CTD panel pending · liver US - congestion suspect steatosis, + hepatopetal flow · Pt is acutely ill and at risk for decline due to PHTN 
· O2 titraiton above 90% · Would ambulate, and if functionally does well from a pulmonary perspective we can continue workup outpatient in 1-2wks · PRN over the weekend Subjective:  
 
 He feels well today. No acute complaints  States that his breathing feels much better, wife at bedside tells me that he looks better and much more comfortable than previously. CXR much improved. 7/10 
stable  This patient has been seen and evaluated at the request of Dr. Kayli Stewart for PHTN. Patient is a 72 y.o. male h/o ETOH abuse presents with 6 months progressive Lozano and presented with large right effusion- tapped- exudative. Negative micro. Thoracic placed pigtail and now out. ECHO with severe PHTN. Pt alert denies h/o PE, CTD, no h/o lung disease. Past Medical History:  
Diagnosis Date  Arthritic-like pain  Hypertension History reviewed. No pertinent surgical history. Prior to Admission medications Medication Sig Start Date End Date Taking? Authorizing Provider  
therapeutic multivitamin [facility]) tablet Take 1 Tab by mouth daily. Yes Provider, Historical  
ergocalciferol (VITAMIN D2) 50,000 unit capsule Take 50,000 Units by mouth every . Yes Provider, Historical  
aspirin delayed-release 81 mg tablet Take  by mouth daily. Yes Provider, Historical  
 
No Known Allergies Social History Tobacco Use  Smoking status: Former Smoker Start date: 1966 Last attempt to quit: 1984 Years since quittin.6  Smokeless tobacco: Never Used Substance Use Topics  Alcohol use: Yes Alcohol/week: 7.0 oz Types: 14 drink(s) per week Family History Problem Relation Age of Onset  Diabetes Mother  Hypertension Mother  Heart Disease Mother  Asthma Mother [facility] Arthritis-osteo Mother  Diabetes Father  Hypertension Father  Asthma Father  Arthritis-osteo Father  Diabetes Sister  Gout Sister  Asthma Brother Current Facility-Administered Medications Medication Dose Route Frequency  furosemide (LASIX) tablet 40 mg  40 mg Oral DAILY  aspirin delayed-release tablet 81 mg  81 mg Oral DAILY  sodium chloride (NS) flush 5-40 mL  5-40 mL IntraVENous Q8H  
 [Held by provider] heparin (porcine) injection 5,000 Units  5,000 Units SubCUTAneous Q8H  
 lactobac ac& pc-s.therm-b.anim (KERLINE Q/RISAQUAD)  1 Cap Oral DAILY  thiamine HCL (B-1) tablet 100 mg  100 mg Oral DAILY  folic acid (FOLVITE) tablet 1 mg  1 mg Oral DAILY  therapeutic multivitamin (THERAGRAN) tablet 1 Tab  1 Tab Oral DAILY  amLODIPine (NORVASC) tablet 5 mg  5 mg Oral BID And  
 lisinopril (PRINIVIL, ZESTRIL) tablet 20 mg  20 mg Oral BID Review of Systems: A comprehensive review of systems was negative except for that written in the HPI. Objective:  
Vital Signs:   
Visit Vitals /45 (BP 1 Location: Right arm, BP Patient Position: At rest) Pulse 65 Temp 97.9 °F (36.6 °C) Resp 18 Ht 5' 8\" (1.727 m) Wt 63.2 kg (139 lb 5.3 oz) SpO2 100% BMI 21.19 kg/m² O2 Device: Room air O2 Flow Rate (L/min): 2 l/min Temp (24hrs), Av.8 °F (36.6 °C), Min:97.4 °F (36.3 °C), Max:98.3 °F (36.8 °C) Intake/Output:  
Last shift:      No intake/output data recorded. Last 3 shifts: 07/10 1901 -  0700 In: 240 [P.O.:240] Out: - No intake or output data in the 24 hours ending 19 0858 Physical Exam:  
General:  Alert, cooperative, no distress, appears stated age. Head:  Normocephalic, without obvious abnormality, atraumatic. Eyes:  Conjunctivae/corneas clear. Nose: Nares normal. Septum midline Neck: Supple, symmetrical, trachea midline Lungs:   Clear to auscultation bilaterally. Heart:  Regular rate and rhythm, S1, S2 normal, no murmur, click, rub or gallop. Abdomen:   Soft, non-tender. Bowel sounds normal. No masses,  No organomegaly. Extremities: Extremities normal, atraumatic, no cyanosis or edema. Pulses: 2+ and symmetric all extremities. Skin: Skin color, texture, turgor normal. No rashes or lesions Data review:  
 
Recent Results (from the past 24 hour(s)) GLUCOSE, POC Collection Time: 19  7:56 PM  
Result Value Ref Range Glucose (POC) 175 (H) 65 - 100 mg/dL Performed by Unkown  CBC WITH AUTOMATED DIFF Collection Time: 19  8:37 PM  
Result Value Ref Range WBC 12.4 (H) 4.1 - 11.1 K/uL  
 RBC 3.78 (L) 4.10 - 5.70 M/uL HGB 9.8 (L) 12.1 - 17.0 g/dL HCT 32.6 (L) 36.6 - 50.3 % MCV 86.2 80.0 - 99.0 FL  
 MCH 25.9 (L) 26.0 - 34.0 PG  
 MCHC 30.1 30.0 - 36.5 g/dL  
 RDW 13.6 11.5 - 14.5 % PLATELET 713 512 - 467 K/uL MPV 10.8 8.9 - 12.9 FL  
 NRBC 0.0 0  WBC ABSOLUTE NRBC 0.00 0.00 - 0.01 K/uL BASOPHILS 0 0 - 1 %  
 ABS. BASOPHILS 0.0 0.0 - 0.1 K/UL NEUTROPHILS 58 32 - 75 % LYMPHOCYTES 20 12 - 49 % MONOCYTES 10 5 - 13 % EOSINOPHILS 10 (H) 0 - 7 % IMMATURE GRANULOCYTES 2 (H) 0.0 - 0.5 % ABS. NEUTROPHILS 7.3 1.8 - 8.0 K/UL  
 ABS. LYMPHOCYTES 2.5 0.8 - 3.5 K/UL  
 ABS. MONOCYTES 1.2 (H) 0.0 - 1.0 K/UL  
 ABS. EOSINOPHILS 1.2 (H) 0.0 - 0.4 K/UL  
 ABS. IMM. GRANS. 0.2 (H) 0.00 - 0.04 K/UL  
 DF SMEAR SCANNED    
 RBC COMMENTS AURELIA CELLS 
PRESENT 
    
CK W/ REFLX CKMB Collection Time: 07/11/19  8:37 PM  
Result Value Ref Range CK 25 (L) 39 - 308 U/L  
MAGNESIUM Collection Time: 07/11/19  8:37 PM  
Result Value Ref Range Magnesium 2.5 (H) 1.6 - 2.4 mg/dL TROPONIN I Collection Time: 07/11/19  8:37 PM  
Result Value Ref Range Troponin-I, Qt. 0.06 (H) <0.05 ng/mL PHOSPHORUS Collection Time: 07/11/19  8:37 PM  
Result Value Ref Range Phosphorus 5.7 (H) 2.6 - 4.7 MG/DL  
METABOLIC PANEL, COMPREHENSIVE Collection Time: 07/11/19  8:37 PM  
Result Value Ref Range Sodium 140 136 - 145 mmol/L Potassium 4.1 3.5 - 5.1 mmol/L Chloride 112 (H) 97 - 108 mmol/L  
 CO2 17 (L) 21 - 32 mmol/L Anion gap 11 5 - 15 mmol/L Glucose 181 (H) 65 - 100 mg/dL BUN 55 (H) 6 - 20 MG/DL Creatinine 2.10 (H) 0.70 - 1.30 MG/DL  
 BUN/Creatinine ratio 26 (H) 12 - 20 GFR est AA 38 (L) >60 ml/min/1.73m2 GFR est non-AA 31 (L) >60 ml/min/1.73m2 Calcium 10.4 (H) 8.5 - 10.1 MG/DL Bilirubin, total 0.2 0.2 - 1.0 MG/DL  
 ALT (SGPT) 54 12 - 78 U/L  
 AST (SGOT) 56 (H) 15 - 37 U/L Alk. phosphatase 113 45 - 117 U/L Protein, total 6.1 (L) 6.4 - 8.2 g/dL Albumin 2.5 (L) 3.5 - 5.0 g/dL Globulin 3.6 2.0 - 4.0 g/dL A-G Ratio 0.7 (L) 1.1 - 2.2 Imaging: 
I have personally reviewed the patients radiographs and have reviewed the reports: 
7/5 CT chest moderate to large layering right effusion and RLL ATX LLL ASD no ILD changes no masses- main PA large Zeferino Leticia, PA

## 2019-07-12 NOTE — PROGRESS NOTES
Bedside shift change report given to [facility] (oncoming nurse) by Nancy Marte (offgoing nurse). Report included the following information SBAR, Kardex and [facility] Joni Trotter

## 2019-07-12 NOTE — PROGRESS NOTES
[facility] Team: Follow-Up HUG Note    Date of Assessment: 07/12/19  Time of Assessment:  4:01 PM    Assessment & Plan   RODRÍGUEZ Diagnoses:  --HTN, elevated BNP (8324 on 7/4/19),  Echocardiogram 7/7/19 with the following findings: mild global systolic dysfunction. Estimated left ventricular ejection fraction is 46 - 50%. Left ventricular global hypokinesis. Right Ventricle: Moderately dilated right ventricle. Left Atrium: Severely dilated left atrium. Right Atrium: Severely dilated right atrium. Pulmonary Artery: Severe pulmonary hypertension. --Acute hypoxemic resp failure-resolved   due to large Right pleural effusion and compressive atelectasis.    -pleural drain placed 7/5/19- 2L output in last 48hrs  Pleural drain removed 7/7/19  --Partial small bowel obstruction due to small umbilical hernia.    General surgery consulted and hernia reduced in ER and at bedside  --Bilateral leg swelling--dopplers were negative for DVT. Suspected bacterial pneumonia     Readmission Risks:   moderate    1. Knowledge deficit regarding conditions and self-management   2, Advanced heart failure  3. Lack of F/U with PCP for the last 5 years  4. ETOH use/abuse? No hx of falls documented. Nurse Navigator: likely to have HF NN  RODRÍGUEZ appointment TBD    Code status: Partial  Recommended Disposition: TBD     F/U Concerns: Will update closer to discharge  As of 7/13--transferred to ICU for upper GI bleed and hemorrhagic shock; endoscopic findings of 20 mm duodenal ulcer        Number of Admissions this year:  only current one    Heart Failure Bundle:  no      Advance Care Plan: not on file; went back to see him day prior to his scheduled cardiac catheterizations so did not discuss this at that time. Met . and Mrs. Alvarado today and introduced [facility] program and its goals to them. Also, met their two daughters at the same time.   Discussed a few aspects of HF with Mr. Donte Marmolejo and his family and answered their questions to the best of my ability. They are eager to see the hospitalist, Dr. Hui San, and he has been advised. Admission medication reconciliation was performed by PharmD. Discharge medication reconciliation will be performed by [facility] NP at that time if possible. Discharge Needs: needs to reestablish care with Dr. Daryle Seat for PCP--will be considered new patient, but patient and family [facility] Lexi Hig[facility] and reiterated by me to let Keny De La Rosa at [facility] know that Mr. Emre Hector has just been in the hospital so she knows to expedite getting a new patient appt for him. In anticipation of discharge this weekend, on Friday a [facility] appt was made for Monday for transitional care visit as per patient and family agreement. In light of events of 7/13, will cancel this appt with [facility] by Monday morning. [facility] NP left Mrs. Alvarado with ULISES simpson/follow up appointment with Dr. Luis Fernando Celis for 7/19 at [facility].  Has also been advised to F/U with Dr. Ochoa Aggarwal regarding hernia and possible hernia repair surgery. Family state that cardiology has told them he is not strong enough for surgery. I advised them to discuss with Dr. Luis Fernando Celis at the upcoming appt if there is any reason to F/U right now with Dr. Gaetano Delacruz if surgery is not an option--they have agreed to do so. Continue to follow CM documentation. Kendra Gentile is a 72 y.o. male inpatient at [address] [place] [address] admitted with abdominal pain on 7/5/19; reported epigastric pain and constipation; chest pain, leg swelling, abdominal distention, and upper back pain. Chart reviewed by Gavin Call NP on 7/13 and unfortunate, acute events noted. [facility] team will follow along peripherally while Mr. Emre Hector is in critical care. Mr. Emre Hector is a Sound bundle patient and will not have a BS NN assigned at discharge at this point.     Past Medical History:   Diagnosis Date    Arthritic-like pain     Hypertension        Advance Care Planning 7/5/2019   Confirm Advance Directive None   Patient Would Like to Complete Advance Directive No

## 2019-07-13 NOTE — PROGRESS NOTES
Central Line Procedure Note  Under ultrasound guidence Indication:volume and cvp and pa monitoring Risks, benefits, alternatives explained and patient agrees to proceed. Patient positioned in Trendelenburg. 7-Step Sterility Protocol followed. (cap, mask sterile gown, sterile gloves, large sterile sheet, hand hygiene, 2% chlorhexidine for cutaneous antisepsis) 5 mL 1% Lidocaine placed at insertion site.   
 
right internal jugular cannulated x 1 attempt(s) utilizing the Seldinger technique. And  Live ultrasound  Guidance was used to visualise the vein. Venous cannulation confirmed with column drop test.   
Catheter secured. 
 & Biopatch applied. Sterile Tegaderm placed. CXR pending.

## 2019-07-13 NOTE — PROGRESS NOTES
IR 
Right femoral access for GDA embolization for bleeding duodenal ulcer. The patient has a replace hepatic artery off the SMA. The GDA and branches were embolized without problem. Control arteriograms to look for other arteries to embolize and completeness revealed the endoluminal clamp place by GI this morning was on the replaced hepatic artery with partial occlusion. Left hepatic artery not clearly visualize on SMA or Celiac angios. Discussed with my IR colleagues and GI. Consensus to leave hepatic patent and not completely embolize because of risk of liver injury. Multiple views show no evidence of active leaking at site of clamp or pseudoaneurysms.

## 2019-07-13 NOTE — CONSULTS
Asked to come over and urgently scope this critically ill patient who was just transferred to the ICU and intubated and placed on pressors given IV fluids and a blood transfusion after he developed shock and passed a large melanic stool and had significant drop in hgb. He has multiple other medical issues including: 
 
He can't give a history. Abdomen is soft. #. Acute blood loss anemia: dropped Hgb, large melenic stool BM. Hgb only 5. 
- Transfuse 2 RBCs now, keep NPO, will need EGD now,  
- IVF bolus, then maintenance, hold all BP meds. - He may need more blood later. 
  
#. KAYLEEN on CKD: sudden rise in Cr to 5. - Nephro cs pending, likely preRenal 
- Renal US NAD. Bladder scan: empty bladder 
  
1. Pulm HTN: severe, PaPressure = 79 on TTE  
- Cont lasix and home meds, troponin neg x3 
- echo left and right heart failure EF 45-50, mod-severe TR with pulm - Cardiology following, s/p LHC: clear arteries, RHC 
- Pulmonary following, running tests to evaluate for possible cause for pHTN 
  
2.  Acute hypoxemic resp failure-resolved - due to large Right pleural effusion and compressive atelectasis.   
- pleural drain placed 7/5/19- 2L output in [id number] - CXR showing well expanded right lung- Pleural drain removed 7/7/19 
- Now intubated which is a good idea to protect airway during endoscopy in the setting possible large volume upper GI bleeding.  
  
3.  Partial small bowel obstruction due to small umbilical hernia- resolved   
General surgery consulted- Hernia reduced in ER and at bedside- Advanced diet 
  
4.  Bilateral leg swelling. - dopplers were negative for DVT. 5.  Bacterial pneumonia. cont Rocephin and Zithromax for suspected bacterial pneumonia. 6.  Reported hx of alcohol abuse. The patient drinks about 3-4 beers and a half of pint of gin a day. We will cont thiamine, folic acid, and multivitamin. WA protocol - No signs of withdrawal so far. This could be a variceal bleed if he is cirrhotic. 7. Recent abdominal pain - etiology unclear, thought to be related to SBO. Will plan on urgent endoscopy now. Discussed with family.  
 
 
MTF

## 2019-07-13 NOTE — PROGRESS NOTES
Erika NP Progress note Name: Michelle Cutler YOB: 1945 MRN: [id number] Admission Date: 7/4/2019 11:31 PM 
 
Date of service: 7/13/2019 5:45 AM 
 
Overnight Update:  
  
 
Complaint: Abnormal labs Paged by: Lebron Sun RN Subjective: Called regarding patient's AM labs, creatinine 5.05 (baseline ~1.3) progressively trending up over the past three days. No prior history of CKD. Per family, patient has not been taking PO for the past several days, on lasix, down about 30 pounds from home baseline. HGB 7.0 this morning down from 12 on admission. Denies active bleeding, though does report dark stools. Patient reports back pain, generalized weakness. Plan:  
Acute renal failure - Suspect pre-renal secondary to poor intake and diuresis during admission - Gentle hydration 
- Hold lasix and nephrotoxic drugs - Renal US, UA, bladder scan to assess for other causes - Nephrology consultation placed, may not be needed if Cr improves with fluids Acute anemia 
- HGB 7.0 down from baseline 12 
- No sources of active bleeding - Will check occult blood - Sample to blood bank in anticipation of possible transfusion - May be contributing to the above Discussed with patient, son, and nurse. Will hand off to Dr Parris Crenshaw in [address] 
[phone] or Flash

## 2019-07-13 NOTE — PROGRESS NOTES
responded to staff request for family support. Pt's son at bedside and tearful. Lead family to waiting area, for a procedure for pt. Pt's wife and daughter would be coming to the hospital. Let pt's Zeinab Villalobos know of  availability.  follow up as needed. Tina Vora, [facility] 
 [phone])

## 2019-07-13 NOTE — PERIOP NOTES
TRANSFER - OUT REPORT: 
 
Verbal report given to Roxana Hightower RN (name) on [facility]  for routine progression of care Information from the following report(s) Procedure Summary and Recent Results was reviewed. CVICU nurse at bedside. Lines:  
Peripheral IV 07/09/19 Left Forearm (Active) Site Assessment Clean, dry, & intact 7/12/2019  4:10 PM  
Phlebitis Assessment 0 7/12/2019  4:10 PM  
Infiltration Assessment 0 7/12/2019  4:10 PM  
Dressing Status Clean, dry, & intact 7/12/2019  4:10 PM  
Dressing Type Transparent;Tape 7/12/2019  4:10 PM  
Hub Color/Line Status Blue;Capped 7/12/2019  4:10 PM  
Action Taken Open ports on tubing capped 7/12/2019  4:10 PM  
Alcohol Cap Used Yes 7/12/2019  4:10 PM  
  
 
Opportunity for questions and clarification was provided.

## 2019-07-13 NOTE — PROCEDURES
[facility] 
[address] [place] [address] 
[phone] Endoscopic Gastroduodenoscopy Procedure Note NAME:  Lisa Collins :   1945 MRN:   [id number] Indication:  Hematemesis, Melena/hematochezia : Valetta Mcburney, MD 
 
Referring Provider:  Amelia Borrego DO Anesthesia/Sedation:  MAC anesthesia Procedure Details After infomed consent was obtained for the procedure, with all risks and benefits of procedure explained the patient was taken to the endoscopy suite and placed in the left lateral decubitus position. Following sequential administration of sedation as per above, the endoscope was inserted into the mouth and advanced under direct vision to duodenal bulb. A careful inspection was made as the gastroscope was withdrawn, including a retroflexed view of the proximal stomach; findings and interventions are described below. See dictated note for details of this emergent, life saving procedure - patient with massive upper GI bleeding and hemorrhagic shock. Findings:  
Esophagus:5 erosion(s) several small mm in size located in  whole  of esophagus Stomach:  blood Duodenum/jejunum: blood Therapies:  Maralee Cutting claw\" clip was placed for hemorrhage Specimens: none Complications:   None; patient tolerated the procedure well. Impression:   
-20 mm duodenal ulcer, as described above. Recommendations: 
-Continue acid suppression. , -No NSAIDS, -IR embolization Valetta Mcburney, MD  
 
Dictated note below: [facility] [address] PROCEDURE NOTE 
  
Name:  Breonna Cevallos 
MR#:  [id number] :  1945 ACCOUNT #:  [de-identified] DATE OF SERVICE:  2019  
  
PROCEDURE PERFORMED:  Upper endoscopy with bleed control (placement of Ovesco \"bear claw\" clip on pulsating, actively hemorrhaging visible vessel on a large duodenal ulcer).  
  
 INDICATIONS:  Hematemesis, melena, and hemorrhagic shock. 
  
The patient was given two units of packed red blood cells and IV albumin as well as IV fluids and pressors prior to this procedure. He had presented previously with hypotension and \"rapid response\" was called. Hemoglobin had fallen to 5. The patient has multiple other medical issues as well including acute renal failure and respiratory failure. He is intubated for this procedure in the intensive care unit. 
  
ANESTHESIA:  The patient was sedated with a propofol drip. 
  
POSTPROCEDURE DIAGNOSES: 
1.  Large duodenal ulcer with large visible vessel, successfully clipped with an Ovesco clip, \"over-the-scope clip\". 2.  Esophagitis and gastritis. 
  
FINDINGS/PROCEDURE IN DETAIL:  After informed consent was obtained from the patient's family, the Olympus double-channel upper endoscope was inserted into the esophagus under direct vision. The patient had been intubated. Endotracheal tube was noted in place in the trachea. 
  
ESOPHAGUS:  There were erosive changes noted in the esophagus. Dark blood was noted refluxing into the esophagus from the stomach. No varices were seen. 
  
STOMACH:  There was a large amount of blood seen in the stomach. This made visualization quite difficult. The blood for the most part in the stomach was very dark. This was suctioned as much as possible. Blood in the area of the prepyloric antrum and pylorus was much redder. 
  
I was able to move the patient into a supine and lateral decubitus position to move the blood pool so I could see the whole stomach and suctioned the blood out of the stomach and we were able to advance into the duodenum. 
  
In the duodenum, there was a large duodenal ulcer noted. There was a very large pulsating visible vessel seen at the base of the large duodenal bulbar ulcer.   This vessel was intermittently spurting bright red blood. 
  
 Because of the size of the vessel, I felt standard clips would be dangerous as there would be a significant risk to shear off the end of the vessel. We therefore felt use of the over-the-scope Ovesco clip would be beneficial. 
  
The scope was removed and I used the largest Ovesco clip available. 
  
The scope was advanced into the hypopharynx, and I was not able to get the large clip through the upper sphincter. I then used a 12-mm balloon to dilate the upper sphincter and was able to advance the scope into the esophagus and further down into the stomach. 
  
We were able to reach the pylorus, and I was able to see through the pylorus and see the ulcer, but with the over-the-scope clip I was not able to get through the pylorus - the clip was too big. We used a 15-mm balloon to try to dilate the pylorus to see if we could get this scope through, but we were still unsuccessful in getting this large clip, which was on the top of the large channel scope through the pylorus, I then removed the scope. Of note, the very large clip fell off the end of the endoscope and was in the stomach. This happened when we pulled the scope through the upper sphincter and it got stuck at the level of the upper sphincter. We had to push this clip back down into the stomach.  
  
We used a smaller Ovesco clip that was 9 mm in diameter on the diagnostic upper scope and repeated the procedure. Using this clip and using the smaller scope, we were able to advance easily into the esophagus and further easily through the pylorus into the duodenum. We then were able to advance the clip on to the very large vessel and deploy it.   At the end of the procedure, there was no further bleeding noted. 
  
At the end of the procedure  I tried to retrieve the larger clip that had fallen of the scope with a Storyful net  which was floating in the black blood pool in the fundus but the patient was relatively unstable we were unable to capture this easily and since the patient was unstable we felt it mor important to finish the exam than spend more time removing this foreign body that should be small enough that it should pass relatively easily on its own. 
  
IMPRESSION:  The patient has a very large duodenal ulcer with a large pulsating visible vessel that was successfully clipped. The active bleeding has now been stopped. 
  
I think this lesion has a very high risk of rebleeding. The patient was unstable throughout the procedure and had a blood pressure that at one point fell into the 50 systolic range. At the end of the procedure, his systolic blood pressure was over 100 mm/hg. I think that it would be prudent to proceed with Interventional Radiology embolization of this vessel to prevent re-bleeding in light of the life-threatening nature of this hemorrhage.  
  
  
Rodriguez Vazquez MD  
D:  07/13/2019 13:10 
T:  07/13/2019 20:18 
JOB #:  [id number] CC:  Roxann Napoles MD

## 2019-07-13 NOTE — PROGRESS NOTES
Arterial Line Procedure Note Risks, benefits, alternatives explained and patient agrees to proceed. Sterile prep with Chlorhexidine. 0.5 mL 1% Lidocaine placed at insertion site. Right radial artery cannulated x 1attempt(s) utilizing the Seldinger technique. Catheter secured. Sterile dressing placed.

## 2019-07-13 NOTE — PROGRESS NOTES
Hospitalist Progress Note Ok Spangler MD 
Answering service: [phone] OR [phone] from in house phone Date of Service:  2019 NAME:  Santosh Beltran :  1945 MRN:  [id number] Admission Summary: This is a 79-year-old man with a past medical history significant for hypertension, who was in his usual state of health until about a week ago when the patient developed abdominal pain. The pain is located at the epigastric region, 8/10 in severity. No radiation. No known aggravating or relieving factors. The patient described the pain as a dull ache associated with constipation but no nausea, no vomiting. The patient took laxative without any significant relief of the constipation. The patient was brought to the emergency room for further evaluation. When the EMS arrived at the scene, the patient's oxygen saturation was 86% on room air. CT scan of the abdomen and pelvis performed by the emergency room provider shows right pleural effusion and suspected small bowel obstruction. Interval history / Subjective:  
Patient feels ok, wife at bedside, eventful night, dropped HB, large BM dark/melena?, Cr sudden rise, appetite was very poor with minimal PO, BP on lower side. Assessment & Plan: #. Acute blood loss anemia: dropped HB, large melena BM 
- Transfuse 1 URBC today, GI consult, keep NPO, will need EGD,  
- IVF bolus, then maintenance, hold all BP meds #. KAYLEEN on CKD: sudden rise in Cr to 5. - Nephro cs pending, likely preRenal 
- Renal US NAD. Bladder scan: empty bladder 1. Pulm HTN: severe, PaPressure = 79 on TTE  
- Cont lasix and home meds, troponin neg x3 
- echo left and right heart failure EF 45-50, mod-severe TR with pulm - Cardiology following, s/p LHC: clear arteries, RHC 
- Pulmonary following, running tests to evaluate for possible cause for pHTN 2. Acute hypoxemic resp failure-resolved - due to large Right pleural effusion and compressive atelectasis. - pleural drain placed 7/5/19- 2L output in [id number] - CXR showing well expanded right lung- Pleural drain removed 7/7/19 3. Partial small bowel obstruction due to small umbilical hernia- resolved General surgery consulted- Hernia reduced in ER and at bedside- Advanced diet 4. Bilateral leg swelling. - dopplers were negative for DVT. 5.  Bacterial pneumonia. cont Rocephin and Zithromax for suspected bacterial pneumonia. 6.  Reported hx of alcohol abuse. The patient drinks about 14 drinks per week. cont thiamine, folic acid, and multivitamin. [facility] protocol- No signs of withdrawl Code status:FULL 
DVT prophylaxis: Heparin Care Plan discussed with: Patient/Family Disposition: Home w/Family and TBD Hospital Problems  Date Reviewed: 7/5/2019 Codes Class Noted POA * (Principal) Acute CHF (congestive heart failure) (HCC) ICD-10-CM: I50.9 ICD-9-CM: 428.0  7/5/2019 Yes Pleural effusion, right ICD-10-CM: J90 ICD-9-CM: 511.9  7/5/2019 Unknown Review of Systems: A comprehensive review of systems was negative except for that written in the subjective section Vital Signs:  
 Last 24hrs VS reviewed since prior progress note. Most recent are: 
Visit Vitals BP (!) 118/38 (BP 1 Location: Right arm, BP Patient Position: At rest) Pulse 61 Temp 97.4 °F (36.3 °C) Resp 18 Ht 5' 8\" (1.727 m) Wt 63 kg (138 lb 14.2 oz) SpO2 100% BMI 21.12 kg/m² No intake or output data in the 24 hours ending 07/13/19 0825 Physical Examination:  
 
Constitutional:  No acute distress, cooperative, pleasant   
   
Resp:  CTA bilaterally. No wheezing/rhonchi/rales. GI:  Soft, non distended, non tender Musculoskeletal:  mild LE edema, warm Neurologic:  Moves all extremities. AAOx3, Data Review:  
 Review and/or order of tests in the radiology section of CPT Review and/or order of tests in the medicine section of [facility] Labs:  
 
Recent Labs  
  07/13/19 
0332 07/12/19 1057 WBC 13.6* 11.4* HGB 7.0* 8.4* HCT 23.0* 27.4*  
 271 Recent Labs  
  07/13/19 
0332 07/12/19 
1057 07/11/19 2037  141 140  
K 4.8 4.8 4.1 * 113* 112* CO2 17* 20* 17* * 86* 55* CREA 5.05* 3.41* 2.10* * 125* 181* CA 9.9 9.9 10.4* MG 2.6*  --  2.5* PHOS 5.1*  --  5.7* Recent Labs  
  07/13/19 0332 07/11/19 2037 SGOT 30 56* ALT 45 54  113 TBILI 0.2 0.2 TP 6.0* 6.1* ALB 2.6* 2.5*  
GLOB 3.4 3.6 No results for input(s): INR, PTP, APTT in the last 72 hours. No lab exists for component: INREXT, INREXT No results for input(s): FE, TIBC, PSAT, FERR in the last 72 hours. No results found for: FOL, RBCF No results for input(s): PH, PCO2, PO2 in the last 72 hours. Recent Labs  
  07/11/19 2037 TROIQ 0.06* Lab Results Component Value Date/Time Cholesterol, total 134 07/06/2019 03:50 AM  
 HDL Cholesterol 56 07/06/2019 03:50 AM  
 LDL, calculated 68 07/06/2019 03:50 AM  
 Triglyceride 50 07/06/2019 03:50 AM  
 CHOL/HDL Ratio 2.4 07/06/2019 03:50 AM  
 
Lab Results Component Value Date/Time Glucose (POC) 175 (H) 07/11/2019 07:56 PM  
 Glucose (POC) 87 07/05/2019 12:02 PM  
 Glucose (POC) 101 (H) 07/05/2019 08:47 AM  
 
No results found for: COLOR, APPRN, SPGRU, REFSG, ANGELI, PROTU, GLUCU, KETU, BILU, UROU, TREVOR, LEUKU, GLUKE, EPSU, BACTU, WBCU, RBCU, CASTS, UCRY Medications Reviewed:  
 
Current Facility-Administered Medications Medication Dose Route Frequency  0.9% sodium chloride infusion  125 mL/hr IntraVENous CONTINUOUS  
 0.9% sodium chloride infusion 250 mL  250 mL IntraVENous PRN  
 HYDROcodone-acetaminophen (NORCO) 5-325 mg per tablet 1 Tab  1 Tab Oral Q4H PRN  
 calcium carbonate (TUMS) chewable tablet 200 mg [elemental]  200 mg Oral TID PRN  
  [Held by provider] furosemide (LASIX) tablet 40 mg  40 mg Oral DAILY  aspirin delayed-release tablet 81 mg  81 mg Oral DAILY  sodium chloride (NS) flush 5-40 mL  5-40 mL IntraVENous Q8H  
 sodium chloride (NS) flush 5-40 mL  5-40 mL IntraVENous PRN  
 acetaminophen (TYLENOL) tablet 650 mg  650 mg Oral Q4H PRN  
 ondansetron (ZOFRAN) injection 4 mg  4 mg IntraVENous Q4H PRN  
 morphine injection 2 mg  2 mg IntraVENous Q4H PRN  
 [Held by provider] heparin (porcine) injection 5,000 Units  5,000 Units SubCUTAneous Q8H  
 lactobac ac& pc-s.therm-b.anim (KERLINE Q/RISAQUAD)  1 Cap Oral DAILY  LORazepam (ATIVAN) injection 1 mg  1 mg IntraVENous Q6H PRN  
 naloxone (NARCAN) injection 0.4 mg  0.4 mg IntraVENous PRN  thiamine HCL (B-1) tablet 100 mg  100 mg Oral DAILY  folic acid (FOLVITE) tablet 1 mg  1 mg Oral DAILY  therapeutic multivitamin (THERAGRAN) tablet 1 Tab  1 Tab Oral DAILY  [Held by provider] amLODIPine (NORVASC) tablet 5 mg  5 mg Oral BID  
 
______________________________________________________________________ EXPECTED LENGTH OF STAY: 4d 2h 
ACTUAL LENGTH OF STAY:          8 Davey Manzanares MD

## 2019-07-13 NOTE — PROGRESS NOTES
Critical Care Documentation Name: Gema Cui YOB: 1945 MRN: [id number] Admission Date: 7/4/2019 11:31 PM 
 
Date of service: 7/13/2019 Active Diagnoses: 
 
Hospital Problems  Date Reviewed: 7/5/2019 Codes Class Noted POA * (Principal) Acute CHF (congestive heart failure) ([facility]) ICD-10-CM: I50.9 ICD-9-CM: 428.0  7/5/2019 Yes Pleural effusion, right ICD-10-CM: J90 ICD-9-CM: 511.9  7/5/2019 Unknown Chief Complaint: 
Diaphoretic, confused, dropped BP Clinical Presentation: 
Just saw pt in rounds, was little confused, then shortly after that, suddenly became more confused, and diaphoretic, denies chest pain, BP dropped to 70/30, RR Called. Physical Exam:  
Gen: diaphoretic, confused, still alert CVS: BP 70/30, HR 80s, cool clammy peripheries Resp: sats 90s, RR 20 
GI: soft, nontender abdomen Neuro: confused Ext: no sig edema Data Reviewed: All diagnostic labs and studies have been reviewed. Assessment and Plan: 
Acute upper GI bleed Hypovolemic Shock KAYLEEN Acute metabolic encephalopathy: 2nd to GI bleed - IVF bolus, followed by maintenance - Blood transfusion 
- check CBC, Trops, ABG - PH7.2, POs 230, PCo2 37, Bicarb 12 
- EKG: no STEMI. - Transfer to ICU, if BP does't come up with bolus will need cvc and pressors - Stat GI consult for EGD 'last ate last night'. - Family at bedside updated Medications Administered:  
Sedation: no 
Anxiolytics: no Antiarrhythmics: no Antihypertensives:no Pressors: not yet needed IVF's: yes Critical Care Attestation: This patient is unstable and critically ill.  Due to a high probability of clinically significant, life threatening deterioration, the patient required my highest level of preparedness to intervene emergently and I personally spent this critical care time directly and personally managing the patient. This critical care time included obtaining a history; examining the patient; pulse oximetry; ordering and review of studies; arranging urgent treatment with development of a management plan; evaluation of patient's response to treatment; frequent reassessment; and, discussions with other providers and/or family. This critical care time was performed to assess and manage the high probability of imminent, life-threatening deterioration that could result in multi-organ failure and death. It was exclusive of separately billable procedures, treating other patients, and teaching time. Time Spent:  
 
Total critical care time: Approximately 50 minutes Tianna Tena MD 
7/13/2019 
8:46 AM 
 
 
1300: review patient condition, dropped BP after improvement temporarily. Had central line placed. Started on pressors, multiple pressors needed. - GI came to bedside did EGD, found duodenal ulcer, eroding into GastroDuodenal artery bleeding actively, with multiple attempts managed to place a clip, with sub-optimal control of bleed. - continued to drop BP after clip placed, pt taken down to IR for embolization attempt. - I have updated family 'several members, including son, daughter-in- law' Further 30mins critical care time. Total critical care time spent around 80mins

## 2019-07-13 NOTE — CONSULTS
[facility] 
 [address] [facility], [address] [facility] Phone: [phone] NOTE Patient: Wai West MRN: [id number]  PCP: Mao Cuello DO  
:     1945  Age:   72 y.o. Sex:  male Referring physician: Wes Naik MD 
Reason for consultation: 72 y.o. male with Acute CHF (congestive heart failure) ([facility] [address] [G52.3] complicated by KAYLEEN Admission Date: 2019 11:31 PM  LOS: 8 days ASSESSMENT and PLAN :  
KAYLEEN - due to hypotension, shock from GI bleed Metabolic acidosis from hypoperfusion Shock, suspected hemorrhagic GI bleed with h/o epigastric pain, acute drop in hbg 
 
S/P partial SBO due ot umbilical hernia that was reduced in ER by Surgery PNA Pulm HTN, RV dysfunction, mod/severe TR; LVEF~45% H/o alcohol use 
  
 
REC: 
Fernandez Renal US 
IV bicarb Volume resusitation Likely to need CRRT Discussed with nurse and family who understand the risks/benefits and wish to proceed. Thank you for consulting [facility] in the care of your patient. Subjective: HPI: Wai West is a 72 y.o.  male who has been admitted to the hospital for abdominal pain, found to have partial SBO form umbilical hernia, low Hbg, and KAYLEEN. He was urgently transferred to CVICU for marked hypotension and suspected acute GI bleed. Rising creat, no U/O, SBP as low as ~60. Past Medical Hx:  
Past Medical History:  
Diagnosis Date  Arthritic-like pain  Hypertension Past Surgical Hx: 
 History reviewed. No pertinent surgical history. No Known Allergies Social Hx:  reports that he quit smoking about 34 years ago. He started smoking about 52 years ago. He has never used smokeless tobacco. He reports that he drinks about 7.0 oz of alcohol per week. He reports that he does not use drugs. Family History Problem Relation Age of Onset  Diabetes Mother  Hypertension Mother  Heart Disease Mother  Asthma Mother [address] [facility] [address] Arthritis-osteo Mother  Diabetes Father  Hypertension Father  Asthma Father  Arthritis-osteo Father  Diabetes Sister  Gout Sister  Asthma Brother Review of Systems: A thorough twelve point review of system was performed today. Pertinent positives and negatives are mentioned in the HPI. The reminder of the ROS is negative and noncontributory. Objective:   
Vitals:   
Vitals:  
 07/13/19 1045 07/13/19 1050 07/13/19 1055 07/13/19 1100 BP: (!) 89/44 90/45 (!) 74/42 (!) 80/46 Pulse: (!) 51 (!) 53 (!) 51 (!) 52 Resp: 19 22 24 23 Temp: 95 °F (35 °C)  95 °F (35 °C) 95 °F (35 °C) SpO2: 100% 100%  100% Weight:      
Height:      
 
I&O's:  No intake/output data recorded. Visit Vitals BP (!) 80/46 Pulse (!) 52 Temp 95 °F (35 °C) Resp 23 Ht 5' 8\" (1.727 m) Wt 63 kg (138 lb 14.2 oz) SpO2 100% BMI 21.12 kg/m² Physical Exam: 
General:  intubated HEENT: ET tube Neck: lines Lungs : few ronchi CVS: RRR, S1 S2 normal, No rub Abdomen: no bowel sounds Extremities: Edema Skin: No rash or lesions. MS: NA 
Neurologic: sedated Psych: Unable to assess Laboratory Results: 
 
Recent Labs  
  07/13/19 
0845 07/13/19 
0332 07/12/19 
1057 07/11/19 
2037  143 141 140  
K 5.0 4.8 4.8 4.1 * 116* 113* 112* CO2 11* 17* 20* 17* * 126* 125* 181* * 107* 86* 55* CREA 5.32* 5.05* 3.41* 2.10* CA 8.8 9.9 9.9 10.4* MG  --  2.6*  --  2.5* PHOS  --  5.1*  --  5.7* ALB  --  2.6*  --  2.5* SGOT  --  30  --  56* ALT  --  45  --  54 Recent Labs  
  07/13/19 
0845 07/13/19 
0332 07/12/19 
1057 WBC 14.5* 13.6* 11.4* HGB 5.6* 7.0* 8.4* HCT 19.0* 23.0* 27.4*  
 288 271 No results found for: SDES Lab Results Component Value Date/Time  Culture result: NO GROWTH 4 DAYS 07/05/2019 12:25 PM  
 Culture result: NO GROWTH 4 DAYS 07/05/2019 12:25 PM  
 
Recent Results (from the past 24 hour(s)) CBC WITH AUTOMATED DIFF Collection Time: 07/13/19  3:32 AM  
Result Value Ref Range WBC 13.6 (H) 4.1 - 11.1 K/uL  
 RBC 2.67 (L) 4.10 - 5.70 M/uL HGB 7.0 (L) 12.1 - 17.0 g/dL HCT 23.0 (L) 36.6 - 50.3 % MCV 86.1 80.0 - 99.0 FL  
 MCH 26.2 26.0 - 34.0 PG  
 MCHC 30.4 30.0 - 36.5 g/dL  
 RDW 14.0 11.5 - 14.5 % PLATELET 810 189 - 899 K/uL MPV 10.6 8.9 - 12.9 FL  
 NRBC 0.1 (H) 0  WBC ABSOLUTE NRBC 0.02 (H) 0.00 - 0.01 K/uL NEUTROPHILS 79 (H) 32 - 75 % LYMPHOCYTES 10 (L) 12 - 49 % MONOCYTES 6 5 - 13 % EOSINOPHILS 3 0 - 7 % BASOPHILS 2 (H) 0 - 1 % IMMATURE GRANULOCYTES 0 %  
 ABS. NEUTROPHILS 10.7 (H) 1.8 - 8.0 K/UL  
 ABS. LYMPHOCYTES 1.4 0.8 - 3.5 K/UL  
 ABS. MONOCYTES 0.8 0.0 - 1.0 K/UL  
 ABS. EOSINOPHILS 0.4 0.0 - 0.4 K/UL  
 ABS. BASOPHILS 0.3 (H) 0.0 - 0.1 K/UL  
 ABS. IMM. GRANS. 0.0 K/UL  
 DF MANUAL PLATELET COMMENTS Large Platelets RBC COMMENTS ANISOCYTOSIS 1+ 
    
 RBC COMMENTS AURELIA CELLS 
PRESENT 
    
 RBC COMMENTS OVALOCYTES PRESENT 
    
 RBC COMMENTS POLYCHROMASIA 1+ MAGNESIUM Collection Time: 07/13/19  3:32 AM  
Result Value Ref Range Magnesium 2.6 (H) 1.6 - 2.4 mg/dL PHOSPHORUS Collection Time: 07/13/19  3:32 AM  
Result Value Ref Range Phosphorus 5.1 (H) 2.6 - 4.7 MG/DL  
METABOLIC PANEL, COMPREHENSIVE Collection Time: 07/13/19  3:32 AM  
Result Value Ref Range Sodium 143 136 - 145 mmol/L Potassium 4.8 3.5 - 5.1 mmol/L Chloride 116 (H) 97 - 108 mmol/L  
 CO2 17 (L) 21 - 32 mmol/L Anion gap 10 5 - 15 mmol/L Glucose 126 (H) 65 - 100 mg/dL  (H) 6 - 20 MG/DL Creatinine 5.05 (H) 0.70 - 1.30 MG/DL  
 BUN/Creatinine ratio 21 (H) 12 - 20 GFR est AA 14 (L) >60 ml/min/1.73m2 GFR est non-AA 11 (L) >60 ml/min/1.73m2 Calcium 9.9 8.5 - 10.1 MG/DL  Bilirubin, total 0.2 0.2 - 1.0 MG/DL  
 ALT (SGPT) 45 12 - 78 U/L  
 AST (SGOT) 30 15 - 37 U/L Alk. phosphatase 104 45 - 117 U/L Protein, total 6.0 (L) 6.4 - 8.2 g/dL Albumin 2.6 (L) 3.5 - 5.0 g/dL Globulin 3.4 2.0 - 4.0 g/dL A-G Ratio 0.8 (L) 1.1 - 2.2 OCCULT BLOOD, STOOL Collection Time: 07/13/19  7:59 AM  
Result Value Ref Range Occult blood, stool POSITIVE (A) NEG    
EKG, 12 LEAD, INITIAL Collection Time: 07/13/19  8:08 AM  
Result Value Ref Range Ventricular Rate 68 BPM  
 Atrial Rate 72 BPM  
 QRS Duration 82 ms Q-T Interval 386 ms QTC Calculation (Bezet) 410 ms Calculated R Axis 5 degrees Calculated T Axis 69 degrees Diagnosis Undetermined rhythm RSR' or QR pattern in V1 suggests right ventricular conduction delay When compared with ECG of 11-JUL-2019 20:44, 
Current undetermined rhythm precludes rhythm comparison, needs review RSR' pattern in V1 is now present Borderline criteria for Lateral infarct are no longer present Criteria for Inferior infarct are no longer present TYPE + CROSSMATCH Collection Time: 07/13/19  8:09 AM  
Result Value Ref Range Crossmatch Expiration 07/16/2019 ABO/Rh(D) B POSITIVE Antibody screen NEG Unit number [id number] Blood component type [facility] Unit division 00 Status of unit ISSUED Crossmatch result Compatible Unit number [id number] Blood component type [facility] Unit division 00 Status of unit ISSUED Crossmatch result Compatible METABOLIC PANEL, BASIC Collection Time: 07/13/19  8:45 AM  
Result Value Ref Range Sodium 143 136 - 145 mmol/L Potassium 5.0 3.5 - 5.1 mmol/L Chloride 119 (H) 97 - 108 mmol/L  
 CO2 11 (LL) 21 - 32 mmol/L Anion gap 13 5 - 15 mmol/L Glucose 179 (H) 65 - 100 mg/dL  (H) 6 - 20 MG/DL Creatinine 5.32 (H) 0.70 - 1.30 MG/DL  
 BUN/Creatinine ratio 20 12 - 20 GFR est AA 13 (L) >60 ml/min/1.73m2 GFR est non-AA 11 (L) >60 ml/min/1.73m2 Calcium 8.8 8.5 - 10.1 MG/DL  
CBC WITH AUTOMATED DIFF Collection Time: 07/13/19  8:45 AM  
Result Value Ref Range WBC 14.5 (H) 4.1 - 11.1 K/uL  
 RBC 2.15 (L) 4.10 - 5.70 M/uL HGB 5.6 (LL) 12.1 - 17.0 g/dL HCT 19.0 (L) 36.6 - 50.3 % MCV 88.4 80.0 - 99.0 FL  
 MCH 26.0 26.0 - 34.0 PG  
 MCHC 29.5 (L) 30.0 - 36.5 g/dL  
 RDW 13.8 11.5 - 14.5 % PLATELET 987 367 - 153 K/uL MPV 10.6 8.9 - 12.9 FL  
 NRBC 0.1 (H) 0  WBC ABSOLUTE NRBC 0.02 (H) 0.00 - 0.01 K/uL NEUTROPHILS 65 32 - 75 % LYMPHOCYTES 17 12 - 49 % MONOCYTES 12 5 - 13 % EOSINOPHILS 3 0 - 7 % BASOPHILS 2 (H) 0 - 1 % METAMYELOCYTES 1 (H) 0 % IMMATURE GRANULOCYTES 0 %  
 ABS. NEUTROPHILS 9.4 (H) 1.8 - 8.0 K/UL  
 ABS. LYMPHOCYTES 2.5 0.8 - 3.5 K/UL  
 ABS. MONOCYTES 1.7 (H) 0.0 - 1.0 K/UL  
 ABS. EOSINOPHILS 0.4 0.0 - 0.4 K/UL  
 ABS. BASOPHILS 0.3 (H) 0.0 - 0.1 K/UL  
 ABS. IMM. GRANS. 0.0 K/UL  
 DF MANUAL    
 RBC COMMENTS AURELIA CELLS 2+ 
    
 RBC COMMENTS OVALOCYTES 1+ TROPONIN I Collection Time: 07/13/19  8:45 AM  
Result Value Ref Range Troponin-I, Qt. 0.10 (H) <0.05 ng/mL EKG, 12 LEAD, INITIAL Collection Time: 07/13/19  8:46 AM  
Result Value Ref Range Ventricular Rate 66 BPM  
 Atrial Rate 208 BPM  
 QRS Duration 84 ms Q-T Interval 406 ms QTC Calculation (Bezet) 425 ms Calculated R Axis 26 degrees Calculated T Axis 68 degrees Diagnosis Atrial fibrillation Nonspecific ST abnormality , probably digitalis effect Abnormal ECG When compared with ECG of 11-JUL-2019 20:44, Atrial fibrillation has replaced Junctional rhythm Borderline criteria for Lateral infarct are no longer present Nonspecific T wave abnormality no longer evident in Anterolateral leads POC G3 - PUL Collection Time: 07/13/19  8:47 AM  
Result Value Ref Range pH (POC) 7.205 (LL) 7.35 - 7.45    
 pCO2 (POC) 30.8 (L) 35.0 - 45.0 MMHG  
 pO2 (POC) 235 (H) 80 - 100 MMHG HCO3 (POC) 12.2 (L) 22 - 26 MMOL/L  
 sO2 (POC) 100 (H) 92 - 97 % Base deficit (POC) 16 mmol/L Site RIGHT RADIAL Device: NASAL CANNULA Flow rate (POC) 4.5 L/M Allens test (POC) YES Specimen type (POC) ARTERIAL Total resp. rate 16 LACTIC ACID Collection Time: 07/13/19 10:49 AM  
Result Value Ref Range Lactic acid 9.9 (HH) 0.4 - 2.0 MMOL/L  
POC G3 - PUL Collection Time: 07/13/19 11:10 AM  
Result Value Ref Range FIO2 (POC) 100 % pH (POC) 7.123 (LL) 7.35 - 7.45    
 pCO2 (POC) 30.2 (L) 35.0 - 45.0 MMHG  
 pO2 (POC) 474 (H) 80 - 100 MMHG  
 HCO3 (POC) 9.9 (L) 22 - 26 MMOL/L  
 sO2 (POC) 100 (H) 92 - 97 % Base deficit (POC) 19 mmol/L Site LEFT BRACHIAL Device: VENT Mode ASSIST CONTROL Tidal volume 450 ml Set Rate 16 bpm  
 PEEP/CPAP (POC) 8 cmH2O Allens test (POC) NO Specimen type (POC) ARTERIAL Total resp. rate 23 Urine dipstick: No results found for: COLOR, APPRN, SPGRU, REFSG, ANGELI, PROTU, GLUCU, KETU, BILU, UROU, TREVOR, LEUKU, GLUKE, EPSU, BACTU, WBCU, RBCU, CASTS, UCRY I have reviewed the following: All pertinent labs, microbiology data, radiology imaging for my assessment Medications list Personally Reviewed   [x]      Yes     []               No    
 
Medications: 
Prior to Admission medications Medication Sig Start Date End Date Taking? Authorizing Provider  
therapeutic multivitamin [facility]) tablet Take 1 Tab by mouth daily. Yes Provider, Historical  
ergocalciferol (VITAMIN D2) 50,000 unit capsule Take 50,000 Units by mouth every Sunday. Yes Provider, Historical  
aspirin delayed-release 81 mg tablet Take  by mouth daily. Yes Provider, Historical  
  
 
Thank you for allowing us to participate in the care of this patient. We will follow patient. Please dont hesitate to call with any questions Julius Jesus MD 
[address] [place] [address] [facility] [address] [facility] Phone - [phone] Fax - [phone] 
[email or web address]

## 2019-07-13 NOTE — PROGRESS NOTES
responded to RRT. Pt's son Herminia Quintanilla and Tete Villalobos were at bedside. Duane was tearful, and Tete Heath seems to work in the [facility] [employer] so understands the language. Pt was being moved to CVICU.  provided pastoral listening, support and assurance of prayer. Let family know of  support and availability.  follow up as needed. Tina Vora, MACE 
 [phone]

## 2019-07-13 NOTE — PROGRESS NOTES
TRANSFER - OUT REPORT: 
 
Verbal report given to CHRISTINA Arceo(name) on Geronimo Burns  being transferred to CVICU33(unit) for routine progression of care Report consisted of patients Situation, Background, Assessment and  
Recommendations(SBAR). Information from the following report(s) Procedure Summary and MAR was reviewed with the receiving nurse. Lines:  
Quad Lumen 07/13/19 Right Neck (Active) Central Line Being Utilized Yes 7/13/2019 12:00 PM  
Criteria for Appropriate Use Hemodynamically unstable, requiring monitoring lines, vasopressors, or volume resuscitation 7/13/2019 12:00 PM  
Infiltration Assessment 0 7/13/2019 12:00 PM  
Affected Extremity/Extremities Color distal to insertion site pink (or appropriate for race) 7/13/2019 12:00 PM  
Date of Last Dressing Change 07/13/19 7/13/2019 12:00 PM  
Dressing Status Clean, dry, & intact 7/13/2019 12:00 PM  
Dressing Type Disk with Chlorhexadine gluconate (CHG); Tape;Transparent 7/13/2019 12:00 PM  
Action Taken Open ports on tubing capped 7/13/2019 12:00 PM  
   
Peripheral IV 07/09/19 Left Forearm (Active) Site Assessment Clean, dry, & intact 7/13/2019 12:00 PM  
Phlebitis Assessment 0 7/13/2019 12:00 PM  
Infiltration Assessment 0 7/13/2019 12:00 PM  
Dressing Status Clean, dry, & intact 7/13/2019 12:00 PM  
Dressing Type Tape;Transparent 7/13/2019 12:00 PM  
Hub Color/Line Status Blue;Capped; Patent; Flushed 7/13/2019 12:00 PM  
Action Taken Open ports on tubing capped 7/13/2019 12:00 PM  
Alcohol Cap Used Yes 7/13/2019 12:00 PM  
  
 
Opportunity for questions and clarification was provided. Patient transported with: 
 Monitor O2 @ 10 liters Registered Nurse

## 2019-07-13 NOTE — PERIOP NOTES
TRANSFER - IN REPORT: 
 
Verbal report received from CHRISTINA Arceo(name) on Robert Force  for ordered procedure Information from the following report(s) SBAR, Recent Results, Pre Procedure Checklist, Procedure Verification and Quality Measures was reviewed . Opportunity for questions and clarification was provided. Assessment completed upon arrival to unit and care assumed.

## 2019-07-13 NOTE — PROGRESS NOTES
Intubation Note Called to bedside secondary to  impending respiratory failure. Patient pre-oxygenated with 100% oxygen. Smooth RSI with Etomidate 14 mg + Succinylcholine 140 mg IV. DVL x 1 
 
7.5 ETT taped and secured at 23 cm at the teeth. Tube confirmed by visualization and 
+ Bilateral BS, + Chest rise, + ETCO2 
 
VSS. CXR pending.  
 
Care turned over to covering  Attending MD.

## 2019-07-13 NOTE — PROGRESS NOTES
0900- Patient arrived to unit. Patient not oriented. Labored breathing. Patient pale. Unable to obtain temp. Family at bedside. Very tearful. Updates given. 7311- Received Critical hgb result. Hospitalist called. Telephone order to give a total of 2uPRBCs stat and call GI/ Nephro. Also discussed lack of peripheral access. Telephone order to insert central line. Discussed with family. Consent obtained. Hospitalist at bedside. Updates given. Verbal order for Dennis and levo gtt ordered. 7977- Anesthesia at bedside. Central line inserted. Hospitalist called and ordered for elective intubation. Discussed with family. Consent obtained. Verbal order from anesthesia for etomidate and succinylcholine and diprivan gtt. MG for each medication. documented on MAR. Patient tolerated well. 1015- GI at bedside. Discussed AM events. Temp sensing callejas inserted. Badder temp 34.8. Shanice hugger applied. 1030- Family at bedside. Updates given. 1058- Levophed started for BP of 74/42. Dr. Soraya Mathur at bedside. Discussed Lactic acid of 9.9 
 
1110- ABG 7.123, 30.2, 474, -19, 9.9, 100. Dr. Deonte Watson at bedside. Verbal order for 3 amps of 50meq of Bicarb for a total of 150 meq. RT to adjust vent setting from 16, 450, 100, 8 to 14, 50, 5 
 
1145- Levo up to 8. 2 units prbcs ordered from GI to be given rapidly. Verbal order from Dr. Soraya Mathur to give two 5% 250cc Albumins. 46- Dr. Franco Reese GI placed OG. Verbal order to place on Intermittent High suction. Dr. Dustin Carbajal at bedside to place emergent Art line. 1300-Verbal order to place flexi from Dr. Franco Reese. Flexi Seal inserted. Patient having copious amounts of melanous liquid stool. 1450- Patient transported down to IR. This nurse remained at bedside. 1615- Patient returned from IR. Patient must have HOB less than 20 degrees until 2200. 
 
1800- Incontinence care performed.  Patient had large amount of melanous stool. Linens changed and oral care performed. Lactic acid 6.2- improving. 2000- Bedside and Verbal shift change report given to [address] (oncoming nurse) by Kimmy Grimes RN (offgoing nurse). Report given with SBAR, Kardex, Intake/Output and MAR.

## 2019-07-13 NOTE — PROGRESS NOTES
1430 pm- Patient to Angio for GI bleed & dialysis catheter placement per Dr. Nemesio Root. Patient transported on vent and CVICU RN in critical condition. Consent obtained by Dr. Leanna Penaloza and Angio RN in CVICU waiting room.

## 2019-07-14 NOTE — PROGRESS NOTES
[address] 
[address] [facility], [address] [place] [address] 
[phone] GI PROGRESS NOTE 
 
 
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 We successfully clipped this with our large over the scope device and we stopped the bleeding. No further bleeding was noted at angio and this vessel was not coiled further because of the risk of injuring the half of the liver which it feeds. Coils were however placed in other nearby vascular branches  - although the site of the massive bleed was the endoscopically legated (with the clip) vessel.   
- No more bleeding over night 
- He received a total of four units of prbcs yesterday - he has not required any more blood today - Pressor requirements are falling 
- Only old black blood coming from OG tube - Remains on Protonix drip - Remains intubated,on a ventilator, on CVVH  
  
 
Plan:  
 
Continue aggressive supportive care Consider tube feeds tomorrow Of note, the very large clip which we had to leave free floating in the stomach is still in the gastric fundus. This should not pose a problem but it could be removed if second look endoscopy is considered. Subjective:  
Discussed with RN events overnight. Complaint Y/N Description Abdominal Pain Hematemesis Hematochezia Melena Constipation Diarrhea Dyspepsia Dysphagia Jaundiced Review of Systems: 
 
Symptom Y/N Comments  Symptom Y/N Comments Fever/Chills    Chest Pain Cough    Abdominal Pain Sputum    Joint Pain SOB/JEAN    Pruritis/Rash Nausea/vomit    Tolerating PT/OT Diarrhea    Tolerating Diet Constipation    Other Could not obtain due to AMS vs intubation Objective: VITALS:  
Last 24hrs VS reviewed since prior progress note. Most recent are: 
Visit Vitals BP (!) 93/39 Pulse (!) 59 Temp 97 °F (36.1 °C) Resp 16 Ht 5' 8\" (1.727 m) Wt 66.4 kg (146 lb 6.2 oz) SpO2 100% BMI 22.26 kg/m² Intake/Output Summary (Last 24 hours) at 7/14/2019 1537 Last data filed at 7/14/2019 1500 Gross per 24 hour Intake 7841.32 ml Output 3476 ml Net 4365.32 ml PHYSICAL EXAM: 
General: WD, WN. Alert, cooperative, no acute distress   
HEENT: NC, Atraumatic. PERRLA, EOMI. Anicteric sclerae. Lungs:  CTA Bilaterally. No Wheezing/Rhonchi/Rales. Heart:  Regular  rhythm,  No murmur (), No Rubs, No Gallops Abdomen: Soft, Non distended, Non tender.  +Bowel sounds, no HSM Extremities: No c/c/e Neurologic:  CN 2-12 gi, Alert and oriented X 3. No acute neurological distress Psych:   Good insight. Not anxious nor agitated. Lab Data Reviewed: (see below) Medications Reviewed: (see below) PMH/SH reviewed - no change compared to H&P 
________________________________________________________________________ Total time spent with patient: 30 minutes Critical Care Provided     Minutes non procedure based Care Plan discussed with: 
Patient Family RN Care Manager Consultant/Specialist:    
 
>50% of visit spent in counseling and coordination of care Recommended Disposition:  
Home with Family HH/PT/OT/RN   
SNF/LTC   
[facility] Code Status: 
Full Code DNR/DNI   
 
________________________________________________________________________ Dustin Ramos MD  
________________________________________________________________________________________________________________________________________________ Procedures: see electronic medical records for all procedures/Xrays and details which 
were not copied into this note but were reviewed prior to creation of Plan. LABS: 
Recent Labs  
  07/14/19 
1246 07/14/19 
0350 WBC 26.4* 25.8*  
 HGB 7.1* 7.5* HCT 20.9* 21.9*  
* 143* Recent Labs  
  07/14/19 
1246 07/14/19 
0350 07/13/19 
1810 07/13/19 
1355  143 149* 148* K 3.9 4.1 4.6 5.0  
 109* 114* 116* CO2 26 26 20* 15* BUN 57* 75* 104* 104* CREA 3.72* 4.32* 5.64* 5.54* * 175* 194* 185* CA 8.0* 7.6* 7.5* 8.0*  
MG 1.7 1.8  --  2.4 PHOS 3.9 4.0  --  7.7* Recent Labs  
  07/14/19 1246 07/14/19 0350 07/13/19 
9677 SGOT 262* 322* 30  
AP 61 58 104  
TP 5.0* 4.7* 6.0* ALB 3.0* 2.8* 2.6*  
GLOB 2.0 1.9* 3.4 No results for input(s): INR, PTP, APTT in the last 72 hours. No lab exists for component: INREXT No results for input(s): FE, TIBC, PSAT, FERR in the last 72 hours. No results found for: FOL, RBCF No results for input(s): PH, PCO2, PO2 in the last 72 hours. No results for input(s): CPK, CKMB in the last 72 hours. No lab exists for component: TROPONINI No results found for: COLOR, APPRN, SPGRU, REFSG, ANGELI, PROTU, GLUCU, KETU, BILU, UROU, TREVOR, LEUKU, GLUKE, EPSU, BACTU, WBCU, RBCU, CASTS, UCRY MEDICATIONS: 
Current Facility-Administered Medications Medication Dose Route Frequency  piperacillin-tazobactam (ZOSYN) 3.375 g in 0.9% sodium chloride (MBP/ADV) 100 mL  3.375 g IntraVENous Q8H  
 0.9% sodium chloride infusion  10 mL/hr IntraVENous CONTINUOUS  
 albumin human 25% (BUMINATE) solution 12.5 g  12.5 g IntraVENous Q6H  
 chlorhexidine (PERIDEX) 0.12 % mouthwash 15 mL  15 mL Oral BID  albuterol-ipratropium (DUO-NEB) 2.5 MG-0.5 MG/3 ML  3 mL Nebulization Q6H RT  
 DOPamine (INTROPIN) 800 mg in dextrose 5% 250 mL infusion  0-20 mcg/kg/min IntraVENous TITRATE  PHENYLephrine (ARTURO-SYNEPHRINE) 100 mg in 0.9% sodium chloride 250 mL infusion   mcg/min IntraVENous TITRATE  
 NOREPINephrine (LEVOPHED) 32 mg in dextrose 5% 250 mL infusion  2-30 mcg/min IntraVENous TITRATE  vasopressin (VASOSTRICT) 20 Units in 0.9% sodium chloride 100 mL infusion  0-0.04 Units/min IntraVENous TITRATE  
 0.9% sodium chloride infusion 250 mL  250 mL IntraVENous PRN  
 0.9% sodium chloride infusion 250 mL  250 mL IntraVENous PRN  propofol (DIPRIVAN) infusion  0-50 mcg/kg/min IntraVENous TITRATE  sodium chloride (NS) flush 5-40 mL  5-40 mL IntraVENous Q8H  
 sodium chloride (NS) flush 5-40 mL  5-40 mL IntraVENous PRN  
 simethicone (MYLICON) 51AU/8.6KH oral drops 80 mg  1.2 mL Oral Multiple  fentaNYL citrate (PF) injection  mcg   mcg IntraVENous Q1H PRN  
 0.9% sodium chloride infusion 250 mL  250 mL IntraVENous PRN  pantoprazole (PROTONIX) 40 mg in 0.9% sodium chloride (MBP/ADV) 50 mL  8 mg/hr IntraVENous CONTINUOUS  
 0.9% sodium chloride infusion  6 mL/hr IntraVENous CONTINUOUS  
 bicarbonate dialysis (PRISMASOL) BG K 2/Ca 3.5 5000 ml solution   Extracorporeal DIALYSIS CONTINUOUS  
 calcium carbonate (TUMS) chewable tablet 200 mg [elemental]  200 mg Oral TID PRN  
 aspirin delayed-release tablet 81 mg  81 mg Oral DAILY  sodium chloride (NS) flush 5-40 mL  5-40 mL IntraVENous Q8H  
 sodium chloride (NS) flush 5-40 mL  5-40 mL IntraVENous PRN  
 acetaminophen (TYLENOL) tablet 650 mg  650 mg Oral Q4H PRN  
 ondansetron (ZOFRAN) injection 4 mg  4 mg IntraVENous Q4H PRN  
 lactobac ac& pc-s.therm-b.anim (KERLINE Q/RISAQUAD)  1 Cap Oral DAILY  LORazepam (ATIVAN) injection 1 mg  1 mg IntraVENous Q6H PRN  
 naloxone (NARCAN) injection 0.4 mg  0.4 mg IntraVENous PRN  thiamine HCL (B-1) tablet 100 mg  100 mg Oral DAILY  folic acid (FOLVITE) tablet 1 mg  1 mg Oral DAILY  therapeutic multivitamin (THERAGRAN) tablet 1 Tab  1 Tab Oral DAILY

## 2019-07-14 NOTE — PROGRESS NOTES
[facility] Progress note Name: Acacia Abarca MRN: [id number] : 1945 Hospital: [address] Date: 2019 IMPRESSION:  
· Multisystem organ failure · Hemorrhagic shock · Acute severe blood loss anemia · KAYLEEN with severe metabolic acidosis · Duodenal ulcer- S/P clipping, IR embolization planned · Acute resp failure with failure to meet MV demands · PHTN- by ECHO PASP 79 EF 50% no AV/MV disease, but RHC data (below) a bit better. Portopulmonary HTN- ETOH abuse and hypoalbuminemic ? Occult cirrhosis · S/p LHC/RCH 7/10: RV 60/4, PA 61/15 mean 29, /7, /48, RA mean 4, PCW mean 7, CO 2.85-- echo reviewed: biatrial dilatation, left to right blowing of interatrial septum. Cath findings show precapillary PH after diuresis, likely mixed picture on presentation (combined pre and post capillary PH-- filling pressures normalized after diuresis) · HTN 
· Right effusion-exudate- ? From [facility] or other DDX is hepatic hydrothorax - cultures and cytology negative · SBO- conservative MGMT 
· Leukocytosis RECOMMENDATIONS:  
· VACV- settings adjusted-- SBT as becomes more hemodynamically stable · Empiric zosyn · Supportive transfusions · Pressors- reviewed and adjusted- D/W RN 
· On dopamine- helping with inotropic support: should be weaned off last 
· On antibiotics · Off bicarb · On CRRT 
· Sedation reviewed · Continue colloids · On protonix infusion · Stop heparin · Vent bundle · Elective [facility] work up if and when acute issues resolve · GI and nephrology following · Critically ill, at high risk for decline and death. CCT 35'. D/W RT and RN Subjective:  
 Seen and examined Still on NE and PE Off vasopressin Hb stable Leukocytosis noted On CRRT On dopamine for bradycardia- on 3 mcg  Seen and examined earlier today.  Transferred to CVICU after rapid response for SOB. Intubated soon after. Found in refractory shock with massive GI bleed secondary to duodenal ulcer. Pressors adjusted at bedside. PH buffered with supplemental bicarb for severe metabolic acidosis. Receiving large amounts of blood products. Fluid resuscitation ongoing. S/O clipping of visible vessel by GI but at high risk for re-bleed and IR embolization is planned. Worsening renal failure with ATN and recent contrast- CRRT is planned.  He feels well today. No acute complaints  States that his breathing feels much better, wife at bedside tells me that he looks better and much more comfortable than previously. CXR much improved. 7/10 
stable  This patient has been seen and evaluated at the request of Dr. Yani Winters for PHTN. Patient is a 72 y.o. male h/o ETOH abuse presents with 6 months progressive Lozano and presented with large right effusion- tapped- exudative. Negative micro. Thoracic placed pigtail and now out. ECHO with severe PHTN. Pt alert denies h/o PE, CTD, no h/o lung disease. Past Medical History:  
Diagnosis Date  Arthritic-like pain  Hypertension History reviewed. No pertinent surgical history. Prior to Admission medications Medication Sig Start Date End Date Taking? Authorizing Provider  
therapeutic multivitamin [facility]) tablet Take 1 Tab by mouth daily. Yes Provider, Historical  
ergocalciferol (VITAMIN D2) 50,000 unit capsule Take 50,000 Units by mouth every . Yes Provider, Historical  
aspirin delayed-release 81 mg tablet Take  by mouth daily. Yes Provider, Historical  
 
No Known Allergies Social History Tobacco Use  Smoking status: Former Smoker Start date: 1966 Last attempt to quit: 1984 Years since quittin.6  Smokeless tobacco: Never Used Substance Use Topics  Alcohol use: Yes Alcohol/week: 7.0 oz Types: 14 drink(s) per week Family History Problem Relation Age of Onset  Diabetes Mother  Hypertension Mother  Heart Disease Mother  Asthma Mother [facility] Arthritis-osteo Mother  Diabetes Father  Hypertension Father  Asthma Father  Arthritis-osteo Father  Diabetes Sister  Gout Sister  Asthma Brother Current Facility-Administered Medications Medication Dose Route Frequency  piperacillin-tazobactam (ZOSYN) 3.375 g in 0.9% sodium chloride (MBP/ADV) 100 mL  3.375 g IntraVENous Q8H  
 0.9% sodium chloride infusion  10 mL/hr IntraVENous CONTINUOUS  
 albumin human 25% (BUMINATE) solution 12.5 g  12.5 g IntraVENous Q6H  
 chlorhexidine (PERIDEX) 0.12 % mouthwash 15 mL  15 mL Oral BID  albuterol-ipratropium (DUO-NEB) 2.5 MG-0.5 MG/3 ML  3 mL Nebulization Q6H RT  
 DOPamine (INTROPIN) 800 mg in dextrose 5% 250 mL infusion  0-20 mcg/kg/min IntraVENous TITRATE  PHENYLephrine (ARTURO-SYNEPHRINE) 100 mg in 0.9% sodium chloride 250 mL infusion   mcg/min IntraVENous TITRATE  
 NOREPINephrine (LEVOPHED) 32 mg in dextrose 5% 250 mL infusion  2-30 mcg/min IntraVENous TITRATE  vasopressin (VASOSTRICT) 20 Units in 0.9% sodium chloride 100 mL infusion  0-0.04 Units/min IntraVENous TITRATE  propofol (DIPRIVAN) infusion  0-50 mcg/kg/min IntraVENous TITRATE  sodium chloride (NS) flush 5-40 mL  5-40 mL IntraVENous Q8H  
 pantoprazole (PROTONIX) 40 mg in 0.9% sodium chloride (MBP/ADV) 50 mL  8 mg/hr IntraVENous CONTINUOUS  
 0.9% sodium chloride infusion  6 mL/hr IntraVENous CONTINUOUS  
 bicarbonate dialysis (PRISMASOL) BG K 2/Ca 3.5 5000 ml solution   Extracorporeal DIALYSIS CONTINUOUS  
 aspirin delayed-release tablet 81 mg  81 mg Oral DAILY  sodium chloride (NS) flush 5-40 mL  5-40 mL IntraVENous Q8H  
 lactobac ac& pc-s.therm-b.anim (KERLINE Q/RISAQUAD)  1 Cap Oral DAILY  thiamine HCL (B-1) tablet 100 mg  100 mg Oral DAILY  folic acid (FOLVITE) tablet 1 mg  1 mg Oral DAILY  therapeutic multivitamin (THERAGRAN) tablet 1 Tab  1 Tab Oral DAILY Review of Systems: A comprehensive review of systems was negative except for that written in the HPI. Objective:  
Vital Signs:   
Visit Vitals BP (!) 93/39 Pulse 65 Temp 97.5 °F (36.4 °C) Resp 22 Ht 5' 8\" (1.727 m) Wt 66.4 kg (146 lb 6.2 oz) SpO2 100% BMI 22.26 kg/m² O2 Device: Endotracheal tube O2 Flow Rate (L/min): 2 l/min Temp (24hrs), Av.3 °F (36.3 °C), Min:95.7 °F (35.4 °C), Max:98.4 °F (36.9 °C) Intake/Output:  
Last shift:       07 -  1900 In: 1241.2 [I.V.:1241.2] Out: 8621 Last 3 shifts:  190 -  0700 In: 9285.4 [I.V.:8045.4] Out: 2211 Intake/Output Summary (Last 24 hours) at 2019 1611 Last data filed at 2019 1500 Gross per 24 hour Intake 5825.29 ml Output 3476 ml Net 2349.29 ml Physical Exam:  
General:  Intubated, appears stated age. Head:  Normocephalic, without obvious abnormality, atraumatic. Eyes:  Conjunctivae/corneas - pale Nose: Nares normal. Septum midline Neck: Supple, symmetrical, trachea midline Lungs:   Clear to auscultation bilaterally. Heart:  Regular rate and rhythm, S1, S2 normal, no murmur, click, rub or gallop. Abdomen:   Distended Extremities: Extremities normal, atraumatic, no cyanosis or edema. Pulses: 2+ and symmetric all extremities. Skin: Skin color, texture, turgor normal. No rashes or lesions Data review:  
 
Recent Results (from the past 24 hour(s)) METABOLIC PANEL, BASIC Collection Time: 19  6:10 PM  
Result Value Ref Range Sodium 149 (H) 136 - 145 mmol/L Potassium 4.6 3.5 - 5.1 mmol/L Chloride 114 (H) 97 - 108 mmol/L  
 CO2 20 (L) 21 - 32 mmol/L Anion gap 15 5 - 15 mmol/L Glucose 194 (H) 65 - 100 mg/dL  (H) 6 - 20 MG/DL  Creatinine 5.64 (H) 0.70 - 1.30 MG/DL  
 BUN/Creatinine ratio 18 12 - 20    
 GFR est AA 12 (L) >60 ml/min/1.73m2 GFR est non-AA 10 (L) >60 ml/min/1.73m2 Calcium 7.5 (L) 8.5 - 10.1 MG/DL  
LACTIC ACID Collection Time: 07/13/19  6:10 PM  
Result Value Ref Range Lactic acid 6.2 (HH) 0.4 - 2.0 MMOL/L  
HGB & HCT Collection Time: 07/13/19  6:10 PM  
Result Value Ref Range HGB 7.8 (L) 12.1 - 17.0 g/dL HCT 23.7 (L) 36.6 - 50.3 % POC G3 - PUL Collection Time: 07/13/19  8:24 PM  
Result Value Ref Range FIO2 (POC) 50 % pH (POC) 7.366 7.35 - 7.45    
 pCO2 (POC) 35.1 35.0 - 45.0 MMHG  
 pO2 (POC) 170 (H) 80 - 100 MMHG  
 HCO3 (POC) 20.1 (L) 22 - 26 MMOL/L  
 sO2 (POC) 99 (H) 92 - 97 % Base deficit (POC) 5 mmol/L Site DRAWN FROM ARTERIAL LINE Device: VENT Mode ASSIST CONTROL Tidal volume 450 ml Set Rate 14 bpm  
 PEEP/CPAP (POC) 5 cmH2O Allens test (POC) N/A Specimen type (POC) ARTERIAL Total resp. rate 17 HEP B SURFACE AG Collection Time: 07/13/19 10:38 PM  
Result Value Ref Range Hepatitis B surface Ag <0.10 Index Hep B surface Ag Interp. NEGATIVE  NEG    
HEP B SURFACE AB Collection Time: 07/13/19 10:38 PM  
Result Value Ref Range Hepatitis B surface Ab <3.10 mIU/mL Hep B surface Ab Interp. NONREACTIVE NR    
TROPONIN I Collection Time: 07/13/19 10:38 PM  
Result Value Ref Range Troponin-I, Qt. 0.28 (H) <0.05 ng/mL HGB & HCT Collection Time: 07/13/19 10:38 PM  
Result Value Ref Range HGB 7.2 (L) 12.1 - 17.0 g/dL HCT 21.7 (L) 36.6 - 50.3 % CBC WITH AUTOMATED DIFF Collection Time: 07/14/19  3:50 AM  
Result Value Ref Range WBC 25.8 (H) 4.1 - 11.1 K/uL  
 RBC 2.72 (L) 4.10 - 5.70 M/uL HGB 7.5 (L) 12.1 - 17.0 g/dL HCT 21.9 (L) 36.6 - 50.3 % MCV 80.5 80.0 - 99.0 FL  
 MCH 27.6 26.0 - 34.0 PG  
 MCHC 34.2 30.0 - 36.5 g/dL  
 RDW 16.1 (H) 11.5 - 14.5 % PLATELET 527 (L) 154 - 400 K/uL MPV 10.9 8.9 - 12.9 FL  
 NRBC 1.0 (H) 0  WBC ABSOLUTE NRBC 0.26 (H) 0.00 - 0.01 K/uL NEUTROPHILS 85 (H) 32 - 75 % LYMPHOCYTES 9 (L) 12 - 49 % MONOCYTES 1 (L) 5 - 13 % EOSINOPHILS 4 0 - 7 % BASOPHILS 0 0 - 1 % METAMYELOCYTES 1 (H) 0 % IMMATURE GRANULOCYTES 0 %  
 ABS. NEUTROPHILS 21.9 (H) 1.8 - 8.0 K/UL  
 ABS. LYMPHOCYTES 2.3 0.8 - 3.5 K/UL  
 ABS. MONOCYTES 0.3 0.0 - 1.0 K/UL  
 ABS. EOSINOPHILS 1.0 (H) 0.0 - 0.4 K/UL  
 ABS. BASOPHILS 0.0 0.0 - 0.1 K/UL  
 ABS. IMM. GRANS. 0.0 K/UL  
 DF MANUAL    
 RBC COMMENTS ANISOCYTOSIS 1+ 
    
 RBC COMMENTS AURELIA CELLS 
PRESENT 
    
 RBC COMMENTS OVALOCYTES PRESENT 
    
LACTIC ACID Collection Time: 07/14/19  3:50 AM  
Result Value Ref Range Lactic acid 1.9 0.4 - 2.0 MMOL/L  
METABOLIC PANEL, COMPREHENSIVE Collection Time: 07/14/19  3:50 AM  
Result Value Ref Range Sodium 143 136 - 145 mmol/L Potassium 4.1 3.5 - 5.1 mmol/L Chloride 109 (H) 97 - 108 mmol/L  
 CO2 26 21 - 32 mmol/L Anion gap 8 5 - 15 mmol/L Glucose 175 (H) 65 - 100 mg/dL BUN 75 (H) 6 - 20 MG/DL Creatinine 4.32 (H) 0.70 - 1.30 MG/DL  
 BUN/Creatinine ratio 17 12 - 20 GFR est AA 16 (L) >60 ml/min/1.73m2 GFR est non-AA 14 (L) >60 ml/min/1.73m2 Calcium 7.6 (L) 8.5 - 10.1 MG/DL Bilirubin, total 0.6 0.2 - 1.0 MG/DL  
 ALT (SGPT) 236 (H) 12 - 78 U/L  
 AST (SGOT) 322 (H) 15 - 37 U/L Alk. phosphatase 58 45 - 117 U/L Protein, total 4.7 (L) 6.4 - 8.2 g/dL Albumin 2.8 (L) 3.5 - 5.0 g/dL Globulin 1.9 (L) 2.0 - 4.0 g/dL A-G Ratio 1.5 1.1 - 2.2 MAGNESIUM Collection Time: 07/14/19  3:50 AM  
Result Value Ref Range Magnesium 1.8 1.6 - 2.4 mg/dL PHOSPHORUS Collection Time: 07/14/19  3:50 AM  
Result Value Ref Range Phosphorus 4.0 2.6 - 4.7 MG/DL  
POC G3 - PUL Collection Time: 07/14/19  6:33 AM  
Result Value Ref Range FIO2 (POC) 40 % pH (POC) 7.378 7.35 - 7.45    
 pCO2 (POC) 46.2 (H) 35.0 - 45.0 MMHG  
 pO2 (POC) 207 (H) 80 - 100 MMHG HCO3 (POC) 27.2 (H) 22 - 26 MMOL/L  
 sO2 (POC) 100 (H) 92 - 97 % Base excess (POC) 2 mmol/L Site DRAWN FROM ARTERIAL LINE Device: VENT Mode ASSIST CONTROL Tidal volume 450 ml Set Rate 14 bpm  
 PEEP/CPAP (POC) 5 cmH2O Allens test (POC) N/A Specimen type (POC) ARTERIAL Total resp. rate 14 METABOLIC PANEL, COMPREHENSIVE Collection Time: 07/14/19 12:46 PM  
Result Value Ref Range Sodium 141 136 - 145 mmol/L Potassium 3.9 3.5 - 5.1 mmol/L Chloride 106 97 - 108 mmol/L  
 CO2 26 21 - 32 mmol/L Anion gap 9 5 - 15 mmol/L Glucose 156 (H) 65 - 100 mg/dL BUN 57 (H) 6 - 20 MG/DL Creatinine 3.72 (H) 0.70 - 1.30 MG/DL  
 BUN/Creatinine ratio 15 12 - 20 GFR est AA 20 (L) >60 ml/min/1.73m2 GFR est non-AA 16 (L) >60 ml/min/1.73m2 Calcium 8.0 (L) 8.5 - 10.1 MG/DL Bilirubin, total 0.5 0.2 - 1.0 MG/DL  
 ALT (SGPT) 220 (H) 12 - 78 U/L  
 AST (SGOT) 262 (H) 15 - 37 U/L Alk. phosphatase 61 45 - 117 U/L Protein, total 5.0 (L) 6.4 - 8.2 g/dL Albumin 3.0 (L) 3.5 - 5.0 g/dL Globulin 2.0 2.0 - 4.0 g/dL A-G Ratio 1.5 1.1 - 2.2 PHOSPHORUS Collection Time: 07/14/19 12:46 PM  
Result Value Ref Range Phosphorus 3.9 2.6 - 4.7 MG/DL MAGNESIUM Collection Time: 07/14/19 12:46 PM  
Result Value Ref Range Magnesium 1.7 1.6 - 2.4 mg/dL CBC WITH AUTOMATED DIFF Collection Time: 07/14/19 12:46 PM  
Result Value Ref Range WBC 26.4 (H) 4.1 - 11.1 K/uL  
 RBC 2.58 (L) 4.10 - 5.70 M/uL HGB 7.1 (L) 12.1 - 17.0 g/dL HCT 20.9 (L) 36.6 - 50.3 % MCV 81.0 80.0 - 99.0 FL  
 MCH 27.5 26.0 - 34.0 PG  
 MCHC 34.0 30.0 - 36.5 g/dL  
 RDW 16.5 (H) 11.5 - 14.5 % PLATELET 255 (L) 907 - 400 K/uL MPV 10.9 8.9 - 12.9 FL  
 NRBC 1.9 (H) 0  WBC ABSOLUTE NRBC 0.51 (H) 0.00 - 0.01 K/uL NEUTROPHILS 79 (H) 32 - 75 % BAND NEUTROPHILS 5 0 - 6 % LYMPHOCYTES 5 (L) 12 - 49 % MONOCYTES 4 (L) 5 - 13 % EOSINOPHILS 4 0 - 7 % BASOPHILS 1 0 - 1 % METAMYELOCYTES 2 (H) 0 % IMMATURE GRANULOCYTES 0 %  
 ABS. NEUTROPHILS 22.2 (H) 1.8 - 8.0 K/UL  
 ABS. LYMPHOCYTES 1.3 0.8 - 3.5 K/UL  
 ABS. MONOCYTES 1.1 (H) 0.0 - 1.0 K/UL  
 ABS. EOSINOPHILS 1.1 (H) 0.0 - 0.4 K/UL  
 ABS. BASOPHILS 0.3 (H) 0.0 - 0.1 K/UL  
 ABS. IMM. GRANS. 0.0 K/UL  
 DF MANUAL PLATELET COMMENTS Large Platelets RBC COMMENTS ANISOCYTOSIS 1+ 
    
 RBC COMMENTS OVALOCYTES PRESENT 
    
 RBC COMMENTS POLYCHROMASIA 1+ TROPONIN I Collection Time: 07/14/19 12:46 PM  
Result Value Ref Range Troponin-I, Qt. 0.73 (H) <0.05 ng/mL Imaging: 
I have personally reviewed the patients radiographs and have reviewed the reports: 
7/5 CT chest moderate to large layering right effusion and RLL ATX LLL ASD no ILD changes no masses- main PA large Nomi Chamorro MD

## 2019-07-14 NOTE — PROGRESS NOTES
[facility] 
 [address] [facility], [facility] [address] [facility] Phone: [phone]   [phone]   
  
Nephrology Progress Note Calista Rodriguez     1945     [id number] Date of Admission : 7/4/2019 07/14/19 CC:  Follow up for KAYLEEN, Acidosis Assessment and Plan KAYLEEN - due to hypotension, shock from GI bleed 
-remains anuric 
-continue CVVH Shock, hemorrhagic 
-multiple pressors PUD, large visible bleeding vessel 
-S/P emergent clipping by EDG 7/13 
-S/P embolization of gastric/diuodenal artery 7/12 in IR Metabolic acidosis - better 
-stop bicarb drip Bradycardia - HR 30's 
-start Dopamine; wean vasopressin if possible 
  
S/P partial SBO due ot umbilical hernia that was reduced in ER by Surgery PNA 
  
Pulm HTN, RV dysfunction, mod/severe TR; LVEF~45% 
  
H/o alcohol use Interval History:  As above interventions for bleeding PUD; no U/O; on [address] and multiple pressors Review of Systems: Review of systems not obtained due to patient factors. Current Medications:  
Current Facility-Administered Medications Medication Dose Route Frequency  piperacillin-tazobactam (ZOSYN) 3.375 g in 0.9% sodium chloride (MBP/ADV) 100 mL  3.375 g IntraVENous Q8H  
 DOPamine (INTROPIN) 800 mg in dextrose 5% 250 mL infusion  0-20 mcg/kg/min IntraVENous TITRATE  
 0.9% sodium chloride infusion 250 mL  250 mL IntraVENous PRN  
 0.9% sodium chloride infusion 250 mL  250 mL IntraVENous PRN  propofol (DIPRIVAN) infusion  0-50 mcg/kg/min IntraVENous TITRATE  sodium chloride (NS) flush 5-40 mL  5-40 mL IntraVENous Q8H  
 sodium chloride (NS) flush 5-40 mL  5-40 mL IntraVENous PRN  
 simethicone (MYLICON) 00SN/7.4JP oral drops 80 mg  1.2 mL Oral Multiple  atropine injection 0.5 mg  0.5 mg IntraVENous ONCE PRN  
 EPINEPHrine (ADRENALIN) 0.1 mg/mL syringe 1 mg  1 mg Endoscopically ONCE PRN  
 fentaNYL citrate (PF) injection  mcg   mcg IntraVENous Q1H PRN  
  albumin human 25% (BUMINATE) solution 12.5 g  12.5 g IntraVENous Q6H  
 0.9% sodium chloride infusion 250 mL  250 mL IntraVENous PRN  pantoprazole (PROTONIX) 40 mg in 0.9% sodium chloride (MBP/ADV) 50 mL  8 mg/hr IntraVENous CONTINUOUS  
 vasopressin (VASOSTRICT) 20 Units in 0.9% sodium chloride 100 mL infusion  0-0.04 Units/min IntraVENous TITRATE  
 0.9% sodium chloride infusion  6 mL/hr IntraVENous CONTINUOUS  
 bicarbonate dialysis (PRISMASOL) BG K 2/Ca 3.5 5000 ml solution   Extracorporeal DIALYSIS CONTINUOUS  
 NOREPINephrine (LEVOPHED) 32 mg in dextrose 5% 250 mL infusion  2-30 mcg/min IntraVENous TITRATE  PHENYLephrine (ARTURO-SYNEPHRINE) 100 mg in 0.9% sodium chloride 250 mL infusion   mcg/min IntraVENous TITRATE  calcium carbonate (TUMS) chewable tablet 200 mg [elemental]  200 mg Oral TID PRN  
 aspirin delayed-release tablet 81 mg  81 mg Oral DAILY  sodium chloride (NS) flush 5-40 mL  5-40 mL IntraVENous Q8H  
 sodium chloride (NS) flush 5-40 mL  5-40 mL IntraVENous PRN  
 acetaminophen (TYLENOL) tablet 650 mg  650 mg Oral Q4H PRN  
 ondansetron (ZOFRAN) injection 4 mg  4 mg IntraVENous Q4H PRN  
 lactobac ac& pc-s.therm-b.anim (KERLINE Q/RISAQUAD)  1 Cap Oral DAILY  LORazepam (ATIVAN) injection 1 mg  1 mg IntraVENous Q6H PRN  
 naloxone (NARCAN) injection 0.4 mg  0.4 mg IntraVENous PRN  thiamine HCL (B-1) tablet 100 mg  100 mg Oral DAILY  folic acid (FOLVITE) tablet 1 mg  1 mg Oral DAILY  therapeutic multivitamin (THERAGRAN) tablet 1 Tab  1 Tab Oral DAILY No Known Allergies Objective: 
Vitals:   
Vitals:  
 07/14/19 0700 07/14/19 0800 07/14/19 0830 07/14/19 0900 BP:      
Pulse: (!) 46 (!) 45 (!) 52 (!) 40 Resp: 14 15 15 15 Temp: 96.4 °F (35.8 °C) 96.6 °F (35.9 °C) SpO2: 100% 100% 100% 100% Weight:      
Height:      
 
Intake and Output: 
07/14 0701 - 07/14 1900 In: 439.9 [I.V.:439.9] Out: 607  
07/12 1901 - 07/14 0700 In: 9285.4 [I.V.:8045.4] Out: 2211 Physical Examination: 
Pt intubated    Yes General: Intubated and sedated Neck:  Central lines Resp:  Lungs distant sounds, few ronchi CV:  RRR,  no murmur or rub, trace LE edema GI:  sedated Psych:             sedated Skin:  intact :  callejas [x]    High complexity decision making was performed 
[]    Patient is at high-risk of decompensation with multiple organ involvement Lab Data Personally Reviewed: I have reviewed all the pertinent labs, microbiology data and radiology studies during assessment. Recent Labs  
  07/14/19 0350 07/13/19 1810 07/13/19 
1355 07/13/19 
0845 07/13/19 
8929  07/11/19 2037  149* 148* 143 143   < > 140  
K 4.1 4.6 5.0 5.0 4.8   < > 4.1 * 114* 116* 119* 116*   < > 112* CO2 26 20* 15* 11* 17*   < > 17* * 194* 185* 179* 126*   < > 181* BUN 75* 104* 104* 106* 107*   < > 55* CREA 4.32* 5.64* 5.54* 5.32* 5.05*   < > 2.10* CA 7.6* 7.5* 8.0* 8.8 9.9   < > 10.4* MG 1.8  --  2.4  --  2.6*  --  2.5* PHOS 4.0  --  7.7*  --  5.1*  --  5.7* ALB 2.8*  --   --   --  2.6*  --  2.5* SGOT 322*  --   --   --  30  --  56* *  --   --   --  45  --  54  
 < > = values in this interval not displayed. Recent Labs  
  07/14/19 0350 07/13/19 2238 07/13/19 1810 07/13/19 
1355 07/13/19 
0845 07/13/19 
0332 07/12/19 
1057 WBC 25.8*  --   --  29.1* 14.5* 13.6* 11.4* HGB 7.5* 7.2* 7.8* 9.2* 5.6* 7.0* 8.4* HCT 21.9* 21.7* 23.7* 28.6* 19.0* 23.0* 27.4*  
*  --   --  169 271 288 271 No results found for: SDES Lab Results Component Value Date/Time Culture result: NO GROWTH 4 DAYS 07/05/2019 12:25 PM  
 Culture result: NO GROWTH 4 DAYS 07/05/2019 12:25 PM  
 
Recent Results (from the past 24 hour(s)) LACTIC ACID Collection Time: 07/13/19 10:49 AM  
Result Value Ref Range Lactic acid 9.9 (HH) 0.4 - 2.0 MMOL/L  
POC G3 - PUL  Collection Time: 07/13/19 11:10 AM  
 Result Value Ref Range FIO2 (POC) 100 % pH (POC) 7.123 (LL) 7.35 - 7.45    
 pCO2 (POC) 30.2 (L) 35.0 - 45.0 MMHG  
 pO2 (POC) 474 (H) 80 - 100 MMHG  
 HCO3 (POC) 9.9 (L) 22 - 26 MMOL/L  
 sO2 (POC) 100 (H) 92 - 97 % Base deficit (POC) 19 mmol/L Site LEFT BRACHIAL Device: VENT Mode ASSIST CONTROL Tidal volume 450 ml Set Rate 16 bpm  
 PEEP/CPAP (POC) 8 cmH2O Allens test (POC) NO Specimen type (POC) ARTERIAL Total resp. rate 23 TROPONIN I Collection Time: 07/13/19  1:55 PM  
Result Value Ref Range Troponin-I, Qt. 0.25 (H) <0.05 ng/mL CBC WITH AUTOMATED DIFF Collection Time: 07/13/19  1:55 PM  
Result Value Ref Range WBC 29.1 (H) 4.1 - 11.1 K/uL  
 RBC 3.33 (L) 4.10 - 5.70 M/uL HGB 9.2 (L) 12.1 - 17.0 g/dL HCT 28.6 (L) 36.6 - 50.3 % MCV 85.9 80.0 - 99.0 FL  
 MCH 27.6 26.0 - 34.0 PG  
 MCHC 32.2 30.0 - 36.5 g/dL  
 RDW 16.6 (H) 11.5 - 14.5 % PLATELET 220 036 - 628 K/uL MPV 10.7 8.9 - 12.9 FL  
 NRBC 0.2 (H) 0  WBC ABSOLUTE NRBC 0.05 (H) 0.00 - 0.01 K/uL NEUTROPHILS 74 32 - 75 % BAND NEUTROPHILS 3 0 - 6 % LYMPHOCYTES 7 (L) 12 - 49 % MONOCYTES 12 5 - 13 % EOSINOPHILS 0 0 - 7 % BASOPHILS 0 0 - 1 % METAMYELOCYTES 3 (H) 0 % MYELOCYTES 1 (H) 0 % IMMATURE GRANULOCYTES 0 %  
 ABS. NEUTROPHILS 22.4 (H) 1.8 - 8.0 K/UL  
 ABS. LYMPHOCYTES 2.0 0.8 - 3.5 K/UL  
 ABS. MONOCYTES 3.5 (H) 0.0 - 1.0 K/UL  
 ABS. EOSINOPHILS 0.0 0.0 - 0.4 K/UL  
 ABS. BASOPHILS 0.0 0.0 - 0.1 K/UL  
 ABS. IMM. GRANS. 0.0 K/UL  
 DF MANUAL    
 RBC COMMENTS ANISOCYTOSIS 1+ 
    
 RBC COMMENTS AURELIA CELLS 3+ METABOLIC PANEL, BASIC Collection Time: 07/13/19  1:55 PM  
Result Value Ref Range Sodium 148 (H) 136 - 145 mmol/L Potassium 5.0 3.5 - 5.1 mmol/L Chloride 116 (H) 97 - 108 mmol/L  
 CO2 15 (LL) 21 - 32 mmol/L Anion gap 17 (H) 5 - 15 mmol/L Glucose 185 (H) 65 - 100 mg/dL   (H) 6 - 20 MG/DL  
 Creatinine 5.54 (H) 0.70 - 1.30 MG/DL  
 BUN/Creatinine ratio 19 12 - 20 GFR est AA 12 (L) >60 ml/min/1.73m2 GFR est non-AA 10 (L) >60 ml/min/1.73m2 Calcium 8.0 (L) 8.5 - 10.1 MG/DL MAGNESIUM Collection Time: 07/13/19  1:55 PM  
Result Value Ref Range Magnesium 2.4 1.6 - 2.4 mg/dL PHOSPHORUS Collection Time: 07/13/19  1:55 PM  
Result Value Ref Range Phosphorus 7.7 (H) 2.6 - 4.7 MG/DL  
POC G3 - PUL Collection Time: 07/13/19  1:59 PM  
Result Value Ref Range FIO2 (POC) 50 % pH (POC) 7.273 (L) 7.35 - 7.45    
 pCO2 (POC) 32.9 (L) 35.0 - 45.0 MMHG  
 pO2 (POC) 268 (H) 80 - 100 MMHG  
 HCO3 (POC) 15.2 (L) 22 - 26 MMOL/L  
 sO2 (POC) 100 (H) 92 - 97 % Base deficit (POC) 12 mmol/L Site DRAWN FROM ARTERIAL LINE Device: VENT Mode ASSIST CONTROL Tidal volume 450 ml Set Rate 14 bpm  
 PEEP/CPAP (POC) 5 cmH2O Allens test (POC) NO Specimen type (POC) ARTERIAL Total resp. rate 23 METABOLIC PANEL, BASIC Collection Time: 07/13/19  6:10 PM  
Result Value Ref Range Sodium 149 (H) 136 - 145 mmol/L Potassium 4.6 3.5 - 5.1 mmol/L Chloride 114 (H) 97 - 108 mmol/L  
 CO2 20 (L) 21 - 32 mmol/L Anion gap 15 5 - 15 mmol/L Glucose 194 (H) 65 - 100 mg/dL  (H) 6 - 20 MG/DL Creatinine 5.64 (H) 0.70 - 1.30 MG/DL  
 BUN/Creatinine ratio 18 12 - 20 GFR est AA 12 (L) >60 ml/min/1.73m2 GFR est non-AA 10 (L) >60 ml/min/1.73m2 Calcium 7.5 (L) 8.5 - 10.1 MG/DL  
LACTIC ACID Collection Time: 07/13/19  6:10 PM  
Result Value Ref Range Lactic acid 6.2 (HH) 0.4 - 2.0 MMOL/L  
HGB & HCT Collection Time: 07/13/19  6:10 PM  
Result Value Ref Range HGB 7.8 (L) 12.1 - 17.0 g/dL HCT 23.7 (L) 36.6 - 50.3 % POC G3 - PUL Collection Time: 07/13/19  8:24 PM  
Result Value Ref Range FIO2 (POC) 50 % pH (POC) 7.366 7.35 - 7.45    
 pCO2 (POC) 35.1 35.0 - 45.0 MMHG  
 pO2 (POC) 170 (H) 80 - 100 MMHG HCO3 (POC) 20.1 (L) 22 - 26 MMOL/L  
 sO2 (POC) 99 (H) 92 - 97 % Base deficit (POC) 5 mmol/L Site DRAWN FROM ARTERIAL LINE Device: VENT Mode ASSIST CONTROL Tidal volume 450 ml Set Rate 14 bpm  
 PEEP/CPAP (POC) 5 cmH2O Allens test (POC) N/A Specimen type (POC) ARTERIAL Total resp. rate 17 HEP B SURFACE AG Collection Time: 07/13/19 10:38 PM  
Result Value Ref Range Hepatitis B surface Ag <0.10 Index Hep B surface Ag Interp. NEGATIVE  NEG    
HEP B SURFACE AB Collection Time: 07/13/19 10:38 PM  
Result Value Ref Range Hepatitis B surface Ab <3.10 mIU/mL Hep B surface Ab Interp. NONREACTIVE NR    
TROPONIN I Collection Time: 07/13/19 10:38 PM  
Result Value Ref Range Troponin-I, Qt. 0.28 (H) <0.05 ng/mL HGB & HCT Collection Time: 07/13/19 10:38 PM  
Result Value Ref Range HGB 7.2 (L) 12.1 - 17.0 g/dL HCT 21.7 (L) 36.6 - 50.3 % CBC WITH AUTOMATED DIFF Collection Time: 07/14/19  3:50 AM  
Result Value Ref Range WBC 25.8 (H) 4.1 - 11.1 K/uL  
 RBC 2.72 (L) 4.10 - 5.70 M/uL HGB 7.5 (L) 12.1 - 17.0 g/dL HCT 21.9 (L) 36.6 - 50.3 % MCV 80.5 80.0 - 99.0 FL  
 MCH 27.6 26.0 - 34.0 PG  
 MCHC 34.2 30.0 - 36.5 g/dL  
 RDW 16.1 (H) 11.5 - 14.5 % PLATELET 954 (L) 799 - 400 K/uL MPV 10.9 8.9 - 12.9 FL  
 NRBC 1.0 (H) 0  WBC ABSOLUTE NRBC 0.26 (H) 0.00 - 0.01 K/uL NEUTROPHILS 85 (H) 32 - 75 % LYMPHOCYTES 9 (L) 12 - 49 % MONOCYTES 1 (L) 5 - 13 % EOSINOPHILS 4 0 - 7 % BASOPHILS 0 0 - 1 % METAMYELOCYTES 1 (H) 0 % IMMATURE GRANULOCYTES 0 %  
 ABS. NEUTROPHILS 21.9 (H) 1.8 - 8.0 K/UL  
 ABS. LYMPHOCYTES 2.3 0.8 - 3.5 K/UL  
 ABS. MONOCYTES 0.3 0.0 - 1.0 K/UL  
 ABS. EOSINOPHILS 1.0 (H) 0.0 - 0.4 K/UL  
 ABS. BASOPHILS 0.0 0.0 - 0.1 K/UL  
 ABS. IMM. GRANS. 0.0 K/UL  
 DF MANUAL    
 RBC COMMENTS ANISOCYTOSIS 1+ 
    
 RBC COMMENTS AURELIA CELLS 
PRESENT 
    
 RBC COMMENTS OVALOCYTES PRESENT 
    
LACTIC ACID  
 Collection Time: 07/14/19  3:50 AM  
Result Value Ref Range Lactic acid 1.9 0.4 - 2.0 MMOL/L  
METABOLIC PANEL, COMPREHENSIVE Collection Time: 07/14/19  3:50 AM  
Result Value Ref Range Sodium 143 136 - 145 mmol/L Potassium 4.1 3.5 - 5.1 mmol/L Chloride 109 (H) 97 - 108 mmol/L  
 CO2 26 21 - 32 mmol/L Anion gap 8 5 - 15 mmol/L Glucose 175 (H) 65 - 100 mg/dL BUN 75 (H) 6 - 20 MG/DL Creatinine 4.32 (H) 0.70 - 1.30 MG/DL  
 BUN/Creatinine ratio 17 12 - 20 GFR est AA 16 (L) >60 ml/min/1.73m2 GFR est non-AA 14 (L) >60 ml/min/1.73m2 Calcium 7.6 (L) 8.5 - 10.1 MG/DL Bilirubin, total 0.6 0.2 - 1.0 MG/DL  
 ALT (SGPT) 236 (H) 12 - 78 U/L  
 AST (SGOT) 322 (H) 15 - 37 U/L Alk. phosphatase 58 45 - 117 U/L Protein, total 4.7 (L) 6.4 - 8.2 g/dL Albumin 2.8 (L) 3.5 - 5.0 g/dL Globulin 1.9 (L) 2.0 - 4.0 g/dL A-G Ratio 1.5 1.1 - 2.2 MAGNESIUM Collection Time: 07/14/19  3:50 AM  
Result Value Ref Range Magnesium 1.8 1.6 - 2.4 mg/dL PHOSPHORUS Collection Time: 07/14/19  3:50 AM  
Result Value Ref Range Phosphorus 4.0 2.6 - 4.7 MG/DL  
POC G3 - PUL Collection Time: 07/14/19  6:33 AM  
Result Value Ref Range FIO2 (POC) 40 % pH (POC) 7.378 7.35 - 7.45    
 pCO2 (POC) 46.2 (H) 35.0 - 45.0 MMHG  
 pO2 (POC) 207 (H) 80 - 100 MMHG  
 HCO3 (POC) 27.2 (H) 22 - 26 MMOL/L  
 sO2 (POC) 100 (H) 92 - 97 % Base excess (POC) 2 mmol/L Site DRAWN FROM ARTERIAL LINE Device: VENT Mode ASSIST CONTROL Tidal volume 450 ml Set Rate 14 bpm  
 PEEP/CPAP (POC) 5 cmH2O Allens test (POC) N/A Specimen type (POC) ARTERIAL Total resp. rate 14 Total time spent with patient:  xxx   min. Care Plan discussed with: 
Patient Family RN Consulting Physician Molly Muller     
 
I have reviewed the flowsheets. Chart and Pertinent Notes have been reviewed. No change in PMH ,family and social history from Consult note.  
 
 
Ok Hendrix MD

## 2019-07-14 NOTE — PROGRESS NOTES
1930 - Bedside and Verbal shift change report given to Angela Lerma, CHRISTINA (oncoming nurse) by Phan Ferguson RN (offgoing nurse). Report included the following information SBAR, Kardex, Intake/Output, MAR, Recent Results, Cardiac Rhythm Sinus and Alarm Parameters . Medications verified and pt assessment performed. Pt resting in bed intubated and sedated. Family at bedside. 2020 - RT at bedside for ABG Ph = 7.366 CO2 = 35.1 PO2 = 170 Base excess = -5 
HCO3 = 20.1 SO2 = 99% Alyson Agrawal RN also at bedside to initiate CRRT setup. 2300 - H/H resulted. 
hgb = 7.2 
hct = 21.7  Will continue to monitor. 0500 - H/H resulted  
hgb = 7.5 
hct = 21.9 
0635 - RT at bedside for ABG Ph = 7.378 CO2 = 46.2 PO2 = 207 Base excess = 2 
HCO3 = 27.2 SO2 = 100% Pulmonary paged 
[phone] - Dr. Mabel Garrido on telephone. Updated on pt condition overnight. New orders received -portable KUB. 9696 - Pulmonary paged [phone] - Bedside and Verbal shift change report given to Phan Ferguson, CHRISTINA (oncoming nurse) by Angela Lerma RN (offgoing nurse). Report included the following information SBAR, Kardex, Intake/Output, MAR, Recent Results, Cardiac Rhythm Sinus Morgan/A-fib and Alarm Parameters .

## 2019-07-14 NOTE — PROCEDURES
[address] PROCEDURE NOTE Name:  Breanna Sousa 
MR#:  [id number] :  1945 ACCOUNT #:  [de-identified] DATE OF SERVICE:  2019 PROCEDURE PERFORMED:  Upper endoscopy with bleed control (placement of Ovesco \"bear claw\" clip on pulsating, actively hemorrhaging visible vessel on a large duodenal ulcer). INDICATIONS:  Hematemesis, melena, and hemorrhagic shock. The patient was given two units of packed red blood cells and IV albumin as well as IV fluids and pressors prior to this procedure. He had presented previously with hypotension and \"rapid response\" was called. Hemoglobin had fallen to 5. The patient has multiple other medical issues as well including acute renal failure and respiratory failure. He is intubated for this procedure in the intensive care unit. ANESTHESIA:  The patient was sedated with a propofol drip. POSTPROCEDURE DIAGNOSES: 
1.  Large duodenal ulcer with large visible vessel, successfully clipped with an Ovesco clip, \"over-the-scope clip\". 2.  Esophagitis and gastritis. FINDINGS/PROCEDURE IN DETAIL:  After informed consent was obtained from the patient's family, the Olympus double-channel upper endoscope was inserted into the esophagus under direct vision. The patient had been intubated. Endotracheal tube was noted in place in the trachea. ESOPHAGUS:  There were erosive changes noted in the esophagus. Dark blood was noted refluxing into the esophagus from the stomach. No varices were seen. STOMACH:  There was a large amount of blood seen in the stomach. This made visualization quite difficult. The blood for the most part in the stomach was very dark. This was suctioned as much as possible. Blood in the area of the prepyloric antrum and pylorus was much redder.  
 
I was able to move the patient into a supine and lateral decubitus position to move the blood pool so I could see the whole stomach and suctioned the blood out of the stomach and we were able to advance into the duodenum. In the duodenum, there was a large duodenal ulcer noted. There was a very large pulsating visible vessel seen at the base of the large duodenal bulbar ulcer. This vessel was intermittently spurting bright red blood. Because of the size of the vessel, I felt standard clips would be dangerous as there would be a significant risk to shear off the end of the vessel. We therefore felt use of the over-the-scope Ovesco clip would be beneficial. 
 
The scope was removed and I used the largest Ovesco clip available. The scope was advanced into the hypopharynx, and I was not able to get the large clip through the upper sphincter. I then used a 12-mm balloon to dilate the upper sphincter and was able to advance the scope into the esophagus and further down into the stomach. We were able to reach the pylorus, and I was able to see through the pylorus and see the ulcer, but with the over-the-scope clip I was not able to get through the pylorus - the clip was too big. We used a 15-mm balloon to try to dilate the pylorus to see if we could get this scope through, but we were still unsuccessful in getting this large clip, which was on the top of the large channel scope through the pylorus, I then removed the scope. Of note, the very large clip fell off the end of the endoscope and was in the stomach. This happened when we pulled the scope through the upper sphincter and it got stuck at the level of the upper sphincter. We had to push this clip back down into the stomach. We used a smaller Ovesco clip that was 9 mm in diameter on the diagnostic upper scope and repeated the procedure. Using this clip and using the smaller scope, we were able to advance easily into the esophagus and further easily through the pylorus into the duodenum.   We then were able to advance the clip on to the very large vessel and deploy it. At the end of the procedure, there was no further bleeding noted. At the end of the procedure  I tried to retrieve the larger clip that had fallen of the scope with a Verle  net  which was floating in the black blood pool in the fundus but the patient was relatively unstable we were unable to capture this easily and since the patient was unstable we felt it mor important to finish the exam than spend more time removing this foreign body that should be small enough that it should pass relatively easily on its own. IMPRESSION:  The patient has a very large duodenal ulcer with a large pulsating visible vessel that was successfully clipped. The active bleeding has now been stopped. I think this lesion has a very high risk of rebleeding. The patient was unstable throughout the procedure and had a blood pressure that at one point fell into the 50 systolic range. At the end of the procedure, his systolic blood pressure was over 100 mm/hg. I think that it would be prudent to proceed with Interventional Radiology embolization of this vessel to prevent re-bleeding in light of the life-threatening nature of this hemorrhage. Jeannette Narayanan MD 
 
 
[id number] 
D:  07/13/2019 13:10 
T:  07/13/2019 20:18 
JOB #:  [id number] CC:  Maliha Guo MD

## 2019-07-14 NOTE — PROGRESS NOTES
0800- Bedside report received. Assessment performed. Drips verified with offgoing RN 
 
1110- Dr Sona Elmore at bedside. Clarified gtt parameters. MD states to wean to keep SBP greater than 100.  
 
1215- Family at bedside. Updates given. Family much more calm and at ease today. [phone]- Dr. Tg Allen at bedside. Discussed HR in the 30s. MD states to add dopamine, wean off (in order)- Vaso then debbie and stay on Levo and Dopamine. [phone]- Dr. Denise Null at bedside. Updates given. 1700- Noticed increasing pressor support. HGB drawn and sent to lab. HGB noted to be 6.7. Per Dr. Sona Elmore nurse [facility] [place] will transfuse 2 uPRBCs. 1830- Heart rate trending in the 50s. Increased Dopamine to 4mcg/kg/min

## 2019-07-14 NOTE — PROGRESS NOTES
Cm reviewed previous PT note and patient completed the 6 minute walk test and he was able to ambulate 446,4 feet with supervision. Recommendation was home with family. Cm reviewed CM order for home health PT/OT but at this time patient does not meet criteria for [facility].

## 2019-07-14 NOTE — PROGRESS NOTES
Clinical Pharmacy Note - Extended Infusion Piperacillin/Tazobactam Dosing Indication: Sepsis CrCl: CVVH Per P&T protocol, piperacillin/tazobactam 3.375 g IV every 8 hours (each dose infused over 4 hours) is appropriate for patients currently on CVVH. Please call pharmacy with any questions. Anita Cherry, KiraD Clinical Pharmacist 
[facility] [phone]

## 2019-07-14 NOTE — PROGRESS NOTES
Hospitalist Progress Note Jayda Flores MD 
Answering service: [phone] OR [phone] from in house phone Date of Service:  2019 NAME:  Portillo Robertson :  1945 MRN:  [id number] Admission Summary: This is a 82-year-old man with a past medical history significant for hypertension, who was in his usual state of health until about a week ago when the patient developed abdominal pain. The pain is located at the epigastric region, 8/10 in severity. No radiation. No known aggravating or relieving factors. The patient described the pain as a dull ache associated with constipation but no nausea, no vomiting. The patient took laxative without any significant relief of the constipation. The patient was brought to the emergency room for further evaluation. When the EMS arrived at the scene, the patient's oxygen saturation was 86% on room air. CT scan of the abdomen and pelvis performed by the emergency room provider shows right pleural effusion and suspected small bowel obstruction. Interval history / Subjective:  
Patient in CVICU- following hemorrhagic shock from bleeding duodenal ulcer. On 3 pressors, had IR embolize bleeders, HB stable last few checks. S/p several units trasfusions Assessment & Plan: #. Duodenal ulcer: bleeding- controlled with clips via EGD and IR embolizations #. Hemorrhagic shock: 2nd to bleeding duodenal ulcer #. Severe Lactic Acidosis: 2nd to shock, resolved with Bicarb gtt #. KAYLEEN: started on CRRT- Nephrology following - Transfused 4 URBC, GI managed to place Clip on bleeder,  
- IR embolized bleeders, clip on hepatic artery- not embolized to avoid major liver inj.  
- Pressors, starting to wean down very slowly. PPI gtt - Trending CBC Q8H. Monitor lytes/clinically closely 1.  Pulm HTN: severe, PaPressure = 79 on TTE  
- Cont lasix and home meds, troponin neg x3 
 - echo left and right heart failure EF 45-50, mod-severe TR with pulm - Cardiology following, s/p LHC: clear arteries, RHC 
- Pulmonary following, running tests to evaluate for possible cause for pHTN 2. Acute hypoxemic resp failure-resolved - due to large Right pleural effusion and compressive atelectasis. - pleural drain placed 7/5/19- 2L output in [id number] - CXR showing well expanded right lung- Pleural drain removed 7/7/19 3. Partial small bowel obstruction due to small umbilical hernia- resolved General surgery consulted- Hernia reduced in ER and at bedside- Advanced diet 4. Bilateral leg swelling. - dopplers were negative for DVT. 5.  Bacterial pneumonia. cont Rocephin and Zithromax for suspected bacterial pneumonia. 6.  Reported hx of alcohol abuse. The patient drinks about 14 drinks per week. cont thiamine, folic acid, and multivitamin. [facility] protocol- No signs of withdrawl Code status:FULL 
DVT prophylaxis: Heparin Care Plan discussed with: Patient/Family Disposition: Home w/Family and TBD Hospital Problems  Date Reviewed: 7/5/2019 Codes Class Noted POA * (Principal) Acute CHF (congestive heart failure) (HCC) ICD-10-CM: I50.9 ICD-9-CM: 428.0  7/5/2019 Yes Pleural effusion, right ICD-10-CM: J90 ICD-9-CM: 511.9  7/5/2019 Unknown Review of Systems: A comprehensive review of systems was negative except for that written in the subjective section Vital Signs:  
 Last 24hrs VS reviewed since prior progress note. Most recent are: 
Visit Vitals BP (!) 113/33 Pulse (!) 46 Temp 96.4 °F (35.8 °C) Resp 14 Ht 5' 8\" (1.727 m) Wt 66.4 kg (146 lb 6.2 oz) SpO2 100% BMI 22.26 kg/m² Intake/Output Summary (Last 24 hours) at 7/14/2019 6178 Last data filed at 7/14/2019 0700 Gross per 24 hour Intake 9285.4 ml Output 2197 ml Net 7088.4 ml Physical Examination:  
 
Constitutional:  intubated, sedated Resp:  AE b/l, no wheezes, on Vent GI:  Soft, non distended Musculoskeletal:  mild LE edema, warm Neurologic:  Moves all extremities. AAOx3, Data Review:  
 Review and/or order of tests in the radiology section of CPT Review and/or order of tests in the medicine section of CPT Labs:  
 
Recent Labs  
  07/14/19 
0350 07/13/19 2238  07/13/19 
1355 WBC 25.8*  --   --  29.1* HGB 7.5* 7.2*   < > 9.2* HCT 21.9* 21.7*   < > 28.6*  
*  --   --  169  
 < > = values in this interval not displayed. Recent Labs  
  07/14/19 
0350 07/13/19 
1810 07/13/19 
1355  07/13/19 
9923  149* 148*   < > 143 K 4.1 4.6 5.0   < > 4.8 * 114* 116*   < > 116* CO2 26 20* 15*   < > 17* BUN 75* 104* 104*   < > 107* CREA 4.32* 5.64* 5.54*   < > 5.05* * 194* 185*   < > 126* CA 7.6* 7.5* 8.0*   < > 9.9 MG 1.8  --  2.4  --  2.6* PHOS 4.0  --  7.7*  --  5.1*  
 < > = values in this interval not displayed. Recent Labs  
  07/14/19 0350 07/13/19 
0332 07/11/19 2037 SGOT 322* 30 56* * 45 54 AP 58 104 113 TBILI 0.6 0.2 0.2 TP 4.7* 6.0* 6.1* ALB 2.8* 2.6* 2.5*  
GLOB 1.9* 3.4 3.6 No results for input(s): INR, PTP, APTT in the last 72 hours. No lab exists for component: INREXT, INREXT No results for input(s): FE, TIBC, PSAT, FERR in the last 72 hours. No results found for: FOL, RBCF No results for input(s): PH, PCO2, PO2 in the last 72 hours. Recent Labs  
  07/13/19 2238 07/13/19 
1355 07/13/19 
0845 TROIQ 0.28* 0.25* 0.10* Lab Results Component Value Date/Time Cholesterol, total 134 07/06/2019 03:50 AM  
 HDL Cholesterol 56 07/06/2019 03:50 AM  
 LDL, calculated 68 07/06/2019 03:50 AM  
 Triglyceride 50 07/06/2019 03:50 AM  
 CHOL/HDL Ratio 2.4 07/06/2019 03:50 AM  
 
Lab Results Component Value Date/Time  Glucose (POC) 175 (H) 07/11/2019 07:56 PM  
 Glucose (POC) 87 07/05/2019 12:02 PM  
 Glucose (POC) 101 (H) 07/05/2019 08:47 AM  
 
No results found for: COLOR, APPRN, SPGRU, REFSG, ANGELI, PROTU, GLUCU, KETU, BILU, UROU, TREVOR, LEUKU, GLUKE, EPSU, BACTU, WBCU, RBCU, CASTS, UCRY Medications Reviewed:  
 
Current Facility-Administered Medications Medication Dose Route Frequency  0.9% sodium chloride infusion 250 mL  250 mL IntraVENous PRN  
 0.9% sodium chloride infusion 250 mL  250 mL IntraVENous PRN  propofol (DIPRIVAN) infusion  0-50 mcg/kg/min IntraVENous TITRATE  sodium chloride (NS) flush 5-40 mL  5-40 mL IntraVENous Q8H  
 sodium chloride (NS) flush 5-40 mL  5-40 mL IntraVENous PRN  
 simethicone (MYLICON) 99LZ/6.4AR oral drops 80 mg  1.2 mL Oral Multiple  atropine injection 0.5 mg  0.5 mg IntraVENous ONCE PRN  
 EPINEPHrine (ADRENALIN) 0.1 mg/mL syringe 1 mg  1 mg Endoscopically ONCE PRN  
 fentaNYL citrate (PF) injection  mcg   mcg IntraVENous Q1H PRN  
 sodium bicarbonate (8.4%) 150 mEq in sterile water 1,000 mL infusion   IntraVENous CONTINUOUS  
 albumin human 25% (BUMINATE) solution 12.5 g  12.5 g IntraVENous Q6H  
 0.9% sodium chloride infusion 250 mL  250 mL IntraVENous PRN  pantoprazole (PROTONIX) 40 mg in 0.9% sodium chloride (MBP/ADV) 50 mL  8 mg/hr IntraVENous CONTINUOUS  
 vasopressin (VASOSTRICT) 20 Units in 0.9% sodium chloride 100 mL infusion  0-0.04 Units/min IntraVENous TITRATE  
 0.9% sodium chloride infusion  6 mL/hr IntraVENous CONTINUOUS  
 bicarbonate dialysis (PRISMASOL) BG K 2/Ca 3.5 5000 ml solution   Extracorporeal DIALYSIS CONTINUOUS  
 NOREPINephrine (LEVOPHED) 32 mg in dextrose 5% 250 mL infusion  2-30 mcg/min IntraVENous TITRATE  PHENYLephrine (ARTURO-SYNEPHRINE) 100 mg in 0.9% sodium chloride 250 mL infusion   mcg/min IntraVENous TITRATE  calcium carbonate (TUMS) chewable tablet 200 mg [elemental]  200 mg Oral TID PRN  
 aspirin delayed-release tablet 81 mg  81 mg Oral DAILY  sodium chloride (NS) flush 5-40 mL  5-40 mL IntraVENous Q8H  
 sodium chloride (NS) flush 5-40 mL  5-40 mL IntraVENous PRN  
 acetaminophen (TYLENOL) tablet 650 mg  650 mg Oral Q4H PRN  
 ondansetron (ZOFRAN) injection 4 mg  4 mg IntraVENous Q4H PRN  
 lactobac ac& pc-s.therm-b.anim (KERLINE Q/RISAQUAD)  1 Cap Oral DAILY  LORazepam (ATIVAN) injection 1 mg  1 mg IntraVENous Q6H PRN  
 naloxone (NARCAN) injection 0.4 mg  0.4 mg IntraVENous PRN  thiamine HCL (B-1) tablet 100 mg  100 mg Oral DAILY  folic acid (FOLVITE) tablet 1 mg  1 mg Oral DAILY  therapeutic multivitamin (THERAGRAN) tablet 1 Tab  1 Tab Oral DAILY  
 
______________________________________________________________________ EXPECTED LENGTH OF STAY: 4d 2h 
ACTUAL LENGTH OF STAY:          9 Shahram Jimenez MD

## 2019-07-14 NOTE — DIALYSIS
[facility]       [phone] Orders Mode: CVVH Factor: 0 ml UFR: 1,600 ml/hr Blood Flow Rate: 200 ml/min Metrics BP / HR: 113/33  / 55 Blood Flow Rate: 200 ml/min AP:                         -87 RP: 02 TMP: 25  
PD: 22  
FP: 99  
UFR: 1,600 ml/hr Comments / Plan: In to initiate CRRT per MD orders. Orders and consents obtained/verified, time out done, labs and code status reviewed, education given to family and primary RN. Hep B orders in for draw. Pre/post report with Darion Brito RN. New RIJ CVC placement confirmed by xray, dressing dry/intact and dated 7/13/19, no s/s of infection or bleeding, +asp/+flush x 2 ports prior to tx start. Site prepped per P&P with preventix. New HF 1000 filter primed 1L NS, tested and running well at this time, no issue with startup. All connections visible and secured with blood warmer to return line @ 37*C. Tx started @ 2030.

## 2019-07-14 NOTE — DIALYSIS
[facility]       [phone] Orders Mode: CVVH Factor: 0 ml/hr UFR: 1600 ml/hr Blood Flow Rate: 200 ml/min Metrics BP / HR: 93/39, 48 Blood Flow Rate: 200 ml/min AP:                         -88 RP: 52 TMP: 70 PD: 32  
FP: 107 UFR: 1600 ml/hr Comments / Plan:  
   XR2015 filter running well with no indication for change at this time. Consents, patient, code status, labs and orders verified. RIJ temporary CVC, dressing CDI with bio-patch dated 7/13/19. No signs of redness, drainage, or infection visualized. Lines visible and connections secure with blood warmer to return line at 37*C. Education, pre and post to T. Nicklas Bence, RN.

## 2019-07-14 NOTE — PROGRESS NOTES
Problem: Falls - Risk of 
Goal: *Absence of Falls Description Document Cedric Lechuga Fall Risk and appropriate interventions in the flowsheet. Outcome: Progressing Towards Goal 
  
Problem: Heart Failure: Day 5 Goal: Off Pathway (Use only if patient is Off Pathway) Outcome: Progressing Towards Goal 
  
Problem: Ventilator Management Goal: *Adequate oxygenation and ventilation Outcome: Progressing Towards Goal 
Goal: *Patient maintains clear airway/free of aspiration Outcome: Progressing Towards Goal 
Goal: *Absence of infection signs and symptoms Outcome: Progressing Towards Goal 
Goal: *Normal spontaneous ventilation Outcome: Progressing Towards Goal 
  
Problem: Non-Violent Restraints Goal: *Removal from restraints as soon as assessed to be safe Outcome: Progressing Towards Goal 
Goal: *No harm/injury to patient while restraints in use Outcome: Progressing Towards Goal 
Goal: *Patient's dignity will be maintained Outcome: Progressing Towards Goal 
Goal: *Patient Specific Goal (EDIT GOAL, INSERT TEXT) Outcome: Progressing Towards Goal 
Goal: Non-violent Restaints:Standard Interventions Outcome: Progressing Towards Goal 
Goal: Non-violent Restraints:Patient Interventions Outcome: Progressing Towards Goal 
Goal: Patient/Family Education Outcome: Progressing Towards Goal 
  
Problem: Infection - Risk of, Urinary Catheter-Associated Urinary Tract Infection Goal: *Absence of infection signs and symptoms Outcome: Progressing Towards Goal 
  
Problem: Infection - Risk of, Central Venous Catheter-Associated Bloodstream Infection Goal: *Absence of infection signs and symptoms Outcome: Progressing Towards Goal

## 2019-07-15 NOTE — PROGRESS NOTES
[facility] 
 [address] [address], [address] [facility] Phone: [phone]   [phone]   
  
Nephrology Progress Note Gema Cui     1945     [id number] Date of Admission : 7/4/2019 
07/15/19 CC:  Follow up for KAYLEEN Assessment and Plan KAYLEEN on CKD  
- 2/2 ATN from hemorrhagic shock  
- anuric. Remove callejas and bladder scan daily  
- Continue CVVH. Change RP to 4K bags - No factor - Labs Q12 CKD Stage III : 
- baseline Cr 1.2-1.3 mg/dl Metabolic acidosis :  
- better Hemorraghic Shock /Massive UGI bleed - S/P emergent clipping by EDG 7/13 
- S/P embolization of gastric/diuodenal artery 7/12 in IR 
- remains on pressors and Q12 H/H Bradycardia - On Dopamine gtt 
  
Acute Resp Failure Mixed Pulm HTN w/ moderate/ severe TR Pleural effusions - per [facility] SBO 2/2 Umbilical hernia  
- Hernia reduced in ER Chronic Alcoholism ? Occult Cirrhosis Interval History: 
Seen and examined Remains anuric Review of Systems: Review of systems not obtained due to patient factors. Current Medications:  
Current Facility-Administered Medications Medication Dose Route Frequency  bicarbonate dialysis (PRISMASOL) BG K 4/Ca 2.5 5000 ml solution   Extracorporeal DIALYSIS CONTINUOUS  piperacillin-tazobactam (ZOSYN) 3.375 g in 0.9% sodium chloride (MBP/ADV) 100 mL  3.375 g IntraVENous Q8H  
 0.9% sodium chloride infusion  10 mL/hr IntraVENous CONTINUOUS  
 albumin human 25% (BUMINATE) solution 12.5 g  12.5 g IntraVENous Q6H  
 chlorhexidine (PERIDEX) 0.12 % mouthwash 15 mL  15 mL Oral BID  albuterol-ipratropium (DUO-NEB) 2.5 MG-0.5 MG/3 ML  3 mL Nebulization Q6H RT  
 DOPamine (INTROPIN) 800 mg in dextrose 5% 250 mL infusion  0-20 mcg/kg/min IntraVENous TITRATE  PHENYLephrine (ARTURO-SYNEPHRINE) 100 mg in 0.9% sodium chloride 250 mL infusion   mcg/min IntraVENous TITRATE  NOREPINephrine (LEVOPHED) 32 mg in dextrose 5% 250 mL infusion  2-30 mcg/min IntraVENous TITRATE  vasopressin (VASOSTRICT) 20 Units in 0.9% sodium chloride 100 mL infusion  0-0.04 Units/min IntraVENous TITRATE  
 0.9% sodium chloride infusion 250 mL  250 mL IntraVENous PRN  
 0.9% sodium chloride infusion 250 mL  250 mL IntraVENous PRN  
 0.9% sodium chloride infusion 250 mL  250 mL IntraVENous PRN  propofol (DIPRIVAN) infusion  0-50 mcg/kg/min IntraVENous TITRATE  sodium chloride (NS) flush 5-40 mL  5-40 mL IntraVENous Q8H  
 sodium chloride (NS) flush 5-40 mL  5-40 mL IntraVENous PRN  
 simethicone (MYLICON) 04VI/1.8SN oral drops 80 mg  1.2 mL Oral Multiple  fentaNYL citrate (PF) injection  mcg   mcg IntraVENous Q1H PRN  
 0.9% sodium chloride infusion 250 mL  250 mL IntraVENous PRN  pantoprazole (PROTONIX) 40 mg in 0.9% sodium chloride (MBP/ADV) 50 mL  8 mg/hr IntraVENous CONTINUOUS  
 0.9% sodium chloride infusion  6 mL/hr IntraVENous CONTINUOUS  
 calcium carbonate (TUMS) chewable tablet 200 mg [elemental]  200 mg Oral TID PRN  
 aspirin delayed-release tablet 81 mg  81 mg Oral DAILY  sodium chloride (NS) flush 5-40 mL  5-40 mL IntraVENous Q8H  
 sodium chloride (NS) flush 5-40 mL  5-40 mL IntraVENous PRN  
 acetaminophen (TYLENOL) tablet 650 mg  650 mg Oral Q4H PRN  
 ondansetron (ZOFRAN) injection 4 mg  4 mg IntraVENous Q4H PRN  
 lactobac ac& pc-s.therm-b.anim (KERLINE Q/RISAQUAD)  1 Cap Oral DAILY  LORazepam (ATIVAN) injection 1 mg  1 mg IntraVENous Q6H PRN  
 naloxone (NARCAN) injection 0.4 mg  0.4 mg IntraVENous PRN  thiamine HCL (B-1) tablet 100 mg  100 mg Oral DAILY  folic acid (FOLVITE) tablet 1 mg  1 mg Oral DAILY  therapeutic multivitamin (THERAGRAN) tablet 1 Tab  1 Tab Oral DAILY No Known Allergies Objective: 
Vitals:   
Vitals:  
 07/15/19 0400 07/15/19 0500 07/15/19 0511 07/15/19 0600 BP: 105/49 Pulse: (!) 49 (!) 59  (!) 46 Resp: 17 14  16 Temp:      
SpO2: 100% 100%  100% Weight:   65.8 kg (145 lb 1 oz) Height:      
 
Intake and Output: 
07/14 1901 - 07/15 0700 In: 1394.9 [I.V.:734.9] Out: 1132 [Drains:250] 07/13 0701 - 07/14 1900 In: 81070.3 [I.V.:9794.3] Out: 3838 Physical Examination: 
Pt intubated    Yes General: Unresponsive Neck:  Lines + Resp:  CTA 
CV:  RRR,  no murmur or rub, no LE edema GI:  Soft, NT, + Bowel sounds, no hepatosplenomegaly Neurologic:  Sedated Psych:             Unable to assess  : callejas + 
 
[]    High complexity decision making was performed 
[]    Patient is at high-risk of decompensation with multiple organ involvement Lab Data Personally Reviewed: I have reviewed all the pertinent labs, microbiology data and radiology studies during assessment. Recent Labs  
  07/15/19 
0322 07/14/19 
1246 07/14/19 
0350 07/13/19 
1810 07/13/19 
1355  07/13/19 
5754  141 143 149* 148*   < > 143 K 3.5 3.9 4.1 4.6 5.0   < > 4.8  106 109* 114* 116*   < > 116* CO2 28 26 26 20* 15*   < > 17* * 156* 175* 194* 185*   < > 126* BUN 39* 57* 75* 104* 104*   < > 107* CREA 3.00* 3.72* 4.32* 5.64* 5.54*   < > 5.05* CA 8.6 8.0* 7.6* 7.5* 8.0*   < > 9.9 MG 1.8 1.7 1.8  --  2.4  --  2.6* PHOS 3.1 3.9 4.0  --  7.7*  --  5.1* ALB 3.2* 3.0* 2.8*  --   --   --  2.6* SGOT 178* 262* 322*  --   --   --  30 * 220* 236*  --   --   --  45  
 < > = values in this interval not displayed. Recent Labs  
  07/15/19 
0322 07/14/19 
1658 07/14/19 
1246 07/14/19 
0350 07/13/19 
2238  07/13/19 
1355 07/13/19 
0845 WBC 24.8*  --  26.4* 25.8*  --   --  29.1* 14.5* HGB 8.0* 6.7* 7.1* 7.5* 7.2*   < > 9.2* 5.6* HCT 23.9* 20.2* 20.9* 21.9* 21.7*   < > 28.6* 19.0*  
*  --  146* 143*  --   --  169 271  
 < > = values in this interval not displayed. No results found for: SDES Lab Results Component Value Date/Time Culture result: NO GROWTH 4 DAYS 07/05/2019 12:25 PM  
 Culture result: NO GROWTH 4 DAYS 07/05/2019 12:25 PM  
 
Recent Results (from the past 24 hour(s)) METABOLIC PANEL, COMPREHENSIVE Collection Time: 07/14/19 12:46 PM  
Result Value Ref Range Sodium 141 136 - 145 mmol/L Potassium 3.9 3.5 - 5.1 mmol/L Chloride 106 97 - 108 mmol/L  
 CO2 26 21 - 32 mmol/L Anion gap 9 5 - 15 mmol/L Glucose 156 (H) 65 - 100 mg/dL BUN 57 (H) 6 - 20 MG/DL Creatinine 3.72 (H) 0.70 - 1.30 MG/DL  
 BUN/Creatinine ratio 15 12 - 20 GFR est AA 20 (L) >60 ml/min/1.73m2 GFR est non-AA 16 (L) >60 ml/min/1.73m2 Calcium 8.0 (L) 8.5 - 10.1 MG/DL Bilirubin, total 0.5 0.2 - 1.0 MG/DL  
 ALT (SGPT) 220 (H) 12 - 78 U/L  
 AST (SGOT) 262 (H) 15 - 37 U/L Alk. phosphatase 61 45 - 117 U/L Protein, total 5.0 (L) 6.4 - 8.2 g/dL Albumin 3.0 (L) 3.5 - 5.0 g/dL Globulin 2.0 2.0 - 4.0 g/dL A-G Ratio 1.5 1.1 - 2.2 PHOSPHORUS Collection Time: 07/14/19 12:46 PM  
Result Value Ref Range Phosphorus 3.9 2.6 - 4.7 MG/DL MAGNESIUM Collection Time: 07/14/19 12:46 PM  
Result Value Ref Range Magnesium 1.7 1.6 - 2.4 mg/dL CBC WITH AUTOMATED DIFF Collection Time: 07/14/19 12:46 PM  
Result Value Ref Range WBC 26.4 (H) 4.1 - 11.1 K/uL  
 RBC 2.58 (L) 4.10 - 5.70 M/uL HGB 7.1 (L) 12.1 - 17.0 g/dL HCT 20.9 (L) 36.6 - 50.3 % MCV 81.0 80.0 - 99.0 FL  
 MCH 27.5 26.0 - 34.0 PG  
 MCHC 34.0 30.0 - 36.5 g/dL  
 RDW 16.5 (H) 11.5 - 14.5 % PLATELET 562 (L) 487 - 400 K/uL MPV 10.9 8.9 - 12.9 FL  
 NRBC 1.9 (H) 0  WBC ABSOLUTE NRBC 0.51 (H) 0.00 - 0.01 K/uL NEUTROPHILS 79 (H) 32 - 75 % BAND NEUTROPHILS 5 0 - 6 % LYMPHOCYTES 5 (L) 12 - 49 % MONOCYTES 4 (L) 5 - 13 % EOSINOPHILS 4 0 - 7 % BASOPHILS 1 0 - 1 % METAMYELOCYTES 2 (H) 0 % IMMATURE GRANULOCYTES 0 %  
 ABS. NEUTROPHILS 22.2 (H) 1.8 - 8.0 K/UL  
 ABS. LYMPHOCYTES 1.3 0.8 - 3.5 K/UL ABS. MONOCYTES 1.1 (H) 0.0 - 1.0 K/UL  
 ABS. EOSINOPHILS 1.1 (H) 0.0 - 0.4 K/UL  
 ABS. BASOPHILS 0.3 (H) 0.0 - 0.1 K/UL  
 ABS. IMM. GRANS. 0.0 K/UL  
 DF MANUAL PLATELET COMMENTS Large Platelets RBC COMMENTS ANISOCYTOSIS 1+ 
    
 RBC COMMENTS OVALOCYTES PRESENT 
    
 RBC COMMENTS POLYCHROMASIA 1+ TROPONIN I Collection Time: 07/14/19 12:46 PM  
Result Value Ref Range Troponin-I, Qt. 0.73 (H) <0.05 ng/mL HGB & HCT Collection Time: 07/14/19  4:58 PM  
Result Value Ref Range HGB 6.7 (L) 12.1 - 17.0 g/dL HCT 20.2 (L) 36.6 - 50.3 % CBC WITH AUTOMATED DIFF Collection Time: 07/15/19  3:22 AM  
Result Value Ref Range WBC 24.8 (H) 4.1 - 11.1 K/uL  
 RBC 2.80 (L) 4.10 - 5.70 M/uL HGB 8.0 (L) 12.1 - 17.0 g/dL HCT 23.9 (L) 36.6 - 50.3 % MCV 85.4 80.0 - 99.0 FL  
 MCH 28.6 26.0 - 34.0 PG  
 MCHC 33.5 30.0 - 36.5 g/dL  
 RDW 15.8 (H) 11.5 - 14.5 % PLATELET 120 (L) 190 - 400 K/uL MPV 11.1 8.9 - 12.9 FL  
 NRBC 2.1 (H) 0  WBC ABSOLUTE NRBC 0.51 (H) 0.00 - 0.01 K/uL NEUTROPHILS 72 32 - 75 % BAND NEUTROPHILS 2 0 - 6 % LYMPHOCYTES 4 (L) 12 - 49 % MONOCYTES 5 5 - 13 % EOSINOPHILS 17 (H) 0 - 7 % BASOPHILS 0 0 - 1 % IMMATURE GRANULOCYTES 0 %  
 ABS. NEUTROPHILS 18.4 (H) 1.8 - 8.0 K/UL  
 ABS. LYMPHOCYTES 1.0 0.8 - 3.5 K/UL  
 ABS. MONOCYTES 1.2 (H) 0.0 - 1.0 K/UL  
 ABS. EOSINOPHILS 4.2 (H) 0.0 - 0.4 K/UL  
 ABS. BASOPHILS 0.0 0.0 - 0.1 K/UL  
 ABS. IMM. GRANS. 0.0 K/UL  
 DF MANUAL    
 RBC COMMENTS ANISOCYTOSIS 
1+ WBC COMMENTS Pathology Review Requested METABOLIC PANEL, COMPREHENSIVE Collection Time: 07/15/19  3:22 AM  
Result Value Ref Range Sodium 140 136 - 145 mmol/L Potassium 3.5 3.5 - 5.1 mmol/L Chloride 105 97 - 108 mmol/L  
 CO2 28 21 - 32 mmol/L Anion gap 7 5 - 15 mmol/L Glucose 123 (H) 65 - 100 mg/dL BUN 39 (H) 6 - 20 MG/DL  Creatinine 3.00 (H) 0.70 - 1.30 MG/DL  
 BUN/Creatinine ratio 13 12 - 20 GFR est AA 25 (L) >60 ml/min/1.73m2 GFR est non-AA 21 (L) >60 ml/min/1.73m2 Calcium 8.6 8.5 - 10.1 MG/DL Bilirubin, total 0.8 0.2 - 1.0 MG/DL  
 ALT (SGPT) 172 (H) 12 - 78 U/L  
 AST (SGOT) 178 (H) 15 - 37 U/L Alk. phosphatase 57 45 - 117 U/L Protein, total 5.2 (L) 6.4 - 8.2 g/dL Albumin 3.2 (L) 3.5 - 5.0 g/dL Globulin 2.0 2.0 - 4.0 g/dL A-G Ratio 1.6 1.1 - 2.2 MAGNESIUM Collection Time: 07/15/19  3:22 AM  
Result Value Ref Range Magnesium 1.8 1.6 - 2.4 mg/dL PHOSPHORUS Collection Time: 07/15/19  3:22 AM  
Result Value Ref Range Phosphorus 3.1 2.6 - 4.7 MG/DL  
LACTIC ACID Collection Time: 07/15/19  3:22 AM  
Result Value Ref Range Lactic acid 0.8 0.4 - 2.0 MMOL/L Total time spent with patient:  xxx   min. Care Plan discussed with: 
Patient Family RN Consulting Physician [facility],      
 
I have reviewed the flowsheets. Chart and Pertinent Notes have been reviewed. No change in PMH ,family and social history from Consult note.  
 
 
Jayleen Suarez MD

## 2019-07-15 NOTE — DIALYSIS
[facility]       [phone] Orders Mode: CVVH Factor: 0 ml/hr UFR: 1,600 ml/hr Blood Flow Rate: 200 ml/min Metrics BP / HR: 132/34 // 56 Blood Flow Rate: 200 ml/min AP:                         -69  
RP: 70 TMP: 38  
PD: 43  
FP: 123 UFR: 1,600 ml/hr Comments / Plan:  
   Filter changed secondary to sudden rise in TMP >250. All possible blood (165 ml) returned. Consents, patient, code status, labs and orders verified. Old set discarded in red bio-hazard bag. New [id number] filter set-up, primed, tested and running well at this time. RIJ temporary CVC, dressing CDI with bio-patch. No signs of redness, drainage, or infection visualized. Each catheter limb disinfected for 60 seconds per limb with alcohol swabs. Caps removed, dialysis CVC hub scrubbed with [employer] for 5 seconds, followed by a 5 second dry time per Hospital P&P. +asp/+flush x 2 ports. Lines reversed, visible and connections secure with blood warmer to return line at 37*C. Education, pre and post to JARRED Goldman RN.

## 2019-07-15 NOTE — DIALYSIS
[facility]       [phone] Orders Mode: CVVH Factor: 0  
UFR: 1600 Blood Flow Rate: 200 Metrics BP / HR: 150/46/ 73 Blood Flow Rate: 200 AP:                         -100 RP: 36 TMP: 154 PD: 89  
FP: 153 UFR: 1600 Comments / Plan:  
   [id number] filter running well with no indication for change at this time. Consents, patient, code status, labs and orders verified. RIJ temporary CVC, dressing CDI with bio-patch. No signs of redness, drainage, or infection visualized. Lines visible and connections secure with blood warmer to return line at 37*C. Education, pre and post to Mayank Rinaldi RN.

## 2019-07-15 NOTE — PROGRESS NOTES
Problem: Falls - Risk of 
Goal: *Absence of Falls Description Document Vincent Loyola Fall Risk and appropriate interventions in the flowsheet. Outcome: Progressing Towards Goal 
  
Problem: Non-Violent Restraints Goal: *Removal from restraints as soon as assessed to be safe Outcome: Progressing Towards Goal 
Goal: *No harm/injury to patient while restraints in use Outcome: Progressing Towards Goal 
Goal: *Patient's dignity will be maintained Outcome: Progressing Towards Goal 
Goal: *Patient Specific Goal (EDIT GOAL, INSERT TEXT) Outcome: Progressing Towards Goal 
Goal: Non-violent Restaints:Standard Interventions Outcome: Progressing Towards Goal 
Goal: Non-violent Restraints:Patient Interventions Outcome: Progressing Towards Goal 
  
Problem: Infection - Risk of, Urinary Catheter-Associated Urinary Tract Infection Goal: *Absence of infection signs and symptoms Outcome: Progressing Towards Goal 
  
Problem: Infection - Risk of, Central Venous Catheter-Associated Bloodstream Infection Goal: *Absence of infection signs and symptoms Outcome: Progressing Towards Goal 
  
Problem: Pressure Injury - Risk of 
Goal: *Prevention of pressure injury Description Document Eugenio Scale and appropriate interventions in the flowsheet.  
Outcome: Progressing Towards Goal

## 2019-07-15 NOTE — PROGRESS NOTES
2000 Assumed care of patient from [address] 
 
2127 Neosynephrine titrated off- SBP >100 
 
2130 1st unit of blood completed 2115 2nd unit of blood intitiated, weaning levophed to maintain SBP >100 
 
0430 AM lab resulted- Hg 8, no transfusion per standing order [id number] Dr. J Luis Melgoza in to evaluate patient. Gave order to remove callejas catheter 
 
0800 Bedside and Verbal shift change report given to Raffi Pham Se (oncoming nurse) by Essence Kinsey (offgoing nurse). Report included the following information SBAR, Kardex, MAR, Recent Results and Cardiac Rhythm afib.

## 2019-07-15 NOTE — PROGRESS NOTES
Cardiology Progress Note                                        Admit Date: 7/4/2019 Assessment/Plan:  
 
Bradycardia; slow with ventricular rate of 30 to 40s; this is concerning as his HR should be in 100s with acute bleeding; will insert a permanent pacer tomorrow Cardiomyopathy; idiopathic; probably alcohol induced CHF;acute systolic; EF 35 to 35% Hemorrhagic shock; on pressors Yesenia Fuller is a 72 y.o. male with PROBLEM LIST: 
Patient Active Problem List  
 Diagnosis Date Noted  Acute CHF (congestive heart failure) ([address] 07/05/2019  Pleural effusion, right 07/05/2019  Vitamin D deficiency 11/18/2014  Arthritis of wrist, right, degenerative 11/18/2014  History of wrist fracture, Right 11/18/2014  Overweight (BMI 25.0-29.9) 11/18/2014  
 HTN (hypertension) 11/11/2014  Blurred vision, bilateral 11/11/2014 Subjective:  
 
Yesenia Fuller is resting on vent Visit Vitals /46 Pulse (!) 52 Temp 97.5 °F (36.4 °C) Resp 20 Ht 5' 8\" (1.727 m) Wt 145 lb 1 oz (65.8 kg) SpO2 100% BMI 22.06 kg/m² Intake/Output Summary (Last 24 hours) at 7/15/2019 1433 Last data filed at 7/15/2019 1400 Gross per 24 hour Intake 2488. 51 ml Output 2702 ml Net -213.49 ml Objective:  
  
Physical Exam: 
HEENT: Perrla, EOMI Neck: No JVD,  No thyroidmegaly Resp:  rales CV: RRR s1s2 No murmur no s3 Abd:Soft, Nontender Ext: No edema Neuro: sedated Skin: Warm, Dry, Intact Pulses: 2+ DP/PT/Rad Telemetry: Aflutter with slow VR Current Facility-Administered Medications Medication Dose Route Frequency  bicarbonate dialysis (PRISMASOL) BG K 4/Ca 2.5 5000 ml solution   Extracorporeal DIALYSIS CONTINUOUS  
 balsam peru-castor oil (VENELEX) ointment   Topical BID  piperacillin-tazobactam (ZOSYN) 3.375 g in 0.9% sodium chloride (MBP/ADV) 100 mL  3.375 g IntraVENous Q8H  
 0.9% sodium chloride infusion  10 mL/hr IntraVENous CONTINUOUS  
  chlorhexidine (PERIDEX) 0.12 % mouthwash 15 mL  15 mL Oral BID  albuterol-ipratropium (DUO-NEB) 2.5 MG-0.5 MG/3 ML  3 mL Nebulization Q6H RT  
 DOPamine (INTROPIN) 800 mg in dextrose 5% 250 mL infusion  0-20 mcg/kg/min IntraVENous TITRATE  PHENYLephrine (ARTURO-SYNEPHRINE) 100 mg in 0.9% sodium chloride 250 mL infusion   mcg/min IntraVENous TITRATE  
 NOREPINephrine (LEVOPHED) 32 mg in dextrose 5% 250 mL infusion  2-30 mcg/min IntraVENous TITRATE  vasopressin (VASOSTRICT) 20 Units in 0.9% sodium chloride 100 mL infusion  0-0.04 Units/min IntraVENous TITRATE  
 0.9% sodium chloride infusion 250 mL  250 mL IntraVENous PRN  
 0.9% sodium chloride infusion 250 mL  250 mL IntraVENous PRN  
 0.9% sodium chloride infusion 250 mL  250 mL IntraVENous PRN  propofol (DIPRIVAN) infusion  0-50 mcg/kg/min IntraVENous TITRATE  sodium chloride (NS) flush 5-40 mL  5-40 mL IntraVENous Q8H  
 sodium chloride (NS) flush 5-40 mL  5-40 mL IntraVENous PRN  
 simethicone (MYLICON) 20LA/3.4WX oral drops 80 mg  1.2 mL Oral Multiple  fentaNYL citrate (PF) injection  mcg   mcg IntraVENous Q1H PRN  
 0.9% sodium chloride infusion 250 mL  250 mL IntraVENous PRN  pantoprazole (PROTONIX) 40 mg in 0.9% sodium chloride (MBP/ADV) 50 mL  8 mg/hr IntraVENous CONTINUOUS  
 0.9% sodium chloride infusion  6 mL/hr IntraVENous CONTINUOUS  
 calcium carbonate (TUMS) chewable tablet 200 mg [elemental]  200 mg Oral TID PRN  
 aspirin delayed-release tablet 81 mg  81 mg Oral DAILY  sodium chloride (NS) flush 5-40 mL  5-40 mL IntraVENous Q8H  
 sodium chloride (NS) flush 5-40 mL  5-40 mL IntraVENous PRN  
 acetaminophen (TYLENOL) tablet 650 mg  650 mg Oral Q4H PRN  
 ondansetron (ZOFRAN) injection 4 mg  4 mg IntraVENous Q4H PRN  
 lactobac ac& pc-s.therm-b.anim (KERLINE Q/RISAQUAD)  1 Cap Oral DAILY  LORazepam (ATIVAN) injection 1 mg  1 mg IntraVENous Q6H PRN  
  naloxone (NARCAN) injection 0.4 mg  0.4 mg IntraVENous PRN  thiamine HCL (B-1) tablet 100 mg  100 mg Oral DAILY  folic acid (FOLVITE) tablet 1 mg  1 mg Oral DAILY  therapeutic multivitamin (THERAGRAN) tablet 1 Tab  1 Tab Oral DAILY Data Review:  
Labs:   
Recent Results (from the past 24 hour(s)) HGB & HCT Collection Time: 07/14/19  4:58 PM  
Result Value Ref Range HGB 6.7 (L) 12.1 - 17.0 g/dL HCT 20.2 (L) 36.6 - 50.3 % CBC WITH AUTOMATED DIFF Collection Time: 07/15/19  3:22 AM  
Result Value Ref Range WBC 24.8 (H) 4.1 - 11.1 K/uL  
 RBC 2.80 (L) 4.10 - 5.70 M/uL HGB 8.0 (L) 12.1 - 17.0 g/dL HCT 23.9 (L) 36.6 - 50.3 % MCV 85.4 80.0 - 99.0 FL  
 MCH 28.6 26.0 - 34.0 PG  
 MCHC 33.5 30.0 - 36.5 g/dL  
 RDW 15.8 (H) 11.5 - 14.5 % PLATELET 908 (L) 189 - 400 K/uL MPV 11.1 8.9 - 12.9 FL  
 NRBC 2.1 (H) 0  WBC ABSOLUTE NRBC 0.51 (H) 0.00 - 0.01 K/uL NEUTROPHILS 72 32 - 75 % BAND NEUTROPHILS 2 0 - 6 % LYMPHOCYTES 4 (L) 12 - 49 % MONOCYTES 5 5 - 13 % EOSINOPHILS 17 (H) 0 - 7 % BASOPHILS 0 0 - 1 % IMMATURE GRANULOCYTES 0 %  
 ABS. NEUTROPHILS 18.4 (H) 1.8 - 8.0 K/UL  
 ABS. LYMPHOCYTES 1.0 0.8 - 3.5 K/UL  
 ABS. MONOCYTES 1.2 (H) 0.0 - 1.0 K/UL  
 ABS. EOSINOPHILS 4.2 (H) 0.0 - 0.4 K/UL  
 ABS. BASOPHILS 0.0 0.0 - 0.1 K/UL  
 ABS. IMM. GRANS. 0.0 K/UL  
 DF MANUAL    
 RBC COMMENTS ANISOCYTOSIS 
1+ WBC COMMENTS Pathology Review Requested METABOLIC PANEL, COMPREHENSIVE Collection Time: 07/15/19  3:22 AM  
Result Value Ref Range Sodium 140 136 - 145 mmol/L Potassium 3.5 3.5 - 5.1 mmol/L Chloride 105 97 - 108 mmol/L  
 CO2 28 21 - 32 mmol/L Anion gap 7 5 - 15 mmol/L Glucose 123 (H) 65 - 100 mg/dL BUN 39 (H) 6 - 20 MG/DL Creatinine 3.00 (H) 0.70 - 1.30 MG/DL  
 BUN/Creatinine ratio 13 12 - 20 GFR est AA 25 (L) >60 ml/min/1.73m2 GFR est non-AA 21 (L) >60 ml/min/1.73m2  Calcium 8.6 8.5 - 10.1 MG/DL  
 Bilirubin, total 0.8 0.2 - 1.0 MG/DL  
 ALT (SGPT) 172 (H) 12 - 78 U/L  
 AST (SGOT) 178 (H) 15 - 37 U/L Alk. phosphatase 57 45 - 117 U/L Protein, total 5.2 (L) 6.4 - 8.2 g/dL Albumin 3.2 (L) 3.5 - 5.0 g/dL Globulin 2.0 2.0 - 4.0 g/dL A-G Ratio 1.6 1.1 - 2.2 MAGNESIUM Collection Time: 07/15/19  3:22 AM  
Result Value Ref Range Magnesium 1.8 1.6 - 2.4 mg/dL PHOSPHORUS Collection Time: 07/15/19  3:22 AM  
Result Value Ref Range Phosphorus 3.1 2.6 - 4.7 MG/DL  
LACTIC ACID Collection Time: 07/15/19  3:22 AM  
Result Value Ref Range Lactic acid 0.8 0.4 - 2.0 MMOL/L  
EKG, 12 LEAD, INITIAL Collection Time: 07/15/19  8:31 AM  
Result Value Ref Range Ventricular Rate 41 BPM  
 Atrial Rate 192 BPM  
 QRS Duration 98 ms Q-T Interval 598 ms QTC Calculation (Bezet) 493 ms Calculated P Axis -90 degrees Calculated R Axis -19 degrees Calculated T Axis -13 degrees Diagnosis Atrial fibrillation Nonspecific ST and T wave abnormality Prolonged QT When compared with ECG of 13-JUL-2019 08:46, No significant change Confirmed by Sangeeta Garcia ([id number]) on 7/15/2019 11:42:36 AM 
  
POC G3 - PUL Collection Time: 07/15/19  9:14 AM  
Result Value Ref Range FIO2 (POC) 0.30 % pH (POC) 7.442 7.35 - 7.45    
 pCO2 (POC) 36.0 35.0 - 45.0 MMHG  
 pO2 (POC) 155 (H) 80 - 100 MMHG  
 HCO3 (POC) 24.6 22 - 26 MMOL/L  
 sO2 (POC) 99 (H) 92 - 97 % Base excess (POC) 0 mmol/L Site DRAWN FROM ARTERIAL LINE Device: VENT Mode ASSIST CONTROL Tidal volume 450 ml Set Rate 14 bpm  
 PEEP/CPAP (POC) 5 cmH2O Allens test (POC) N/A Specimen type (POC) ARTERIAL Total resp. rate 20    
CBC WITH AUTOMATED DIFF Collection Time: 07/15/19 12:11 PM  
Result Value Ref Range WBC 20.9 (H) 4.1 - 11.1 K/uL  
 RBC 2.82 (L) 4.10 - 5.70 M/uL HGB 8.1 (L) 12.1 - 17.0 g/dL HCT 23.4 (L) 36.6 - 50.3 %  MCV 83.0 80.0 - 99.0 FL  
 MCH 28.7 26.0 - 34.0 PG  
 MCHC 34.6 30.0 - 36.5 g/dL  
 RDW 15.7 (H) 11.5 - 14.5 % PLATELET 904 (L) 402 - 400 K/uL MPV 11.9 8.9 - 12.9 FL  
 NRBC 2.6 (H) 0  WBC ABSOLUTE NRBC 0.55 (H) 0.00 - 0.01 K/uL NEUTROPHILS 72 32 - 75 % LYMPHOCYTES 5 (L) 12 - 49 % MONOCYTES 6 5 - 13 % EOSINOPHILS 15 (H) 0 - 7 % BASOPHILS 0 0 - 1 % IMMATURE GRANULOCYTES 2 (H) 0.0 - 0.5 % ABS. NEUTROPHILS 15.1 (H) 1.8 - 8.0 K/UL  
 ABS. LYMPHOCYTES 1.0 0.8 - 3.5 K/UL  
 ABS. MONOCYTES 1.3 (H) 0.0 - 1.0 K/UL  
 ABS. EOSINOPHILS 3.1 (H) 0.0 - 0.4 K/UL  
 ABS. BASOPHILS 0.0 0.0 - 0.1 K/UL  
 ABS. IMM.  GRANS. 0.4 (H) 0.00 - 0.04 K/UL  
 DF SMEAR SCANNED    
 RBC COMMENTS ANISOCYTOSIS 
1+

## 2019-07-15 NOTE — PROGRESS NOTES
Hospitalist Progress Note Davey Manzanares MD 
Answering service: [phone] OR [phone] from in house phone Date of Service:  7/15/2019 NAME:  Nury Lewis :  1945 MRN:  [id number] Admission Summary: This is a 57-year-old man with a past medical history significant for hypertension, who was in his usual state of health until about a week ago when the patient developed abdominal pain. The pain is located at the epigastric region, 8/10 in severity. No radiation. No known aggravating or relieving factors. The patient described the pain as a dull ache associated with constipation but no nausea, no vomiting. The patient took laxative without any significant relief of the constipation. The patient was brought to the emergency room for further evaluation. When the EMS arrived at the scene, the patient's oxygen saturation was 86% on room air. CT scan of the abdomen and pelvis performed by the emergency room provider shows right pleural effusion and suspected small bowel obstruction. Interval history / Subjective:  
Patient in CVICU- dropped HB again yesterday, required 2 more units of rbc, HR in high 30s, on dopamine Assessment & Plan: #. Duodenal ulcer: bleeding- controlled with clips via EGD and IR embolizations #. Hemorrhagic shock: 2nd to bleeding duodenal ulcer #. Severe Lactic Acidosis: 2nd to shock, resolved with Bicarb gtt #. KAYLEEN: started on CRRT- Nephrology following #. Bradycardia: HR in high 30s, - started on dopamine - Transfused 4 URBC, GI managed to place Clip on bleeder,  
- IR embolized bleeders, clip on hepatic artery- not embolized to avoid major liver inj.  
- Pressors, starting to wean down very slowly. PPI gtt - Trending CBC Q8H. Monitor lytes/clinically closely  
- needed 2 more rbc units - continue trending, GI following #. Acute Hypoxic respiratory failure: 2nd to aspiration PNA - intubated, on vent, started on zosyn. Pulm following #. Pulm HTN: severe, PaPressure = 79 on TTE  
- Cont lasix and home meds, troponin neg x3 
- echo left and right heart failure EF 45-50, mod-severe TR with pulm - Cardiology following, s/p LHC: clear arteries, RHC 
- Pulmonary following, running tests to evaluate for possible cause for pHTN 3. Partial small bowel obstruction due to small umbilical hernia- resolved General surgery consulted- Hernia reduced in ER and at bedside- Advanced diet 4. Bilateral leg swelling. - dopplers were negative for DVT. 5.  Bacterial pneumonia. cont Rocephin and Zithromax for suspected bacterial pneumonia. 6.  Reported hx of alcohol abuse. The patient drinks about 14 drinks per week. cont thiamine, folic acid, and multivitamin. WA protocol- No signs of withdrawl Code status:FULL 
DVT prophylaxis: Heparin Care Plan discussed with: Patient/Family Disposition: Home w/Family and TBD Hospital Problems  Date Reviewed: 7/5/2019 Codes Class Noted POA * (Principal) Acute CHF (congestive heart failure) (HCC) ICD-10-CM: I50.9 ICD-9-CM: 428.0  7/5/2019 Yes Pleural effusion, right ICD-10-CM: J90 ICD-9-CM: 511.9  7/5/2019 Unknown Review of Systems: A comprehensive review of systems was negative except for that written in the subjective section Vital Signs:  
 Last 24hrs VS reviewed since prior progress note. Most recent are: 
Visit Vitals /54 (BP 1 Location: Left arm, BP Patient Position: At rest) Pulse (!) 55 Temp 97.3 °F (36.3 °C) Resp 14 Ht 5' 8\" (1.727 m) Wt 65.8 kg (145 lb 1 oz) SpO2 100% BMI 22.06 kg/m² Intake/Output Summary (Last 24 hours) at 7/15/2019 0741 Last data filed at 7/15/2019 2957 Gross per 24 hour Intake 2853.65 ml Output 3314 ml Net -460.35 ml Physical Examination:  
 
Constitutional:  intubated, sedated CV:   HR 38-looks flutter Resp:  AE b/l, no wheezes, on Vent GI:  Soft, non distended Musculoskeletal:  mild LE edema, warm Neurologic:  Moves all extremities. AAOx3, Data Review:  
 Review and/or order of tests in the radiology section of CPT Review and/or order of tests in the medicine section of CPT Labs:  
 
Recent Labs  
  07/15/19 
0322 07/14/19 
1658 07/14/19 
1246 WBC 24.8*  --  26.4* HGB 8.0* 6.7* 7.1*  
HCT 23.9* 20.2* 20.9*  
*  --  146* Recent Labs  
  07/15/19 
0322 07/14/19 
1246 07/14/19 
0350  141 143  
K 3.5 3.9 4.1  106 109* CO2 28 26 26 BUN 39* 57* 75* CREA 3.00* 3.72* 4.32* * 156* 175* CA 8.6 8.0* 7.6*  
MG 1.8 1.7 1.8 PHOS 3.1 3.9 4.0 Recent Labs  
  07/15/19 
0322 07/14/19 
1246 07/14/19 
0350 SGOT 178* 262* 322* * 220* 236* AP 57 61 58 TBILI 0.8 0.5 0.6 TP 5.2* 5.0* 4.7* ALB 3.2* 3.0* 2.8*  
GLOB 2.0 2.0 1.9* No results for input(s): INR, PTP, APTT in the last 72 hours. No lab exists for component: INREXT, INREXT No results for input(s): FE, TIBC, PSAT, FERR in the last 72 hours. No results found for: FOL, RBCF No results for input(s): PH, PCO2, PO2 in the last 72 hours. Recent Labs  
  07/14/19 
1246 07/13/19 
2238 07/13/19 
1355 TROIQ 0.73* 0.28* 0.25* Lab Results Component Value Date/Time Cholesterol, total 134 07/06/2019 03:50 AM  
 HDL Cholesterol 56 07/06/2019 03:50 AM  
 LDL, calculated 68 07/06/2019 03:50 AM  
 Triglyceride 50 07/06/2019 03:50 AM  
 CHOL/HDL Ratio 2.4 07/06/2019 03:50 AM  
 
Lab Results Component Value Date/Time Glucose (POC) 175 (H) 07/11/2019 07:56 PM  
 Glucose (POC) 87 07/05/2019 12:02 PM  
 Glucose (POC) 101 (H) 07/05/2019 08:47 AM  
 
No results found for: COLOR, APPRN, SPGRU, REFSG, ANGELI, PROTU, GLUCU, KETU, BILU, UROU, TREVOR, LEUKU, GLUKE, EPSU, BACTU, WBCU, RBCU, CASTS, UCRY Medications Reviewed:  
 
Current Facility-Administered Medications Medication Dose Route Frequency  bicarbonate dialysis (PRISMASOL) BG K 4/Ca 2.5 5000 ml solution   Extracorporeal DIALYSIS CONTINUOUS  
 balsam peru-castor oil (VENELEX) ointment   Topical BID  piperacillin-tazobactam (ZOSYN) 3.375 g in 0.9% sodium chloride (MBP/ADV) 100 mL  3.375 g IntraVENous Q8H  
 0.9% sodium chloride infusion  10 mL/hr IntraVENous CONTINUOUS  chlorhexidine (PERIDEX) 0.12 % mouthwash 15 mL  15 mL Oral BID  albuterol-ipratropium (DUO-NEB) 2.5 MG-0.5 MG/3 ML  3 mL Nebulization Q6H RT  
 DOPamine (INTROPIN) 800 mg in dextrose 5% 250 mL infusion  0-20 mcg/kg/min IntraVENous TITRATE  PHENYLephrine (ARTURO-SYNEPHRINE) 100 mg in 0.9% sodium chloride 250 mL infusion   mcg/min IntraVENous TITRATE  
 NOREPINephrine (LEVOPHED) 32 mg in dextrose 5% 250 mL infusion  2-30 mcg/min IntraVENous TITRATE  vasopressin (VASOSTRICT) 20 Units in 0.9% sodium chloride 100 mL infusion  0-0.04 Units/min IntraVENous TITRATE  
 0.9% sodium chloride infusion 250 mL  250 mL IntraVENous PRN  
 0.9% sodium chloride infusion 250 mL  250 mL IntraVENous PRN  
 0.9% sodium chloride infusion 250 mL  250 mL IntraVENous PRN  propofol (DIPRIVAN) infusion  0-50 mcg/kg/min IntraVENous TITRATE  sodium chloride (NS) flush 5-40 mL  5-40 mL IntraVENous Q8H  
 sodium chloride (NS) flush 5-40 mL  5-40 mL IntraVENous PRN  
 simethicone (MYLICON) 61WY/6.3EH oral drops 80 mg  1.2 mL Oral Multiple  fentaNYL citrate (PF) injection  mcg   mcg IntraVENous Q1H PRN  
 0.9% sodium chloride infusion 250 mL  250 mL IntraVENous PRN  pantoprazole (PROTONIX) 40 mg in 0.9% sodium chloride (MBP/ADV) 50 mL  8 mg/hr IntraVENous CONTINUOUS  
 0.9% sodium chloride infusion  6 mL/hr IntraVENous CONTINUOUS  
 calcium carbonate (TUMS) chewable tablet 200 mg [elemental]  200 mg Oral TID PRN  
 aspirin delayed-release tablet 81 mg  81 mg Oral DAILY  sodium chloride (NS) flush 5-40 mL  5-40 mL IntraVENous Q8H  
 sodium chloride (NS) flush 5-40 mL  5-40 mL IntraVENous PRN  
 acetaminophen (TYLENOL) tablet 650 mg  650 mg Oral Q4H PRN  
 ondansetron (ZOFRAN) injection 4 mg  4 mg IntraVENous Q4H PRN  
 lactobac ac& pc-s.therm-b.anim (KERLINE Q/RISAQUAD)  1 Cap Oral DAILY  LORazepam (ATIVAN) injection 1 mg  1 mg IntraVENous Q6H PRN  
 naloxone (NARCAN) injection 0.4 mg  0.4 mg IntraVENous PRN  thiamine HCL (B-1) tablet 100 mg  100 mg Oral DAILY  folic acid (FOLVITE) tablet 1 mg  1 mg Oral DAILY  therapeutic multivitamin (THERAGRAN) tablet 1 Tab  1 Tab Oral DAILY  
 
______________________________________________________________________ EXPECTED LENGTH OF STAY: 4d 2h 
ACTUAL LENGTH OF STAY:          Norman Yan MD

## 2019-07-15 NOTE — PROGRESS NOTES
0800: Bedside report received from Brian Obando RN, assumed care of pt. Rodney dual verified. Current vent settings: AC  r14, Vt 450, peep 5, FiO2 30%. 0910: HR dropping into 30s- questioning heart block? Will get EKG. -170s. Titrating dopamine and levophed per parameters. 0920: Dr. Yamileth Kowalski at bedside. Expressed concern with potentially doing a SBT d/t pt's hemodynamic instability. [place] [id number] Ruth Carmichael with holding off on SBT for now. 5418: Hospitalist at bedside. Aware of HR and rhythm issues. 1057: Pt's daughter called for telephone updates. 1254: Hg 8.1, will continue to monitor. Next CBC to be drawn at 2000. 
 
1313: Hospitalist at bedside. Aware of EKG - getting cardiology consult. Also aware of GI reccs, wanting to wait one more day before starting TF. 
 
1330: Noted TMP trending up ~230. Luis RN paged. 1430: Dr. Chris Juares on floor for cardiology consult. Aware of HR/rhythm issues-  
 says its aflutter with variable AV block. Planning for a PPM tomorrow afternoon. MD spoke with pt's wife, Georgana Dandy, via telephone. 1509: Luis VASQUEZ at bedside to change out CVVH filter. 0: Pt's wife and daughter at bedside. 1533: CVVH restarted. 1600: Bladder scanned pt- 77 mL. 1938: Levophed weaned off. 
 
2000: Bedside and Verbal shift change report given to Brian Obando (oncoming nurse) by Nikos Moctezuma (offgoing nurse). Report included the following information SBAR.

## 2019-07-15 NOTE — DIALYSIS
[facility]       [phone] Orders Mode: CVVH Factor: 0  
UFR: 1600 Blood Flow Rate: 200 Metrics BP / HR: 92/41 ; 61 Blood Flow Rate: 200 AP:                         -101 RP: 45 TMP: 95  
PD: 55  
FP: 120 UFR: 1600 Comments / Plan:  
    [id number] filter running well with no indication for change at this time. Consents, patient, code status, labs and orders verified. RIJ temporary CVC, dressing CDI with bio-patch dated 7/13/19. No signs of redness, drainage, or infection visualized. Lines visible and connections secure with blood warmer to return line at 37*C. Education, pre and post to DAPHNE Ruth RN.

## 2019-07-15 NOTE — PROGRESS NOTES
[address] 
[address] [facility], [address] [place] [address] [address]kw[address] 
[phone] GI PROGRESS NOTE 
 
 
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 We successfully clipped this with our large over the scope device and we stopped the bleeding. No further bleeding was noted at angio and this vessel was not coiled further because of the risk of injuring the half of the liver which it feeds. Coils were however placed in other nearby vascular branches  - although the site of the massive bleed was the endoscopically legated (with the clip) vessel.   
- Hgb stable - Pressor requirements are falling 
- Only old black blood coming from OG tube - Remains on Protonix drip - Remains intubated,on a ventilator, on CVVH  
  
 
Plan:  
 
Continue aggressive supportive care Portable KUB to evaluate for ileus. If no ileus, start tube feeds at a slow rate through the OG Of note, the very large clip which we had to leave free floating in the stomach is still in the gastric fundus. This should not pose a problem but it could be removed if second look endoscopy is considered. 
-Hgb q 8hrs, transfuse PRN 
-Protonix gtt Subjective:  
Discussed with RN events overnight. Old blood/clots in flexiseal. No blood transfusions Objective: VITALS:  
Last 24hrs VS reviewed since prior progress note. Most recent are: 
Visit Vitals /54 (BP 1 Location: Left arm, BP Patient Position: At rest) Pulse 74 Temp 97.3 °F (36.3 °C) Resp 18 Ht 5' 8\" (1.727 m) Wt 65.8 kg (145 lb 1 oz) SpO2 100% BMI 22.06 kg/m² Intake/Output Summary (Last 24 hours) at 7/15/2019 9681 Last data filed at 7/15/2019 0800 Gross per 24 hour Intake 3023.18 ml Output 3480 ml  
 Net -456.82 ml PHYSICAL EXAM: 
General: WD, WN. Alert, cooperative, no acute distress   
Lungs:  CTA Bilaterally. No Wheezing/Rhonchi/Rales. Heart:  Regular  rhythm,  No murmur (), No Rubs, No Gallops Abdomen: Soft, mild distended, Non tender.  Hypoactive Bowel sounds, no HSM Psych:   Not anxious nor agitated. Lab Data Reviewed: (see below) Medications Reviewed: (see below) PMH/SH reviewed - no change compared to H&P 
________________________________________________________________________ Total time spent with patient: 30 minutes Critical Care Provided     Minutes non procedure based Care Plan discussed with: 
Patient Family RN Care Manager Consultant/Specialist:    
 
>50% of visit spent in counseling and coordination of care Recommended Disposition:  
Home with Family HH/PT/OT/RN   
SNF/LTC   
[facility] Code Status: 
Full Code DNR/DNI   
 
________________________________________________________________________ Abebe No MD  
________________________________________________________________________________________________________________________________________________ Procedures: see electronic medical records for all procedures/Xrays and details which 
were not copied into this note but were reviewed prior to creation of Plan. LABS: 
Recent Labs  
  07/15/19 
0322 07/14/19 
1658 07/14/19 
1246 WBC 24.8*  --  26.4* HGB 8.0* 6.7* 7.1*  
HCT 23.9* 20.2* 20.9*  
*  --  146* Recent Labs  
  07/15/19 
0322 07/14/19 
1246 07/14/19 
0350  141 143  
K 3.5 3.9 4.1  106 109* CO2 28 26 26 BUN 39* 57* 75* CREA 3.00* 3.72* 4.32* * 156* 175* CA 8.6 8.0* 7.6*  
MG 1.8 1.7 1.8 PHOS 3.1 3.9 4.0 Recent Labs  
  07/15/19 
0322 07/14/19 
1246 07/14/19 
0350 SGOT 178* 262* 322* AP 57 61 58 TP 5.2* 5.0* 4.7* ALB 3.2* 3.0* 2.8*  
GLOB 2.0 2.0 1.9*  
 
 No results for input(s): INR, PTP, APTT in the last 72 hours. No lab exists for component: INREXT, INREXT No results for input(s): FE, TIBC, PSAT, FERR in the last 72 hours. No results found for: FOL, RBCF No results for input(s): PH, PCO2, PO2 in the last 72 hours. No results for input(s): CPK, CKMB in the last 72 hours. No lab exists for component: TROPONINI No results found for: COLOR, APPRN, SPGRU, REFSG, ANGELI, PROTU, GLUCU, KETU, BILU, UROU, TREVOR, LEUKU, GLUKE, EPSU, BACTU, WBCU, RBCU, CASTS, UCRY MEDICATIONS: 
Current Facility-Administered Medications Medication Dose Route Frequency  bicarbonate dialysis (PRISMASOL) BG K 4/Ca 2.5 5000 ml solution   Extracorporeal DIALYSIS CONTINUOUS  piperacillin-tazobactam (ZOSYN) 3.375 g in 0.9% sodium chloride (MBP/ADV) 100 mL  3.375 g IntraVENous Q8H  
 0.9% sodium chloride infusion  10 mL/hr IntraVENous CONTINUOUS  
 albumin human 25% (BUMINATE) solution 12.5 g  12.5 g IntraVENous Q6H  
 chlorhexidine (PERIDEX) 0.12 % mouthwash 15 mL  15 mL Oral BID  albuterol-ipratropium (DUO-NEB) 2.5 MG-0.5 MG/3 ML  3 mL Nebulization Q6H RT  
 DOPamine (INTROPIN) 800 mg in dextrose 5% 250 mL infusion  0-20 mcg/kg/min IntraVENous TITRATE  PHENYLephrine (ARTURO-SYNEPHRINE) 100 mg in 0.9% sodium chloride 250 mL infusion   mcg/min IntraVENous TITRATE  
 NOREPINephrine (LEVOPHED) 32 mg in dextrose 5% 250 mL infusion  2-30 mcg/min IntraVENous TITRATE  vasopressin (VASOSTRICT) 20 Units in 0.9% sodium chloride 100 mL infusion  0-0.04 Units/min IntraVENous TITRATE  
 0.9% sodium chloride infusion 250 mL  250 mL IntraVENous PRN  
 0.9% sodium chloride infusion 250 mL  250 mL IntraVENous PRN  
 0.9% sodium chloride infusion 250 mL  250 mL IntraVENous PRN  propofol (DIPRIVAN) infusion  0-50 mcg/kg/min IntraVENous TITRATE  sodium chloride (NS) flush 5-40 mL  5-40 mL IntraVENous Q8H  
 sodium chloride (NS) flush 5-40 mL  5-40 mL IntraVENous PRN  
  simethicone (MYLICON) 94UC/7.3ZQ oral drops 80 mg  1.2 mL Oral Multiple  fentaNYL citrate (PF) injection  mcg   mcg IntraVENous Q1H PRN  
 0.9% sodium chloride infusion 250 mL  250 mL IntraVENous PRN  pantoprazole (PROTONIX) 40 mg in 0.9% sodium chloride (MBP/ADV) 50 mL  8 mg/hr IntraVENous CONTINUOUS  
 0.9% sodium chloride infusion  6 mL/hr IntraVENous CONTINUOUS  
 calcium carbonate (TUMS) chewable tablet 200 mg [elemental]  200 mg Oral TID PRN  
 aspirin delayed-release tablet 81 mg  81 mg Oral DAILY  sodium chloride (NS) flush 5-40 mL  5-40 mL IntraVENous Q8H  
 sodium chloride (NS) flush 5-40 mL  5-40 mL IntraVENous PRN  
 acetaminophen (TYLENOL) tablet 650 mg  650 mg Oral Q4H PRN  
 ondansetron (ZOFRAN) injection 4 mg  4 mg IntraVENous Q4H PRN  
 lactobac ac& pc-s.therm-b.anim (KERLINE Q/RISAQUAD)  1 Cap Oral DAILY  LORazepam (ATIVAN) injection 1 mg  1 mg IntraVENous Q6H PRN  
 naloxone (NARCAN) injection 0.4 mg  0.4 mg IntraVENous PRN  thiamine HCL (B-1) tablet 100 mg  100 mg Oral DAILY  folic acid (FOLVITE) tablet 1 mg  1 mg Oral DAILY  therapeutic multivitamin (THERAGRAN) tablet 1 Tab  1 Tab Oral DAILY

## 2019-07-15 NOTE — PROGRESS NOTES
Physical Therapy 7/15/2019 Chart reviewed. Noted patient transferred to CVICU for refractory shock with GI bleed. At this time, patient is on ventilator support, CRRT, multiple pressors, and sedated. Patient is not medically stable for skilled therapy interventions at this time. Will continue to follow peripherally. Thank you. Elba Zuniga, PT, DPT Recommendation for Nursing: Patient to complete as able in order to maintain strength, endurance, and independence: - Bed in modified chair position with foot board on 3x/day 30-60 mins max each 
- Passive ROM to B UEs and LEs during bathing to prevent contractures - Positioning to prevent edema and contractures

## 2019-07-15 NOTE — PROGRESS NOTES
[facility] Pulmonary, Critical Care, and Sleep Medicine Progress note Name: Javier Brothers MRN: [id number] : 1945 Hospital: [address] Date: 7/15/2019 IMPRESSION:  
· Multisystem organ failure · Hemorrhagic shock · Acute severe blood loss anemia · KAYLEEN with severe metabolic acidosis · Duodenal ulcer- S/P clipping, IR embolization planned · Acute resp failure with failure to meet MV demands · PHTN- by ECHO PASP 79 EF 50% no AV/MV disease, but RHC data (below) a bit better. Portopulmonary HTN- ETOH abuse and hypoalbuminemic ? Occult cirrhosis · S/p LHC/RCH 7/10: RV 60/4, PA 61/15 mean 29, /7, /48, RA mean 4, PCW mean 7, CO 2.85-- echo reviewed: biatrial dilatation, left to right blowing of interatrial septum. Cath findings show precapillary PH after diuresis, likely mixed picture on presentation (combined pre and post capillary PH-- filling pressures normalized after diuresis) · HTN 
· Right effusion-exudate- ? From [facility] or other DDX is hepatic hydrothorax - cultures and cytology negative · SBO- conservative MGMT 
· Leukocytosis RECOMMENDATIONS:  
· VACV- settings adjusted-- SBT as becomes more hemodynamically stable still too unstable for extubation · Empiric zosyn · Supportive transfusions · Pressors- reviewed · On dopamine- helping with inotropic support: should be weaned off last 
· On antibiotics · Off bicarb · On CRRT 
· Sedation reviewed · On protonix infusion · No anticoagulation · Vent bundle · Elective [facility] work up if and when acute issues resolve · GI and nephrology following · Critically ill, at high risk for decline and death. CCT 30'. D/W  RN Subjective:  
 
7/15 On vent 30% FiO2 Received 2 units prbc's over night On vasopressors Bradycardic On CVVH 
 
 Seen and examined Still on NE and PE Off vasopressin Hb stable Leukocytosis noted On CRRT On dopamine for bradycardia- on 3 mcg  Seen and examined earlier today. Transferred to CVICU after rapid response for SOB. Intubated soon after. Found in refractory shock with massive GI bleed secondary to duodenal ulcer. Pressors adjusted at bedside. PH buffered with supplemental bicarb for severe metabolic acidosis. Receiving large amounts of blood products. Fluid resuscitation ongoing. S/O clipping of visible vessel by GI but at high risk for re-bleed and IR embolization is planned. Worsening renal failure with ATN and recent contrast- CRRT is planned.  He feels well today. No acute complaints  States that his breathing feels much better, wife at bedside tells me that he looks better and much more comfortable than previously. CXR much improved. 7/10 
stable  This patient has been seen and evaluated at the request of Dr. Danny Head for PHTN. Patient is a 72 y.o. male h/o ETOH abuse presents with 6 months progressive Lozano and presented with large right effusion- tapped- exudative. Negative micro. Thoracic placed pigtail and now out. ECHO with severe PHTN. Pt alert denies h/o PE, CTD, no h/o lung disease. Past Medical History:  
Diagnosis Date  Arthritic-like pain  Hypertension Past Surgical History:  
Procedure Laterality Date  IR INSERT NON TUNL CVC OVER 5 YRS  2019  IR OCCL TXCATH HEMMORAGE W SI  2019 Prior to Admission medications Medication Sig Start Date End Date Taking? Authorizing Provider  
therapeutic multivitamin [facility]) tablet Take 1 Tab by mouth daily. Yes Provider, Historical  
ergocalciferol (VITAMIN D2) 50,000 unit capsule Take 50,000 Units by mouth every . Yes Provider, Historical  
aspirin delayed-release 81 mg tablet Take  by mouth daily. Yes Provider, Historical  
 
No Known Allergies Social History Tobacco Use  Smoking status: Former Smoker Start date: 1966 Last attempt to quit: 1984 Years since quittin.6  Smokeless tobacco: Never Used Substance Use Topics  Alcohol use: Yes Alcohol/week: 7.0 oz Types: 14 drink(s) per week Family History Problem Relation Age of Onset  Diabetes Mother  Hypertension Mother  Heart Disease Mother  Asthma Mother [facility] Arthritis-osteo Mother  Diabetes Father  Hypertension Father  Asthma Father  Arthritis-osteo Father  Diabetes Sister  Gout Sister  Asthma Brother Current Facility-Administered Medications Medication Dose Route Frequency  bicarbonate dialysis (PRISMASOL) BG K 4/Ca 2.5 5000 ml solution   Extracorporeal DIALYSIS CONTINUOUS  
 balsam peru-castor oil (VENELEX) ointment   Topical BID  piperacillin-tazobactam (ZOSYN) 3.375 g in 0.9% sodium chloride (MBP/ADV) 100 mL  3.375 g IntraVENous Q8H  
 0.9% sodium chloride infusion  10 mL/hr IntraVENous CONTINUOUS  chlorhexidine (PERIDEX) 0.12 % mouthwash 15 mL  15 mL Oral BID  albuterol-ipratropium (DUO-NEB) 2.5 MG-0.5 MG/3 ML  3 mL Nebulization Q6H RT  
 DOPamine (INTROPIN) 800 mg in dextrose 5% 250 mL infusion  0-20 mcg/kg/min IntraVENous TITRATE  PHENYLephrine (ARTURO-SYNEPHRINE) 100 mg in 0.9% sodium chloride 250 mL infusion   mcg/min IntraVENous TITRATE  
 NOREPINephrine (LEVOPHED) 32 mg in dextrose 5% 250 mL infusion  2-30 mcg/min IntraVENous TITRATE  vasopressin (VASOSTRICT) 20 Units in 0.9% sodium chloride 100 mL infusion  0-0.04 Units/min IntraVENous TITRATE  propofol (DIPRIVAN) infusion  0-50 mcg/kg/min IntraVENous TITRATE  sodium chloride (NS) flush 5-40 mL  5-40 mL IntraVENous Q8H  
 pantoprazole (PROTONIX) 40 mg in 0.9% sodium chloride (MBP/ADV) 50 mL  8 mg/hr IntraVENous CONTINUOUS  
 0.9% sodium chloride infusion  6 mL/hr IntraVENous CONTINUOUS  
 aspirin delayed-release tablet 81 mg  81 mg Oral DAILY  sodium chloride (NS) flush 5-40 mL  5-40 mL IntraVENous Q8H  
  raghav ac& pc-s.therm-b.anim (KERLINE Q/RISAQUAD)  1 Cap Oral DAILY  thiamine HCL (B-1) tablet 100 mg  100 mg Oral DAILY  folic acid (FOLVITE) tablet 1 mg  1 mg Oral DAILY  therapeutic multivitamin (THERAGRAN) tablet 1 Tab  1 Tab Oral DAILY Review of Systems: A comprehensive review of systems was negative except for that written in the HPI. Objective:  
Vital Signs:   
Visit Vitals /54 (BP 1 Location: Left arm, BP Patient Position: At rest) Pulse 77 Temp 97.3 °F (36.3 °C) Resp 16 Ht 5' 8\" (1.727 m) Wt 65.8 kg (145 lb 1 oz) SpO2 100% BMI 22.06 kg/m² O2 Device: Ventilator O2 Flow Rate (L/min): 2 l/min Temp (24hrs), Av.5 °F (36.4 °C), Min:97 °F (36.1 °C), Max:98.1 °F (36.7 °C) Intake/Output:  
Last shift:      07/15 07 - 07/15 1900 In: 188.2 [I.V.:188.2] Out: 227 Last 3 shifts:  1901 - 07/15 0700 In: 6774.4 [I.V.:6114.4] Out: 1400 [Drains:300] Intake/Output Summary (Last 24 hours) at 7/15/2019 1044 Last data filed at 7/15/2019 1000 Gross per 24 hour Intake 2726.74 ml Output 3246 ml Net -519.26 ml Physical Exam:  
General:  Intubated, appears stated age. Head:  Normocephalic, without obvious abnormality, atraumatic. Eyes:  Conjunctivae/corneas - pale Nose: Nares normal. Septum midline Neck: Supple, symmetrical, trachea midline Lungs:   Clear to auscultation bilaterally. Heart:  Regular rate and rhythm, S1, S2 normal, no murmur, click, rub or gallop. Abdomen:   Distended Extremities: Extremities normal, atraumatic, no cyanosis or edema. Pulses: 2+ and symmetric all extremities. Skin: Skin color, texture, turgor normal. No rashes or lesions Data review:  
 
Recent Results (from the past 24 hour(s)) METABOLIC PANEL, COMPREHENSIVE Collection Time: 19 12:46 PM  
Result Value Ref Range Sodium 141 136 - 145 mmol/L  Potassium 3.9 3.5 - 5.1 mmol/L  
 Chloride 106 97 - 108 mmol/L  
 CO2 26 21 - 32 mmol/L Anion gap 9 5 - 15 mmol/L Glucose 156 (H) 65 - 100 mg/dL BUN 57 (H) 6 - 20 MG/DL Creatinine 3.72 (H) 0.70 - 1.30 MG/DL  
 BUN/Creatinine ratio 15 12 - 20 GFR est AA 20 (L) >60 ml/min/1.73m2 GFR est non-AA 16 (L) >60 ml/min/1.73m2 Calcium 8.0 (L) 8.5 - 10.1 MG/DL Bilirubin, total 0.5 0.2 - 1.0 MG/DL  
 ALT (SGPT) 220 (H) 12 - 78 U/L  
 AST (SGOT) 262 (H) 15 - 37 U/L Alk. phosphatase 61 45 - 117 U/L Protein, total 5.0 (L) 6.4 - 8.2 g/dL Albumin 3.0 (L) 3.5 - 5.0 g/dL Globulin 2.0 2.0 - 4.0 g/dL A-G Ratio 1.5 1.1 - 2.2 PHOSPHORUS Collection Time: 07/14/19 12:46 PM  
Result Value Ref Range Phosphorus 3.9 2.6 - 4.7 MG/DL MAGNESIUM Collection Time: 07/14/19 12:46 PM  
Result Value Ref Range Magnesium 1.7 1.6 - 2.4 mg/dL CBC WITH AUTOMATED DIFF Collection Time: 07/14/19 12:46 PM  
Result Value Ref Range WBC 26.4 (H) 4.1 - 11.1 K/uL  
 RBC 2.58 (L) 4.10 - 5.70 M/uL HGB 7.1 (L) 12.1 - 17.0 g/dL HCT 20.9 (L) 36.6 - 50.3 % MCV 81.0 80.0 - 99.0 FL  
 MCH 27.5 26.0 - 34.0 PG  
 MCHC 34.0 30.0 - 36.5 g/dL  
 RDW 16.5 (H) 11.5 - 14.5 % PLATELET 317 (L) 061 - 400 K/uL MPV 10.9 8.9 - 12.9 FL  
 NRBC 1.9 (H) 0  WBC ABSOLUTE NRBC 0.51 (H) 0.00 - 0.01 K/uL NEUTROPHILS 79 (H) 32 - 75 % BAND NEUTROPHILS 5 0 - 6 % LYMPHOCYTES 5 (L) 12 - 49 % MONOCYTES 4 (L) 5 - 13 % EOSINOPHILS 4 0 - 7 % BASOPHILS 1 0 - 1 % METAMYELOCYTES 2 (H) 0 % IMMATURE GRANULOCYTES 0 %  
 ABS. NEUTROPHILS 22.2 (H) 1.8 - 8.0 K/UL  
 ABS. LYMPHOCYTES 1.3 0.8 - 3.5 K/UL  
 ABS. MONOCYTES 1.1 (H) 0.0 - 1.0 K/UL  
 ABS. EOSINOPHILS 1.1 (H) 0.0 - 0.4 K/UL  
 ABS. BASOPHILS 0.3 (H) 0.0 - 0.1 K/UL  
 ABS. IMM. GRANS. 0.0 K/UL  
 DF MANUAL PLATELET COMMENTS Large Platelets RBC COMMENTS ANISOCYTOSIS 1+ 
    
 RBC COMMENTS OVALOCYTES PRESENT 
    
 RBC COMMENTS POLYCHROMASIA 1+ TROPONIN I  
 Collection Time: 07/14/19 12:46 PM  
Result Value Ref Range Troponin-I, Qt. 0.73 (H) <0.05 ng/mL HGB & HCT Collection Time: 07/14/19  4:58 PM  
Result Value Ref Range HGB 6.7 (L) 12.1 - 17.0 g/dL HCT 20.2 (L) 36.6 - 50.3 % CBC WITH AUTOMATED DIFF Collection Time: 07/15/19  3:22 AM  
Result Value Ref Range WBC 24.8 (H) 4.1 - 11.1 K/uL  
 RBC 2.80 (L) 4.10 - 5.70 M/uL HGB 8.0 (L) 12.1 - 17.0 g/dL HCT 23.9 (L) 36.6 - 50.3 % MCV 85.4 80.0 - 99.0 FL  
 MCH 28.6 26.0 - 34.0 PG  
 MCHC 33.5 30.0 - 36.5 g/dL  
 RDW 15.8 (H) 11.5 - 14.5 % PLATELET 771 (L) 602 - 400 K/uL MPV 11.1 8.9 - 12.9 FL  
 NRBC 2.1 (H) 0  WBC ABSOLUTE NRBC 0.51 (H) 0.00 - 0.01 K/uL NEUTROPHILS 72 32 - 75 % BAND NEUTROPHILS 2 0 - 6 % LYMPHOCYTES 4 (L) 12 - 49 % MONOCYTES 5 5 - 13 % EOSINOPHILS 17 (H) 0 - 7 % BASOPHILS 0 0 - 1 % IMMATURE GRANULOCYTES 0 %  
 ABS. NEUTROPHILS 18.4 (H) 1.8 - 8.0 K/UL  
 ABS. LYMPHOCYTES 1.0 0.8 - 3.5 K/UL  
 ABS. MONOCYTES 1.2 (H) 0.0 - 1.0 K/UL  
 ABS. EOSINOPHILS 4.2 (H) 0.0 - 0.4 K/UL  
 ABS. BASOPHILS 0.0 0.0 - 0.1 K/UL  
 ABS. IMM. GRANS. 0.0 K/UL  
 DF MANUAL    
 RBC COMMENTS ANISOCYTOSIS 
1+ WBC COMMENTS Pathology Review Requested METABOLIC PANEL, COMPREHENSIVE Collection Time: 07/15/19  3:22 AM  
Result Value Ref Range Sodium 140 136 - 145 mmol/L Potassium 3.5 3.5 - 5.1 mmol/L Chloride 105 97 - 108 mmol/L  
 CO2 28 21 - 32 mmol/L Anion gap 7 5 - 15 mmol/L Glucose 123 (H) 65 - 100 mg/dL BUN 39 (H) 6 - 20 MG/DL Creatinine 3.00 (H) 0.70 - 1.30 MG/DL  
 BUN/Creatinine ratio 13 12 - 20 GFR est AA 25 (L) >60 ml/min/1.73m2 GFR est non-AA 21 (L) >60 ml/min/1.73m2 Calcium 8.6 8.5 - 10.1 MG/DL Bilirubin, total 0.8 0.2 - 1.0 MG/DL  
 ALT (SGPT) 172 (H) 12 - 78 U/L  
 AST (SGOT) 178 (H) 15 - 37 U/L Alk. phosphatase 57 45 - 117 U/L Protein, total 5.2 (L) 6.4 - 8.2 g/dL Albumin 3.2 (L) 3.5 - 5.0 g/dL Globulin 2.0 2.0 - 4.0 g/dL A-G Ratio 1.6 1.1 - 2.2 MAGNESIUM Collection Time: 07/15/19  3:22 AM  
Result Value Ref Range Magnesium 1.8 1.6 - 2.4 mg/dL PHOSPHORUS Collection Time: 07/15/19  3:22 AM  
Result Value Ref Range Phosphorus 3.1 2.6 - 4.7 MG/DL  
LACTIC ACID Collection Time: 07/15/19  3:22 AM  
Result Value Ref Range Lactic acid 0.8 0.4 - 2.0 MMOL/L  
EKG, 12 LEAD, INITIAL Collection Time: 07/15/19  8:31 AM  
Result Value Ref Range Ventricular Rate 41 BPM  
 Atrial Rate 192 BPM  
 QRS Duration 98 ms Q-T Interval 598 ms QTC Calculation (Bezet) 493 ms Calculated P Axis -90 degrees Calculated R Axis -19 degrees Calculated T Axis -13 degrees Diagnosis Undetermined rhythm Nonspecific ST and T wave abnormality Prolonged QT When compared with ECG of 13-JUL-2019 08:46, 
Current undetermined rhythm precludes rhythm comparison, needs review Nonspecific T wave abnormality now evident in Inferior leads QT has lengthened POC G3 - PUL Collection Time: 07/15/19  9:14 AM  
Result Value Ref Range FIO2 (POC) 0.30 % pH (POC) 7.442 7.35 - 7.45    
 pCO2 (POC) 36.0 35.0 - 45.0 MMHG  
 pO2 (POC) 155 (H) 80 - 100 MMHG  
 HCO3 (POC) 24.6 22 - 26 MMOL/L  
 sO2 (POC) 99 (H) 92 - 97 % Base excess (POC) 0 mmol/L Site DRAWN FROM ARTERIAL LINE Device: VENT Mode ASSIST CONTROL Tidal volume 450 ml Set Rate 14 bpm  
 PEEP/CPAP (POC) 5 cmH2O Allens test (POC) N/A Specimen type (POC) ARTERIAL Total resp. rate 20 Imaging: 
I have personally reviewed the patients radiographs and have reviewed the reports: 
7/5 CT chest moderate to large layering right effusion and RLL ATX LLL ASD no ILD changes no masses- main PA large Maggie Mcdaniel MD

## 2019-07-16 NOTE — PROGRESS NOTES
Cardiac Cath Lab Procedure Area Arrival Note: 
 
Maru Das arrived to Cardiac Cath Lab, Procedure Area. Patient identifiers verified with NAME and DATE OF BIRTH. Procedure verified with patient. Consent forms verified. Allergies verified. Patient informed of procedure and plan of care. Questions answered with review. Patient voiced understanding of procedure and plan of care. Patient on cardiac monitor, non-invasive blood pressure, SPO2 monitor. On Vent  IV - See MAR. Patient medicated during procedure with orders obtained and verified by Dr. Anastacia Uriostegui. Refer to patients Cardiac Cath Lab PROCEDURE REPORT for vital signs, assessment, status, and response during procedure, printed at end of case. Printed report on chart or scanned into chart.

## 2019-07-16 NOTE — PROGRESS NOTES
2000 Assumed care of patient from [facility] Brief shift summary- remained hemodynamically stable off levophed throughout night maintaining SBP >100. Tolerated CRRT without issue. Required increased number of IV pushes of fentanyl for pain management-see pain assessments. OG found to be at 55 at 0400- advance to 70 and air bolus verified position. No blood transfusions required- hg remained >7. 2 baths provided for planned PPM in AM. Family updated via phone. 0745 Bedside and Verbal shift change report given to [facility] (oncoming nurse) by Yuliya (offgoing nurse). Report included the following information SBAR, Kardex, MAR, Recent Results and Cardiac Rhythm A flutter. Problem: Falls - Risk of 
Goal: *Absence of Falls Description Document Loly Riggs Fall Risk and appropriate interventions in the flowsheet. Outcome: Progressing Towards Goal 
  
Problem: Heart Failure: Day 5 Goal: Off Pathway (Use only if patient is Off Pathway) 7/16/2019 0300 by Luis A Llamas RN Outcome: Progressing Towards Goal 
7/16/2019 0258 by Luis A Llamas RN Outcome: Progressing Towards Goal 
  
Problem: Non-Violent Restraints Goal: *Removal from restraints as soon as assessed to be safe Outcome: Progressing Towards Goal 
Goal: *No harm/injury to patient while restraints in use Outcome: Progressing Towards Goal 
Goal: *Patient's dignity will be maintained Outcome: Progressing Towards Goal 
Goal: *Patient Specific Goal (EDIT GOAL, INSERT TEXT) Outcome: Progressing Towards Goal 
Goal: Non-violent Restaints:Standard Interventions Outcome: Progressing Towards Goal 
Goal: Non-violent Restraints:Patient Interventions Outcome: Progressing Towards Goal 
  
Problem: Infection - Risk of, Central Venous Catheter-Associated Bloodstream Infection Goal: *Absence of infection signs and symptoms Outcome: Progressing Towards Goal 
  
Problem: Patient Education: Go to Patient Education Activity Goal: Patient/Family Education Outcome: Progressing Towards Goal 
  
Problem: Pressure Injury - Risk of 
Goal: *Prevention of pressure injury Description Document Eugenio Scale and appropriate interventions in the flowsheet. Outcome: Progressing Towards Goal 
  
Problem: Patient Education: Go to Patient Education Activity Goal: Patient/Family Education Outcome: Progressing Towards Goal 
  
Problem: Pacer/ICD: Pre-Procedure Goal: Activity/Safety Outcome: Progressing Towards Goal 
Goal: Medications Outcome: Progressing Towards Goal 
Goal: Respiratory Outcome: Progressing Towards Goal 
Goal: Psychosocial 
Outcome: Progressing Towards Goal

## 2019-07-16 NOTE — DIALYSIS
[facility]       [phone] Orders Mode: CVVH Factor: 0 ml/hr UFR: 1,600 ml/hr Blood Flow Rate: 200 ml/min Metrics BP / HR: 122/41  //  41 Blood Flow Rate: 200 ml/min AP:                         -64  
RP: 78 TMP: 48  
PD: 23  
FP: 132 UFR: 1,600 ml/hr Comments / Plan:  
   Filter changed secondary to leaving floor for procedure. All possible blood (165 ml) returned by primary RN. Consents, patient, code status, labs and orders verified. Old set discarded in red bio-hazard bag. New [id number] filter set-up, primed, tested and running well at this time. RIJ temporary CVC, dressing CDI with bio-patch. No signs of redness, drainage, or infection visualized. Each catheter limb disinfected for 60 seconds per limb with alcohol swabs. Caps removed, dialysis CVC hub scrubbed with Prevantics for 5 seconds, followed by a 5 second dry time per Hospital P&P. +asp/+flush x 2 ports. Lines reversed, visible and connections secure with blood warmer to return line at 37*C. Education, pre and post to JARRED Maldonado RN.

## 2019-07-16 NOTE — PROGRESS NOTES
Physical Therapy 7/16/2019 Chart reviewed. At this time, patient is on ventilator support, CRRT, multiple pressors, sedated, and in aflutter with variable AV block (PPM placement scheduled this morning). Patient is not medically stable for skilled therapy interventions at this time. Will continue to follow peripherally. Thank you. Bhavana Saunders, PT, DPT Recommendation for Nursing: Patient to complete as able in order to maintain strength, endurance, and independence: - Bed in modified chair position with foot board on 3x/day 30-60 mins max each 
- Passive ROM to B UEs and LEs during bathing to prevent contractures - Positioning to prevent edema and contractures

## 2019-07-16 NOTE — PROGRESS NOTES
Eric Solis [address] [place] [address], [address] GI PROGRESS NOTE Will Alina Sheldon, [address] [facility] office [phone] NP/PA in-hospital cell phone M-F until 4:30PM 
After 5PM or on weekends, please call  for physician on call NAME: Dana Jay :  1945 MRN:  [id number] Subjective:  
Patient is intubated and sedated. He remains on pressors. Pacemaker was placed today. No further signs of bleeding. Discussed with RN. Objective: VITALS:  
Last 24hrs VS reviewed since prior progress note. Most recent are: 
Visit Vitals /51 (BP 1 Location: Left arm, BP Patient Position: At rest) Pulse 69 Temp 97.4 °F (36.3 °C) Resp 17 Ht 5' 8\" (1.727 m) Wt 64.8 kg (142 lb 13.7 oz) SpO2 100% BMI 21.72 kg/m² PHYSICAL EXAM: 
General: Intubated, sedated Neurologic:  Intubated HEENT: No scleral icterus Lungs:  CTA bilaterally anteriorly Heart:  S1 S2 Abdomen: Soft, non-distended. +Bowel sounds. Extremities: Warm Psych:   Unable to assess Lab Data Reviewed:  
 
Recent Results (from the past 24 hour(s)) CBC WITH AUTOMATED DIFF Collection Time: 07/15/19  7:20 PM  
Result Value Ref Range WBC 19.2 (H) 4.1 - 11.1 K/uL  
 RBC 2.70 (L) 4.10 - 5.70 M/uL HGB 7.7 (L) 12.1 - 17.0 g/dL HCT 22.6 (L) 36.6 - 50.3 % MCV 83.7 80.0 - 99.0 FL  
 MCH 28.5 26.0 - 34.0 PG  
 MCHC 34.1 30.0 - 36.5 g/dL  
 RDW 15.7 (H) 11.5 - 14.5 % PLATELET 363 (L) 366 - 400 K/uL MPV 10.9 8.9 - 12.9 FL  
 NRBC 2.5 (H) 0  WBC ABSOLUTE NRBC 0.48 (H) 0.00 - 0.01 K/uL NEUTROPHILS 75 32 - 75 % LYMPHOCYTES 4 (L) 12 - 49 % MONOCYTES 7 5 - 13 % EOSINOPHILS 12 (H) 0 - 7 % BASOPHILS 0 0 - 1 % IMMATURE GRANULOCYTES 2 (H) 0.0 - 0.5 % ABS. NEUTROPHILS 14.4 (H) 1.8 - 8.0 K/UL  
 ABS. LYMPHOCYTES 0.8 0.8 - 3.5 K/UL  
 ABS. MONOCYTES 1.3 (H) 0.0 - 1.0 K/UL  
 ABS. EOSINOPHILS 2.3 (H) 0.0 - 0.4 K/UL ABS. BASOPHILS 0.0 0.0 - 0.1 K/UL  
 ABS. IMM. GRANS. 0.4 (H) 0.00 - 0.04 K/UL  
 DF AUTOMATED    
 RBC COMMENTS ANISOCYTOSIS 1+ 
    
 RBC COMMENTS MICROCYTOSIS 
1+ METABOLIC PANEL, COMPREHENSIVE Collection Time: 07/16/19  3:13 AM  
Result Value Ref Range Sodium 139 136 - 145 mmol/L Potassium 3.8 3.5 - 5.1 mmol/L Chloride 107 97 - 108 mmol/L  
 CO2 24 21 - 32 mmol/L Anion gap 8 5 - 15 mmol/L Glucose 103 (H) 65 - 100 mg/dL BUN 21 (H) 6 - 20 MG/DL Creatinine 2.44 (H) 0.70 - 1.30 MG/DL  
 BUN/Creatinine ratio 9 (L) 12 - 20 GFR est AA 32 (L) >60 ml/min/1.73m2 GFR est non-AA 26 (L) >60 ml/min/1.73m2 Calcium 8.1 (L) 8.5 - 10.1 MG/DL Bilirubin, total 0.7 0.2 - 1.0 MG/DL  
 ALT (SGPT) 124 (H) 12 - 78 U/L  
 AST (SGOT) 88 (H) 15 - 37 U/L Alk. phosphatase 71 45 - 117 U/L Protein, total 5.3 (L) 6.4 - 8.2 g/dL Albumin 3.1 (L) 3.5 - 5.0 g/dL Globulin 2.2 2.0 - 4.0 g/dL A-G Ratio 1.4 1.1 - 2.2 PHOSPHORUS Collection Time: 07/16/19  3:13 AM  
Result Value Ref Range Phosphorus 3.9 2.6 - 4.7 MG/DL MAGNESIUM Collection Time: 07/16/19  3:13 AM  
Result Value Ref Range Magnesium 2.3 1.6 - 2.4 mg/dL CBC WITH AUTOMATED DIFF Collection Time: 07/16/19  3:13 AM  
Result Value Ref Range WBC 20.1 (H) 4.1 - 11.1 K/uL  
 RBC 2.75 (L) 4.10 - 5.70 M/uL HGB 7.8 (L) 12.1 - 17.0 g/dL HCT 23.7 (L) 36.6 - 50.3 % MCV 86.2 80.0 - 99.0 FL  
 MCH 28.4 26.0 - 34.0 PG  
 MCHC 32.9 30.0 - 36.5 g/dL  
 RDW 16.0 (H) 11.5 - 14.5 % PLATELET 976 (L) 538 - 400 K/uL MPV 10.9 8.9 - 12.9 FL  
 NRBC 1.2 (H) 0  WBC ABSOLUTE NRBC 0.25 (H) 0.00 - 0.01 K/uL NEUTROPHILS 76 (H) 32 - 75 % LYMPHOCYTES 7 (L) 12 - 49 % MONOCYTES 3 (L) 5 - 13 % EOSINOPHILS 14 (H) 0 - 7 % BASOPHILS 0 0 - 1 % IMMATURE GRANULOCYTES 0 %  
 ABS. NEUTROPHILS 15.3 (H) 1.8 - 8.0 K/UL  
 ABS. LYMPHOCYTES 1.4 0.8 - 3.5 K/UL  
 ABS. MONOCYTES 0.6 0.0 - 1.0 K/UL ABS. EOSINOPHILS 2.8 (H) 0.0 - 0.4 K/UL  
 ABS. BASOPHILS 0.0 0.0 - 0.1 K/UL  
 ABS. IMM. GRANS. 0.0 K/UL  
 DF MANUAL    
 RBC COMMENTS ANISOCYTOSIS 1+ 
    
 RBC COMMENTS OVALOCYTES PRESENT 
    
 RBC COMMENTS POLYCHROMASIA PRESENT 
    
CORTISOL, AM  
 Collection Time: 07/16/19  7:26 AM  
Result Value Ref Range Cortisol, a.m. 13.5 4.30 - 22.45 ug/dL POC G3 - PUL Collection Time: 07/16/19  8:37 AM  
Result Value Ref Range FIO2 (POC) 30 % pH (POC) 7.360 7.35 - 7.45    
 pCO2 (POC) 38.8 35.0 - 45.0 MMHG  
 pO2 (POC) 165 (H) 80 - 100 MMHG  
 HCO3 (POC) 21.9 (L) 22 - 26 MMOL/L  
 sO2 (POC) 99 (H) 92 - 97 % Base deficit (POC) 4 mmol/L Site DRAWN FROM ARTERIAL LINE Device: VENT Mode ASSIST CONTROL Tidal volume 500 ml Set Rate 14 bpm  
 PEEP/CPAP (POC) 5 cmH2O Allens test (POC) N/A Specimen type (POC) ARTERIAL Total resp. rate 14 CBC WITH AUTOMATED DIFF Collection Time: 07/16/19  2:00 PM  
Result Value Ref Range WBC 17.1 (H) 4.1 - 11.1 K/uL  
 RBC 2.68 (L) 4.10 - 5.70 M/uL HGB 7.6 (L) 12.1 - 17.0 g/dL HCT 23.1 (L) 36.6 - 50.3 % MCV 86.2 80.0 - 99.0 FL  
 MCH 28.4 26.0 - 34.0 PG  
 MCHC 32.9 30.0 - 36.5 g/dL  
 RDW 16.3 (H) 11.5 - 14.5 % PLATELET 457 (L) 160 - 400 K/uL MPV 10.8 8.9 - 12.9 FL  
 NRBC 0.8 (H) 0  WBC ABSOLUTE NRBC 0.14 (H) 0.00 - 0.01 K/uL NEUTROPHILS PENDING % LYMPHOCYTES PENDING % MONOCYTES PENDING % EOSINOPHILS PENDING % BASOPHILS PENDING % IMMATURE GRANULOCYTES PENDING %  
 ABS. NEUTROPHILS PENDING K/UL  
 ABS. LYMPHOCYTES PENDING K/UL  
 ABS. MONOCYTES PENDING K/UL  
 ABS. EOSINOPHILS PENDING K/UL  
 ABS. BASOPHILS PENDING K/UL  
 ABS. IMM. GRANS. PENDING K/UL  
 DF PENDING Assessment: · UGI bleed: EGD (7/13/19): large duodenal ulcer (20 mm) with visible vessel, clipped; esophagitis and gastritis. Hgb 7.6 (last unit PRBCs on 7/14). No further signs of bleeding. · Hemorrhagic shock: on pressors · Acute blood loss anemia · Acute respiratory failure · Pulmonary hypertension · Bradycardia: status post pacemaker · Idiopathic cardiomyopathy · Congestive heart failure · Acute on chronic kidney disease Patient Active Problem List  
Diagnosis Code  
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  
 History of wrist fracture, Right Z87.81  
 Overweight (BMI 25.0-29. 9) E66.3  Acute CHF (congestive heart failure) ([facility]) I50.9  Pleural effusion, right J90 Plan:  
· On PPI gtt · Monitor CBC and transfuse as needed · Medical management per primary team  
 
Signed By: YURY Olivarez   
 7/16/2019  3:45 PM 
  
 
This patient was seen and examined by me in a face-to-face visit today. I reviewed the medical record including lab work, imaging and other provider notes. I confirmed the interval history as described above. I spoke to the patient, however the patient is unable to give a meaningful history. I discussed this case in detail with Kisha COTTON. I formulated an updated  assessment of this patient and guided our treatment plan. I agree with the above progress note. I agree with the history, exam and assessment and plan as outlined in the note. I would like to add the following:  
 
Abd: normoactive BS, mild distention, nt, no rebound/guarding. If he continues to do well, will change PPI to BID from gtt tomorrow. Start tube feeds. Dr. Eugenio Shore

## 2019-07-16 NOTE — PROGRESS NOTES
The CM participated in [facility] rounds on the patient- the patient remains on pressor- CRRT, sedated. The patient is to undergo permanent pacemaker today with cardiology- not medically stable at this time. PT/OT when appropriate, CM will continue to follow.  PERNELL Franklin

## 2019-07-16 NOTE — PROGRESS NOTES
[facility] 
 [address] [facility] [address] Phone: [phone]   [phone]   
  
Nephrology Progress Note Colin Necessary     1945     [id number] Date of Admission : 7/4/2019 07/16/19 CC:  Follow up for KAYLEEN Assessment and Plan KAYLEEN on CKD  
- 2/2 ATN from hemorrhagic shock  
- remains anuric and callejas removed 7/15 
- Continue CVVH at current settings - Labs daily CKD Stage III : 
- baseline Cr 1.2-1.3 mg/dl Hemorraghic Shock /Massive UGI bleed - S/P emergent clipping by EDG 7/13 
- S/P embolization of gastric/diuodenal artery 7/12 in IR 
- remains on pressors Bradycardia - On Dopamine gtt 
  
Acute Resp Failure Mixed Pulm HTN w/ moderate/ severe TR Pleural effusions - per [facility] SBO 2/2 Umbilical hernia  
- Hernia reduced in ER Chronic Alcoholism ? Occult Cirrhosis Interval History: 
Seen and examined Remains anuric No issues w/ CRRT Hypothermic Dependent on dopamine Review of Systems: Review of systems not obtained due to patient factors. Current Medications:  
Current Facility-Administered Medications Medication Dose Route Frequency  bicarbonate dialysis (PRISMASOL) BG K 4/Ca 2.5 5000 ml solution   Extracorporeal DIALYSIS CONTINUOUS  
 balsam peru-castor oil (VENELEX) ointment   Topical BID  piperacillin-tazobactam (ZOSYN) 3.375 g in 0.9% sodium chloride (MBP/ADV) 100 mL  3.375 g IntraVENous Q8H  
 0.9% sodium chloride infusion  10 mL/hr IntraVENous CONTINUOUS  chlorhexidine (PERIDEX) 0.12 % mouthwash 15 mL  15 mL Oral BID  albuterol-ipratropium (DUO-NEB) 2.5 MG-0.5 MG/3 ML  3 mL Nebulization Q6H RT  
 DOPamine (INTROPIN) 800 mg in dextrose 5% 250 mL infusion  0-20 mcg/kg/min IntraVENous TITRATE  PHENYLephrine (ARTURO-SYNEPHRINE) 100 mg in 0.9% sodium chloride 250 mL infusion   mcg/min IntraVENous TITRATE  
 NOREPINephrine (LEVOPHED) 32 mg in dextrose 5% 250 mL infusion  2-30 mcg/min IntraVENous TITRATE  vasopressin (VASOSTRICT) 20 Units in 0.9% sodium chloride 100 mL infusion  0-0.04 Units/min IntraVENous TITRATE  
 0.9% sodium chloride infusion 250 mL  250 mL IntraVENous PRN  
 0.9% sodium chloride infusion 250 mL  250 mL IntraVENous PRN  
 0.9% sodium chloride infusion 250 mL  250 mL IntraVENous PRN  propofol (DIPRIVAN) infusion  0-50 mcg/kg/min IntraVENous TITRATE  sodium chloride (NS) flush 5-40 mL  5-40 mL IntraVENous Q8H  
 sodium chloride (NS) flush 5-40 mL  5-40 mL IntraVENous PRN  
 simethicone (MYLICON) 17MD/9.0NZ oral drops 80 mg  1.2 mL Oral Multiple  fentaNYL citrate (PF) injection  mcg   mcg IntraVENous Q1H PRN  
 0.9% sodium chloride infusion 250 mL  250 mL IntraVENous PRN  pantoprazole (PROTONIX) 40 mg in 0.9% sodium chloride (MBP/ADV) 50 mL  8 mg/hr IntraVENous CONTINUOUS  
 0.9% sodium chloride infusion  6 mL/hr IntraVENous CONTINUOUS  
 calcium carbonate (TUMS) chewable tablet 200 mg [elemental]  200 mg Oral TID PRN  
 aspirin delayed-release tablet 81 mg  81 mg Oral DAILY  sodium chloride (NS) flush 5-40 mL  5-40 mL IntraVENous Q8H  
 sodium chloride (NS) flush 5-40 mL  5-40 mL IntraVENous PRN  
 acetaminophen (TYLENOL) tablet 650 mg  650 mg Oral Q4H PRN  
 ondansetron (ZOFRAN) injection 4 mg  4 mg IntraVENous Q4H PRN  
 lactobac ac& pc-s.therm-b.anim (KERLINE Q/RISAQUAD)  1 Cap Oral DAILY  LORazepam (ATIVAN) injection 1 mg  1 mg IntraVENous Q6H PRN  
 naloxone (NARCAN) injection 0.4 mg  0.4 mg IntraVENous PRN  thiamine HCL (B-1) tablet 100 mg  100 mg Oral DAILY  folic acid (FOLVITE) tablet 1 mg  1 mg Oral DAILY  therapeutic multivitamin (THERAGRAN) tablet 1 Tab  1 Tab Oral DAILY No Known Allergies Objective: 
Vitals:   
Vitals:  
 07/16/19 0344 07/16/19 0400 07/16/19 0500 07/16/19 3168 BP:      
Pulse: (!) 56 (!) 52 (!) 55 Resp: 19 19 18 Temp:  96.1 °F (35.6 °C) SpO2: 100% 100% 100% Weight:    64.8 kg (142 lb 13.7 oz) Height:      
 
Intake and Output: 
07/15 1901 - 07/16 0700 In: 763.1 [I.V.:763.1] Out: 961  
07/14 0701 - 07/15 1900 In: 4008.4 [I.V.:3348.4] Out: 2331 [Drains:375] Physical Examination: 
Pt intubated    Yes General: Unresponsive Neck:  Lines + Resp:  CTA 
CV:  RRR,  no murmur or rub, no LE edema GI:  Soft, NT, + Bowel sounds, no hepatosplenomegaly Neurologic:  Sedated Psych:             Unable to assess  : callejas + 
 
[]    High complexity decision making was performed 
[]    Patient is at high-risk of decompensation with multiple organ involvement Lab Data Personally Reviewed: I have reviewed all the pertinent labs, microbiology data and radiology studies during assessment. Recent Labs  
  07/16/19 
0313 07/15/19 
1545 07/15/19 
0322 07/14/19 
1246 07/14/19 
0350  140 140 141 143  
K 3.8 3.3* 3.5 3.9 4.1  107 105 106 109* CO2 24 24 28 26 26 * 92 123* 156* 175* BUN 21* 28* 39* 57* 75* CREA 2.44* 2.65* 3.00* 3.72* 4.32* CA 8.1* 8.2* 8.6 8.0* 7.6*  
MG 2.3 2.0 1.8 1.7 1.8 PHOS 3.9 1.9* 3.1 3.9 4.0 ALB 3.1* 3.0* 3.2* 3.0* 2.8* SGOT 88* 113* 178* 262* 322* * 135* 172* 220* 236* Recent Labs  
  07/16/19 
0313 07/15/19 
1920 07/15/19 
1211 07/15/19 
0322 07/14/19 
1658 07/14/19 
1246 WBC 20.1* 19.2* 20.9* 24.8*  --  26.4* HGB 7.8* 7.7* 8.1* 8.0* 6.7* 7.1*  
HCT 23.7* 22.6* 23.4* 23.9* 20.2* 20.9*  
* 103* 114* 100*  --  146* No results found for: SDES Lab Results Component Value Date/Time Culture result: NO GROWTH 4 DAYS 07/05/2019 12:25 PM  
 Culture result: NO GROWTH 4 DAYS 07/05/2019 12:25 PM  
 
Recent Results (from the past 24 hour(s)) EKG, 12 LEAD, INITIAL Collection Time: 07/15/19  8:31 AM  
Result Value Ref Range Ventricular Rate 41 BPM  
 Atrial Rate 192 BPM  
 QRS Duration 98 ms Q-T Interval 598 ms QTC Calculation (Bezet) 493 ms Calculated P Axis -90 degrees Calculated R Axis -19 degrees Calculated T Axis -13 degrees Diagnosis Atrial fibrillation Nonspecific ST and T wave abnormality Prolonged QT When compared with ECG of 13-JUL-2019 08:46, No significant change Confirmed by Timoteo Escoto ([id number]) on 7/15/2019 11:42:36 AM 
  
POC G3 - PUL Collection Time: 07/15/19  9:14 AM  
Result Value Ref Range FIO2 (POC) 0.30 % pH (POC) 7.442 7.35 - 7.45    
 pCO2 (POC) 36.0 35.0 - 45.0 MMHG  
 pO2 (POC) 155 (H) 80 - 100 MMHG  
 HCO3 (POC) 24.6 22 - 26 MMOL/L  
 sO2 (POC) 99 (H) 92 - 97 % Base excess (POC) 0 mmol/L Site DRAWN FROM ARTERIAL LINE Device: VENT Mode ASSIST CONTROL Tidal volume 450 ml Set Rate 14 bpm  
 PEEP/CPAP (POC) 5 cmH2O Allens test (POC) N/A Specimen type (POC) ARTERIAL Total resp. rate 20    
CBC WITH AUTOMATED DIFF Collection Time: 07/15/19 12:11 PM  
Result Value Ref Range WBC 20.9 (H) 4.1 - 11.1 K/uL  
 RBC 2.82 (L) 4.10 - 5.70 M/uL HGB 8.1 (L) 12.1 - 17.0 g/dL HCT 23.4 (L) 36.6 - 50.3 % MCV 83.0 80.0 - 99.0 FL  
 MCH 28.7 26.0 - 34.0 PG  
 MCHC 34.6 30.0 - 36.5 g/dL  
 RDW 15.7 (H) 11.5 - 14.5 % PLATELET 201 (L) 008 - 400 K/uL MPV 11.9 8.9 - 12.9 FL  
 NRBC 2.6 (H) 0  WBC ABSOLUTE NRBC 0.55 (H) 0.00 - 0.01 K/uL NEUTROPHILS 72 32 - 75 % LYMPHOCYTES 5 (L) 12 - 49 % MONOCYTES 6 5 - 13 % EOSINOPHILS 15 (H) 0 - 7 % BASOPHILS 0 0 - 1 % IMMATURE GRANULOCYTES 2 (H) 0.0 - 0.5 % ABS. NEUTROPHILS 15.1 (H) 1.8 - 8.0 K/UL  
 ABS. LYMPHOCYTES 1.0 0.8 - 3.5 K/UL  
 ABS. MONOCYTES 1.3 (H) 0.0 - 1.0 K/UL  
 ABS. EOSINOPHILS 3.1 (H) 0.0 - 0.4 K/UL  
 ABS. BASOPHILS 0.0 0.0 - 0.1 K/UL  
 ABS. IMM. GRANS. 0.4 (H) 0.00 - 0.04 K/UL  
 DF SMEAR SCANNED    
 RBC COMMENTS ANISOCYTOSIS 
1+ METABOLIC PANEL, COMPREHENSIVE Collection Time: 07/15/19  3:45 PM  
Result Value Ref Range Sodium 140 136 - 145 mmol/L  Potassium 3.3 (L) 3.5 - 5.1 mmol/L  
 Chloride 107 97 - 108 mmol/L  
 CO2 24 21 - 32 mmol/L Anion gap 9 5 - 15 mmol/L Glucose 92 65 - 100 mg/dL BUN 28 (H) 6 - 20 MG/DL Creatinine 2.65 (H) 0.70 - 1.30 MG/DL  
 BUN/Creatinine ratio 11 (L) 12 - 20 GFR est AA 29 (L) >60 ml/min/1.73m2 GFR est non-AA 24 (L) >60 ml/min/1.73m2 Calcium 8.2 (L) 8.5 - 10.1 MG/DL Bilirubin, total 0.7 0.2 - 1.0 MG/DL  
 ALT (SGPT) 135 (H) 12 - 78 U/L  
 AST (SGOT) 113 (H) 15 - 37 U/L Alk. phosphatase 60 45 - 117 U/L Protein, total 5.0 (L) 6.4 - 8.2 g/dL Albumin 3.0 (L) 3.5 - 5.0 g/dL Globulin 2.0 2.0 - 4.0 g/dL A-G Ratio 1.5 1.1 - 2.2 PHOSPHORUS Collection Time: 07/15/19  3:45 PM  
Result Value Ref Range Phosphorus 1.9 (L) 2.6 - 4.7 MG/DL MAGNESIUM Collection Time: 07/15/19  3:45 PM  
Result Value Ref Range Magnesium 2.0 1.6 - 2.4 mg/dL CBC WITH AUTOMATED DIFF Collection Time: 07/15/19  7:20 PM  
Result Value Ref Range WBC 19.2 (H) 4.1 - 11.1 K/uL  
 RBC 2.70 (L) 4.10 - 5.70 M/uL HGB 7.7 (L) 12.1 - 17.0 g/dL HCT 22.6 (L) 36.6 - 50.3 % MCV 83.7 80.0 - 99.0 FL  
 MCH 28.5 26.0 - 34.0 PG  
 MCHC 34.1 30.0 - 36.5 g/dL  
 RDW 15.7 (H) 11.5 - 14.5 % PLATELET 498 (L) 891 - 400 K/uL MPV 10.9 8.9 - 12.9 FL  
 NRBC 2.5 (H) 0  WBC ABSOLUTE NRBC 0.48 (H) 0.00 - 0.01 K/uL NEUTROPHILS 75 32 - 75 % LYMPHOCYTES 4 (L) 12 - 49 % MONOCYTES 7 5 - 13 % EOSINOPHILS 12 (H) 0 - 7 % BASOPHILS 0 0 - 1 % IMMATURE GRANULOCYTES 2 (H) 0.0 - 0.5 % ABS. NEUTROPHILS 14.4 (H) 1.8 - 8.0 K/UL  
 ABS. LYMPHOCYTES 0.8 0.8 - 3.5 K/UL  
 ABS. MONOCYTES 1.3 (H) 0.0 - 1.0 K/UL  
 ABS. EOSINOPHILS 2.3 (H) 0.0 - 0.4 K/UL  
 ABS. BASOPHILS 0.0 0.0 - 0.1 K/UL  
 ABS. IMM. GRANS. 0.4 (H) 0.00 - 0.04 K/UL  
 DF AUTOMATED    
 RBC COMMENTS ANISOCYTOSIS 1+ 
    
 RBC COMMENTS MICROCYTOSIS 
1+ METABOLIC PANEL, COMPREHENSIVE Collection Time: 07/16/19  3:13 AM  
Result Value Ref Range Sodium 139 136 - 145 mmol/L Potassium 3.8 3.5 - 5.1 mmol/L Chloride 107 97 - 108 mmol/L  
 CO2 24 21 - 32 mmol/L Anion gap 8 5 - 15 mmol/L Glucose 103 (H) 65 - 100 mg/dL BUN 21 (H) 6 - 20 MG/DL Creatinine 2.44 (H) 0.70 - 1.30 MG/DL  
 BUN/Creatinine ratio 9 (L) 12 - 20 GFR est AA 32 (L) >60 ml/min/1.73m2 GFR est non-AA 26 (L) >60 ml/min/1.73m2 Calcium 8.1 (L) 8.5 - 10.1 MG/DL Bilirubin, total 0.7 0.2 - 1.0 MG/DL  
 ALT (SGPT) 124 (H) 12 - 78 U/L  
 AST (SGOT) 88 (H) 15 - 37 U/L Alk. phosphatase 71 45 - 117 U/L Protein, total 5.3 (L) 6.4 - 8.2 g/dL Albumin 3.1 (L) 3.5 - 5.0 g/dL Globulin 2.2 2.0 - 4.0 g/dL A-G Ratio 1.4 1.1 - 2.2 PHOSPHORUS Collection Time: 07/16/19  3:13 AM  
Result Value Ref Range Phosphorus 3.9 2.6 - 4.7 MG/DL MAGNESIUM Collection Time: 07/16/19  3:13 AM  
Result Value Ref Range Magnesium 2.3 1.6 - 2.4 mg/dL CBC WITH AUTOMATED DIFF Collection Time: 07/16/19  3:13 AM  
Result Value Ref Range WBC 20.1 (H) 4.1 - 11.1 K/uL  
 RBC 2.75 (L) 4.10 - 5.70 M/uL HGB 7.8 (L) 12.1 - 17.0 g/dL HCT 23.7 (L) 36.6 - 50.3 % MCV 86.2 80.0 - 99.0 FL  
 MCH 28.4 26.0 - 34.0 PG  
 MCHC 32.9 30.0 - 36.5 g/dL  
 RDW 16.0 (H) 11.5 - 14.5 % PLATELET 034 (L) 611 - 400 K/uL MPV 10.9 8.9 - 12.9 FL  
 NRBC 1.2 (H) 0  WBC ABSOLUTE NRBC 0.25 (H) 0.00 - 0.01 K/uL NEUTROPHILS 76 (H) 32 - 75 % LYMPHOCYTES 7 (L) 12 - 49 % MONOCYTES 3 (L) 5 - 13 % EOSINOPHILS 14 (H) 0 - 7 % BASOPHILS 0 0 - 1 % IMMATURE GRANULOCYTES 0 %  
 ABS. NEUTROPHILS 15.3 (H) 1.8 - 8.0 K/UL  
 ABS. LYMPHOCYTES 1.4 0.8 - 3.5 K/UL  
 ABS. MONOCYTES 0.6 0.0 - 1.0 K/UL  
 ABS. EOSINOPHILS 2.8 (H) 0.0 - 0.4 K/UL  
 ABS. BASOPHILS 0.0 0.0 - 0.1 K/UL  
 ABS. IMM. GRANS. 0.0 K/UL  
 DF MANUAL    
 RBC COMMENTS ANISOCYTOSIS 1+ 
    
 RBC COMMENTS OVALOCYTES PRESENT 
    
 RBC COMMENTS POLYCHROMASIA PRESENT 
    
 
 
 
 
 Total time spent with patient:  xxx   min. Care Plan discussed with: 
Patient Family RN Consulting Physician [facility],      
 
I have reviewed the flowsheets. Chart and Pertinent Notes have been reviewed. No change in PMH ,family and social history from Consult note.  
 
 
Rose Nunn MD

## 2019-07-16 NOTE — PROGRESS NOTES
Hospitalist Progress Note Alan Romero MD 
Answering service: [phone] OR [phone] from in house phone Cell: [phone] Date of Service:  2019 NAME:  Dale Hernandez :  1945 MRN:  [id number] Admission Summary: This is a 31-year-old man with a past medical history significant for hypertension, who was in his usual state of health until about a week ago when the patient developed abdominal pain. The pain is located at the epigastric region, 8/10 in severity. No radiation. No known aggravating or relieving factors. The patient described the pain as a dull ache associated with constipation but no nausea, no vomiting. The patient took laxative without any significant relief of the constipation. The patient was brought to the emergency room for further evaluation. When the EMS arrived at the scene, the patient's oxygen saturation was 86% on room air. The oxygen saturation improved to 97% when the patient was started on 2 liters of nasal cannula. The patient denies COPD, congestive heart failure. No heart disease. When the patient arrived at the emergency room, he was found to have elevated BNP level. CT scan of the abdomen and pelvis performed by the emergency room provider shows right pleural effusion and suspected small bowel obstruction. The patient was subsequently referred to the hospitalist service for evaluation for admission. The patient denies fever. No rigors. No chills. No record of prior admission to this hospital.  The patient has not seen his primary care physician for a while; according to him, he does not like going to the doctor. 
  
Interval history / Subjective:  
 
Patient intubated and sedated Assessment & Plan:  
 
Acute severe GI blood loss anemia due to duodenal ulcer  
-GI consulted and s/p EGD clips, IR consulted and s/p embolization  
-s/p total of 6 units pRBC, Hgb 7.6 -continue protonix gtt 
-monitor H/H Hemorrhagic shock due to GI blood loss 
-s/p 4 units pRBC transfusion, on dopamine and levophed 
-BP normal, continue serial BP monitoring Bradycardia 
-HR 30, s/p pacemaker on 7/16, wean off dobutamin 
-cardiologist on board KAYLEEN on CKD stage III 
-creatinine improving 
-nephrology on board, on CVVH 
-avoid nephrotoxin, monitor renal function Severe lactic acidosis due to shock 
-resolved Acute hypoxic respiratory failure multifactorial, possible pneumonia 
-s/p intubated, on vent , duo neb 
-CTA chest no evidence of acute pulmonary embolus. Bilateral airspace disease may represent atelectasis or pneumonia. Large right pleural effusion. Early versus partial 
small bowel obstruction secondary to umbilical hernia. Small ascites. -on IV zosyn 
-pulmonologist on board Pulmonary HTN 
- PaPressure = 79 on TTE  
- echo left and right heart failure EF 45-50, mod-severe TR with pulm - Cardiology on board s/p LHC: clear arteries, RHC 
- Pulmonary following, running tests to evaluate for possible cause for pHTN 
  
Partial SBO 
-KUB on 7/15 stable nonobstructive bowel gas pattern. NG tube satisfactory position. 
-start NGT feeding at slow rat Bilateral lower legs swelling 
-doppler study negative for DVT Reported hx of alcohol abuse 
-on propofol, on folic acid, thiamin, mvi, CIWA protocol Full Code; at risk for decompensation Code status: Full Code DVT prophylaxis: SCD Care Plan discussed with: Patient/Family and Nurse Disposition: TBD Hospital Problems  Date Reviewed: 7/5/2019 Codes Class Noted POA * (Principal) Acute CHF (congestive heart failure) (HCC) ICD-10-CM: I50.9 ICD-9-CM: 428.0  7/5/2019 Yes Pleural effusion, right ICD-10-CM: J90 ICD-9-CM: 511.9  7/5/2019 Unknown Vital Signs:  
 Last 24hrs VS reviewed since prior progress note. Most recent are: 
Visit Vitals /51 (BP 1 Location: Left arm, BP Patient Position: At rest) Pulse 69 Temp 97.4 °F (36.3 °C) Resp 17 Ht 5' 8\" (1.727 m) Wt 64.8 kg (142 lb 13.7 oz) SpO2 100% BMI 21.72 kg/m² Intake/Output Summary (Last 24 hours) at 7/16/2019 1555 Last data filed at 7/16/2019 1500 Gross per 24 hour Intake 1568.49 ml Output 1740 ml Net -171.51 ml Physical Examination:  
 
 
     
Constitutional:  Intubated, on Ventilator ENT:  Oral mucous moist, oropharynx benign. Neck supple, Resp:  CTA bilaterally. No wheezing/rhonchi/rales. No accessory muscle use CV:  Regular rhythm, normal rate, no murmurs, gallops, rubs GI:  Soft, non distended, non tender. normoactive bowel sounds, no hepatosplenomegaly Musculoskeletal:  No edema Neurologic:  Sedated Skin:  Good turgor, no rashes or ulcers Data Review:  
 Review and/or order of clinical lab test 
Review and/or order of tests in the radiology section of CPT Review and/or order of tests in the medicine section of CPT Labs:  
 
Recent Labs  
  07/16/19 
1400 07/16/19 0313 WBC 17.1* 20.1* HGB 7.6* 7.8* HCT 23.1* 23.7*  
* 110* Recent Labs  
  07/16/19 0313 07/15/19 
1545 07/15/19 
0322  140 140  
K 3.8 3.3* 3.5  107 105 CO2 24 24 28 BUN 21* 28* 39* CREA 2.44* 2.65* 3.00* * 92 123* CA 8.1* 8.2* 8.6 MG 2.3 2.0 1.8 PHOS 3.9 1.9* 3.1 Recent Labs  
  07/16/19 0313 07/15/19 
1545 07/15/19 
0322 SGOT 88* 113* 178* * 135* 172* AP 71 60 57 TBILI 0.7 0.7 0.8 TP 5.3* 5.0* 5.2* ALB 3.1* 3.0* 3.2*  
GLOB 2.2 2.0 2.0 No results for input(s): INR, PTP, APTT in the last 72 hours. No lab exists for component: INREXT No results for input(s): FE, TIBC, PSAT, FERR in the last 72 hours. No results found for: FOL, RBCF No results for input(s): PH, PCO2, PO2 in the last 72 hours. Recent Labs  
  07/14/19 
1246 07/13/19 
2238 TROIQ 0.73* 0.28* Lab Results Component Value Date/Time Cholesterol, total 134 07/06/2019 03:50 AM  
 HDL Cholesterol 56 07/06/2019 03:50 AM  
 LDL, calculated 68 07/06/2019 03:50 AM  
 Triglyceride 50 07/06/2019 03:50 AM  
 CHOL/HDL Ratio 2.4 07/06/2019 03:50 AM  
 
Lab Results Component Value Date/Time Glucose (POC) 175 (H) 07/11/2019 07:56 PM  
 Glucose (POC) 87 07/05/2019 12:02 PM  
 Glucose (POC) 101 (H) 07/05/2019 08:47 AM  
 
No results found for: COLOR, APPRN, SPGRU, REFSG, ANGELI, PROTU, GLUCU, KETU, BILU, UROU, TREVOR, LEUKU, GLUKE, EPSU, BACTU, WBCU, RBCU, CASTS, UCRY Medications Reviewed:  
 
Current Facility-Administered Medications Medication Dose Route Frequency  sodium chloride (NS) flush 5-40 mL  5-40 mL IntraVENous Q8H  
 sodium chloride (NS) flush 5-40 mL  5-40 mL IntraVENous PRN  
 bicarbonate dialysis (PRISMASOL) BG K 4/Ca 2.5 5000 ml solution   Extracorporeal DIALYSIS CONTINUOUS  
 balsam peru-castor oil (VENELEX) ointment   Topical BID  piperacillin-tazobactam (ZOSYN) 3.375 g in 0.9% sodium chloride (MBP/ADV) 100 mL  3.375 g IntraVENous Q8H  
 0.9% sodium chloride infusion  10 mL/hr IntraVENous CONTINUOUS  chlorhexidine (PERIDEX) 0.12 % mouthwash 15 mL  15 mL Oral BID  albuterol-ipratropium (DUO-NEB) 2.5 MG-0.5 MG/3 ML  3 mL Nebulization Q6H RT  
 DOPamine (INTROPIN) 800 mg in dextrose 5% 250 mL infusion  0-20 mcg/kg/min IntraVENous TITRATE  PHENYLephrine (ARTURO-SYNEPHRINE) 100 mg in 0.9% sodium chloride 250 mL infusion   mcg/min IntraVENous TITRATE  
 NOREPINephrine (LEVOPHED) 32 mg in dextrose 5% 250 mL infusion  2-30 mcg/min IntraVENous TITRATE  vasopressin (VASOSTRICT) 20 Units in 0.9% sodium chloride 100 mL infusion  0-0.04 Units/min IntraVENous TITRATE  
 0.9% sodium chloride infusion 250 mL  250 mL IntraVENous PRN  
  propofol (DIPRIVAN) infusion  0-50 mcg/kg/min IntraVENous TITRATE  sodium chloride (NS) flush 5-40 mL  5-40 mL IntraVENous Q8H  
 sodium chloride (NS) flush 5-40 mL  5-40 mL IntraVENous PRN  
 simethicone (MYLICON) 07YS/4.5QM oral drops 80 mg  1.2 mL Oral Multiple  fentaNYL citrate (PF) injection  mcg   mcg IntraVENous Q1H PRN  
 0.9% sodium chloride infusion 250 mL  250 mL IntraVENous PRN  pantoprazole (PROTONIX) 40 mg in 0.9% sodium chloride (MBP/ADV) 50 mL  8 mg/hr IntraVENous CONTINUOUS  
 0.9% sodium chloride infusion  6 mL/hr IntraVENous CONTINUOUS  
 calcium carbonate (TUMS) chewable tablet 200 mg [elemental]  200 mg Oral TID PRN  
 aspirin delayed-release tablet 81 mg  81 mg Oral DAILY  sodium chloride (NS) flush 5-40 mL  5-40 mL IntraVENous Q8H  
 sodium chloride (NS) flush 5-40 mL  5-40 mL IntraVENous PRN  
 acetaminophen (TYLENOL) tablet 650 mg  650 mg Oral Q4H PRN  
 ondansetron (ZOFRAN) injection 4 mg  4 mg IntraVENous Q4H PRN  
 lactobac ac& pc-s.therm-b.anim (KERLINE Q/RISAQUAD)  1 Cap Oral DAILY  LORazepam (ATIVAN) injection 1 mg  1 mg IntraVENous Q6H PRN  
 naloxone (NARCAN) injection 0.4 mg  0.4 mg IntraVENous PRN  thiamine HCL (B-1) tablet 100 mg  100 mg Oral DAILY  folic acid (FOLVITE) tablet 1 mg  1 mg Oral DAILY  therapeutic multivitamin (THERAGRAN) tablet 1 Tab  1 Tab Oral DAILY  
 
______________________________________________________________________ EXPECTED LENGTH OF STAY: 4d 2h 
ACTUAL LENGTH OF STAY:          Ronan Sorto MD

## 2019-07-16 NOTE — PROGRESS NOTES
[facility] Pulmonary, Critical Care, and Sleep Medicine Progress note Name: Miguel Angel Sifuentes MRN: [id number] : 1945 Hospital: [facility]  Date: 2019 IMPRESSION:  
· Multisystem organ failure · Hemorrhagic shock · Acute severe blood loss anemia · KAYLEEN with severe metabolic acidosis · Duodenal ulcer- S/P clipping, IR embolization planned · Acute resp failure with failure to meet MV demands · PHTN- by ECHO PASP 79 EF 50% no AV/MV disease, but RHC data (below) a bit better. Portopulmonary HTN- ETOH abuse and hypoalbuminemic ? Occult cirrhosis · S/p LHC/RCH 7/10: RV 60/4, PA 61/15 mean 29, /7, /48, RA mean 4, PCW mean 7, CO 2.85-- echo reviewed: biatrial dilatation, left to right blowing of interatrial septum. Cath findings show precapillary PH after diuresis, likely mixed picture on presentation (combined pre and post capillary PH-- filling pressures normalized after diuresis) · HTN 
· Right effusion-exudate- ? From [facility] or other DDX is hepatic hydrothorax - cultures and cytology negative · SBO- conservative MGMT 
· Leukocytosis RECOMMENDATIONS:  
· VACV- settings adjusted-- SBT after pacemaker placement today · Empiric zosyn · Supportive transfusions - none in last day · Pressors- reviewed · On dopamine- helping with inotropic support: should be weaned off last - for PPM placement  · On CRRT 
· Sedation reviewed · On protonix infusion · No anticoagulation · Vent bundle · Elective [facility] work up if and when acute issues resolve · GI and nephrology following · Critically ill, at high risk for decline and death. CCT 30'. D/W  RN Subjective:  
 
 On vent 30% FiO2 No active bleeding over night Remains bradycardic on dopamine - for PPM today CXR clear May be able to try SAT / SBT after PPM placement 7/15 On vent 30% FiO2 Received 2 units prbc's over night On vasopressors Bradycardic On CVVH 
 
 Seen and examined Still on NE and PE Off vasopressin Hb stable Leukocytosis noted On CRRT On dopamine for bradycardia- on 3 mcg 7/13 Seen and examined earlier today. Transferred to CVICU after rapid response for SOB. Intubated soon after. Found in refractory shock with massive GI bleed secondary to duodenal ulcer. Pressors adjusted at bedside. PH buffered with supplemental bicarb for severe metabolic acidosis. Receiving large amounts of blood products. Fluid resuscitation ongoing. S/O clipping of visible vessel by GI but at high risk for re-bleed and IR embolization is planned. Worsening renal failure with ATN and recent contrast- CRRT is planned. 7/12 He feels well today. No acute complaints 7/11 States that his breathing feels much better, wife at bedside tells me that he looks better and much more comfortable than previously. CXR much improved. 7/10 
stable 7/9 This patient has been seen and evaluated at the request of Dr. Lilia Caba for PHTN. Patient is a 72 y.o. male h/o ETOH abuse presents with 6 months progressive Lozano and presented with large right effusion- tapped- exudative. Negative micro. Thoracic placed pigtail and now out. ECHO with severe PHTN. Pt alert denies h/o PE, CTD, no h/o lung disease. Past Medical History:  
Diagnosis Date  Arthritic-like pain  Hypertension Past Surgical History:  
Procedure Laterality Date  IR INSERT NON TUNL CVC OVER 5 YRS  7/13/2019  IR OCCL TXCATH HEMMORAGE W SI  7/13/2019 Prior to Admission medications Medication Sig Start Date End Date Taking? Authorizing Provider  
therapeutic multivitamin [facility]) tablet Take 1 Tab by mouth daily. Yes Provider, Historical  
ergocalciferol (VITAMIN D2) 50,000 unit capsule Take 50,000 Units by mouth every Sunday. Yes Provider, Historical  
aspirin delayed-release 81 mg tablet Take  by mouth daily. Yes Provider, Historical  
 
No Known Allergies Social History Tobacco Use  Smoking status: Former Smoker Start date: 1966 Last attempt to quit: 1984 Years since quittin.6  Smokeless tobacco: Never Used Substance Use Topics  Alcohol use: Yes Alcohol/week: 7.0 oz Types: 14 drink(s) per week Family History Problem Relation Age of Onset  Diabetes Mother  Hypertension Mother  Heart Disease Mother  Asthma Mother [facility] Arthritis-osteo Mother  Diabetes Father  Hypertension Father  Asthma Father  Arthritis-osteo Father  Diabetes Sister  Gout Sister  Asthma Brother Current Facility-Administered Medications Medication Dose Route Frequency  bicarbonate dialysis (PRISMASOL) BG K 4/Ca 2.5 5000 ml solution   Extracorporeal DIALYSIS CONTINUOUS  
 balsam peru-castor oil (VENELEX) ointment   Topical BID  piperacillin-tazobactam (ZOSYN) 3.375 g in 0.9% sodium chloride (MBP/ADV) 100 mL  3.375 g IntraVENous Q8H  
 0.9% sodium chloride infusion  10 mL/hr IntraVENous CONTINUOUS  chlorhexidine (PERIDEX) 0.12 % mouthwash 15 mL  15 mL Oral BID  albuterol-ipratropium (DUO-NEB) 2.5 MG-0.5 MG/3 ML  3 mL Nebulization Q6H RT  
 DOPamine (INTROPIN) 800 mg in dextrose 5% 250 mL infusion  0-20 mcg/kg/min IntraVENous TITRATE  PHENYLephrine (ARTURO-SYNEPHRINE) 100 mg in 0.9% sodium chloride 250 mL infusion   mcg/min IntraVENous TITRATE  
 NOREPINephrine (LEVOPHED) 32 mg in dextrose 5% 250 mL infusion  2-30 mcg/min IntraVENous TITRATE  vasopressin (VASOSTRICT) 20 Units in 0.9% sodium chloride 100 mL infusion  0-0.04 Units/min IntraVENous TITRATE  propofol (DIPRIVAN) infusion  0-50 mcg/kg/min IntraVENous TITRATE  sodium chloride (NS) flush 5-40 mL  5-40 mL IntraVENous Q8H  
 pantoprazole (PROTONIX) 40 mg in 0.9% sodium chloride (MBP/ADV) 50 mL  8 mg/hr IntraVENous CONTINUOUS  
 0.9% sodium chloride infusion  6 mL/hr IntraVENous CONTINUOUS  
  aspirin delayed-release tablet 81 mg  81 mg Oral DAILY  sodium chloride (NS) flush 5-40 mL  5-40 mL IntraVENous Q8H  
 lactobac ac& pc-s.therm-b.anim (KERLINE Q/RISAQUAD)  1 Cap Oral DAILY  thiamine HCL (B-1) tablet 100 mg  100 mg Oral DAILY  folic acid (FOLVITE) tablet 1 mg  1 mg Oral DAILY  therapeutic multivitamin (THERAGRAN) tablet 1 Tab  1 Tab Oral DAILY Review of Systems: A comprehensive review of systems was negative except for that written in the HPI. Objective:  
Vital Signs:   
Visit Vitals BP (!) 132/34 Pulse (!) 57 Temp 96.5 °F (35.8 °C) Comment: scott reyes on Resp 19 Ht 5' 8\" (1.727 m) Wt 64.8 kg (142 lb 13.7 oz) SpO2 100% BMI 21.72 kg/m² O2 Device: Endotracheal tube O2 Flow Rate (L/min): 2 l/min Temp (24hrs), Av.2 °F (36.2 °C), Min:96.1 °F (35.6 °C), Max:98 °F (36.7 °C) Intake/Output:  
Last shift:       0701 -  1900 In: 188.4 [I.V.:188.4] Out: 125 Last 3 shifts:  190 -  0700 In: 3071.9 [I.V.:2411.9] Out: 8338 [Drains:375] Intake/Output Summary (Last 24 hours) at 2019 1014 Last data filed at 2019 1000 Gross per 24 hour Intake 1625.89 ml Output 2008 ml Net -382.11 ml Physical Exam:  
General:  Intubated, appears stated age. Head:  Normocephalic, without obvious abnormality, atraumatic. Eyes:  Conjunctivae/corneas - pale Nose: Nares normal. Septum midline Neck: Supple, symmetrical, trachea midline Lungs:   Clear to auscultation bilaterally. Heart:  Regular rate and rhythm, S1, S2 normal, no murmur, click, rub or gallop. Abdomen:   Distended Extremities: Extremities normal, atraumatic, no cyanosis or edema. Pulses: 2+ and symmetric all extremities. Skin: Skin color, texture, turgor normal. No rashes or lesions Data review:  
 
Recent Results (from the past 24 hour(s)) CBC WITH AUTOMATED DIFF  Collection Time: 07/15/19 12:11 PM  
 Result Value Ref Range WBC 20.9 (H) 4.1 - 11.1 K/uL  
 RBC 2.82 (L) 4.10 - 5.70 M/uL HGB 8.1 (L) 12.1 - 17.0 g/dL HCT 23.4 (L) 36.6 - 50.3 % MCV 83.0 80.0 - 99.0 FL  
 MCH 28.7 26.0 - 34.0 PG  
 MCHC 34.6 30.0 - 36.5 g/dL  
 RDW 15.7 (H) 11.5 - 14.5 % PLATELET 553 (L) 905 - 400 K/uL MPV 11.9 8.9 - 12.9 FL  
 NRBC 2.6 (H) 0  WBC ABSOLUTE NRBC 0.55 (H) 0.00 - 0.01 K/uL NEUTROPHILS 72 32 - 75 % LYMPHOCYTES 5 (L) 12 - 49 % MONOCYTES 6 5 - 13 % EOSINOPHILS 15 (H) 0 - 7 % BASOPHILS 0 0 - 1 % IMMATURE GRANULOCYTES 2 (H) 0.0 - 0.5 % ABS. NEUTROPHILS 15.1 (H) 1.8 - 8.0 K/UL  
 ABS. LYMPHOCYTES 1.0 0.8 - 3.5 K/UL  
 ABS. MONOCYTES 1.3 (H) 0.0 - 1.0 K/UL  
 ABS. EOSINOPHILS 3.1 (H) 0.0 - 0.4 K/UL  
 ABS. BASOPHILS 0.0 0.0 - 0.1 K/UL  
 ABS. IMM. GRANS. 0.4 (H) 0.00 - 0.04 K/UL  
 DF SMEAR SCANNED    
 RBC COMMENTS ANISOCYTOSIS 
1+ METABOLIC PANEL, COMPREHENSIVE Collection Time: 07/15/19  3:45 PM  
Result Value Ref Range Sodium 140 136 - 145 mmol/L Potassium 3.3 (L) 3.5 - 5.1 mmol/L Chloride 107 97 - 108 mmol/L  
 CO2 24 21 - 32 mmol/L Anion gap 9 5 - 15 mmol/L Glucose 92 65 - 100 mg/dL BUN 28 (H) 6 - 20 MG/DL Creatinine 2.65 (H) 0.70 - 1.30 MG/DL  
 BUN/Creatinine ratio 11 (L) 12 - 20 GFR est AA 29 (L) >60 ml/min/1.73m2 GFR est non-AA 24 (L) >60 ml/min/1.73m2 Calcium 8.2 (L) 8.5 - 10.1 MG/DL Bilirubin, total 0.7 0.2 - 1.0 MG/DL  
 ALT (SGPT) 135 (H) 12 - 78 U/L  
 AST (SGOT) 113 (H) 15 - 37 U/L Alk. phosphatase 60 45 - 117 U/L Protein, total 5.0 (L) 6.4 - 8.2 g/dL Albumin 3.0 (L) 3.5 - 5.0 g/dL Globulin 2.0 2.0 - 4.0 g/dL A-G Ratio 1.5 1.1 - 2.2 PHOSPHORUS Collection Time: 07/15/19  3:45 PM  
Result Value Ref Range Phosphorus 1.9 (L) 2.6 - 4.7 MG/DL MAGNESIUM Collection Time: 07/15/19  3:45 PM  
Result Value Ref Range Magnesium 2.0 1.6 - 2.4 mg/dL CBC WITH AUTOMATED DIFF  
 Collection Time: 07/15/19  7:20 PM  
Result Value Ref Range WBC 19.2 (H) 4.1 - 11.1 K/uL  
 RBC 2.70 (L) 4.10 - 5.70 M/uL HGB 7.7 (L) 12.1 - 17.0 g/dL HCT 22.6 (L) 36.6 - 50.3 % MCV 83.7 80.0 - 99.0 FL  
 MCH 28.5 26.0 - 34.0 PG  
 MCHC 34.1 30.0 - 36.5 g/dL  
 RDW 15.7 (H) 11.5 - 14.5 % PLATELET 304 (L) 722 - 400 K/uL MPV 10.9 8.9 - 12.9 FL  
 NRBC 2.5 (H) 0  WBC ABSOLUTE NRBC 0.48 (H) 0.00 - 0.01 K/uL NEUTROPHILS 75 32 - 75 % LYMPHOCYTES 4 (L) 12 - 49 % MONOCYTES 7 5 - 13 % EOSINOPHILS 12 (H) 0 - 7 % BASOPHILS 0 0 - 1 % IMMATURE GRANULOCYTES 2 (H) 0.0 - 0.5 % ABS. NEUTROPHILS 14.4 (H) 1.8 - 8.0 K/UL  
 ABS. LYMPHOCYTES 0.8 0.8 - 3.5 K/UL  
 ABS. MONOCYTES 1.3 (H) 0.0 - 1.0 K/UL  
 ABS. EOSINOPHILS 2.3 (H) 0.0 - 0.4 K/UL  
 ABS. BASOPHILS 0.0 0.0 - 0.1 K/UL  
 ABS. IMM. GRANS. 0.4 (H) 0.00 - 0.04 K/UL  
 DF AUTOMATED    
 RBC COMMENTS ANISOCYTOSIS 1+ 
    
 RBC COMMENTS MICROCYTOSIS 
1+ METABOLIC PANEL, COMPREHENSIVE Collection Time: 07/16/19  3:13 AM  
Result Value Ref Range Sodium 139 136 - 145 mmol/L Potassium 3.8 3.5 - 5.1 mmol/L Chloride 107 97 - 108 mmol/L  
 CO2 24 21 - 32 mmol/L Anion gap 8 5 - 15 mmol/L Glucose 103 (H) 65 - 100 mg/dL BUN 21 (H) 6 - 20 MG/DL Creatinine 2.44 (H) 0.70 - 1.30 MG/DL  
 BUN/Creatinine ratio 9 (L) 12 - 20 GFR est AA 32 (L) >60 ml/min/1.73m2 GFR est non-AA 26 (L) >60 ml/min/1.73m2 Calcium 8.1 (L) 8.5 - 10.1 MG/DL Bilirubin, total 0.7 0.2 - 1.0 MG/DL  
 ALT (SGPT) 124 (H) 12 - 78 U/L  
 AST (SGOT) 88 (H) 15 - 37 U/L Alk. phosphatase 71 45 - 117 U/L Protein, total 5.3 (L) 6.4 - 8.2 g/dL Albumin 3.1 (L) 3.5 - 5.0 g/dL Globulin 2.2 2.0 - 4.0 g/dL A-G Ratio 1.4 1.1 - 2.2 PHOSPHORUS Collection Time: 07/16/19  3:13 AM  
Result Value Ref Range Phosphorus 3.9 2.6 - 4.7 MG/DL MAGNESIUM Collection Time: 07/16/19  3:13 AM  
Result Value Ref Range Magnesium 2.3 1.6 - 2.4 mg/dL CBC WITH AUTOMATED DIFF Collection Time: 07/16/19  3:13 AM  
Result Value Ref Range WBC 20.1 (H) 4.1 - 11.1 K/uL  
 RBC 2.75 (L) 4.10 - 5.70 M/uL HGB 7.8 (L) 12.1 - 17.0 g/dL HCT 23.7 (L) 36.6 - 50.3 % MCV 86.2 80.0 - 99.0 FL  
 MCH 28.4 26.0 - 34.0 PG  
 MCHC 32.9 30.0 - 36.5 g/dL  
 RDW 16.0 (H) 11.5 - 14.5 % PLATELET 230 (L) 259 - 400 K/uL MPV 10.9 8.9 - 12.9 FL  
 NRBC 1.2 (H) 0  WBC ABSOLUTE NRBC 0.25 (H) 0.00 - 0.01 K/uL NEUTROPHILS 76 (H) 32 - 75 % LYMPHOCYTES 7 (L) 12 - 49 % MONOCYTES 3 (L) 5 - 13 % EOSINOPHILS 14 (H) 0 - 7 % BASOPHILS 0 0 - 1 % IMMATURE GRANULOCYTES 0 %  
 ABS. NEUTROPHILS 15.3 (H) 1.8 - 8.0 K/UL  
 ABS. LYMPHOCYTES 1.4 0.8 - 3.5 K/UL  
 ABS. MONOCYTES 0.6 0.0 - 1.0 K/UL  
 ABS. EOSINOPHILS 2.8 (H) 0.0 - 0.4 K/UL  
 ABS. BASOPHILS 0.0 0.0 - 0.1 K/UL  
 ABS. IMM. GRANS. 0.0 K/UL  
 DF MANUAL    
 RBC COMMENTS ANISOCYTOSIS 1+ 
    
 RBC COMMENTS OVALOCYTES PRESENT 
    
 RBC COMMENTS POLYCHROMASIA PRESENT 
    
POC G3 - PUL Collection Time: 07/16/19  8:37 AM  
Result Value Ref Range FIO2 (POC) 30 % pH (POC) 7.360 7.35 - 7.45    
 pCO2 (POC) 38.8 35.0 - 45.0 MMHG  
 pO2 (POC) 165 (H) 80 - 100 MMHG  
 HCO3 (POC) 21.9 (L) 22 - 26 MMOL/L  
 sO2 (POC) 99 (H) 92 - 97 % Base deficit (POC) 4 mmol/L Site DRAWN FROM ARTERIAL LINE Device: VENT Mode ASSIST CONTROL Tidal volume 500 ml Set Rate 14 bpm  
 PEEP/CPAP (POC) 5 cmH2O Allens test (POC) N/A Specimen type (POC) ARTERIAL Total resp. rate 14 Imaging: 
I have personally reviewed the patients radiographs and have reviewed the reports: 
7/5 CT chest moderate to large layering right effusion and RLL ATX LLL ASD no ILD changes no masses- main PA large Viraj Avendano MD

## 2019-07-17 NOTE — PROGRESS NOTES
Physical Therapy  7/17/2019    Chart reviewed. Patient remains on ventilator support, CRRT, multiple pressors, sedated, and s/p PPM.     Patient is not medically stable for skilled therapy interventions at this time. Will continue to follow peripherally. Thank you.   Jyoti Kay, PT, DPT      Recommendation for Nursing: Patient to complete as able in order to maintain strength, endurance, and independence:   - Bed in modified chair position with foot board on 3x/day 30-60 mins max each  - Passive ROM to B UEs and LEs during bathing to prevent contractures  - Positioning to prevent edema and contractures

## 2019-07-17 NOTE — PROGRESS NOTES
Hospitalist Progress Note  Cherelle Shabazz MD  Answering service: [phone] from in house phone  Cell: [phone]      Date of Service:  2019  NAME:  Lisa Collins  :  1945  MRN:  [id number]      Admission Summary: This is a 81-year-old man with a past medical history significant for hypertension, who was in his usual state of health until about a week ago when the patient developed abdominal pain. The pain is located at the epigastric region, 8/10 in severity. No radiation. No known aggravating or relieving factors. The patient described the pain as a dull ache associated with constipation but no nausea, no vomiting. The patient took laxative without any significant relief of the constipation. The patient was brought to the emergency room for further evaluation. When the EMS arrived at the scene, the patient's oxygen saturation was 86% on room air. The oxygen saturation improved to 97% when the patient was started on 2 liters of nasal cannula. The patient denies COPD, congestive heart failure. No heart disease. When the patient arrived at the emergency room, he was found to have elevated BNP level. CT scan of the abdomen and pelvis performed by the emergency room provider shows right pleural effusion and suspected small bowel obstruction. The patient was subsequently referred to the hospitalist service for evaluation for admission. The patient denies fever. No rigors. No chills.   No record of prior admission to this hospital.  The patient has not seen his primary care physician for a while; according to him, he does not like going to the doctor.     Interval history / Subjective:     Patient intubated and sedated     Assessment & Plan:     Acute severe GI blood loss anemia due to duodenal ulcer   -GI consulted and s/p EGD on  duodenal ulcer with bleeding s/p clips, IR consulted and s/p embolization -s/p total of 6 units pRBC, Hgb 7.4 low to transfuse one unit pRBC on 7/17  -continue protonix gtt  -monitor H/H    Hemorrhagic shock due to acute GI blood loss  -s/p 6 units pRBC transfusion, on levophed, off dopamine   -BP normal, continue serial BP monitoring    Bradycardia  -HR 30, s/p pacemaker on 7/16, weaned off dobutamin  -cardiologist on board    KAYLEEN on CKD stage III  -creatinine improving  -nephrology on board, on CVVH  -avoid nephrotoxin, monitor renal function     Severe lactic acidosis due to shock  -resolved    Acute hypoxic respiratory failure multifactorial, possible pneumonia  -s/p intubated, on vent , duo neb  -CTA chest no evidence of acute pulmonary embolus. Bilateral airspace disease may represent atelectasis or pneumonia. Large right pleural effusion. Early versus partial  small bowel obstruction secondary to umbilical hernia. Small ascites. -on IV zosyn  -pulmonologist on board    Pulmonary HTN  - PaPressure = 79 on TTE   - echo left and right heart failure EF 45-50, mod-severe TR with pulm   - Cardiology on board s/p LHC: clear arteries, RHC  - Pulmonary following, running tests to evaluate for possible cause for pHTN     Partial SBO  -KUB on 7/15 stable nonobstructive bowel gas pattern. NG tube satisfactory position.  abdomen is soft  -started NGT feeding 25 ml/hr  -nutritionist on board    Bilateral lower legs swelling  -doppler study negative for DVT     Reported hx of alcohol abuse  -on precedex, folic acid, thiamin, mvi, CIWA protocol      Full Code; at risk for decompensation       Code status: Full Code  DVT prophylaxis: SCD    Care Plan discussed with: Patient/Family and Nurse  Disposition: TBD     Hospital Problems  Date Reviewed: 7/5/2019          Codes Class Noted POA    * (Principal) Acute CHF (congestive heart failure) ([facility] [address] ICD-10-CM: I50.9  ICD-9-CM: 428.0  7/5/2019 Yes        Pleural effusion, right ICD-10-CM: J90  ICD-9-CM: 511.9  7/5/2019 Unknown              Vital Signs: Last 24hrs VS reviewed since prior progress note. Most recent are:  Visit Vitals  BP 92/48 (BP 1 Location: Left arm, BP Patient Position: At rest)   Pulse 70   Temp 98.1 °F (36.7 °C)   Resp 17   Ht 5' 8\" (1.727 m)   Wt 61.5 kg (135 lb 9.3 oz)   SpO2 100%   BMI 20.62 kg/m²         Intake/Output Summary (Last 24 hours) at 7/17/2019 1240  Last data filed at 7/17/2019 1200  Gross per 24 hour   Intake 1458.43 ml   Output 1923 ml   Net -464.57 ml        Physical Examination:             Constitutional:  Intubated, on Ventilator    ENT:  Oral mucous moist, oropharynx benign. Neck supple,    Resp:  CTA bilaterally. No wheezing/rhonchi/rales. No accessory muscle use   CV:  Regular rhythm, normal rate, no murmurs, gallops, rubs    GI:  Soft, non distended, non tender. normoactive bowel sounds, no hepatosplenomegaly     Musculoskeletal:  No edema    Neurologic:  Sedated     Skin:  Good turgor, no rashes or ulcers       Data Review:    Review and/or order of clinical lab test  Review and/or order of tests in the radiology section of CPT  Review and/or order of tests in the medicine section of CPT      Labs:     Recent Labs     07/17/19 0312 07/16/19 1914   WBC 17.2* 15.9*   HGB 7.4* 7.1*   HCT 22.8* 21.9*   * 119*     Recent Labs     07/17/19 0312 07/16/19  1608 07/16/19 0313    140 139   K 4.2 4.0 3.8    107 107   CO2 24 25 24   BUN 18 21* 21*   CREA 2.61* 2.98* 2.44*   GLU 85 86 103*   CA 7.9* 8.0* 8.1*   MG 2.5* 2.4 2.3   PHOS 3.3 3.5 3.9     Recent Labs     07/17/19 0312 07/16/19  1608 07/16/19 0313   SGOT 61* 72* 88*   ALT 93* 105* 124*   AP 80 75 71   TBILI 0.7 0.6 0.7   TP 5.4* 5.3* 5.3*   ALB 2.7* 2.9* 3.1*   GLOB 2.7 2.4 2.2     No results for input(s): INR, PTP, APTT in the last 72 hours. No lab exists for component: INREXT, INREXT   No results for input(s): FE, TIBC, PSAT, FERR in the last 72 hours.    No results found for: FOL, RBCF   No results for input(s): PH, PCO2, PO2 in the last 72 hours.   Recent Labs     07/14/19  1246   TROIQ 0.73*     Lab Results   Component Value Date/Time    Cholesterol, total 134 07/06/2019 03:50 AM    HDL Cholesterol 56 07/06/2019 03:50 AM    LDL, calculated 68 07/06/2019 03:50 AM    Triglyceride 50 07/06/2019 03:50 AM    CHOL/HDL Ratio 2.4 07/06/2019 03:50 AM     Lab Results   Component Value Date/Time    Glucose (POC) 175 (H) 07/11/2019 07:56 PM    Glucose (POC) 87 07/05/2019 12:02 PM    Glucose (POC) 101 (H) 07/05/2019 08:47 AM     No results found for: COLOR, APPRN, SPGRU, REFSG, ANGELI, PROTU, GLUCU, KETU, BILU, UROU, TREVOR, LEUKU, GLUKE, EPSU, BACTU, WBCU, RBCU, CASTS, UCRY      Medications Reviewed:     Current Facility-Administered Medications   Medication Dose Route Frequency    0.9% sodium chloride infusion 250 mL  250 mL IntraVENous PRN    pantoprazole (PROTONIX) 40 mg in sodium chloride 0.9% 10 mL injection  40 mg IntraVENous Q12H    sodium chloride (NS) flush 5-40 mL  5-40 mL IntraVENous Q8H    sodium chloride (NS) flush 5-40 mL  5-40 mL IntraVENous PRN    dexmedeTOMidine (PRECEDEX) 400 mcg in 0.9% sodium chloride 100 mL infusion  0.2-0.7 mcg/kg/hr IntraVENous TITRATE    bicarbonate dialysis (PRISMASOL) BG K 4/Ca 2.5 5000 ml solution   Extracorporeal DIALYSIS CONTINUOUS    balsam peru-castor oil (VENELEX) ointment   Topical BID    piperacillin-tazobactam (ZOSYN) 3.375 g in 0.9% sodium chloride (MBP/ADV) 100 mL  3.375 g IntraVENous Q8H    0.9% sodium chloride infusion  10 mL/hr IntraVENous CONTINUOUS    chlorhexidine (PERIDEX) 0.12 % mouthwash 15 mL  15 mL Oral BID    albuterol-ipratropium (DUO-NEB) 2.5 MG-0.5 MG/3 ML  3 mL Nebulization Q6H RT    DOPamine (INTROPIN) 800 mg in dextrose 5% 250 mL infusion  0-20 mcg/kg/min IntraVENous TITRATE    PHENYLephrine (ARTURO-SYNEPHRINE) 100 mg in 0.9% sodium chloride 250 mL infusion   mcg/min IntraVENous TITRATE    NOREPINephrine (LEVOPHED) 32 mg in dextrose 5% 250 mL infusion  2-30 mcg/min IntraVENous TITRATE    vasopressin (VASOSTRICT) 20 Units in 0.9% sodium chloride 100 mL infusion  0-0.04 Units/min IntraVENous TITRATE    0.9% sodium chloride infusion 250 mL  250 mL IntraVENous PRN    0.9% sodium chloride infusion 250 mL  250 mL IntraVENous PRN    0.9% sodium chloride infusion 250 mL  250 mL IntraVENous PRN    propofol (DIPRIVAN) infusion  0-50 mcg/kg/min IntraVENous TITRATE    sodium chloride (NS) flush 5-40 mL  5-40 mL IntraVENous Q8H    sodium chloride (NS) flush 5-40 mL  5-40 mL IntraVENous PRN    simethicone (MYLICON) 64EI/6.1SD oral drops 80 mg  1.2 mL Oral Multiple    fentaNYL citrate (PF) injection  mcg   mcg IntraVENous Q1H PRN    0.9% sodium chloride infusion 250 mL  250 mL IntraVENous PRN    0.9% sodium chloride infusion  6 mL/hr IntraVENous CONTINUOUS    calcium carbonate (TUMS) chewable tablet 200 mg [elemental]  200 mg Oral TID PRN    aspirin delayed-release tablet 81 mg  81 mg Oral DAILY    sodium chloride (NS) flush 5-40 mL  5-40 mL IntraVENous Q8H    sodium chloride (NS) flush 5-40 mL  5-40 mL IntraVENous PRN    acetaminophen (TYLENOL) tablet 650 mg  650 mg Oral Q4H PRN    ondansetron (ZOFRAN) injection 4 mg  4 mg IntraVENous Q4H PRN    lactobac ac& pc-s.therm-b.anim (KERLINE Q/RISAQUAD)  1 Cap Oral DAILY    LORazepam (ATIVAN) injection 1 mg  1 mg IntraVENous Q6H PRN    naloxone (NARCAN) injection 0.4 mg  0.4 mg IntraVENous PRN    thiamine HCL (B-1) tablet 100 mg  100 mg Oral DAILY    folic acid (FOLVITE) tablet 1 mg  1 mg Oral DAILY    therapeutic multivitamin (THERAGRAN) tablet 1 Tab  1 Tab Oral DAILY     ______________________________________________________________________  EXPECTED LENGTH OF STAY: 4d 2h  ACTUAL LENGTH OF STAY:          12                 Petra Santos MD

## 2019-07-17 NOTE — PROGRESS NOTES
NUTRITION COMPLETE ASSESSMENT  RECOMMENDATIONS:   1. Increase tube feeds to goal rate with:   - Nepro @ 25ml/hr + 1 pkt prosource BID + 120ml flush q3hr   - adjust fluid flush per renal/MD   - if K+/Phos elevated discontinue MVI  2. Discontinue thiamine (in place for 12 days)  3. Daily weights     Interventions/Plan:   Food/Nutrient Delivery:  (-) (-)     Modify rate, concentration, composition, and schedule    Assessment:   Reason for Assessment: [x] Provider Consult/[x]Reassessment     Diet: NPO   Tube feeds: Nepro @ 10ml/hr - no flush  Nutritionally Significant Medications: [x] Reviewed: folic acid, soo-Q, zosyn, MVI, thiamine (100mg ), precedex, diprivan, levophed (6.5mcg/min)    Subjective: intubated/sedated. Objective:  Pt admitted for CHF. PMHx: HTN, Arthritis, CHF. Cardiomyopathy, possibly ETOH induced with pacemaker placed yesterday. Respiratory failure with PNA. Intubated on 7/13 and remains on pressors with diprivan @ 11.3ml/hr (298kcal). Now with KAYLEEN on CKD with CVVH started on 7/13, may trial HD tomorrow per renal notes. GIB 2/2 duodenal ulcers s/p clipping and embolization on 7/13. Had been concerns for ileus but KUB stable. Cleared by GI to start tube feeds via NGT into stomach at low rate. Nepro running at 10ml/hr. Recommend advancing tube feed to goal rate of: Nepro @ 25ml/hr + 1 pkt prosource BID + 120ml flush q3hr. Provides: 1200kcal + 298kcal diprivan = 1498kcal, 78g protein, 436ml free fluid + 180ml w/ prosource + 960ml flush = 1576ml fluid. Meets 100% energy and protein needs. K+ and phos WNL. Poor PO x 2 days PTA and for duration of admit. Will follow for diet advancement after extubation with supplements PRN. Admit wt on standing scale 74.7kg, wt down to 63kg on standing scale on 7/13.  2+ edema on admit but now resolved so likely d/t improvement influid status.    Last 3 Recorded Weights in this Encounter    07/15/19 0511 07/16/19 0608 07/17/19 0541   Weight: 65.8 kg (145 lb 1 oz) 64.8 kg (142 lb 13.7 oz) 61.5 kg (135 lb 9.3 oz)     Will continue to follow for diprivan rates, adjustment to EN as needed, wt trends, and transition to PO after extubation. Estimated Nutrition Needs:   Kcals/day: 1972 Kcals/day  Protein: 74 g(74-86g (1.2-1.4g/kg) on CVVH)  Fluid: 1568 ml(1ml/kcal or per renal) 1750 mL  Based On: [address] [facility] [address] (2003b)  Weight Used: Actual wt(61.5kg)    Pt expected to meet estimated nutrient needs:  [x]   Yes - with EN    []  No [] Unable to predict at this time  Nutrition Diagnosis:   1. Inadequate protein-energy intake related to respiratory failure as evidenced by NPO x 4 days; tube feeds infusing    2. (Incresed protein/energy needs) related to KAYLEEN  as evidenced by on CVVH    Goals:     EN meeting at least 90% needs in 2-3 days; wt maintenance     Monitoring & Evaluation:    - Total energy intake, Enteral/parenteral nutrition intake   - Weight/weight change, Electrolyte and renal profile     Previous Nutrition Goals Met:   N/A  Previous Recommendations:    N/A    Education & Discharge Needs:   [x] None Identified   [] Identified and addressed    [] Participated in care plan, discharge planning, and/or interdisciplinary rounds        Cultural, Alevism and ethnic food preferences identified: None    Skin Integrity: [x]Intact  []Other  Edema: []None [x]Other: trace   Last BM: 7/16  Food Allergies: []None []Other  Diet Restrictions: Cultural/Moravian Preference(s): None     Anthropometrics:     Weight Loss Metrics 7/17/2019 12/16/2014 11/18/2014 11/11/2014   Today's Wt 135 lb 9.3 oz 179 lb 182 lb 188 lb 12.8 oz   BMI 20.62 kg/m2 27.22 kg/m2 27.68 kg/m2 28.71 kg/m2      Weight Source: Bed  Height: 5' 8\" (172.7 cm),    Body mass index is 20.62 kg/m².        Labs:    Lab Results   Component Value Date/Time    Sodium 140 07/17/2019 03:12 AM    Potassium 4.2 07/17/2019 03:12 AM    Chloride 108 07/17/2019 03:12 AM    CO2 24 07/17/2019 03:12 AM    Glucose 85 07/17/2019 03:12 AM BUN 18 07/17/2019 03:12 AM    Creatinine 2.61 (H) 07/17/2019 03:12 AM    Calcium 7.9 (L) 07/17/2019 03:12 AM    Magnesium 2.5 (H) 07/17/2019 03:12 AM    Phosphorus 3.3 07/17/2019 03:12 AM    Albumin 2.7 (L) 07/17/2019 03:12 AM     Argentina Valdivia, RD [address] , Pager #[phone]

## 2019-07-17 NOTE — PROGRESS NOTES
Cardiology Progress Note                                        Admit Date: 7/4/2019    Assessment/Plan:     S/p pacer for SSS; working well; will increase basal rate upto 70 bpm to give extra output  ARF following hemorrhagic shock; on CRRT  CHF; acute systolic HF; slowly improving     Maru Das is a 72 y.o. male with     PROBLEM LIST:  Patient Active Problem List    Diagnosis Date Noted    Acute CHF (congestive heart failure) ([address] 07/05/2019    Pleural effusion, right 07/05/2019    Vitamin D deficiency 11/18/2014    Arthritis of wrist, right, degenerative 11/18/2014    History of wrist fracture, Right 11/18/2014    Overweight (BMI 25.0-29.9) 11/18/2014    HTN (hypertension) 11/11/2014    Blurred vision, bilateral 11/11/2014         Subjective:     Maru Das reports none.     Visit Vitals  BP (!) 139/99 (BP 1 Location: Left arm, BP Patient Position: At rest)   Pulse 60   Temp 97.8 °F (36.6 °C)   Resp 18   Ht 5' 8\" (1.727 m)   Wt 135 lb 9.3 oz (61.5 kg)   SpO2 100%   BMI 20.62 kg/m²       Intake/Output Summary (Last 24 hours) at 7/17/2019 5326  Last data filed at 7/17/2019 0600  Gross per 24 hour   Intake 1365.56 ml   Output 1505 ml   Net -139.44 ml       Objective:      Physical Exam:  HEENT: Perrla, EOMI  Neck: No JVD,  No thyroidmegaly  Resp:  rales  CV: RRR s1s2 No murmur no s3  Abd:Soft, Nontender  Ext: No edema  Neuro: on vent and sedated  Skin: Warm, Dry, Intact  Pulses: 2+ DP/PT/Rad      Telemetry: normal sinus rhythm, V paced    Current Facility-Administered Medications   Medication Dose Route Frequency    sodium chloride (NS) flush 5-40 mL  5-40 mL IntraVENous Q8H    sodium chloride (NS) flush 5-40 mL  5-40 mL IntraVENous PRN    dexmedeTOMidine (PRECEDEX) 400 mcg in 0.9% sodium chloride 100 mL infusion  0.2-0.7 mcg/kg/hr IntraVENous TITRATE    bicarbonate dialysis (PRISMASOL) BG K 4/Ca 2.5 5000 ml solution   Extracorporeal DIALYSIS CONTINUOUS    balsam peru-castor oil (VENELEX) ointment   Topical BID    piperacillin-tazobactam (ZOSYN) 3.375 g in 0.9% sodium chloride (MBP/ADV) 100 mL  3.375 g IntraVENous Q8H    0.9% sodium chloride infusion  10 mL/hr IntraVENous CONTINUOUS    chlorhexidine (PERIDEX) 0.12 % mouthwash 15 mL  15 mL Oral BID    albuterol-ipratropium (DUO-NEB) 2.5 MG-0.5 MG/3 ML  3 mL Nebulization Q6H RT    DOPamine (INTROPIN) 800 mg in dextrose 5% 250 mL infusion  0-20 mcg/kg/min IntraVENous TITRATE    PHENYLephrine (ARTURO-SYNEPHRINE) 100 mg in 0.9% sodium chloride 250 mL infusion   mcg/min IntraVENous TITRATE    NOREPINephrine (LEVOPHED) 32 mg in dextrose 5% 250 mL infusion  2-30 mcg/min IntraVENous TITRATE    vasopressin (VASOSTRICT) 20 Units in 0.9% sodium chloride 100 mL infusion  0-0.04 Units/min IntraVENous TITRATE    0.9% sodium chloride infusion 250 mL  250 mL IntraVENous PRN    0.9% sodium chloride infusion 250 mL  250 mL IntraVENous PRN    0.9% sodium chloride infusion 250 mL  250 mL IntraVENous PRN    propofol (DIPRIVAN) infusion  0-50 mcg/kg/min IntraVENous TITRATE    sodium chloride (NS) flush 5-40 mL  5-40 mL IntraVENous Q8H    sodium chloride (NS) flush 5-40 mL  5-40 mL IntraVENous PRN    simethicone (MYLICON) 76II/7.2DM oral drops 80 mg  1.2 mL Oral Multiple    fentaNYL citrate (PF) injection  mcg   mcg IntraVENous Q1H PRN    0.9% sodium chloride infusion 250 mL  250 mL IntraVENous PRN    pantoprazole (PROTONIX) 40 mg in 0.9% sodium chloride (MBP/ADV) 50 mL  8 mg/hr IntraVENous CONTINUOUS    0.9% sodium chloride infusion  6 mL/hr IntraVENous CONTINUOUS    calcium carbonate (TUMS) chewable tablet 200 mg [elemental]  200 mg Oral TID PRN    aspirin delayed-release tablet 81 mg  81 mg Oral DAILY    sodium chloride (NS) flush 5-40 mL  5-40 mL IntraVENous Q8H    sodium chloride (NS) flush 5-40 mL  5-40 mL IntraVENous PRN    acetaminophen (TYLENOL) tablet 650 mg  650 mg Oral Q4H PRN    ondansetron (ZOFRAN) injection 4 mg  4 mg IntraVENous Q4H PRN    lactobac ac& pc-s.therm-b.anim (KERLINE Q/RISAQUAD)  1 Cap Oral DAILY    LORazepam (ATIVAN) injection 1 mg  1 mg IntraVENous Q6H PRN    naloxone (NARCAN) injection 0.4 mg  0.4 mg IntraVENous PRN    thiamine HCL (B-1) tablet 100 mg  100 mg Oral DAILY    folic acid (FOLVITE) tablet 1 mg  1 mg Oral DAILY    therapeutic multivitamin (THERAGRAN) tablet 1 Tab  1 Tab Oral DAILY         Data Review:   Labs:    Recent Results (from the past 24 hour(s))   CORTISOL, AM    Collection Time: 07/16/19  7:26 AM   Result Value Ref Range    Cortisol, a.m. 13.5 4.30 - 22.45 ug/dL   POC G3 - PUL    Collection Time: 07/16/19  8:37 AM   Result Value Ref Range    FIO2 (POC) 30 %    pH (POC) 7.360 7.35 - 7.45      pCO2 (POC) 38.8 35.0 - 45.0 MMHG    pO2 (POC) 165 (H) 80 - 100 MMHG    HCO3 (POC) 21.9 (L) 22 - 26 MMOL/L    sO2 (POC) 99 (H) 92 - 97 %    Base deficit (POC) 4 mmol/L    Site DRAWN FROM ARTERIAL LINE      Device: VENT      Mode ASSIST CONTROL      Tidal volume 500 ml    Set Rate 14 bpm    PEEP/CPAP (POC) 5 cmH2O    Allens test (POC) N/A      Specimen type (POC) ARTERIAL      Total resp. rate 14     CBC WITH AUTOMATED DIFF    Collection Time: 07/16/19  2:00 PM   Result Value Ref Range    WBC 17.1 (H) 4.1 - 11.1 K/uL    RBC 2.68 (L) 4.10 - 5.70 M/uL    HGB 7.6 (L) 12.1 - 17.0 g/dL    HCT 23.1 (L) 36.6 - 50.3 %    MCV 86.2 80.0 - 99.0 FL    MCH 28.4 26.0 - 34.0 PG    MCHC 32.9 30.0 - 36.5 g/dL    RDW 16.3 (H) 11.5 - 14.5 %    PLATELET 591 (L) 005 - 400 K/uL    MPV 10.8 8.9 - 12.9 FL    NRBC 0.8 (H) 0  WBC    ABSOLUTE NRBC 0.14 (H) 0.00 - 0.01 K/uL    NEUTROPHILS 72 32 - 75 %    LYMPHOCYTES 5 (L) 12 - 49 %    MONOCYTES 7 5 - 13 %    EOSINOPHILS 14 (H) 0 - 7 %    BASOPHILS 0 0 - 1 %    IMMATURE GRANULOCYTES 2 (H) 0.0 - 0.5 %    ABS. NEUTROPHILS 12.3 (H) 1.8 - 8.0 K/UL    ABS. LYMPHOCYTES 0.9 0.8 - 3.5 K/UL    ABS. MONOCYTES 1.2 (H) 0.0 - 1.0 K/UL    ABS. EOSINOPHILS 2.4 (H) 0.0 - 0.4 K/UL    ABS. BASOPHILS 0.0 0.0 - 0.1 K/UL    ABS. IMM. GRANS. 0.3 (H) 0.00 - 0.04 K/UL    DF SMEAR SCANNED      RBC COMMENTS ANISOCYTOSIS  1+       METABOLIC PANEL, COMPREHENSIVE    Collection Time: 07/16/19  4:08 PM   Result Value Ref Range    Sodium 140 136 - 145 mmol/L    Potassium 4.0 3.5 - 5.1 mmol/L    Chloride 107 97 - 108 mmol/L    CO2 25 21 - 32 mmol/L    Anion gap 8 5 - 15 mmol/L    Glucose 86 65 - 100 mg/dL    BUN 21 (H) 6 - 20 MG/DL    Creatinine 2.98 (H) 0.70 - 1.30 MG/DL    BUN/Creatinine ratio 7 (L) 12 - 20      GFR est AA 25 (L) >60 ml/min/1.73m2    GFR est non-AA 21 (L) >60 ml/min/1.73m2    Calcium 8.0 (L) 8.5 - 10.1 MG/DL    Bilirubin, total 0.6 0.2 - 1.0 MG/DL    ALT (SGPT) 105 (H) 12 - 78 U/L    AST (SGOT) 72 (H) 15 - 37 U/L    Alk. phosphatase 75 45 - 117 U/L    Protein, total 5.3 (L) 6.4 - 8.2 g/dL    Albumin 2.9 (L) 3.5 - 5.0 g/dL    Globulin 2.4 2.0 - 4.0 g/dL    A-G Ratio 1.2 1.1 - 2.2     PHOSPHORUS    Collection Time: 07/16/19  4:08 PM   Result Value Ref Range    Phosphorus 3.5 2.6 - 4.7 MG/DL   MAGNESIUM    Collection Time: 07/16/19  4:08 PM   Result Value Ref Range    Magnesium 2.4 1.6 - 2.4 mg/dL   CBC WITH AUTOMATED DIFF    Collection Time: 07/16/19  7:14 PM   Result Value Ref Range    WBC 15.9 (H) 4.1 - 11.1 K/uL    RBC 2.49 (L) 4.10 - 5.70 M/uL    HGB 7.1 (L) 12.1 - 17.0 g/dL    HCT 21.9 (L) 36.6 - 50.3 %    MCV 88.0 80.0 - 99.0 FL    MCH 28.5 26.0 - 34.0 PG    MCHC 32.4 30.0 - 36.5 g/dL    RDW 16.6 (H) 11.5 - 14.5 %    PLATELET 444 (L) 353 - 400 K/uL    MPV 10.8 8.9 - 12.9 FL    NRBC 0.8 (H) 0  WBC    ABSOLUTE NRBC 0.13 (H) 0.00 - 0.01 K/uL    NEUTROPHILS 77 (H) 32 - 75 %    LYMPHOCYTES 5 (L) 12 - 49 %    MONOCYTES 4 (L) 5 - 13 %    EOSINOPHILS 12 (H) 0 - 7 %    BASOPHILS 0 0 - 1 %    MYELOCYTES 2 (H) 0 %    IMMATURE GRANULOCYTES 0 %    ABS. NEUTROPHILS 12.2 (H) 1.8 - 8.0 K/UL    ABS. LYMPHOCYTES 0.8 0.8 - 3.5 K/UL    ABS. MONOCYTES 0.6 0.0 - 1.0 K/UL    ABS.  EOSINOPHILS 1.9 (H) 0.0 - 0.4 K/UL    ABS. BASOPHILS 0.0 0.0 - 0.1 K/UL    ABS. IMM. GRANS. 0.0 K/UL    DF MANUAL      RBC COMMENTS ANISOCYTOSIS  1+        RBC COMMENTS POLYCHROMASIA  PRESENT        RBC COMMENTS OVALOCYTES  PRESENT       METABOLIC PANEL, COMPREHENSIVE    Collection Time: 07/17/19  3:12 AM   Result Value Ref Range    Sodium 140 136 - 145 mmol/L    Potassium 4.2 3.5 - 5.1 mmol/L    Chloride 108 97 - 108 mmol/L    CO2 24 21 - 32 mmol/L    Anion gap 8 5 - 15 mmol/L    Glucose 85 65 - 100 mg/dL    BUN 18 6 - 20 MG/DL    Creatinine 2.61 (H) 0.70 - 1.30 MG/DL    BUN/Creatinine ratio 7 (L) 12 - 20      GFR est AA 30 (L) >60 ml/min/1.73m2    GFR est non-AA 24 (L) >60 ml/min/1.73m2    Calcium 7.9 (L) 8.5 - 10.1 MG/DL    Bilirubin, total 0.7 0.2 - 1.0 MG/DL    ALT (SGPT) 93 (H) 12 - 78 U/L    AST (SGOT) 61 (H) 15 - 37 U/L    Alk. phosphatase 80 45 - 117 U/L    Protein, total 5.4 (L) 6.4 - 8.2 g/dL    Albumin 2.7 (L) 3.5 - 5.0 g/dL    Globulin 2.7 2.0 - 4.0 g/dL    A-G Ratio 1.0 (L) 1.1 - 2.2     PHOSPHORUS    Collection Time: 07/17/19  3:12 AM   Result Value Ref Range    Phosphorus 3.3 2.6 - 4.7 MG/DL   MAGNESIUM    Collection Time: 07/17/19  3:12 AM   Result Value Ref Range    Magnesium 2.5 (H) 1.6 - 2.4 mg/dL   CBC WITH AUTOMATED DIFF    Collection Time: 07/17/19  3:12 AM   Result Value Ref Range    WBC 17.2 (H) 4.1 - 11.1 K/uL    RBC 2.58 (L) 4.10 - 5.70 M/uL    HGB 7.4 (L) 12.1 - 17.0 g/dL    HCT 22.8 (L) 36.6 - 50.3 %    MCV 88.4 80.0 - 99.0 FL    MCH 28.7 26.0 - 34.0 PG    MCHC 32.5 30.0 - 36.5 g/dL    RDW 17.1 (H) 11.5 - 14.5 %    PLATELET 223 (L) 500 - 400 K/uL    MPV 10.6 8.9 - 12.9 FL    NRBC 0.7 (H) 0  WBC    ABSOLUTE NRBC 0.12 (H) 0.00 - 0.01 K/uL    NEUTROPHILS 69 32 - 75 %    LYMPHOCYTES 5 (L) 12 - 49 %    MONOCYTES 10 5 - 13 %    EOSINOPHILS 13 (H) 0 - 7 %    BASOPHILS 0 0 - 1 %    IMMATURE GRANULOCYTES 3 (H) 0.0 - 0.5 %    ABS. NEUTROPHILS 11.9 (H) 1.8 - 8.0 K/UL    ABS. LYMPHOCYTES 0.9 0.8 - 3.5 K/UL    ABS. MONOCYTES 1.7 (H) 0.0 - 1.0 K/UL    ABS. EOSINOPHILS 2.2 (H) 0.0 - 0.4 K/UL    ABS. BASOPHILS 0.0 0.0 - 0.1 K/UL    ABS. IMM. GRANS. 0.5 (H) 0.00 - 0.04 K/UL    DF SMEAR SCANNED      RBC COMMENTS ANISOCYTOSIS  1+        RBC COMMENTS POLYCHROMASIA  PRESENT        RBC COMMENTS AURELIA CELLS  PRESENT        RBC COMMENTS OVALOCYTES  PRESENT       POC G3 - PUL    Collection Time: 07/17/19  6:01 AM   Result Value Ref Range    FIO2 (POC) 50 %    pH (POC) 7.434 7.35 - 7.45      pCO2 (POC) 33.4 (L) 35.0 - 45.0 MMHG    pO2 (POC) 253 (H) 80 - 100 MMHG    HCO3 (POC) 22.4 22 - 26 MMOL/L    sO2 (POC) 100 (H) 92 - 97 %    Base deficit (POC) 2 mmol/L    Site DRAWN FROM ARTERIAL LINE      Device: VENT      Mode ASSIST CONTROL      Tidal volume 450 ml    Set Rate 14 bpm    PEEP/CPAP (POC) 5 cmH2O    Allens test (POC) N/A      Specimen type (POC) ARTERIAL      Total resp.  rate 20

## 2019-07-17 NOTE — PROGRESS NOTES
[facility]   [address] [address], [facility], [facility]  Phone: [phone]   [phone]       Nephrology Progress Note  Latricia Narayanan     1945     [id number]  Date of Admission : 7/4/2019 07/17/19    CC:  Follow up for KAYLEEN      Assessment and Plan   KAYLEEN on CKD   - 2/2 ATN from hemorrhagic shock   - remains anuric and callejas removed 7/15  - Continue CRRT today and trial of HD tomorrow if stable   - transfuse one unit blood     CKD Stage III :  - baseline Cr 1.2-1.3 mg/dl     Hemorraghic Shock /Massive UGI bleed  - S/P emergent clipping by EDG 7/13  - S/P embolization of gastric/diuodenal artery 7/12 in IR  - off pressors     Bradycardia   - Off Dopamine gtt  - s/p PPM placement 7/16     Acute Resp Failure   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 Occult Cirrhosis      Interval History:  Seen and examined   Off pressors and dopamine   BP low   Remains on vent   S.p PPM placement   Remains anuric   CRRT withut any issues       Review of Systems: Review of systems not obtained due to patient factors.     Current Medications:   Current Facility-Administered Medications   Medication Dose Route Frequency    0.9% sodium chloride infusion 250 mL  250 mL IntraVENous PRN    sodium chloride (NS) flush 5-40 mL  5-40 mL IntraVENous Q8H    sodium chloride (NS) flush 5-40 mL  5-40 mL IntraVENous PRN    dexmedeTOMidine (PRECEDEX) 400 mcg in 0.9% sodium chloride 100 mL infusion  0.2-0.7 mcg/kg/hr IntraVENous TITRATE    bicarbonate dialysis (PRISMASOL) BG K 4/Ca 2.5 5000 ml solution   Extracorporeal DIALYSIS CONTINUOUS    balsam peru-castor oil (VENELEX) ointment   Topical BID    piperacillin-tazobactam (ZOSYN) 3.375 g in 0.9% sodium chloride (MBP/ADV) 100 mL  3.375 g IntraVENous Q8H    0.9% sodium chloride infusion  10 mL/hr IntraVENous CONTINUOUS    chlorhexidine (PERIDEX) 0.12 % mouthwash 15 mL  15 mL Oral BID    albuterol-ipratropium (DUO-NEB) 2.5 MG-0.5 MG/3 ML  3 mL Nebulization Q6H RT    DOPamine (INTROPIN) 800 mg in dextrose 5% 250 mL infusion  0-20 mcg/kg/min IntraVENous TITRATE    PHENYLephrine (ARTURO-SYNEPHRINE) 100 mg in 0.9% sodium chloride 250 mL infusion   mcg/min IntraVENous TITRATE    NOREPINephrine (LEVOPHED) 32 mg in dextrose 5% 250 mL infusion  2-30 mcg/min IntraVENous TITRATE    vasopressin (VASOSTRICT) 20 Units in 0.9% sodium chloride 100 mL infusion  0-0.04 Units/min IntraVENous TITRATE    0.9% sodium chloride infusion 250 mL  250 mL IntraVENous PRN    0.9% sodium chloride infusion 250 mL  250 mL IntraVENous PRN    0.9% sodium chloride infusion 250 mL  250 mL IntraVENous PRN    propofol (DIPRIVAN) infusion  0-50 mcg/kg/min IntraVENous TITRATE    sodium chloride (NS) flush 5-40 mL  5-40 mL IntraVENous Q8H    sodium chloride (NS) flush 5-40 mL  5-40 mL IntraVENous PRN    simethicone (MYLICON) 69CY/3.2XF oral drops 80 mg  1.2 mL Oral Multiple    fentaNYL citrate (PF) injection  mcg   mcg IntraVENous Q1H PRN    0.9% sodium chloride infusion 250 mL  250 mL IntraVENous PRN    pantoprazole (PROTONIX) 40 mg in 0.9% sodium chloride (MBP/ADV) 50 mL  8 mg/hr IntraVENous CONTINUOUS    0.9% sodium chloride infusion  6 mL/hr IntraVENous CONTINUOUS    calcium carbonate (TUMS) chewable tablet 200 mg [elemental]  200 mg Oral TID PRN    aspirin delayed-release tablet 81 mg  81 mg Oral DAILY    sodium chloride (NS) flush 5-40 mL  5-40 mL IntraVENous Q8H    sodium chloride (NS) flush 5-40 mL  5-40 mL IntraVENous PRN    acetaminophen (TYLENOL) tablet 650 mg  650 mg Oral Q4H PRN    ondansetron (ZOFRAN) injection 4 mg  4 mg IntraVENous Q4H PRN    lactobac ac& pc-s.therm-b.anim (KERLINE Q/RISAQUAD)  1 Cap Oral DAILY    LORazepam (ATIVAN) injection 1 mg  1 mg IntraVENous Q6H PRN    naloxone (NARCAN) injection 0.4 mg  0.4 mg IntraVENous PRN    thiamine HCL (B-1) tablet 100 mg  100 mg Oral DAILY    folic acid (FOLVITE) tablet 1 mg  1 mg Oral DAILY    therapeutic multivitamin (THERAGRAN) tablet 1 Tab  1 Tab Oral DAILY      No Known Allergies    Objective:  Vitals:    Vitals:    07/17/19 0700 07/17/19 0716 07/17/19 0718 07/17/19 0800   BP:       Pulse: 60  70 70   Resp: 28 14 19   Temp:    98.1 °F (36.7 °C)   SpO2: 100% 100% 100% 100%   Weight:       Height:         Intake and Output:  07/17 0701 - 07/17 1900  In: 47.1 [I.V.:47.1]  Out: 80   07/15 1901 - 07/17 0700  In: 2187.3 [I.V.:2057.3]  Out: 2946 [Drains:400]    Physical Examination:  Pt intubated    Yes   General: Unresponsive   Neck:  Lines +  Resp:  CTA  CV:  RRR,  no murmur or rub, no LE edema  GI:  Soft, NT, + Bowel sounds, no hepatosplenomegaly  Neurologic:  Sedated   Psych:             Unable to assess   :no callejas     []    High complexity decision making was performed  []    Patient is at high-risk of decompensation with multiple organ involvement    Lab Data Personally Reviewed: I have reviewed all the pertinent labs, microbiology data and radiology studies during assessment.     Recent Labs     07/17/19  0312 07/16/19  1608 07/16/19  0313 07/15/19  1545 07/15/19  0322    140 139 140 140   K 4.2 4.0 3.8 3.3* 3.5    107 107 107 105   CO2 24 25 24 24 28   GLU 85 86 103* 92 123*   BUN 18 21* 21* 28* 39*   CREA 2.61* 2.98* 2.44* 2.65* 3.00*   CA 7.9* 8.0* 8.1* 8.2* 8.6   MG 2.5* 2.4 2.3 2.0 1.8   PHOS 3.3 3.5 3.9 1.9* 3.1   ALB 2.7* 2.9* 3.1* 3.0* 3.2*   SGOT 61* 72* 88* 113* 178*   ALT 93* 105* 124* 135* 172*     Recent Labs     07/17/19  0312 07/16/19  1914 07/16/19  1400 07/16/19  0313 07/15/19  1920   WBC 17.2* 15.9* 17.1* 20.1* 19.2*   HGB 7.4* 7.1* 7.6* 7.8* 7.7*   HCT 22.8* 21.9* 23.1* 23.7* 22.6*   * 119* 114* 110* 103*     No results found for: SDES  Lab Results   Component Value Date/Time    Culture result: NO GROWTH 4 DAYS 07/05/2019 12:25 PM    Culture result: NO GROWTH 4 DAYS 07/05/2019 12:25 PM     Recent Results (from the past 24 hour(s))   CBC WITH AUTOMATED DIFF    Collection Time: 07/16/19  2:00 PM   Result Value Ref Range    WBC 17.1 (H) 4.1 - 11.1 K/uL    RBC 2.68 (L) 4.10 - 5.70 M/uL    HGB 7.6 (L) 12.1 - 17.0 g/dL    HCT 23.1 (L) 36.6 - 50.3 %    MCV 86.2 80.0 - 99.0 FL    MCH 28.4 26.0 - 34.0 PG    MCHC 32.9 30.0 - 36.5 g/dL    RDW 16.3 (H) 11.5 - 14.5 %    PLATELET 107 (L) 212 - 400 K/uL    MPV 10.8 8.9 - 12.9 FL    NRBC 0.8 (H) 0  WBC    ABSOLUTE NRBC 0.14 (H) 0.00 - 0.01 K/uL    NEUTROPHILS 72 32 - 75 %    LYMPHOCYTES 5 (L) 12 - 49 %    MONOCYTES 7 5 - 13 %    EOSINOPHILS 14 (H) 0 - 7 %    BASOPHILS 0 0 - 1 %    IMMATURE GRANULOCYTES 2 (H) 0.0 - 0.5 %    ABS. NEUTROPHILS 12.3 (H) 1.8 - 8.0 K/UL    ABS. LYMPHOCYTES 0.9 0.8 - 3.5 K/UL    ABS. MONOCYTES 1.2 (H) 0.0 - 1.0 K/UL    ABS. EOSINOPHILS 2.4 (H) 0.0 - 0.4 K/UL    ABS. BASOPHILS 0.0 0.0 - 0.1 K/UL    ABS. IMM. GRANS. 0.3 (H) 0.00 - 0.04 K/UL    DF SMEAR SCANNED      RBC COMMENTS ANISOCYTOSIS  1+       METABOLIC PANEL, COMPREHENSIVE    Collection Time: 07/16/19  4:08 PM   Result Value Ref Range    Sodium 140 136 - 145 mmol/L    Potassium 4.0 3.5 - 5.1 mmol/L    Chloride 107 97 - 108 mmol/L    CO2 25 21 - 32 mmol/L    Anion gap 8 5 - 15 mmol/L    Glucose 86 65 - 100 mg/dL    BUN 21 (H) 6 - 20 MG/DL    Creatinine 2.98 (H) 0.70 - 1.30 MG/DL    BUN/Creatinine ratio 7 (L) 12 - 20      GFR est AA 25 (L) >60 ml/min/1.73m2    GFR est non-AA 21 (L) >60 ml/min/1.73m2    Calcium 8.0 (L) 8.5 - 10.1 MG/DL    Bilirubin, total 0.6 0.2 - 1.0 MG/DL    ALT (SGPT) 105 (H) 12 - 78 U/L    AST (SGOT) 72 (H) 15 - 37 U/L    Alk.  phosphatase 75 45 - 117 U/L    Protein, total 5.3 (L) 6.4 - 8.2 g/dL    Albumin 2.9 (L) 3.5 - 5.0 g/dL    Globulin 2.4 2.0 - 4.0 g/dL    A-G Ratio 1.2 1.1 - 2.2     PHOSPHORUS    Collection Time: 07/16/19  4:08 PM   Result Value Ref Range    Phosphorus 3.5 2.6 - 4.7 MG/DL   MAGNESIUM    Collection Time: 07/16/19 4:08 PM   Result Value Ref Range    Magnesium 2.4 1.6 - 2.4 mg/dL   CBC WITH AUTOMATED DIFF    Collection Time: 07/16/19  7:14 PM   Result Value Ref Range    WBC 15.9 (H) 4.1 - 11.1 K/uL    RBC 2.49 (L) 4.10 - 5.70 M/uL    HGB 7.1 (L) 12.1 - 17.0 g/dL    HCT 21.9 (L) 36.6 - 50.3 %    MCV 88.0 80.0 - 99.0 FL    MCH 28.5 26.0 - 34.0 PG    MCHC 32.4 30.0 - 36.5 g/dL    RDW 16.6 (H) 11.5 - 14.5 %    PLATELET 246 (L) 412 - 400 K/uL    MPV 10.8 8.9 - 12.9 FL    NRBC 0.8 (H) 0  WBC    ABSOLUTE NRBC 0.13 (H) 0.00 - 0.01 K/uL    NEUTROPHILS 77 (H) 32 - 75 %    LYMPHOCYTES 5 (L) 12 - 49 %    MONOCYTES 4 (L) 5 - 13 %    EOSINOPHILS 12 (H) 0 - 7 %    BASOPHILS 0 0 - 1 %    MYELOCYTES 2 (H) 0 %    IMMATURE GRANULOCYTES 0 %    ABS. NEUTROPHILS 12.2 (H) 1.8 - 8.0 K/UL    ABS. LYMPHOCYTES 0.8 0.8 - 3.5 K/UL    ABS. MONOCYTES 0.6 0.0 - 1.0 K/UL    ABS. EOSINOPHILS 1.9 (H) 0.0 - 0.4 K/UL    ABS. BASOPHILS 0.0 0.0 - 0.1 K/UL    ABS. IMM. GRANS. 0.0 K/UL    DF MANUAL      RBC COMMENTS ANISOCYTOSIS  1+        RBC COMMENTS POLYCHROMASIA  PRESENT        RBC COMMENTS OVALOCYTES  PRESENT       METABOLIC PANEL, COMPREHENSIVE    Collection Time: 07/17/19  3:12 AM   Result Value Ref Range    Sodium 140 136 - 145 mmol/L    Potassium 4.2 3.5 - 5.1 mmol/L    Chloride 108 97 - 108 mmol/L    CO2 24 21 - 32 mmol/L    Anion gap 8 5 - 15 mmol/L    Glucose 85 65 - 100 mg/dL    BUN 18 6 - 20 MG/DL    Creatinine 2.61 (H) 0.70 - 1.30 MG/DL    BUN/Creatinine ratio 7 (L) 12 - 20      GFR est AA 30 (L) >60 ml/min/1.73m2    GFR est non-AA 24 (L) >60 ml/min/1.73m2    Calcium 7.9 (L) 8.5 - 10.1 MG/DL    Bilirubin, total 0.7 0.2 - 1.0 MG/DL    ALT (SGPT) 93 (H) 12 - 78 U/L    AST (SGOT) 61 (H) 15 - 37 U/L    Alk.  phosphatase 80 45 - 117 U/L    Protein, total 5.4 (L) 6.4 - 8.2 g/dL    Albumin 2.7 (L) 3.5 - 5.0 g/dL    Globulin 2.7 2.0 - 4.0 g/dL    A-G Ratio 1.0 (L) 1.1 - 2.2     PHOSPHORUS    Collection Time: 07/17/19  3:12 AM   Result Value Ref Range Phosphorus 3.3 2.6 - 4.7 MG/DL   MAGNESIUM    Collection Time: 07/17/19  3:12 AM   Result Value Ref Range    Magnesium 2.5 (H) 1.6 - 2.4 mg/dL   CBC WITH AUTOMATED DIFF    Collection Time: 07/17/19  3:12 AM   Result Value Ref Range    WBC 17.2 (H) 4.1 - 11.1 K/uL    RBC 2.58 (L) 4.10 - 5.70 M/uL    HGB 7.4 (L) 12.1 - 17.0 g/dL    HCT 22.8 (L) 36.6 - 50.3 %    MCV 88.4 80.0 - 99.0 FL    MCH 28.7 26.0 - 34.0 PG    MCHC 32.5 30.0 - 36.5 g/dL    RDW 17.1 (H) 11.5 - 14.5 %    PLATELET 548 (L) 373 - 400 K/uL    MPV 10.6 8.9 - 12.9 FL    NRBC 0.7 (H) 0  WBC    ABSOLUTE NRBC 0.12 (H) 0.00 - 0.01 K/uL    NEUTROPHILS 69 32 - 75 %    LYMPHOCYTES 5 (L) 12 - 49 %    MONOCYTES 10 5 - 13 %    EOSINOPHILS 13 (H) 0 - 7 %    BASOPHILS 0 0 - 1 %    IMMATURE GRANULOCYTES 3 (H) 0.0 - 0.5 %    ABS. NEUTROPHILS 11.9 (H) 1.8 - 8.0 K/UL    ABS. LYMPHOCYTES 0.9 0.8 - 3.5 K/UL    ABS. MONOCYTES 1.7 (H) 0.0 - 1.0 K/UL    ABS. EOSINOPHILS 2.2 (H) 0.0 - 0.4 K/UL    ABS. BASOPHILS 0.0 0.0 - 0.1 K/UL    ABS. IMM. GRANS. 0.5 (H) 0.00 - 0.04 K/UL    DF SMEAR SCANNED      RBC COMMENTS ANISOCYTOSIS  1+        RBC COMMENTS POLYCHROMASIA  PRESENT        RBC COMMENTS AURELIA CELLS  PRESENT        RBC COMMENTS OVALOCYTES  PRESENT       POC G3 - PUL    Collection Time: 07/17/19  6:01 AM   Result Value Ref Range    FIO2 (POC) 50 %    pH (POC) 7.434 7.35 - 7.45      pCO2 (POC) 33.4 (L) 35.0 - 45.0 MMHG    pO2 (POC) 253 (H) 80 - 100 MMHG    HCO3 (POC) 22.4 22 - 26 MMOL/L    sO2 (POC) 100 (H) 92 - 97 %    Base deficit (POC) 2 mmol/L    Site DRAWN FROM ARTERIAL LINE      Device: VENT      Mode ASSIST CONTROL      Tidal volume 450 ml    Set Rate 14 bpm    PEEP/CPAP (POC) 5 cmH2O    Allens test (POC) N/A      Specimen type (POC) ARTERIAL      Total resp. rate 20             Total time spent with patient:  xxx   min.                                Care Plan discussed with:  Patient     Family      RN      Consulting Physician Molly Muller        I have reviewed the flowsheets. Chart and Pertinent Notes have been reviewed. No change in PMH ,family and social history from Consult note.       Thee Hollis MD

## 2019-07-17 NOTE — PROGRESS NOTES
2000- Bedside and Verbal shift change report given to [address] (oncoming nurse) by Biju Harris (offgoing nurse). Report included the following information SBAR, OR Summary, Procedure Summary, Intake/Output, MAR, Recent Results and Cardiac Rhythm Paced. 2015- Patient turned. Moderate amount of liquid, melena stool with soft clots present. 2030- Pt restless. A large amount of liquid, melena stool present. FMS placed. Bag filled to 200 during placement of FMS. 2045- Pt very restless. Unable to reorient. 50 mcg of fentanyl given. 0000- Reassessed. No changes    0115- Went to turn patient. Very large melena stool around FMS. Pt cleaned, FMS repositioned. Stool appears to be going into the bag.    0312- Labs sent. HGB 7.4. No blood needed. K- 4.2 and Mg 2.5. No replacement needed. Juanita Muller at bedside. Updated. Called [employer] to have low limit increased from 60 to 70.    0800- Bedside and Verbal shift change report given to Brendan Blanton (oncoming nurse) by [facility] RN (offgoing nurse). Report included the following information SBAR, Procedure Summary, Intake/Output, MAR, Recent Results and Cardiac Rhythm Paced.

## 2019-07-17 NOTE — DIALYSIS
19 [facility]       [phone]    Orders   Mode: CVVH   Factor: 0 ml/hr   UFR: 1,600 ml/hr   Blood Flow Rate: 200 ml/min     Metrics   BP / HR: 106/42 (60) // 70   Blood Flow Rate: 200 ml/min   AP:                         -67   RP: 67   TMP: 83   PD: 83   FP: 160   UFR: 1,600 ml/hr     Comments / Plan:      [id number] filter running well with no indication for change at this time. Consents, patient, code status, labs and orders verified. RIJ temporary CVC, dressing CDI with bio-patch. No signs of redness, drainage, or infection visualized. Lines reversed, visible and connections secure with blood warmer to return line at 37*C. Education, pre and post to MARCIA Soliz RN.

## 2019-07-17 NOTE — PROGRESS NOTES
Nicole Dejesus [address]  174 [facility], [address]       GI PROGRESS NOTE  [facility]  [phone] NP/PA in-hospital cell phone M-F until 4:30PM  After 5PM or on weekends, please call  for physician on call      NAME: Chikis Sebastian   :  1945   MRN:  [id number]       Subjective:   Remains intubated on CRRT. No signs of bleeding. Objective:     VITALS:   Last 24hrs VS reviewed since prior progress note. Most recent are:  Visit Vitals  BP (!) 89/46   Pulse 70   Temp 98.1 °F (36.7 °C)   Resp 18   Ht 5' 8\" (1.727 m)   Wt 61.5 kg (135 lb 9.3 oz)   SpO2 100%   BMI 20.62 kg/m²       PHYSICAL EXAM:  General: No acute distress  Neurologic:  Intubated  HEENT: sedated  Lungs:  CTA bilaterally anteriorly  Heart:  S1 S2  Abdomen: Soft, non-distended. +Bowel sounds. Extremities: Warm  Psych:   Unable to assess    Lab Data Reviewed:     Recent Results (from the past 24 hour(s))   CBC WITH AUTOMATED DIFF    Collection Time: 19  2:00 PM   Result Value Ref Range    WBC 17.1 (H) 4.1 - 11.1 K/uL    RBC 2.68 (L) 4.10 - 5.70 M/uL    HGB 7.6 (L) 12.1 - 17.0 g/dL    HCT 23.1 (L) 36.6 - 50.3 %    MCV 86.2 80.0 - 99.0 FL    MCH 28.4 26.0 - 34.0 PG    MCHC 32.9 30.0 - 36.5 g/dL    RDW 16.3 (H) 11.5 - 14.5 %    PLATELET 539 (L) 982 - 400 K/uL    MPV 10.8 8.9 - 12.9 FL    NRBC 0.8 (H) 0  WBC    ABSOLUTE NRBC 0.14 (H) 0.00 - 0.01 K/uL    NEUTROPHILS 72 32 - 75 %    LYMPHOCYTES 5 (L) 12 - 49 %    MONOCYTES 7 5 - 13 %    EOSINOPHILS 14 (H) 0 - 7 %    BASOPHILS 0 0 - 1 %    IMMATURE GRANULOCYTES 2 (H) 0.0 - 0.5 %    ABS. NEUTROPHILS 12.3 (H) 1.8 - 8.0 K/UL    ABS. LYMPHOCYTES 0.9 0.8 - 3.5 K/UL    ABS. MONOCYTES 1.2 (H) 0.0 - 1.0 K/UL    ABS. EOSINOPHILS 2.4 (H) 0.0 - 0.4 K/UL    ABS. BASOPHILS 0.0 0.0 - 0.1 K/UL    ABS. IMM.  GRANS. 0.3 (H) 0.00 - 0.04 K/UL    DF SMEAR SCANNED      RBC COMMENTS ANISOCYTOSIS  1+       METABOLIC PANEL, COMPREHENSIVE    Collection Time: 07/16/19  4:08 PM   Result Value Ref Range    Sodium 140 136 - 145 mmol/L    Potassium 4.0 3.5 - 5.1 mmol/L    Chloride 107 97 - 108 mmol/L    CO2 25 21 - 32 mmol/L    Anion gap 8 5 - 15 mmol/L    Glucose 86 65 - 100 mg/dL    BUN 21 (H) 6 - 20 MG/DL    Creatinine 2.98 (H) 0.70 - 1.30 MG/DL    BUN/Creatinine ratio 7 (L) 12 - 20      GFR est AA 25 (L) >60 ml/min/1.73m2    GFR est non-AA 21 (L) >60 ml/min/1.73m2    Calcium 8.0 (L) 8.5 - 10.1 MG/DL    Bilirubin, total 0.6 0.2 - 1.0 MG/DL    ALT (SGPT) 105 (H) 12 - 78 U/L    AST (SGOT) 72 (H) 15 - 37 U/L    Alk. phosphatase 75 45 - 117 U/L    Protein, total 5.3 (L) 6.4 - 8.2 g/dL    Albumin 2.9 (L) 3.5 - 5.0 g/dL    Globulin 2.4 2.0 - 4.0 g/dL    A-G Ratio 1.2 1.1 - 2.2     PHOSPHORUS    Collection Time: 07/16/19  4:08 PM   Result Value Ref Range    Phosphorus 3.5 2.6 - 4.7 MG/DL   MAGNESIUM    Collection Time: 07/16/19  4:08 PM   Result Value Ref Range    Magnesium 2.4 1.6 - 2.4 mg/dL   CBC WITH AUTOMATED DIFF    Collection Time: 07/16/19  7:14 PM   Result Value Ref Range    WBC 15.9 (H) 4.1 - 11.1 K/uL    RBC 2.49 (L) 4.10 - 5.70 M/uL    HGB 7.1 (L) 12.1 - 17.0 g/dL    HCT 21.9 (L) 36.6 - 50.3 %    MCV 88.0 80.0 - 99.0 FL    MCH 28.5 26.0 - 34.0 PG    MCHC 32.4 30.0 - 36.5 g/dL    RDW 16.6 (H) 11.5 - 14.5 %    PLATELET 106 (L) 897 - 400 K/uL    MPV 10.8 8.9 - 12.9 FL    NRBC 0.8 (H) 0  WBC    ABSOLUTE NRBC 0.13 (H) 0.00 - 0.01 K/uL    NEUTROPHILS 77 (H) 32 - 75 %    LYMPHOCYTES 5 (L) 12 - 49 %    MONOCYTES 4 (L) 5 - 13 %    EOSINOPHILS 12 (H) 0 - 7 %    BASOPHILS 0 0 - 1 %    MYELOCYTES 2 (H) 0 %    IMMATURE GRANULOCYTES 0 %    ABS. NEUTROPHILS 12.2 (H) 1.8 - 8.0 K/UL    ABS. LYMPHOCYTES 0.8 0.8 - 3.5 K/UL    ABS. MONOCYTES 0.6 0.0 - 1.0 K/UL    ABS. EOSINOPHILS 1.9 (H) 0.0 - 0.4 K/UL    ABS. BASOPHILS 0.0 0.0 - 0.1 K/UL    ABS. IMM.  GRANS. 0.0 K/UL    DF MANUAL      RBC COMMENTS ANISOCYTOSIS  1+        RBC COMMENTS POLYCHROMASIA  PRESENT        RBC COMMENTS OVALOCYTES  PRESENT       METABOLIC PANEL, COMPREHENSIVE    Collection Time: 07/17/19  3:12 AM   Result Value Ref Range    Sodium 140 136 - 145 mmol/L    Potassium 4.2 3.5 - 5.1 mmol/L    Chloride 108 97 - 108 mmol/L    CO2 24 21 - 32 mmol/L    Anion gap 8 5 - 15 mmol/L    Glucose 85 65 - 100 mg/dL    BUN 18 6 - 20 MG/DL    Creatinine 2.61 (H) 0.70 - 1.30 MG/DL    BUN/Creatinine ratio 7 (L) 12 - 20      GFR est AA 30 (L) >60 ml/min/1.73m2    GFR est non-AA 24 (L) >60 ml/min/1.73m2    Calcium 7.9 (L) 8.5 - 10.1 MG/DL    Bilirubin, total 0.7 0.2 - 1.0 MG/DL    ALT (SGPT) 93 (H) 12 - 78 U/L    AST (SGOT) 61 (H) 15 - 37 U/L    Alk. phosphatase 80 45 - 117 U/L    Protein, total 5.4 (L) 6.4 - 8.2 g/dL    Albumin 2.7 (L) 3.5 - 5.0 g/dL    Globulin 2.7 2.0 - 4.0 g/dL    A-G Ratio 1.0 (L) 1.1 - 2.2     PHOSPHORUS    Collection Time: 07/17/19  3:12 AM   Result Value Ref Range    Phosphorus 3.3 2.6 - 4.7 MG/DL   MAGNESIUM    Collection Time: 07/17/19  3:12 AM   Result Value Ref Range    Magnesium 2.5 (H) 1.6 - 2.4 mg/dL   CBC WITH AUTOMATED DIFF    Collection Time: 07/17/19  3:12 AM   Result Value Ref Range    WBC 17.2 (H) 4.1 - 11.1 K/uL    RBC 2.58 (L) 4.10 - 5.70 M/uL    HGB 7.4 (L) 12.1 - 17.0 g/dL    HCT 22.8 (L) 36.6 - 50.3 %    MCV 88.4 80.0 - 99.0 FL    MCH 28.7 26.0 - 34.0 PG    MCHC 32.5 30.0 - 36.5 g/dL    RDW 17.1 (H) 11.5 - 14.5 %    PLATELET 557 (L) 762 - 400 K/uL    MPV 10.6 8.9 - 12.9 FL    NRBC 0.7 (H) 0  WBC    ABSOLUTE NRBC 0.12 (H) 0.00 - 0.01 K/uL    NEUTROPHILS 69 32 - 75 %    LYMPHOCYTES 5 (L) 12 - 49 %    MONOCYTES 10 5 - 13 %    EOSINOPHILS 13 (H) 0 - 7 %    BASOPHILS 0 0 - 1 %    IMMATURE GRANULOCYTES 3 (H) 0.0 - 0.5 %    ABS. NEUTROPHILS 11.9 (H) 1.8 - 8.0 K/UL    ABS. LYMPHOCYTES 0.9 0.8 - 3.5 K/UL    ABS. MONOCYTES 1.7 (H) 0.0 - 1.0 K/UL    ABS. EOSINOPHILS 2.2 (H) 0.0 - 0.4 K/UL    ABS. BASOPHILS 0.0 0.0 - 0.1 K/UL    ABS. IMM.  GRANS. 0.5 (H) 0.00 - 0.04 K/UL    DF SMEAR SCANNED RBC COMMENTS ANISOCYTOSIS  1+        RBC COMMENTS POLYCHROMASIA  PRESENT        RBC COMMENTS AURELIA CELLS  PRESENT        RBC COMMENTS OVALOCYTES  PRESENT       POC G3 - PUL    Collection Time: 07/17/19  6:01 AM   Result Value Ref Range    FIO2 (POC) 50 %    pH (POC) 7.434 7.35 - 7.45      pCO2 (POC) 33.4 (L) 35.0 - 45.0 MMHG    pO2 (POC) 253 (H) 80 - 100 MMHG    HCO3 (POC) 22.4 22 - 26 MMOL/L    sO2 (POC) 100 (H) 92 - 97 %    Base deficit (POC) 2 mmol/L    Site DRAWN FROM ARTERIAL LINE      Device: VENT      Mode ASSIST CONTROL      Tidal volume 450 ml    Set Rate 14 bpm    PEEP/CPAP (POC) 5 cmH2O    Allens test (POC) N/A      Specimen type (POC) ARTERIAL      Total resp. rate 20          Assessment:   · UGI bleed: EGD (7/13/19): large duodenal ulcer (20 mm) with visible vessel, clipped; esophagitis and gastritis. Hgb 7.4 (last unit PRBCs on 7/14). No further signs of bleeding. 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 9) E66.3    Acute CHF (congestive heart failure) ([facility]) I50.9    Pleural effusion, right J90     Plan:   · Transition PPI gtt to BID  · Avoid postpyloric feeding tubes  · Monitor CBC and transfuse as needed  · Will sign off, please call for questions      Signed By: Maureen Lehman NP     7/17/2019  3:45 PM        This patient was seen and examined by me in a face-to-face visit today. I reviewed the medical record including lab work, imaging and other provider notes. I confirmed the interval history as described above.  I spoke to the patient, however the patient is unable to give a meaningful history. I discussed this case in detail with Noy GILLIS. I formulated an updated  assessment of this patient and guided our treatment plan. I agree with the above progress note. I agree with the history, exam and assessment and plan as outlined in the note. I would like to add the following:     Abd: normoactive BS, nt, nd, no rebound/guarding. No signs of active bleeding. Ovesco clips should pass on their own. Will sign off. Please call with any questions.     Dr. Agusto Quinn

## 2019-07-17 NOTE — PROGRESS NOTES
The patient remains intubated, on CRRT- nephrology notes indicate may trial HD tomorrow if patient stable, will continue to follow- patient would be a new outpatient HD set-up if the patient has ongoing dialysis needs upon discharge. Disposition TBD at this time pending medical POC and progress. CM will continue to follow for transitions of care.  PERNELL Copeland

## 2019-07-17 NOTE — PROGRESS NOTES
Problem: Falls - Risk of  Goal: *Absence of Falls  Description  Document Argenis Hahn Fall Risk and appropriate interventions in the flowsheet. Outcome: Progressing Towards Goal     Problem: Heart Failure: Day 5  Goal: Off Pathway (Use only if patient is Off Pathway)  Outcome: Progressing Towards Goal  Note:   Pt continues to be intubated, sedated, on CRRT and Levophed     Problem: Activity Intolerance  Goal: *Oxygen saturation during activity within specified parameters  Outcome: Progressing Towards Goal     Problem: Nutrition Deficit  Goal: *Optimize nutritional status  Outcome: Progressing Towards Goal     Problem: Ventilator Management  Goal: *Adequate oxygenation and ventilation  Outcome: Progressing Towards Goal  Goal: *Patient maintains clear airway/free of aspiration  Outcome: Progressing Towards Goal  Goal: *Absence of infection signs and symptoms  Outcome: Progressing Towards Goal  Goal: *Normal spontaneous ventilation  Outcome: Progressing Towards Goal     Problem: Non-Violent Restraints  Goal: *Removal from restraints as soon as assessed to be safe  Outcome: Progressing Towards Goal  Goal: *No harm/injury to patient while restraints in use  Outcome: Progressing Towards Goal  Goal: *Patient's dignity will be maintained  Outcome: Progressing Towards Goal     Problem: Infection - Risk of, Central Venous Catheter-Associated Bloodstream Infection  Goal: *Absence of infection signs and symptoms  Outcome: Progressing Towards Goal     Problem: Pressure Injury - Risk of  Goal: *Prevention of pressure injury  Description  Document Eugenio Scale and appropriate interventions in the flowsheet.   Outcome: Progressing Towards Goal     Problem: Pacer/ICD: Post-Procedure  Goal: *Hemodynamically stable  Outcome: Progressing Towards Goal  Goal: *Optimal pain control at patient's stated goal  Outcome: Progressing Towards Goal  Goal: *Absence of signs and symptoms of infection or wound complication  Outcome: Progressing Towards Goal

## 2019-07-18 NOTE — PROGRESS NOTES
Cardiology Progress Note                                        Admit Date: 7/4/2019    Assessment/Plan:     CHF; acute systolic, HYHA class IV on POA; slowly improving; continue supportive care and CRRT  S/p pacer for sick sinus; working well  Aflutter; persistent    Jude Burris is a 72 y.o. male with     PROBLEM LIST:  Patient Active Problem List    Diagnosis Date Noted    Acute CHF (congestive heart failure) ([address] 07/05/2019    Pleural effusion, right 07/05/2019    Vitamin D deficiency 11/18/2014    Arthritis of wrist, right, degenerative 11/18/2014    History of wrist fracture, Right 11/18/2014    Overweight (BMI 25.0-29.9) 11/18/2014    HTN (hypertension) 11/11/2014    Blurred vision, bilateral 11/11/2014         Subjective:     Jude Burris reports none. Visit Vitals  /40   Pulse 70   Temp 98.5 °F (36.9 °C)   Resp 20   Ht 5' 8\" (1.727 m)   Wt 140 lb 3.4 oz (63.6 kg)   SpO2 100%   BMI 21.32 kg/m²       Intake/Output Summary (Last 24 hours) at 7/18/2019 0721  Last data filed at 7/18/2019 0700  Gross per 24 hour   Intake 2420.49 ml   Output 1540 ml   Net 880.49 ml       Objective:      Physical Exam:  HEENT: Perrla, EOMI  Neck: No JVD,  No thyroidmegaly  Resp: CTA bilaterally;  No wheezes or rales  CV: RRR s1s2 No murmur no s3  Abd:Soft, Nontender  Ext: No edema  Neuro: intubated  Skin: Warm, Dry, Intact  Pulses: 2+ DP/PT/Rad      Telemetry: AFIB, V paced    Current Facility-Administered Medications   Medication Dose Route Frequency    0.9% sodium chloride infusion 250 mL  250 mL IntraVENous PRN    pantoprazole (PROTONIX) 40 mg in sodium chloride 0.9% 10 mL injection  40 mg IntraVENous Q12H    sodium chloride (NS) flush 5-40 mL  5-40 mL IntraVENous Q8H    sodium chloride (NS) flush 5-40 mL  5-40 mL IntraVENous PRN    dexmedeTOMidine (PRECEDEX) 400 mcg in 0.9% sodium chloride 100 mL infusion  0.2-0.7 mcg/kg/hr IntraVENous TITRATE    bicarbonate dialysis (PRISMASOL) BG K 4/Ca 2.5 5000 ml solution   Extracorporeal DIALYSIS CONTINUOUS    balsam peru-castor oil (VENELEX) ointment   Topical BID    piperacillin-tazobactam (ZOSYN) 3.375 g in 0.9% sodium chloride (MBP/ADV) 100 mL  3.375 g IntraVENous Q8H    0.9% sodium chloride infusion  10 mL/hr IntraVENous CONTINUOUS    chlorhexidine (PERIDEX) 0.12 % mouthwash 15 mL  15 mL Oral BID    albuterol-ipratropium (DUO-NEB) 2.5 MG-0.5 MG/3 ML  3 mL Nebulization Q6H RT    DOPamine (INTROPIN) 800 mg in dextrose 5% 250 mL infusion  0-20 mcg/kg/min IntraVENous TITRATE    PHENYLephrine (ARTURO-SYNEPHRINE) 100 mg in 0.9% sodium chloride 250 mL infusion   mcg/min IntraVENous TITRATE    NOREPINephrine (LEVOPHED) 32 mg in dextrose 5% 250 mL infusion  2-30 mcg/min IntraVENous TITRATE    vasopressin (VASOSTRICT) 20 Units in 0.9% sodium chloride 100 mL infusion  0-0.04 Units/min IntraVENous TITRATE    0.9% sodium chloride infusion 250 mL  250 mL IntraVENous PRN    0.9% sodium chloride infusion 250 mL  250 mL IntraVENous PRN    0.9% sodium chloride infusion 250 mL  250 mL IntraVENous PRN    propofol (DIPRIVAN) infusion  0-50 mcg/kg/min IntraVENous TITRATE    sodium chloride (NS) flush 5-40 mL  5-40 mL IntraVENous Q8H    sodium chloride (NS) flush 5-40 mL  5-40 mL IntraVENous PRN    simethicone (MYLICON) 18JW/9.0MX oral drops 80 mg  1.2 mL Oral Multiple    fentaNYL citrate (PF) injection  mcg   mcg IntraVENous Q1H PRN    0.9% sodium chloride infusion 250 mL  250 mL IntraVENous PRN    0.9% sodium chloride infusion  3 mL/hr IntraVENous CONTINUOUS    calcium carbonate (TUMS) chewable tablet 200 mg [elemental]  200 mg Oral TID PRN    aspirin delayed-release tablet 81 mg  81 mg Oral DAILY    sodium chloride (NS) flush 5-40 mL  5-40 mL IntraVENous Q8H    sodium chloride (NS) flush 5-40 mL  5-40 mL IntraVENous PRN    acetaminophen (TYLENOL) tablet 650 mg  650 mg Oral Q4H PRN    ondansetron (ZOFRAN) injection 4 mg  4 mg IntraVENous Q4H PRN    [facility] ac& pc-s.therm-b.anim (KERLINE Q/RISAQUAD)  1 Cap Oral DAILY    LORazepam (ATIVAN) injection 1 mg  1 mg IntraVENous Q6H PRN    naloxone (NARCAN) injection 0.4 mg  0.4 mg IntraVENous PRN    thiamine HCL (B-1) tablet 100 mg  100 mg Oral DAILY    folic acid (FOLVITE) tablet 1 mg  1 mg Oral DAILY    therapeutic multivitamin (THERAGRAN) tablet 1 Tab  1 Tab Oral DAILY         Data Review:   Labs:    Recent Results (from the past 24 hour(s))   GLUCOSE, POC    Collection Time: 07/17/19 12:51 PM   Result Value Ref Range    Glucose (POC) 92 65 - 100 mg/dL    Performed by Meg Snellen    CBC WITH AUTOMATED DIFF    Collection Time: 07/17/19 12:53 PM   Result Value Ref Range    WBC 15.1 (H) 4.1 - 11.1 K/uL    RBC 2.48 (L) 4.10 - 5.70 M/uL    HGB 7.0 (L) 12.1 - 17.0 g/dL    HCT 22.3 (L) 36.6 - 50.3 %    MCV 89.9 80.0 - 99.0 FL    MCH 28.2 26.0 - 34.0 PG    MCHC 31.4 30.0 - 36.5 g/dL    RDW 17.6 (H) 11.5 - 14.5 %    PLATELET 462 (L) 079 - 400 K/uL    MPV 10.8 8.9 - 12.9 FL    NRBC 0.5 (H) 0  WBC    ABSOLUTE NRBC 0.07 (H) 0.00 - 0.01 K/uL    NEUTROPHILS 72 32 - 75 %    BAND NEUTROPHILS 3 0 - 6 %    LYMPHOCYTES 1 (L) 12 - 49 %    MONOCYTES 7 5 - 13 %    EOSINOPHILS 13 (H) 0 - 7 %    BASOPHILS 2 (H) 0 - 1 %    METAMYELOCYTES 1 (H) 0 %    MYELOCYTES 1 (H) 0 %    IMMATURE GRANULOCYTES 0 %    ABS. NEUTROPHILS 11.3 (H) 1.8 - 8.0 K/UL    ABS. LYMPHOCYTES 0.2 (L) 0.8 - 3.5 K/UL    ABS. MONOCYTES 1.1 (H) 0.0 - 1.0 K/UL    ABS. EOSINOPHILS 2.0 (H) 0.0 - 0.4 K/UL    ABS. BASOPHILS 0.3 (H) 0.0 - 0.1 K/UL    ABS. IMM.  GRANS. 0.0 K/UL    DF MANUAL      RBC COMMENTS ANISOCYTOSIS  1+        RBC COMMENTS POLYCHROMASIA  1+        RBC COMMENTS BASOPHILIC STIPPLING  OCCASIONAL       TYPE + CROSSMATCH    Collection Time: 07/17/19 12:53 PM   Result Value Ref Range    Crossmatch Expiration 07/20/2019     ABO/Rh(D) B POSITIVE     Antibody screen NEG     Unit number [id number]     Blood component type RC LR     Unit division 00 Status of unit TRANSFUSED     Crossmatch result Compatible    METABOLIC PANEL, COMPREHENSIVE    Collection Time: 07/17/19  5:07 PM   Result Value Ref Range    Sodium 139 136 - 145 mmol/L    Potassium 4.1 3.5 - 5.1 mmol/L    Chloride 106 97 - 108 mmol/L    CO2 25 21 - 32 mmol/L    Anion gap 8 5 - 15 mmol/L    Glucose 85 65 - 100 mg/dL    BUN 16 6 - 20 MG/DL    Creatinine 2.32 (H) 0.70 - 1.30 MG/DL    BUN/Creatinine ratio 7 (L) 12 - 20      GFR est AA 34 (L) >60 ml/min/1.73m2    GFR est non-AA 28 (L) >60 ml/min/1.73m2    Calcium 7.9 (L) 8.5 - 10.1 MG/DL    Bilirubin, total 0.6 0.2 - 1.0 MG/DL    ALT (SGPT) 79 (H) 12 - 78 U/L    AST (SGOT) 52 (H) 15 - 37 U/L    Alk. phosphatase 78 45 - 117 U/L    Protein, total 5.4 (L) 6.4 - 8.2 g/dL    Albumin 2.6 (L) 3.5 - 5.0 g/dL    Globulin 2.8 2.0 - 4.0 g/dL    A-G Ratio 0.9 (L) 1.1 - 2.2     PHOSPHORUS    Collection Time: 07/17/19  5:07 PM   Result Value Ref Range    Phosphorus 3.4 2.6 - 4.7 MG/DL   MAGNESIUM    Collection Time: 07/17/19  5:07 PM   Result Value Ref Range    Magnesium 2.6 (H) 1.6 - 2.4 mg/dL   GLUCOSE, POC    Collection Time: 07/17/19  5:13 PM   Result Value Ref Range    Glucose (POC) 82 65 - 100 mg/dL    Performed by Harper Byrd    METABOLIC PANEL, COMPREHENSIVE    Collection Time: 07/18/19  3:54 AM   Result Value Ref Range    Sodium 140 136 - 145 mmol/L    Potassium 4.1 3.5 - 5.1 mmol/L    Chloride 105 97 - 108 mmol/L    CO2 25 21 - 32 mmol/L    Anion gap 10 5 - 15 mmol/L    Glucose 95 65 - 100 mg/dL    BUN 16 6 - 20 MG/DL    Creatinine 2.45 (H) 0.70 - 1.30 MG/DL    BUN/Creatinine ratio 7 (L) 12 - 20      GFR est AA 32 (L) >60 ml/min/1.73m2    GFR est non-AA 26 (L) >60 ml/min/1.73m2    Calcium 8.2 (L) 8.5 - 10.1 MG/DL    Bilirubin, total 0.6 0.2 - 1.0 MG/DL    ALT (SGPT) 75 12 - 78 U/L    AST (SGOT) 50 (H) 15 - 37 U/L    Alk.  phosphatase 83 45 - 117 U/L    Protein, total 5.7 (L) 6.4 - 8.2 g/dL    Albumin 2.7 (L) 3.5 - 5.0 g/dL    Globulin 3.0 2.0 - 4.0 g/dL A-G Ratio 0.9 (L) 1.1 - 2.2     PHOSPHORUS    Collection Time: 07/18/19  3:54 AM   Result Value Ref Range    Phosphorus 2.9 2.6 - 4.7 MG/DL   MAGNESIUM    Collection Time: 07/18/19  3:54 AM   Result Value Ref Range    Magnesium 2.6 (H) 1.6 - 2.4 mg/dL   CBC WITH AUTOMATED DIFF    Collection Time: 07/18/19  3:54 AM   Result Value Ref Range    WBC 16.8 (H) 4.1 - 11.1 K/uL    RBC 3.03 (L) 4.10 - 5.70 M/uL    HGB 8.6 (L) 12.1 - 17.0 g/dL    HCT 26.7 (L) 36.6 - 50.3 %    MCV 88.1 80.0 - 99.0 FL    MCH 28.4 26.0 - 34.0 PG    MCHC 32.2 30.0 - 36.5 g/dL    RDW 17.4 (H) 11.5 - 14.5 %    PLATELET 539 130 - 784 K/uL    MPV 10.8 8.9 - 12.9 FL    NRBC 0.4 (H) 0  WBC    ABSOLUTE NRBC 0.06 (H) 0.00 - 0.01 K/uL    NEUTROPHILS 71 32 - 75 %    BAND NEUTROPHILS 3 0 - 6 %    LYMPHOCYTES 6 (L) 12 - 49 %    MONOCYTES 8 5 - 13 %    EOSINOPHILS 12 (H) 0 - 7 %    BASOPHILS 0 0 - 1 %    IMMATURE GRANULOCYTES 0 %    ABS. NEUTROPHILS 12.5 (H) 1.8 - 8.0 K/UL    ABS. LYMPHOCYTES 1.0 0.8 - 3.5 K/UL    ABS. MONOCYTES 1.3 (H) 0.0 - 1.0 K/UL    ABS. EOSINOPHILS 2.0 (H) 0.0 - 0.4 K/UL    ABS. BASOPHILS 0.0 0.0 - 0.1 K/UL    ABS. IMM.  GRANS. 0.0 K/UL    DF SMEAR SCANNED      RBC COMMENTS ANISOCYTOSIS  1+        RBC COMMENTS HYPOCHROMIA  1+

## 2019-07-18 NOTE — PROGRESS NOTES
2000- Bedside and Verbal shift change report given to [address] (oncoming nurse) by José Doran (offgoing nurse). Report included the following information SBAR, Paced rhythm. 2145- Spoke with Ko Winters NP about needing a head CT for patient. Stated that it appears that the CRRT filter might need to be changed out this evening, so I suggested that we get a head CT during this CRRT down time. He agreed. Order placed. Spoke with CT about situation with CRRT and needing scan. They stated to just call when I am able to bring the patient down. 0202- Pt CRRT started to clot off. Was able to return blood. Organized head CT with CT department and RT. Called Frank to let them know that his filter needs changing. Jimbo Veronica returned call and stated to let him knkow when we are back from CT and he will change out filter. 0245- Pt to CT    0310- Pt back from CT called Jimbo Veronica. They stated they will be by as soon as they can to reconnect patient. 0- [facility] at bedside reconnecting patient. 8720- CRRT restarted. 0800- Bedside and Verbal shift change report given to Ifeanyi Su 46 (oncoming nurse) by Debbie Myers RN (offgoing nurse). Report included the following information SBAR, Procedure Summary, Intake/Output, Recent Results and Cardiac Rhythm Paced. Problem: Falls - Risk of  Goal: *Absence of Falls  Description  Document Mi Andrews Fall Risk and appropriate interventions in the flowsheet. Outcome: Progressing Towards Goal  Note:   Fall Risk Interventions:  Mobility Interventions: Communicate number of staff needed for ambulation/transfer    Mentation Interventions: Adequate sleep, hydration, pain control    Medication Interventions: Evaluate medications/consider consulting pharmacy    Elimination Interventions: Toileting schedule/hourly rounds              Problem: Heart Failure: Day 5  Goal: Off Pathway (Use only if patient is Off Pathway)  Outcome: Progressing Towards Goal  Note:   Weaning Levophed per orders. Problem: Activity Intolerance  Goal: *Oxygen saturation during activity within specified parameters  Outcome: Progressing Towards Goal     Problem: Cath Lab Procedures: Discharge Outcomes  Goal: *Stable cardiac rhythm  Outcome: Progressing Towards Goal  Goal: *Hemodynamically stable  Outcome: Progressing Towards Goal  Goal: *Optimal pain control at patient's stated goal  Outcome: Progressing Towards Goal  Goal: *Pulses palpable, skin color within defined limits, skin temperature warm  Outcome: Progressing Towards Goal  Goal: *Lungs clear or at baseline  Outcome: Progressing Towards Goal  Goal: *No signs and symptoms of infection or wound complications  Outcome: Progressing Towards Goal  Goal: *Anxiety reduced or absent  Outcome: Progressing Towards Goal     Problem: Nutrition Deficit  Goal: *Optimize nutritional status  Outcome: Progressing Towards Goal     Problem: Ventilator Management  Goal: *Adequate oxygenation and ventilation  Outcome: Progressing Towards Goal  Goal: *Patient maintains clear airway/free of aspiration  Outcome: Progressing Towards Goal  Goal: *Absence of infection signs and symptoms  Outcome: Progressing Towards Goal  Goal: *Normal spontaneous ventilation  Outcome: Progressing Towards Goal     Problem: Non-Violent Restraints  Goal: *Removal from restraints as soon as assessed to be safe  Outcome: Progressing Towards Goal  Goal: *No harm/injury to patient while restraints in use  Outcome: Progressing Towards Goal  Goal: *Patient's dignity will be maintained  Outcome: Progressing Towards Goal     Problem: Infection - Risk of, Central Venous Catheter-Associated Bloodstream Infection  Goal: *Absence of infection signs and symptoms  Outcome: Progressing Towards Goal     Problem: Pressure Injury - Risk of  Goal: *Prevention of pressure injury  Description  Document Eugenio Scale and appropriate interventions in the flowsheet.   Outcome: Progressing Towards Goal  Note:   Pressure Injury Interventions:  Sensory Interventions: Turn and reposition approx. every two hours (pillows and wedges if needed)    Moisture Interventions: Absorbent underpads, Apply protective barrier, creams and emollients, Internal/External fecal devices, Minimize layers, Moisture barrier    Activity Interventions: Pressure redistribution bed/mattress(bed type)    Mobility Interventions: Pressure redistribution bed/mattress (bed type), Turn and reposition approx.  every two hours(pillow and wedges)    Nutrition Interventions: Discuss nutritional consult with provider    Friction and Shear Interventions: Lift sheet, Minimize layers

## 2019-07-18 NOTE — PROGRESS NOTES
[facility]  Pulmonary, Critical Care, and Sleep Medicine    Progress note    Name: Thomas Hsu MRN: [id number]   : 1945 Hospital: [facility]   Date: 2019        IMPRESSION:   · Multisystem organ failure  · Hemorrhagic shock  · Acute severe blood loss anemia  · KAYLEEN with severe metabolic acidosis  · Duodenal ulcer- S/P clipping, IR embolization planned  · Acute resp failure with failure to meet MV demands  · PHTN- by ECHO PASP 79 EF 50% no AV/MV disease, but RHC data (below) a bit better. Portopulmonary HTN- ETOH abuse and hypoalbuminemic ? Occult cirrhosis  · S/p LHC/RCH 7/10: RV 60/4, PA 61/15 mean 29, /7, /48, RA mean 4, PCW mean 7, CO 2.85-- echo reviewed: biatrial dilatation, left to right blowing of interatrial septum. Cath findings show precapillary PH after diuresis, likely mixed picture on presentation (combined pre and post capillary PH-- filling pressures normalized after diuresis)  · HTN  · Right effusion-exudate- ? From [place] [address] or other DDX is hepatic hydrothorax - cultures and cytology negative  · SBO- conservative MGMT  · Leukocytosis      RECOMMENDATIONS:   · VACV- mental status prohibitive for extubation  · Getting Head CT scan today   · Empiric zosyn  · Supportive transfusions - hgh looks better today   · Off dopamine/debbie/vasopressin   · Levophed    · PPM placement   · On CRRT  · Sedation noted  · On protonix   · Chest x-ray tomorrow  · No anticoagulation  · Vent bundle  · Elective PH work up if and when acute issues resolve  · GI and nephrology following  · Critically ill, at high risk for decline and death. CCT 30'. D/W  RN     Subjective:       30mcg propofol, 0.7mcg precedex. PEEP 5cmh2o, and 30% FiO2. Still intubated due to not waking up. Noted chest x-ray today.   Seen and examined earlier today. D/W RN-- opens eyes but does not follow commands.  Periods of rigidity noted by RN      On vent 30% FiO2  No active bleeding over night  Remains bradycardic on dopamine - for PPM today  CXR clear  May be able to try SAT / SBT after PPM placement    7/15  On vent 30% FiO2  Received 2 units prbc's over night  On vasopressors  Bradycardic   On CVVH    7/14  Seen and examined  Still on NE and PE  Off vasopressin  Hb stable  Leukocytosis noted  On CRRT  On dopamine for bradycardia- on 3 mcg      7/13  Seen and examined earlier today. Transferred to CVICU after rapid response for SOB. Intubated soon after. Found in refractory shock with massive GI bleed secondary to duodenal ulcer. Pressors adjusted at bedside. PH buffered with supplemental bicarb for severe metabolic acidosis. Receiving large amounts of blood products. Fluid resuscitation ongoing. S/O clipping of visible vessel by GI but at high risk for re-bleed and IR embolization is planned. Worsening renal failure with ATN and recent contrast- CRRT is planned. 7/12  He feels well today. No acute complaints     7/11  States that his breathing feels much better, wife at bedside tells me that he looks better and much more comfortable than previously. CXR much improved. 7/10  stable    7/9  This patient has been seen and evaluated at the request of Dr. Teodora Gonzalez for PHTN. Patient is a 72 y.o. male h/o ETOH abuse presents with 6 months progressive Lozano and presented with large right effusion- tapped- exudative. Negative micro. Thoracic placed pigtail and now out. ECHO with severe PHTN. Pt alert denies h/o PE, CTD, no h/o lung disease.        Past Medical History:   Diagnosis Date    Arthritic-like pain     Hypertension       Past Surgical History:   Procedure Laterality Date    HX PACEMAKER  07/16/2019    medtronic dual chamber VVIR     IR INSERT NON TUNL CVC OVER 5 YRS  7/13/2019    IR OCCL TXCATH HEMMORAGE W SI  7/13/2019    SD INS NEW/RPLCMT PRM PM W/TRANSV ELTRD ATRIAL&VENT N/A 7/16/2019    INSERT PPM DUAL performed [place] [address], [facility]  CATH LAB      Prior to Admission medications    Medication Sig Start Date End Date Taking? Authorizing Provider   therapeutic multivitamin [facility]) tablet Take 1 Tab by mouth daily. Yes Provider, Historical   ergocalciferol (VITAMIN D2) 50,000 unit capsule Take 50,000 Units by mouth every . Yes Provider, Historical   aspirin delayed-release 81 mg tablet Take  by mouth daily. Yes Provider, Historical     No Known Allergies   Social History     Tobacco Use    Smoking status: Former Smoker     Start date: 1966     Last attempt to quit: 1984     Years since quittin.7    Smokeless tobacco: Never Used   Substance Use Topics    Alcohol use:  Yes     Alcohol/week: 11.7 standard drinks     Types: 14 drink(s) per week      Family History   Problem Relation Age of Onset    Diabetes Mother     Hypertension Mother     Heart Disease Mother     Asthma Mother     Arthritis-osteo Mother     Diabetes Father     Hypertension Father     Asthma Father     Arthritis-osteo Father     Diabetes Sister     Gout Sister     Asthma Brother         Current Facility-Administered Medications   Medication Dose Route Frequency    pantoprazole (PROTONIX) 40 mg in sodium chloride 0.9% 10 mL injection  40 mg IntraVENous Q12H    sodium chloride (NS) flush 5-40 mL  5-40 mL IntraVENous Q8H    dexmedeTOMidine (PRECEDEX) 400 mcg in 0.9% sodium chloride 100 mL infusion  0.2-0.7 mcg/kg/hr IntraVENous TITRATE    bicarbonate dialysis (PRISMASOL) BG K 4/Ca 2.5 5000 ml solution   Extracorporeal DIALYSIS CONTINUOUS    balsam peru-castor oil (VENELEX) ointment   Topical BID    piperacillin-tazobactam (ZOSYN) 3.375 g in 0.9% sodium chloride (MBP/ADV) 100 mL  3.375 g IntraVENous Q8H    0.9% sodium chloride infusion  10 mL/hr IntraVENous CONTINUOUS    chlorhexidine (PERIDEX) 0.12 % mouthwash 15 mL  15 mL Oral BID    albuterol-ipratropium (DUO-NEB) 2.5 MG-0.5 MG/3 ML  3 mL Nebulization Q6H RT    DOPamine (INTROPIN) 800 mg in dextrose 5% 250 mL infusion  0-20 mcg/kg/min IntraVENous TITRATE    PHENYLephrine (ARTURO-SYNEPHRINE) 100 mg in 0.9% sodium chloride 250 mL infusion   mcg/min IntraVENous TITRATE    NOREPINephrine (LEVOPHED) 32 mg in dextrose 5% 250 mL infusion  2-30 mcg/min IntraVENous TITRATE    vasopressin (VASOSTRICT) 20 Units in 0.9% sodium chloride 100 mL infusion  0-0.04 Units/min IntraVENous TITRATE    propofol (DIPRIVAN) infusion  0-50 mcg/kg/min IntraVENous TITRATE    sodium chloride (NS) flush 5-40 mL  5-40 mL IntraVENous Q8H    0.9% sodium chloride infusion  3 mL/hr IntraVENous CONTINUOUS    aspirin delayed-release tablet 81 mg  81 mg Oral DAILY    sodium chloride (NS) flush 5-40 mL  5-40 mL IntraVENous Q8H    lactobac ac& pc-s.therm-b.anim (KERLINE Q/RISAQUAD)  1 Cap Oral DAILY    thiamine HCL (B-1) tablet 100 mg  100 mg Oral DAILY    folic acid (FOLVITE) tablet 1 mg  1 mg Oral DAILY    therapeutic multivitamin (THERAGRAN) tablet 1 Tab  1 Tab Oral DAILY       Review of Systems:  A comprehensive review of systems was negative except for that written in the HPI. Objective:   Vital Signs:    Visit Vitals  /40   Pulse 70   Temp 99 °F (37.2 °C)   Resp 18   Ht 5' 8\" (1.727 m)   Wt 63.6 kg (140 lb 3.4 oz)   SpO2 100%   BMI 21.32 kg/m²       O2 Device: Ventilator   O2 Flow Rate (L/min): 2 l/min   Temp (24hrs), Av.3 °F (36.8 °C), Min:97.5 °F (36.4 °C), Max:99 °F (37.2 °C)       Intake/Output:   Last shift:       07 -  1900  In: 673.9 [I.V.:188.9]  Out: 775   Last 3 shifts: 1901 -  0700  In: 3158.8 [I.V.:1777.1]  Out: 4696 [Drains:420]    Intake/Output Summary (Last 24 hours) at 2019 1302  Last data filed at 2019 1200  Gross per 24 hour   Intake 2671.17 ml   Output 1921 ml   Net 750.17 ml      Physical Exam:   General:  Intubated, appears stated age. Head:  Normocephalic, without obvious abnormality, atraumatic.    Eyes:  Conjunctivae/corneas - pale    Nose: Nares normal. Septum midline       Neck: Supple, symmetrical, trachea midline       Lungs:   Clear to auscultation bilaterally. Heart:  Regular rate and rhythm, S1, S2 normal, no murmur, click, rub or gallop. Abdomen:   Distended   Extremities: Extremities normal, atraumatic, no cyanosis or edema. Pulses: 2+ and symmetric all extremities. Skin: Skin color, texture, turgor normal. No rashes or lesions             Data review:     Recent Results (from the past 24 hour(s))   METABOLIC PANEL, COMPREHENSIVE    Collection Time: 07/17/19  5:07 PM   Result Value Ref Range    Sodium 139 136 - 145 mmol/L    Potassium 4.1 3.5 - 5.1 mmol/L    Chloride 106 97 - 108 mmol/L    CO2 25 21 - 32 mmol/L    Anion gap 8 5 - 15 mmol/L    Glucose 85 65 - 100 mg/dL    BUN 16 6 - 20 MG/DL    Creatinine 2.32 (H) 0.70 - 1.30 MG/DL    BUN/Creatinine ratio 7 (L) 12 - 20      GFR est AA 34 (L) >60 ml/min/1.73m2    GFR est non-AA 28 (L) >60 ml/min/1.73m2    Calcium 7.9 (L) 8.5 - 10.1 MG/DL    Bilirubin, total 0.6 0.2 - 1.0 MG/DL    ALT (SGPT) 79 (H) 12 - 78 U/L    AST (SGOT) 52 (H) 15 - 37 U/L    Alk.  phosphatase 78 45 - 117 U/L    Protein, total 5.4 (L) 6.4 - 8.2 g/dL    Albumin 2.6 (L) 3.5 - 5.0 g/dL    Globulin 2.8 2.0 - 4.0 g/dL    A-G Ratio 0.9 (L) 1.1 - 2.2     PHOSPHORUS    Collection Time: 07/17/19  5:07 PM   Result Value Ref Range    Phosphorus 3.4 2.6 - 4.7 MG/DL   MAGNESIUM    Collection Time: 07/17/19  5:07 PM   Result Value Ref Range    Magnesium 2.6 (H) 1.6 - 2.4 mg/dL   GLUCOSE, POC    Collection Time: 07/17/19  5:13 PM   Result Value Ref Range    Glucose (POC) 82 65 - 100 mg/dL    Performed by [facility], COMPREHENSIVE    Collection Time: 07/18/19  3:54 AM   Result Value Ref Range    Sodium 140 136 - 145 mmol/L    Potassium 4.1 3.5 - 5.1 mmol/L    Chloride 105 97 - 108 mmol/L    CO2 25 21 - 32 mmol/L    Anion gap 10 5 - 15 mmol/L    Glucose 95 65 - 100 mg/dL    BUN 16 6 - 20 MG/DL    Creatinine 2.45 (H) 0.70 - 1.30 MG/DL    BUN/Creatinine ratio 7 (L) 12 - 20      GFR est AA 32 (L) >60 ml/min/1.73m2    GFR est non-AA 26 (L) >60 ml/min/1.73m2    Calcium 8.2 (L) 8.5 - 10.1 MG/DL    Bilirubin, total 0.6 0.2 - 1.0 MG/DL    ALT (SGPT) 75 12 - 78 U/L    AST (SGOT) 50 (H) 15 - 37 U/L    Alk. phosphatase 83 45 - 117 U/L    Protein, total 5.7 (L) 6.4 - 8.2 g/dL    Albumin 2.7 (L) 3.5 - 5.0 g/dL    Globulin 3.0 2.0 - 4.0 g/dL    A-G Ratio 0.9 (L) 1.1 - 2.2     PHOSPHORUS    Collection Time: 07/18/19  3:54 AM   Result Value Ref Range    Phosphorus 2.9 2.6 - 4.7 MG/DL   MAGNESIUM    Collection Time: 07/18/19  3:54 AM   Result Value Ref Range    Magnesium 2.6 (H) 1.6 - 2.4 mg/dL   CBC WITH AUTOMATED DIFF    Collection Time: 07/18/19  3:54 AM   Result Value Ref Range    WBC 16.8 (H) 4.1 - 11.1 K/uL    RBC 3.03 (L) 4.10 - 5.70 M/uL    HGB 8.6 (L) 12.1 - 17.0 g/dL    HCT 26.7 (L) 36.6 - 50.3 %    MCV 88.1 80.0 - 99.0 FL    MCH 28.4 26.0 - 34.0 PG    MCHC 32.2 30.0 - 36.5 g/dL    RDW 17.4 (H) 11.5 - 14.5 %    PLATELET 875 046 - 149 K/uL    MPV 10.8 8.9 - 12.9 FL    NRBC 0.4 (H) 0  WBC    ABSOLUTE NRBC 0.06 (H) 0.00 - 0.01 K/uL    NEUTROPHILS 71 32 - 75 %    BAND NEUTROPHILS 3 0 - 6 %    LYMPHOCYTES 6 (L) 12 - 49 %    MONOCYTES 8 5 - 13 %    EOSINOPHILS 12 (H) 0 - 7 %    BASOPHILS 0 0 - 1 %    IMMATURE GRANULOCYTES 0 %    ABS. NEUTROPHILS 12.5 (H) 1.8 - 8.0 K/UL    ABS. LYMPHOCYTES 1.0 0.8 - 3.5 K/UL    ABS. MONOCYTES 1.3 (H) 0.0 - 1.0 K/UL    ABS. EOSINOPHILS 2.0 (H) 0.0 - 0.4 K/UL    ABS. BASOPHILS 0.0 0.0 - 0.1 K/UL    ABS. IMM.  GRANS. 0.0 K/UL    DF SMEAR SCANNED      RBC COMMENTS ANISOCYTOSIS  1+        RBC COMMENTS HYPOCHROMIA  1+           Imaging:  I have personally reviewed the patients radiographs and have reviewed the reports:  7/5 CT chest moderate to large layering right effusion and RLL ATX LLL ASD no ILD changes no masses- main PA large        Morris Mili Lemos, PA

## 2019-07-18 NOTE — DIALYSIS
[facility]       [phone]    Orders   Mode: cvvh   Factor: 0 ml/hr   UFR: 1600 ml/hr   Blood Flow Rate: 200 ml/min     Metrics   BP / HR: 142/40  70   Blood Flow Rate: 200 ml/min   AP:                         -71   RP: 70   TMP: 38   PD: 32   FP: 140   UFR: 1600 ml/hr     Comments / Plan: At bedside to restart crrt after pt taken down for CT, all possible blood returned to pt per primary RN. Orders, consent, pt and code status confirmed. RIJ non-tunneled CVC +aspiration/+flush x2. biopatch intact dated 7/16/19. New HF 1000 primed, tested, and running well.  Lines visible, secure, connections fastened well and blood warmer on return line set to 37°C. Education and pre/post report given to Lisa Myers RN

## 2019-07-18 NOTE — PROGRESS NOTES
[facility]   [address] [address], [facility] [address] [place], [facility]  Phone: [phone]   [phone]       Nephrology Progress Note  Nury Lewis     1945     [id number]  Date of Admission : 7/4/2019 07/18/19    CC:  Follow up for KAYLEEN      Assessment and Plan   KAYLEEN on CKD   - 2/2 ATN from hemorrhagic shock   - remains anuric and callejas removed 7/15  - continue CRRT for now  - factor of 25-50 today   - wont be able to tolerate intermittent HD at this time     Encephalopathy:  - consider MRI Brain for anoxic brain injury or Neurology consult     CKD Stage III :  - baseline Cr 1.2-1.3 mg/dl     Hemorraghic Shock /Massive UGI bleed  - S/P emergent clipping by EDG 7/13  - S/P embolization of gastric/diuodenal artery 7/12 in IR  - on and off pressors      Bradycardia   - Off Dopamine gtt  - s/p PPM placement 7/16     Acute Resp Failure   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 Occult Cirrhosis      Interval History:  Seen and examined   Tolerating CRRT   On and off pressors   Not following commands when sedation off  CT head negative       Review of Systems: Review of systems not obtained due to patient factors.     Current Medications:   Current Facility-Administered Medications   Medication Dose Route Frequency    0.9% sodium chloride infusion 250 mL  250 mL IntraVENous PRN    pantoprazole (PROTONIX) 40 mg in sodium chloride 0.9% 10 mL injection  40 mg IntraVENous Q12H    sodium chloride (NS) flush 5-40 mL  5-40 mL IntraVENous Q8H    sodium chloride (NS) flush 5-40 mL  5-40 mL IntraVENous PRN    dexmedeTOMidine (PRECEDEX) 400 mcg in 0.9% sodium chloride 100 mL infusion  0.2-0.7 mcg/kg/hr IntraVENous TITRATE    bicarbonate dialysis (PRISMASOL) BG K 4/Ca 2.5 5000 ml solution   Extracorporeal DIALYSIS CONTINUOUS    balsam peru-castor oil (VENELEX) ointment   Topical BID    piperacillin-tazobactam (ZOSYN) 3.375 g in 0.9% sodium chloride (MBP/ADV) 100 mL  3.375 g IntraVENous Q8H    0.9% sodium chloride infusion  10 mL/hr IntraVENous CONTINUOUS    chlorhexidine (PERIDEX) 0.12 % mouthwash 15 mL  15 mL Oral BID    albuterol-ipratropium (DUO-NEB) 2.5 MG-0.5 MG/3 ML  3 mL Nebulization Q6H RT    DOPamine (INTROPIN) 800 mg in dextrose 5% 250 mL infusion  0-20 mcg/kg/min IntraVENous TITRATE    PHENYLephrine (ARTURO-SYNEPHRINE) 100 mg in 0.9% sodium chloride 250 mL infusion   mcg/min IntraVENous TITRATE    NOREPINephrine (LEVOPHED) 32 mg in dextrose 5% 250 mL infusion  2-30 mcg/min IntraVENous TITRATE    vasopressin (VASOSTRICT) 20 Units in 0.9% sodium chloride 100 mL infusion  0-0.04 Units/min IntraVENous TITRATE    0.9% sodium chloride infusion 250 mL  250 mL IntraVENous PRN    0.9% sodium chloride infusion 250 mL  250 mL IntraVENous PRN    0.9% sodium chloride infusion 250 mL  250 mL IntraVENous PRN    propofol (DIPRIVAN) infusion  0-50 mcg/kg/min IntraVENous TITRATE    sodium chloride (NS) flush 5-40 mL  5-40 mL IntraVENous Q8H    sodium chloride (NS) flush 5-40 mL  5-40 mL IntraVENous PRN    simethicone (MYLICON) 05QQ/4.6IP oral drops 80 mg  1.2 mL Oral Multiple    fentaNYL citrate (PF) injection  mcg   mcg IntraVENous Q1H PRN    0.9% sodium chloride infusion 250 mL  250 mL IntraVENous PRN    0.9% sodium chloride infusion  3 mL/hr IntraVENous CONTINUOUS    calcium carbonate (TUMS) chewable tablet 200 mg [elemental]  200 mg Oral TID PRN    aspirin delayed-release tablet 81 mg  81 mg Oral DAILY    sodium chloride (NS) flush 5-40 mL  5-40 mL IntraVENous Q8H    sodium chloride (NS) flush 5-40 mL  5-40 mL IntraVENous PRN    acetaminophen (TYLENOL) tablet 650 mg  650 mg Oral Q4H PRN    ondansetron (ZOFRAN) injection 4 mg  4 mg IntraVENous Q4H PRN    lactobac ac& pc-s.therm-b.anim (KERLINE Q/RISAQUAD)  1 Cap Oral DAILY    LORazepam (ATIVAN) injection 1 mg  1 mg IntraVENous Q6H PRN    naloxone (NARCAN) injection 0.4 mg  0.4 mg IntraVENous PRN    thiamine HCL (B-1) tablet 100 mg  100 mg Oral DAILY    folic acid (FOLVITE) tablet 1 mg  1 mg Oral DAILY    therapeutic multivitamin (THERAGRAN) tablet 1 Tab  1 Tab Oral DAILY      No Known Allergies    Objective:  Vitals:    Vitals:    07/18/19 0600 07/18/19 0700 07/18/19 0800 07/18/19 0846   BP:       Pulse: 72 70 70 70   Resp: 20 20 18 18   Temp:   99 °F (37.2 °C)    SpO2: 100% 100% 100% 100%   Weight:       Height:         Intake and Output:  07/18 0701 - 07/18 1900  In: 49.3 [I.V.:24.3]  Out: -   07/16 1901 - 07/18 0700  In: 3158.8 [I.V.:1777.1]  Out: 2807 [Drains:420]    Physical Examination:  Pt intubated    Yes   General: Unresponsive   Neck:  Lines +  Resp:  CTA  CV:  RRR,  no murmur or rub, no LE edema  GI:  Soft, NT, + Bowel sounds, no hepatosplenomegaly  Neurologic:  Sedated   Psych:             Unable to assess   :no callejas     []    High complexity decision making was performed  []    Patient is at high-risk of decompensation with multiple organ involvement    Lab Data Personally Reviewed: I have reviewed all the pertinent labs, microbiology data and radiology studies during assessment.     Recent Labs     07/18/19  0354 07/17/19  1707 07/17/19  0312 07/16/19  1608 07/16/19  0313    139 140 140 139   K 4.1 4.1 4.2 4.0 3.8    106 108 107 107   CO2 25 25 24 25 24   GLU 95 85 85 86 103*   BUN 16 16 18 21* 21*   CREA 2.45* 2.32* 2.61* 2.98* 2.44*   CA 8.2* 7.9* 7.9* 8.0* 8.1*   MG 2.6* 2.6* 2.5* 2.4 2.3   PHOS 2.9 3.4 3.3 3.5 3.9   ALB 2.7* 2.6* 2.7* 2.9* 3.1*   SGOT 50* 52* 61* 72* 88*   ALT 75 79* 93* 105* 124*     Recent Labs     07/18/19  0354 07/17/19  1253 07/17/19  0312 07/16/19  1914 07/16/19  1400   WBC 16.8* 15.1* 17.2* 15.9* 17.1*   HGB 8.6* 7.0* 7.4* 7.1* 7.6*   HCT 26.7* 22.3* 22.8* 21.9* 23.1*    123* 134* 119* 114*     No results found for: SDES  Lab Results   Component Value Date/Time    Culture result: NO GROWTH 4 DAYS 07/05/2019 12:25 PM    Culture result: NO GROWTH 4 DAYS 07/05/2019 12:25 PM     Recent Results (from the past 24 hour(s))   GLUCOSE, POC    Collection Time: 07/17/19 12:51 PM   Result Value Ref Range    Glucose (POC) 92 65 - 100 mg/dL    Performed by Zach     CBC WITH AUTOMATED DIFF    Collection Time: 07/17/19 12:53 PM   Result Value Ref Range    WBC 15.1 (H) 4.1 - 11.1 K/uL    RBC 2.48 (L) 4.10 - 5.70 M/uL    HGB 7.0 (L) 12.1 - 17.0 g/dL    HCT 22.3 (L) 36.6 - 50.3 %    MCV 89.9 80.0 - 99.0 FL    MCH 28.2 26.0 - 34.0 PG    MCHC 31.4 30.0 - 36.5 g/dL    RDW 17.6 (H) 11.5 - 14.5 %    PLATELET 037 (L) 104 - 400 K/uL    MPV 10.8 8.9 - 12.9 FL    NRBC 0.5 (H) 0  WBC    ABSOLUTE NRBC 0.07 (H) 0.00 - 0.01 K/uL    NEUTROPHILS 72 32 - 75 %    BAND NEUTROPHILS 3 0 - 6 %    LYMPHOCYTES 1 (L) 12 - 49 %    MONOCYTES 7 5 - 13 %    EOSINOPHILS 13 (H) 0 - 7 %    BASOPHILS 2 (H) 0 - 1 %    METAMYELOCYTES 1 (H) 0 %    MYELOCYTES 1 (H) 0 %    IMMATURE GRANULOCYTES 0 %    ABS. NEUTROPHILS 11.3 (H) 1.8 - 8.0 K/UL    ABS. LYMPHOCYTES 0.2 (L) 0.8 - 3.5 K/UL    ABS. MONOCYTES 1.1 (H) 0.0 - 1.0 K/UL    ABS. EOSINOPHILS 2.0 (H) 0.0 - 0.4 K/UL    ABS. BASOPHILS 0.3 (H) 0.0 - 0.1 K/UL    ABS. IMM.  GRANS. 0.0 K/UL    DF MANUAL      RBC COMMENTS ANISOCYTOSIS  1+        RBC COMMENTS POLYCHROMASIA  1+        RBC COMMENTS BASOPHILIC STIPPLING  OCCASIONAL       TYPE + CROSSMATCH    Collection Time: 07/17/19 12:53 PM   Result Value Ref Range    Crossmatch Expiration 07/20/2019     ABO/Rh(D) B POSITIVE     Antibody screen NEG     Unit number [id number]     Blood component type RC LR     Unit division 00     Status of unit TRANSFUSED     Crossmatch result Compatible    METABOLIC PANEL, COMPREHENSIVE    Collection Time: 07/17/19  5:07 PM   Result Value Ref Range    Sodium 139 136 - 145 mmol/L    Potassium 4.1 3.5 - 5.1 mmol/L    Chloride 106 97 - 108 mmol/L    CO2 25 21 - 32 mmol/L Anion gap 8 5 - 15 mmol/L    Glucose 85 65 - 100 mg/dL    BUN 16 6 - 20 MG/DL    Creatinine 2.32 (H) 0.70 - 1.30 MG/DL    BUN/Creatinine ratio 7 (L) 12 - 20      GFR est AA 34 (L) >60 ml/min/1.73m2    GFR est non-AA 28 (L) >60 ml/min/1.73m2    Calcium 7.9 (L) 8.5 - 10.1 MG/DL    Bilirubin, total 0.6 0.2 - 1.0 MG/DL    ALT (SGPT) 79 (H) 12 - 78 U/L    AST (SGOT) 52 (H) 15 - 37 U/L    Alk. phosphatase 78 45 - 117 U/L    Protein, total 5.4 (L) 6.4 - 8.2 g/dL    Albumin 2.6 (L) 3.5 - 5.0 g/dL    Globulin 2.8 2.0 - 4.0 g/dL    A-G Ratio 0.9 (L) 1.1 - 2.2     PHOSPHORUS    Collection Time: 07/17/19  5:07 PM   Result Value Ref Range    Phosphorus 3.4 2.6 - 4.7 MG/DL   MAGNESIUM    Collection Time: 07/17/19  5:07 PM   Result Value Ref Range    Magnesium 2.6 (H) 1.6 - 2.4 mg/dL   GLUCOSE, POC    Collection Time: 07/17/19  5:13 PM   Result Value Ref Range    Glucose (POC) 82 65 - 100 mg/dL    [facility]    METABOLIC PANEL, COMPREHENSIVE    Collection Time: 07/18/19  3:54 AM   Result Value Ref Range    Sodium 140 136 - 145 mmol/L    Potassium 4.1 3.5 - 5.1 mmol/L    Chloride 105 97 - 108 mmol/L    CO2 25 21 - 32 mmol/L    Anion gap 10 5 - 15 mmol/L    Glucose 95 65 - 100 mg/dL    BUN 16 6 - 20 MG/DL    Creatinine 2.45 (H) 0.70 - 1.30 MG/DL    BUN/Creatinine ratio 7 (L) 12 - 20      GFR est AA 32 (L) >60 ml/min/1.73m2    GFR est non-AA 26 (L) >60 ml/min/1.73m2    Calcium 8.2 (L) 8.5 - 10.1 MG/DL    Bilirubin, total 0.6 0.2 - 1.0 MG/DL    ALT (SGPT) 75 12 - 78 U/L    AST (SGOT) 50 (H) 15 - 37 U/L    Alk.  phosphatase 83 45 - 117 U/L    Protein, total 5.7 (L) 6.4 - 8.2 g/dL    Albumin 2.7 (L) 3.5 - 5.0 g/dL    Globulin 3.0 2.0 - 4.0 g/dL    A-G Ratio 0.9 (L) 1.1 - 2.2     PHOSPHORUS    Collection Time: 07/18/19  3:54 AM   Result Value Ref Range    Phosphorus 2.9 2.6 - 4.7 MG/DL   MAGNESIUM    Collection Time: 07/18/19  3:54 AM   Result Value Ref Range    Magnesium 2.6 (H) 1.6 - 2.4 mg/dL   CBC WITH AUTOMATED DIFF Collection Time: 07/18/19  3:54 AM   Result Value Ref Range    WBC 16.8 (H) 4.1 - 11.1 K/uL    RBC 3.03 (L) 4.10 - 5.70 M/uL    HGB 8.6 (L) 12.1 - 17.0 g/dL    HCT 26.7 (L) 36.6 - 50.3 %    MCV 88.1 80.0 - 99.0 FL    MCH 28.4 26.0 - 34.0 PG    MCHC 32.2 30.0 - 36.5 g/dL    RDW 17.4 (H) 11.5 - 14.5 %    PLATELET 626 472 - 756 K/uL    MPV 10.8 8.9 - 12.9 FL    NRBC 0.4 (H) 0  WBC    ABSOLUTE NRBC 0.06 (H) 0.00 - 0.01 K/uL    NEUTROPHILS 71 32 - 75 %    BAND NEUTROPHILS 3 0 - 6 %    LYMPHOCYTES 6 (L) 12 - 49 %    MONOCYTES 8 5 - 13 %    EOSINOPHILS 12 (H) 0 - 7 %    BASOPHILS 0 0 - 1 %    IMMATURE GRANULOCYTES 0 %    ABS. NEUTROPHILS 12.5 (H) 1.8 - 8.0 K/UL    ABS. LYMPHOCYTES 1.0 0.8 - 3.5 K/UL    ABS. MONOCYTES 1.3 (H) 0.0 - 1.0 K/UL    ABS. EOSINOPHILS 2.0 (H) 0.0 - 0.4 K/UL    ABS. BASOPHILS 0.0 0.0 - 0.1 K/UL    ABS. IMM. GRANS. 0.0 K/UL    DF SMEAR SCANNED      RBC COMMENTS ANISOCYTOSIS  1+        RBC COMMENTS HYPOCHROMIA  1+               Total time spent with patient:  xxx   min. Care Plan discussed with:  Patient     Family      RN      Consulting Physician [id number] [facility],         I have reviewed the flowsheets. Chart and Pertinent Notes have been reviewed. No change in PMH ,family and social history from Consult note.       Quinn Saldaña MD

## 2019-07-18 NOTE — PROGRESS NOTES
08:00- Bedside report received from Vamsi [place]ri[place], [facility]. Drips dual verified. 10:30- Attempted factor of 50. Diprivan was also increased due to restlessness at this time - pt became hypotensive SBP 80s requiring increase in Levophed. Factor removed, BP recovered on 5 mcg Levophed. Wife at bedside, updated on status and plan of care. Shift Summary: Throughout shift pt's blood pressure very sensitive to changes in CRRT fluid removal rate and prn sedation requiring quick titrations up on Levophed and temporarily decreasing factor. Blood pressure takes a while to stabilize during these hypotensive episodes. Once stable, Levophed gtt ranged between 5-8 mcg to maintain SBP > 100. Able to pull factor at times between 25-50. Pt restless at times, moving all extremities, open eyes, breathing over vent RR18-24 but unable to follow commands, track, and with no protective corneal reflex. Multiple family members visiting today, appropriate, hopeful and supportive. 20:15- Bedside shift change report given to Rose Saez (oncoming nurse) by Tanika Hair RN (offgoing nurse). Report included the following information SBAR, Procedure Summary, Intake/Output, MAR, Recent Results and Cardiac Rhythm Paced, no ectopy.

## 2019-07-18 NOTE — PROGRESS NOTES
Hospitalist Progress Note  Mena Payton MD  Answering service: [phone] from in house phone  Cell: [phone]      Date of Service:  2019  NAME:  Ibrahima Geiger  :  1945  MRN:  [id number]      Admission Summary: This is a 68-year-old man with a past medical history significant for hypertension, who was in his usual state of health until about a week ago when the patient developed abdominal pain. The pain is located at the epigastric region, 8/10 in severity. No radiation. No known aggravating or relieving factors. The patient described the pain as a dull ache associated with constipation but no nausea, no vomiting. The patient took laxative without any significant relief of the constipation. The patient was brought to the emergency room for further evaluation. When the EMS arrived at the scene, the patient's oxygen saturation was 86% on room air. The oxygen saturation improved to 97% when the patient was started on 2 liters of nasal cannula. The patient denies COPD, congestive heart failure. No heart disease. When the patient arrived at the emergency room, he was found to have elevated BNP level. CT scan of the abdomen and pelvis performed by the emergency room provider shows right pleural effusion and suspected small bowel obstruction. The patient was subsequently referred to the hospitalist service for evaluation for admission. The patient denies fever. No rigors. No chills.   No record of prior admission to this hospital.  The patient has not seen his primary care physician for a while; according to him, he does not like going to the doctor.     Interval history / Subjective:     Patient intubated and sedated,   Patient not following command, withdraw to pain off propofol      Assessment & Plan:     Acute severe GI blood loss anemia due to duodenal ulcer   -GI consulted and s/p EGD on  duodenal ulcer with bleeding s/p clips, IR consulted and s/p embolization   -s/p total of 6 units pRBC, Hgb 7.4 transfused one unit pRBC on 7/17, Hgb 8.6  -continue protonix gtt  -monitor H/H    Hemorrhagic shock due to acute GI blood loss  -s/p 6 units pRBC transfusion, on levophed, off dopamine   -BP normal, continue serial BP monitoring    SSS s/p pacemaker   -HR 30, s/p pacemaker on 7/16, weaned off dobutamin  -cardiologist on board    KAYLEEN on CKD stage III  -creatinine improving  -nephrology on board, on CVVH  -avoid nephrotoxin, monitor renal function     Severe lactic acidosis due to shock  -resolved    Acute hypoxic respiratory failure multifactorial, possible pneumonia  -s/p intubated, on vent , duo neb, IV zosyn  -CTA chest no evidence of acute pulmonary embolus. Bilateral airspace disease may represent atelectasis or pneumonia. Large right pleural effusion. Early versus partial  small bowel obstruction secondary to umbilical hernia. Small ascites. -pulmonologist on board    Acute encephalopathy   -CT head no acute abnormality     Pulmonary HTN  - PaPressure = 79 on TTE   - echo left and right heart failure EF 45-50, mod-severe TR with pulm   - Cardiology on board s/p LHC: clear arteries, RHC  - Pulmonary following, running tests to evaluate for possible cause for pHTN     Partial SBO  -KUB on 7/15 stable nonobstructive bowel gas pattern. NG tube satisfactory position.  abdomen is soft  -started NGT feeding 25 ml/hr  -nutritionist on board    Bilateral lower legs swelling  -doppler study negative for DVT     Reported hx of alcohol abuse  -on precedex, folic acid, thiamin, mvi, CIWA protocol      Full Code; at risk for decompensation       Code status: Full Code  DVT prophylaxis: SCD    Care Plan discussed with: Patient/Family and Nurse  Disposition: TBD   Wife, Jerome Lemus at bedside, case discussed and answered her questions     Hospital Problems  Date Reviewed: 7/5/2019          Codes Class Noted POA    * (Principal) Acute CHF (congestive heart failure) (Formerly [facility]) ICD-10-CM: I50.9  ICD-9-CM: 428.0  7/5/2019 Yes        Pleural effusion, right ICD-10-CM: J90  ICD-9-CM: 511.9  7/5/2019 Unknown              Vital Signs:    Last 24hrs VS reviewed since prior progress note. Most recent are:  Visit Vitals  /40   Pulse 70   Temp 99 °F (37.2 °C)   Resp 18   Ht 5' 8\" (1.727 m)   Wt 63.6 kg (140 lb 3.4 oz)   SpO2 100%   BMI 21.32 kg/m²         Intake/Output Summary (Last 24 hours) at 7/18/2019 1149  Last data filed at 7/18/2019 1100  Gross per 24 hour   Intake 2613.44 ml   Output 1895 ml   Net 718.44 ml        Physical Examination:             Constitutional:  Intubated, on Ventilator    ENT:  Oral mucous moist, oropharynx benign. Neck supple,    Resp:  CTA bilaterally. No wheezing/rhonchi/rales. No accessory muscle use   CV:  Regular rhythm, normal rate, no murmurs, gallops, rubs    GI:  Soft, non distended, non tender. normoactive bowel sounds, no hepatosplenomegaly     Musculoskeletal:  No edema    Neurologic:  Sedated     Skin:  Good turgor, no rashes or ulcers       Data Review:    Review and/or order of clinical lab test  Review and/or order of tests in the radiology section of CPT  Review and/or order of tests in the medicine section of CPT      Labs:     Recent Labs     07/18/19  0354 07/17/19  1253   WBC 16.8* 15.1*   HGB 8.6* 7.0*   HCT 26.7* 22.3*    123*     Recent Labs     07/18/19  0354 07/17/19  1707 07/17/19  0312    139 140   K 4.1 4.1 4.2    106 108   CO2 25 25 24   BUN 16 16 18   CREA 2.45* 2.32* 2.61*   GLU 95 85 85   CA 8.2* 7.9* 7.9*   MG 2.6* 2.6* 2.5*   PHOS 2.9 3.4 3.3     Recent Labs     07/18/19  0354 07/17/19  1707 07/17/19  0312   SGOT 50* 52* 61*   ALT 75 79* 93*   AP 83 78 80   TBILI 0.6 0.6 0.7   TP 5.7* 5.4* 5.4*   ALB 2.7* 2.6* 2.7*   GLOB 3.0 2.8 2.7     No results for input(s): INR, PTP, APTT in the last 72 hours.     No lab exists for component: INREXT, INREXT   No results for input(s): FE, TIBC, PSAT, FERR in the last 72 hours. No results found for: FOL, RBCF   No results for input(s): PH, PCO2, PO2 in the last 72 hours. No results for input(s): CPK, CKNDX, TROIQ in the last 72 hours.     No lab exists for component: CPKMB  Lab Results   Component Value Date/Time    Cholesterol, total 134 07/06/2019 03:50 AM    HDL Cholesterol 56 07/06/2019 03:50 AM    LDL, calculated 68 07/06/2019 03:50 AM    Triglyceride 50 07/06/2019 03:50 AM    CHOL/HDL Ratio 2.4 07/06/2019 03:50 AM     Lab Results   Component Value Date/Time    Glucose (POC) 82 07/17/2019 05:13 PM    Glucose (POC) 92 07/17/2019 12:51 PM    Glucose (POC) 175 (H) 07/11/2019 07:56 PM    Glucose (POC) 87 07/05/2019 12:02 PM    Glucose (POC) 101 (H) 07/05/2019 08:47 AM     No results found for: COLOR, APPRN, SPGRU, REFSG, ANGELI, PROTU, GLUCU, KETU, BILU, UROU, TREVOR, LEUKU, GLUKE, EPSU, BACTU, WBCU, RBCU, CASTS, UCRY      Medications Reviewed:     Current Facility-Administered Medications   Medication Dose Route Frequency    0.9% sodium chloride infusion 250 mL  250 mL IntraVENous PRN    pantoprazole (PROTONIX) 40 mg in sodium chloride 0.9% 10 mL injection  40 mg IntraVENous Q12H    sodium chloride (NS) flush 5-40 mL  5-40 mL IntraVENous Q8H    sodium chloride (NS) flush 5-40 mL  5-40 mL IntraVENous PRN    dexmedeTOMidine (PRECEDEX) 400 mcg in 0.9% sodium chloride 100 mL infusion  0.2-0.7 mcg/kg/hr IntraVENous TITRATE    bicarbonate dialysis (PRISMASOL) BG K 4/Ca 2.5 5000 ml solution   Extracorporeal DIALYSIS CONTINUOUS    balsam peru-castor oil (VENELEX) ointment   Topical BID    piperacillin-tazobactam (ZOSYN) 3.375 g in 0.9% sodium chloride (MBP/ADV) 100 mL  3.375 g IntraVENous Q8H    0.9% sodium chloride infusion  10 mL/hr IntraVENous CONTINUOUS    chlorhexidine (PERIDEX) 0.12 % mouthwash 15 mL  15 mL Oral BID    albuterol-ipratropium (DUO-NEB) 2.5 MG-0.5 MG/3 ML  3 mL Nebulization Q6H RT    DOPamine (INTROPIN) 800 mg in dextrose 5% 250 mL infusion  0-20 mcg/kg/min IntraVENous TITRATE    PHENYLephrine (ARTURO-SYNEPHRINE) 100 mg in 0.9% sodium chloride 250 mL infusion   mcg/min IntraVENous TITRATE    NOREPINephrine (LEVOPHED) 32 mg in dextrose 5% 250 mL infusion  2-30 mcg/min IntraVENous TITRATE    vasopressin (VASOSTRICT) 20 Units in 0.9% sodium chloride 100 mL infusion  0-0.04 Units/min IntraVENous TITRATE    0.9% sodium chloride infusion 250 mL  250 mL IntraVENous PRN    0.9% sodium chloride infusion 250 mL  250 mL IntraVENous PRN    0.9% sodium chloride infusion 250 mL  250 mL IntraVENous PRN    propofol (DIPRIVAN) infusion  0-50 mcg/kg/min IntraVENous TITRATE    sodium chloride (NS) flush 5-40 mL  5-40 mL IntraVENous Q8H    sodium chloride (NS) flush 5-40 mL  5-40 mL IntraVENous PRN    simethicone (MYLICON) 75SR/6.0RI oral drops 80 mg  1.2 mL Oral Multiple    fentaNYL citrate (PF) injection  mcg   mcg IntraVENous Q1H PRN    0.9% sodium chloride infusion 250 mL  250 mL IntraVENous PRN    0.9% sodium chloride infusion  3 mL/hr IntraVENous CONTINUOUS    calcium carbonate (TUMS) chewable tablet 200 mg [elemental]  200 mg Oral TID PRN    aspirin delayed-release tablet 81 mg  81 mg Oral DAILY    sodium chloride (NS) flush 5-40 mL  5-40 mL IntraVENous Q8H    sodium chloride (NS) flush 5-40 mL  5-40 mL IntraVENous PRN    acetaminophen (TYLENOL) tablet 650 mg  650 mg Oral Q4H PRN    ondansetron (ZOFRAN) injection 4 mg  4 mg IntraVENous Q4H PRN    lactobac ac& pc-s.therm-b.anim (KERLINE Q/RISAQUAD)  1 Cap Oral DAILY    LORazepam (ATIVAN) injection 1 mg  1 mg IntraVENous Q6H PRN    naloxone (NARCAN) injection 0.4 mg  0.4 mg IntraVENous PRN    thiamine HCL (B-1) tablet 100 mg  100 mg Oral DAILY    folic acid (FOLVITE) tablet 1 mg  1 mg Oral DAILY    therapeutic multivitamin (THERAGRAN) tablet 1 Tab  1 Tab Oral DAILY     ______________________________________________________________________  EXPECTED LENGTH OF STAY: 5d 4h  ACTUAL LENGTH OF STAY:          Sandra Mercado MD

## 2019-07-18 NOTE — PROGRESS NOTES
Physical Therapy  7/18/2019    Chart reviewed. Patient remains on ventilator support, CRRT, multiple pressors, sedated, and s/p PPM.     Patient is not medically stable for skilled therapy interventions at this time. Will complete PT orders at this time. Please re-consult once passed SAT and patient is following commands. Thank you.   Molly Helton, PT, DPT      Recommendation for Nursing: Patient to complete as able in order to maintain strength, endurance, and independence:   - Bed in modified chair position with foot board on 3x/day 30-60 mins max each  - Passive ROM to B UEs and LEs during bathing to prevent contractures  - Positioning to prevent edema and contractures

## 2019-07-18 NOTE — PROGRESS NOTES
[facility]  Pulmonary, Critical Care, and Sleep Medicine    Progress note    Name: Chikis Sebastian MRN: [id number]   : 1945 Hospital: [facility]   Date: 2019        IMPRESSION:   · Multisystem organ failure  · Hemorrhagic shock  · Acute severe blood loss anemia  · KAYLEEN with severe metabolic acidosis  · Duodenal ulcer- S/P clipping, IR embolization planned  · Acute resp failure with failure to meet MV demands  · PHTN- by ECHO PASP 79 EF 50% no AV/MV disease, but RHC data (below) a bit better. Portopulmonary HTN- ETOH abuse and hypoalbuminemic ? Occult cirrhosis  · S/p LHC/RCH 7/10: RV 60/4, PA 61/15 mean 29, /7, /48, RA mean 4, PCW mean 7, CO 2.85-- echo reviewed: biatrial dilatation, left to right blowing of interatrial septum. Cath findings show precapillary PH after diuresis, likely mixed picture on presentation (combined pre and post capillary PH-- filling pressures normalized after diuresis)  · HTN  · Right effusion-exudate- ? From [facility] or other DDX is hepatic hydrothorax - cultures and cytology negative  · SBO- conservative MGMT  · Leukocytosis      RECOMMENDATIONS:   · VACV- settings adjusted-- mental status prohibitive for extubation  · ? CT head when off CVVHD  · Empiric zosyn  · Supportive transfusions - none in last day  · Pressors- reviewed   · On dopamine- helping with inotropic support: should be weaned off last - PPM placement   · On CRRT  · Sedation reviewed  · On protonix   · No anticoagulation  · Vent bundle  · Elective PH work up if and when acute issues resolve  · GI and nephrology following  · Critically ill, at high risk for decline and death. CCT 30'. D/W  RN     Subjective:     Seen and examined earlier today. D/W RN-- opens eyes but does not follow commands.  Periods of rigidity noted by RN      On vent 30% FiO2  No active bleeding over night  Remains bradycardic on dopamine - for PPM today  CXR clear  May be able to try SAT / SBT after PPM placement    7/15  On vent 30% FiO2  Received 2 units prbc's over night  On vasopressors  Bradycardic   On CVVH    7/14  Seen and examined  Still on NE and PE  Off vasopressin  Hb stable  Leukocytosis noted  On CRRT  On dopamine for bradycardia- on 3 mcg      7/13  Seen and examined earlier today. Transferred to CVICU after rapid response for SOB. Intubated soon after. Found in refractory shock with massive GI bleed secondary to duodenal ulcer. Pressors adjusted at bedside. PH buffered with supplemental bicarb for severe metabolic acidosis. Receiving large amounts of blood products. Fluid resuscitation ongoing. S/O clipping of visible vessel by GI but at high risk for re-bleed and IR embolization is planned. Worsening renal failure with ATN and recent contrast- CRRT is planned. 7/12  He feels well today. No acute complaints     7/11  States that his breathing feels much better, wife at bedside tells me that he looks better and much more comfortable than previously. CXR much improved. 7/10  stable    7/9  This patient has been seen and evaluated at the request of Dr. Parris Crenshaw for PHTN. Patient is a 72 y.o. male h/o ETOH abuse presents with 6 months progressive Lozano and presented with large right effusion- tapped- exudative. Negative micro. Thoracic placed pigtail and now out. ECHO with severe PHTN. Pt alert denies h/o PE, CTD, no h/o lung disease. Past Medical History:   Diagnosis Date    Arthritic-like pain     Hypertension       Past Surgical History:   Procedure Laterality Date    HX PACEMAKER  07/16/2019    medtronic dual chamber VVIR     IR INSERT NON TUNL CVC OVER 5 YRS  7/13/2019    IR OCCL TXCATH HEMMORAGE W SI  7/13/2019    VT INS NEW/RPLCMT PRM PM W/TRANSV ELTRD ATRIAL&VENT N/A 7/16/2019    INSERT PPM DUAL performed by Deadra Bamberger, MD at [place] [address], [facility] CATH LAB      Prior to Admission medications    Medication Sig Start Date End Date Taking?  Authorizing Provider   therapeutic multivitamin (THERAGRAN) tablet Take 1 Tab by mouth daily. Yes Provider, Historical   ergocalciferol (VITAMIN D2) 50,000 unit capsule Take 50,000 Units by mouth every . Yes Provider, Historical   aspirin delayed-release 81 mg tablet Take  by mouth daily. Yes Provider, Historical     No Known Allergies   Social History     Tobacco Use    Smoking status: Former Smoker     Start date: 1966     Last attempt to quit: 1984     Years since quittin.7    Smokeless tobacco: Never Used   Substance Use Topics    Alcohol use:  Yes     Alcohol/week: 11.7 standard drinks     Types: 14 drink(s) per week      Family History   Problem Relation Age of Onset    Diabetes Mother     Hypertension Mother     Heart Disease Mother     Asthma Mother     Arthritis-osteo Mother     Diabetes Father     Hypertension Father     Asthma Father     Arthritis-osteo Father     Diabetes Sister     Gout Sister     Asthma Brother         Current Facility-Administered Medications   Medication Dose Route Frequency    pantoprazole (PROTONIX) 40 mg in sodium chloride 0.9% 10 mL injection  40 mg IntraVENous Q12H    sodium chloride (NS) flush 5-40 mL  5-40 mL IntraVENous Q8H    dexmedeTOMidine (PRECEDEX) 400 mcg in 0.9% sodium chloride 100 mL infusion  0.2-0.7 mcg/kg/hr IntraVENous TITRATE    bicarbonate dialysis (PRISMASOL) BG K 4/Ca 2.5 5000 ml solution   Extracorporeal DIALYSIS CONTINUOUS    balsam peru-castor oil (VENELEX) ointment   Topical BID    piperacillin-tazobactam (ZOSYN) 3.375 g in 0.9% sodium chloride (MBP/ADV) 100 mL  3.375 g IntraVENous Q8H    0.9% sodium chloride infusion  10 mL/hr IntraVENous CONTINUOUS    chlorhexidine (PERIDEX) 0.12 % mouthwash 15 mL  15 mL Oral BID    albuterol-ipratropium (DUO-NEB) 2.5 MG-0.5 MG/3 ML  3 mL Nebulization Q6H RT    DOPamine (INTROPIN) 800 mg in dextrose 5% 250 mL infusion  0-20 mcg/kg/min IntraVENous TITRATE    PHENYLephrine (ARTURO-SYNEPHRINE) 100 mg in 0.9% sodium chloride 250 mL infusion   mcg/min IntraVENous TITRATE    NOREPINephrine (LEVOPHED) 32 mg in dextrose 5% 250 mL infusion  2-30 mcg/min IntraVENous TITRATE    vasopressin (VASOSTRICT) 20 Units in 0.9% sodium chloride 100 mL infusion  0-0.04 Units/min IntraVENous TITRATE    propofol (DIPRIVAN) infusion  0-50 mcg/kg/min IntraVENous TITRATE    sodium chloride (NS) flush 5-40 mL  5-40 mL IntraVENous Q8H    0.9% sodium chloride infusion  6 mL/hr IntraVENous CONTINUOUS    aspirin delayed-release tablet 81 mg  81 mg Oral DAILY    sodium chloride (NS) flush 5-40 mL  5-40 mL IntraVENous Q8H    lactobac ac& pc-s.therm-b.anim (KERLINE Q/RISAQUAD)  1 Cap Oral DAILY    thiamine HCL (B-1) tablet 100 mg  100 mg Oral DAILY    folic acid (FOLVITE) tablet 1 mg  1 mg Oral DAILY    therapeutic multivitamin (THERAGRAN) tablet 1 Tab  1 Tab Oral DAILY       Review of Systems:  A comprehensive review of systems was negative except for that written in the HPI. Objective:   Vital Signs:    Visit Vitals  /43 (BP 1 Location: Right arm, BP Patient Position: At rest)   Pulse 71   Temp 98.5 °F (36.9 °C)   Resp 17   Ht 5' 8\" (1.727 m)   Wt 61.5 kg (135 lb 9.3 oz)   SpO2 100%   BMI 20.62 kg/m²       O2 Device: Endotracheal tube, Ventilator   O2 Flow Rate (L/min): 2 l/min   Temp (24hrs), Av.1 °F (36.7 °C), Min:97.5 °F (36.4 °C), Max:98.7 °F (37.1 °C)       Intake/Output:   Last shift:      1901 -  07  In: -   Out: 72   Last 3 shifts:  07 - 1900  In: 2476.9 [I.V.:1840.2]  Out: 2580 [Drains:400]    Intake/Output Summary (Last 24 hours) at 2019  Last data filed at 2019  Gross per 24 hour   Intake 1778.33 ml   Output 1755 ml   Net 23.33 ml      Physical Exam:   General:  Intubated, appears stated age. Head:  Normocephalic, without obvious abnormality, atraumatic.    Eyes:  Conjunctivae/corneas - pale    Nose: Nares normal. Septum midline       Neck: Supple, symmetrical, trachea midline       Lungs:   Clear to auscultation bilaterally. Heart:  Regular rate and rhythm, S1, S2 normal, no murmur, click, rub or gallop. Abdomen:   Distended   Extremities: Extremities normal, atraumatic, no cyanosis or edema. Pulses: 2+ and symmetric all extremities. Skin: Skin color, texture, turgor normal. No rashes or lesions             Data review:     Recent Results (from the past 24 hour(s))   METABOLIC PANEL, COMPREHENSIVE    Collection Time: 07/17/19  3:12 AM   Result Value Ref Range    Sodium 140 136 - 145 mmol/L    Potassium 4.2 3.5 - 5.1 mmol/L    Chloride 108 97 - 108 mmol/L    CO2 24 21 - 32 mmol/L    Anion gap 8 5 - 15 mmol/L    Glucose 85 65 - 100 mg/dL    BUN 18 6 - 20 MG/DL    Creatinine 2.61 (H) 0.70 - 1.30 MG/DL    BUN/Creatinine ratio 7 (L) 12 - 20      GFR est AA 30 (L) >60 ml/min/1.73m2    GFR est non-AA 24 (L) >60 ml/min/1.73m2    Calcium 7.9 (L) 8.5 - 10.1 MG/DL    Bilirubin, total 0.7 0.2 - 1.0 MG/DL    ALT (SGPT) 93 (H) 12 - 78 U/L    AST (SGOT) 61 (H) 15 - 37 U/L    Alk.  phosphatase 80 45 - 117 U/L    Protein, total 5.4 (L) 6.4 - 8.2 g/dL    Albumin 2.7 (L) 3.5 - 5.0 g/dL    Globulin 2.7 2.0 - 4.0 g/dL    A-G Ratio 1.0 (L) 1.1 - 2.2     PHOSPHORUS    Collection Time: 07/17/19  3:12 AM   Result Value Ref Range    Phosphorus 3.3 2.6 - 4.7 MG/DL   MAGNESIUM    Collection Time: 07/17/19  3:12 AM   Result Value Ref Range    Magnesium 2.5 (H) 1.6 - 2.4 mg/dL   CBC WITH AUTOMATED DIFF    Collection Time: 07/17/19  3:12 AM   Result Value Ref Range    WBC 17.2 (H) 4.1 - 11.1 K/uL    RBC 2.58 (L) 4.10 - 5.70 M/uL    HGB 7.4 (L) 12.1 - 17.0 g/dL    HCT 22.8 (L) 36.6 - 50.3 %    MCV 88.4 80.0 - 99.0 FL    MCH 28.7 26.0 - 34.0 PG    MCHC 32.5 30.0 - 36.5 g/dL    RDW 17.1 (H) 11.5 - 14.5 %    PLATELET 548 (L) 586 - 400 K/uL    MPV 10.6 8.9 - 12.9 FL    NRBC 0.7 (H) 0  WBC    ABSOLUTE NRBC 0.12 (H) 0.00 - 0.01 K/uL    NEUTROPHILS 69 32 - 75 %    LYMPHOCYTES 5 (L) 12 - 49 % MONOCYTES 10 5 - 13 %    EOSINOPHILS 13 (H) 0 - 7 %    BASOPHILS 0 0 - 1 %    IMMATURE GRANULOCYTES 3 (H) 0.0 - 0.5 %    ABS. NEUTROPHILS 11.9 (H) 1.8 - 8.0 K/UL    ABS. LYMPHOCYTES 0.9 0.8 - 3.5 K/UL    ABS. MONOCYTES 1.7 (H) 0.0 - 1.0 K/UL    ABS. EOSINOPHILS 2.2 (H) 0.0 - 0.4 K/UL    ABS. BASOPHILS 0.0 0.0 - 0.1 K/UL    ABS. IMM. GRANS. 0.5 (H) 0.00 - 0.04 K/UL    DF SMEAR SCANNED      RBC COMMENTS ANISOCYTOSIS  1+        RBC COMMENTS POLYCHROMASIA  PRESENT        RBC COMMENTS AURELIA CELLS  PRESENT        RBC COMMENTS OVALOCYTES  PRESENT       POC G3 - PUL    Collection Time: 07/17/19  6:01 AM   Result Value Ref Range    FIO2 (POC) 50 %    pH (POC) 7.434 7.35 - 7.45      pCO2 (POC) 33.4 (L) 35.0 - 45.0 MMHG    pO2 (POC) 253 (H) 80 - 100 MMHG    HCO3 (POC) 22.4 22 - 26 MMOL/L    sO2 (POC) 100 (H) 92 - 97 %    Base deficit (POC) 2 mmol/L    Site DRAWN FROM ARTERIAL LINE      Device: VENT      Mode ASSIST CONTROL      Tidal volume 450 ml    Set Rate 14 bpm    PEEP/CPAP (POC) 5 cmH2O    Allens test (POC) N/A      Specimen type (POC) ARTERIAL      Total resp.  rate 20     GLUCOSE, POC    Collection Time: 07/17/19 12:51 PM   Result Value Ref Range    Glucose (POC) 92 65 - 100 mg/dL    Performed by Sonu Arms    CBC WITH AUTOMATED DIFF    Collection Time: 07/17/19 12:53 PM   Result Value Ref Range    WBC 15.1 (H) 4.1 - 11.1 K/uL    RBC 2.48 (L) 4.10 - 5.70 M/uL    HGB 7.0 (L) 12.1 - 17.0 g/dL    HCT 22.3 (L) 36.6 - 50.3 %    MCV 89.9 80.0 - 99.0 FL    MCH 28.2 26.0 - 34.0 PG    MCHC 31.4 30.0 - 36.5 g/dL    RDW 17.6 (H) 11.5 - 14.5 %    PLATELET 474 (L) 107 - 400 K/uL    MPV 10.8 8.9 - 12.9 FL    NRBC 0.5 (H) 0  WBC    ABSOLUTE NRBC 0.07 (H) 0.00 - 0.01 K/uL    NEUTROPHILS 72 32 - 75 %    BAND NEUTROPHILS 3 0 - 6 %    LYMPHOCYTES 1 (L) 12 - 49 %    MONOCYTES 7 5 - 13 %    EOSINOPHILS 13 (H) 0 - 7 %    BASOPHILS 2 (H) 0 - 1 %    METAMYELOCYTES 1 (H) 0 %    MYELOCYTES 1 (H) 0 %    IMMATURE GRANULOCYTES 0 % ABS. NEUTROPHILS 11.3 (H) 1.8 - 8.0 K/UL    ABS. LYMPHOCYTES 0.2 (L) 0.8 - 3.5 K/UL    ABS. MONOCYTES 1.1 (H) 0.0 - 1.0 K/UL    ABS. EOSINOPHILS 2.0 (H) 0.0 - 0.4 K/UL    ABS. BASOPHILS 0.3 (H) 0.0 - 0.1 K/UL    ABS. IMM. GRANS. 0.0 K/UL    DF MANUAL      RBC COMMENTS ANISOCYTOSIS  1+        RBC COMMENTS POLYCHROMASIA  1+        RBC COMMENTS BASOPHILIC STIPPLING  OCCASIONAL       TYPE + CROSSMATCH    Collection Time: 07/17/19 12:53 PM   Result Value Ref Range    Crossmatch Expiration 07/20/2019     ABO/Rh(D) B POSITIVE     Antibody screen NEG     Unit number [id number]     Blood component type RC LR     Unit division 00     Status of unit ISSUED     Crossmatch result Compatible    METABOLIC PANEL, COMPREHENSIVE    Collection Time: 07/17/19  5:07 PM   Result Value Ref Range    Sodium 139 136 - 145 mmol/L    Potassium 4.1 3.5 - 5.1 mmol/L    Chloride 106 97 - 108 mmol/L    CO2 25 21 - 32 mmol/L    Anion gap 8 5 - 15 mmol/L    Glucose 85 65 - 100 mg/dL    BUN 16 6 - 20 MG/DL    Creatinine 2.32 (H) 0.70 - 1.30 MG/DL    BUN/Creatinine ratio 7 (L) 12 - 20      GFR est AA 34 (L) >60 ml/min/1.73m2    GFR est non-AA 28 (L) >60 ml/min/1.73m2    Calcium 7.9 (L) 8.5 - 10.1 MG/DL    Bilirubin, total 0.6 0.2 - 1.0 MG/DL    ALT (SGPT) 79 (H) 12 - 78 U/L    AST (SGOT) 52 (H) 15 - 37 U/L    Alk.  phosphatase 78 45 - 117 U/L    Protein, total 5.4 (L) 6.4 - 8.2 g/dL    Albumin 2.6 (L) 3.5 - 5.0 g/dL    Globulin 2.8 2.0 - 4.0 g/dL    A-G Ratio 0.9 (L) 1.1 - 2.2     PHOSPHORUS    Collection Time: 07/17/19  5:07 PM   Result Value Ref Range    Phosphorus 3.4 2.6 - 4.7 MG/DL   MAGNESIUM    Collection Time: 07/17/19  5:07 PM   Result Value Ref Range    Magnesium 2.6 (H) 1.6 - 2.4 mg/dL   GLUCOSE, POC    Collection Time: 07/17/19  5:13 PM   Result Value Ref Range    Glucose (POC) 82 65 - 100 mg/dL    Performed by Dahlia Rubin        Imaging:  I have personally reviewed the patients radiographs and have reviewed the reports:  7/5 CT chest moderate to large layering right effusion and RLL ATX LLL ASD no ILD changes no masses- main PA large        Molly Frey MD

## 2019-07-19 NOTE — PROGRESS NOTES
Problem: Falls - Risk of  Goal: *Absence of Falls  Description  Document Candida Lightning Fall Risk and appropriate interventions in the flowsheet. Outcome: Progressing Towards Goal  Note:   Fall Risk Interventions:  Mobility Interventions: Communicate number of staff needed for ambulation/transfer, Assess mobility with egress test    Mentation Interventions: Adequate sleep, hydration, pain control, Evaluate medications/consider consulting pharmacy, More frequent rounding    Medication Interventions: Evaluate medications/consider consulting pharmacy    Elimination Interventions: Toileting schedule/hourly rounds              Problem: Heart Failure: Day 5  Goal: Off Pathway (Use only if patient is Off Pathway)  Outcome: Progressing Towards Goal     Problem: Nutrition Deficit  Goal: *Optimize nutritional status  Outcome: Progressing Towards Goal  Nepro tube feeds continued via OGT     Problem: Ventilator Management  Goal: *Adequate oxygenation and ventilation  Outcome: Progressing Towards Goal     Problem: Non-Violent Restraints  Goal: *Removal from restraints as soon as assessed to be safe  Outcome: Progressing Towards Goal  Goal: *No harm/injury to patient while restraints in use  Outcome: Progressing Towards Goal  Goal: *Patient's dignity will be maintained  Outcome: Progressing Towards Goal     Problem: Infection - Risk of, Central Venous Catheter-Associated Bloodstream Infection  Goal: *Absence of infection signs and symptoms  Outcome: Progressing Towards Goal     Problem: Pressure Injury - Risk of  Goal: *Prevention of pressure injury  Description  Document Eugenio Scale and appropriate interventions in the flowsheet. Outcome: Progressing Towards Goal  Note:   Pressure Injury Interventions:  Sensory Interventions: Assess changes in LOC, Assess need for specialty bed, Float heels, Keep linens dry and wrinkle-free, Minimize linen layers, Turn and reposition approx.  every two hours (pillows and wedges if needed), Pressure redistribution bed/mattress (bed type)    Moisture Interventions: Apply protective barrier, creams and emollients, Limit adult briefs, Maintain skin hydration (lotion/cream), Minimize layers    Activity Interventions: Assess need for specialty bed, Pressure redistribution bed/mattress(bed type)    Mobility Interventions: Assess need for specialty bed, Pressure redistribution bed/mattress (bed type), Turn and reposition approx.  every two hours(pillow and wedges)    Nutrition Interventions: Document food/fluid/supplement intake, Discuss nutritional consult with provider    Friction and Shear Interventions: Apply protective barrier, creams and emollients, Lift sheet                Problem: Pacer/ICD: Discharge Outcomes  Goal: *Hemodynamically stable  Outcome: Progressing Towards Goal  Goal: *Stable cardiac rhythm  Outcome: Progressing Towards Goal     Problem: Acute Renal Failure: Day 6  Goal: Off Pathway (Use only if patient is Off Pathway)  Outcome: Progressing Towards Goal  Pt continues CRRT therapy, CR/BUN trending down, still anuric

## 2019-07-19 NOTE — PROGRESS NOTES
0745- bedside report and drips verified. Patient intubated and sedated. CRRT rinsed back for preparation of MRI. Dr. Prem Alberto made aware of needed order, he will place the order. 46- MRI called stating the new machines are not pacemaker compatible; and that only [id number] [facility] and [facility] can do it. Will page Dr. Prem Alberto and Dr. Alexa Brittle to let them know. 9981- spoke to Dr. Prem Alberto regarding MRI. He wants to hold the MRI and will consult neurology instead. Will page [facility] to restart CRRT.    0900- medicated with fentanyl for extreme restlessness and inability to follow directions. Patient was grimacing, BP increasing, and RR increasing. 1257- neurology consult called. 5876- patient awake, not following commands, no tracking, or response to painful stimuli. He is moving all extremities, reaching for ETT.     8981- levophed stopped for 's.    1105- family at bedside. Patient will turn towards daughter's voice amd squeeze her hand. Decreased propofol to access neuro status. 18- Dr. Prem Alberto at bedside for update. Orders to wean propofol as tolerated and medicate with ativan as needed. 1415- EEG completed. 1435- propofol decreased to 30 mcg. 1530- wound care at bedside. 36- daughter at bedside; patient responding to her. Will open eyes, turn head towards her, and squeeze left hand only. 1945- Bedside and Verbal shift change report given to Saúl Becerra RN (oncoming nurse) by Hortencia Mcgowan RN (offgoing nurse). Report included the following information SBAR, Kardex, Recent Results and Cardiac Rhythm paced.

## 2019-07-19 NOTE — PROGRESS NOTES
[facility]   [address] [facility], [facility] [place] [facility], [facility]  Phone: [phone]   [phone]       Nephrology Progress Note  Yesenia Fuller     1945     [id number]  Date of Admission : 7/4/2019 07/19/19    CC:  Follow up for KAYLEEN      Assessment and Plan   KAYLEEN on CKD   - 2/2 ATN from hemorrhagic shock   - remains anuric and callejas removed 7/15  - continue CRRT for now. 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 Occult Cirrhosis      Interval History:  Seen and examined   Tolerating CRRT   On levophed at 10 mcg   MRI planned   Not following commands when sedation off      Review of Systems: Review of systems not obtained due to patient factors.     Current Medications:   Current Facility-Administered Medications   Medication Dose Route Frequency    0.9% sodium chloride infusion 250 mL  250 mL IntraVENous PRN    pantoprazole (PROTONIX) 40 mg in sodium chloride 0.9% 10 mL injection  40 mg IntraVENous Q12H    sodium chloride (NS) flush 5-40 mL  5-40 mL IntraVENous Q8H    sodium chloride (NS) flush 5-40 mL  5-40 mL IntraVENous PRN    dexmedeTOMidine (PRECEDEX) 400 mcg in 0.9% sodium chloride 100 mL infusion  0.2-0.7 mcg/kg/hr IntraVENous TITRATE    bicarbonate dialysis (PRISMASOL) BG K 4/Ca 2.5 5000 ml solution   Extracorporeal DIALYSIS CONTINUOUS    balsam peru-castor oil (VENELEX) ointment   Topical BID    piperacillin-tazobactam (ZOSYN) 3.375 g in 0.9% sodium chloride (MBP/ADV) 100 mL  3.375 g IntraVENous Q8H    0.9% sodium chloride infusion  10 mL/hr IntraVENous CONTINUOUS    chlorhexidine (PERIDEX) 0.12 % mouthwash 15 mL  15 mL Oral BID    albuterol-ipratropium (DUO-NEB) 2.5 MG-0.5 MG/3 ML  3 mL Nebulization Q6H RT    DOPamine (INTROPIN) 800 mg in dextrose 5% 250 mL infusion  0-20 mcg/kg/min IntraVENous TITRATE    PHENYLephrine (ARTURO-SYNEPHRINE) 100 mg in 0.9% sodium chloride 250 mL infusion   mcg/min IntraVENous TITRATE    NOREPINephrine (LEVOPHED) 32 mg in dextrose 5% 250 mL infusion  2-30 mcg/min IntraVENous TITRATE    vasopressin (VASOSTRICT) 20 Units in 0.9% sodium chloride 100 mL infusion  0-0.04 Units/min IntraVENous TITRATE    0.9% sodium chloride infusion 250 mL  250 mL IntraVENous PRN    0.9% sodium chloride infusion 250 mL  250 mL IntraVENous PRN    0.9% sodium chloride infusion 250 mL  250 mL IntraVENous PRN    propofol (DIPRIVAN) infusion  0-50 mcg/kg/min IntraVENous TITRATE    sodium chloride (NS) flush 5-40 mL  5-40 mL IntraVENous Q8H    sodium chloride (NS) flush 5-40 mL  5-40 mL IntraVENous PRN    simethicone (MYLICON) 62ZK/4.0FW oral drops 80 mg  1.2 mL Oral Multiple    fentaNYL citrate (PF) injection  mcg   mcg IntraVENous Q1H PRN    0.9% sodium chloride infusion 250 mL  250 mL IntraVENous PRN    0.9% sodium chloride infusion  3 mL/hr IntraVENous CONTINUOUS    calcium carbonate (TUMS) chewable tablet 200 mg [elemental]  200 mg Oral TID PRN    aspirin delayed-release tablet 81 mg  81 mg Oral DAILY    sodium chloride (NS) flush 5-40 mL  5-40 mL IntraVENous Q8H    sodium chloride (NS) flush 5-40 mL  5-40 mL IntraVENous PRN    acetaminophen (TYLENOL) tablet 650 mg  650 mg Oral Q4H PRN    ondansetron (ZOFRAN) injection 4 mg  4 mg IntraVENous Q4H PRN    lactobac ac& pc-s.therm-b.anim (KERLINE Q/RISAQUAD)  1 Cap Oral DAILY    LORazepam (ATIVAN) injection 1 mg  1 mg IntraVENous Q6H PRN    naloxone (NARCAN) injection 0.4 mg  0.4 mg IntraVENous PRN    thiamine HCL (B-1) tablet 100 mg  100 mg Oral DAILY    folic acid (FOLVITE) tablet 1 mg  1 mg Oral DAILY    therapeutic multivitamin (THERAGRAN) tablet 1 Tab  1 Tab Oral DAILY      No Known Allergies    Objective:  Vitals:    Vitals:    07/19/19 0500 07/19/19 0528 07/19/19 0600 07/19/19 0700   BP:       Pulse: 70  70 70   Resp: 18  14 14   Temp:       SpO2: 100%  100% 100%   Weight:  62.5 kg (137 lb 12.6 oz)     Height:         Intake and Output:  No intake/output data recorded. 07/17 1901 - 07/19 0700  In: 4187.8 [I.V.:1727.8]  Out: 4206 [Drains:210]    Physical Examination:  Pt intubated    Yes   General: Unresponsive   Neck:  Lines +  Resp:  CTA  CV:  RRR,  no murmur or rub, no LE edema  GI:  Soft, NT, + Bowel sounds, no hepatosplenomegaly  Neurologic:  Sedated   Psych:             Unable to assess   :no callejas     []    High complexity decision making was performed  []    Patient is at high-risk of decompensation with multiple organ involvement    Lab Data Personally Reviewed: I have reviewed all the pertinent labs, microbiology data and radiology studies during assessment.     Recent Labs     07/19/19  0303 07/18/19  2020 07/18/19  0354 07/17/19  1707 07/17/19  0312    137 140 139 140   K 4.1 4.0 4.1 4.1 4.2    105 105 106 108   CO2 26 25 25 25 24   * 101* 95 85 85   BUN 14 14 16 16 18   CREA 2.10* 2.15* 2.45* 2.32* 2.61*   CA 8.3* 8.2* 8.2* 7.9* 7.9*   MG 2.7* 2.7* 2.6* 2.6* 2.5*   PHOS 2.4* 2.5* 2.9 3.4 3.3   ALB 2.6* 2.6* 2.7* 2.6* 2.7*   SGOT 41* 42* 50* 52* 61*   ALT 59 60 75 79* 93*     Recent Labs     07/19/19  0303 07/18/19  2020 07/18/19  0354 07/17/19  1253 07/17/19  0312   WBC 14.1* 12.7* 16.8* 15.1* 17.2*   HGB 8.7* 8.4* 8.6* 7.0* 7.4*   HCT 28.1* 26.9* 26.7* 22.3* 22.8*    153 154 123* 134*     No results found for: SDES  Lab Results   Component Value Date/Time    Culture result: NO GROWTH 4 DAYS 07/05/2019 12:25 PM    Culture result: NO GROWTH 4 DAYS 07/05/2019 12:25 PM     Recent Results (from the past 24 hour(s))   METABOLIC PANEL, COMPREHENSIVE    Collection Time: 07/18/19  8:20 PM   Result Value Ref Range    Sodium 137 136 - 145 mmol/L    Potassium 4.0 3.5 - 5.1 mmol/L    Chloride 105 97 - 108 mmol/L    CO2 25 21 - 32 mmol/L    Anion gap 7 5 - 15 mmol/L    Glucose 101 (H) 65 - 100 mg/dL    BUN 14 6 - 20 MG/DL    Creatinine 2.15 (H) 0.70 - 1.30 MG/DL    BUN/Creatinine ratio 7 (L) 12 - 20      GFR est AA 37 (L) >60 ml/min/1.73m2    GFR est non-AA 30 (L) >60 ml/min/1.73m2    Calcium 8.2 (L) 8.5 - 10.1 MG/DL    Bilirubin, total 0.6 0.2 - 1.0 MG/DL    ALT (SGPT) 60 12 - 78 U/L    AST (SGOT) 42 (H) 15 - 37 U/L    Alk. phosphatase 85 45 - 117 U/L    Protein, total 5.7 (L) 6.4 - 8.2 g/dL    Albumin 2.6 (L) 3.5 - 5.0 g/dL    Globulin 3.1 2.0 - 4.0 g/dL    A-G Ratio 0.8 (L) 1.1 - 2.2     PHOSPHORUS    Collection Time: 07/18/19  8:20 PM   Result Value Ref Range    Phosphorus 2.5 (L) 2.6 - 4.7 MG/DL   MAGNESIUM    Collection Time: 07/18/19  8:20 PM   Result Value Ref Range    Magnesium 2.7 (H) 1.6 - 2.4 mg/dL   CBC WITH AUTOMATED DIFF    Collection Time: 07/18/19  8:20 PM   Result Value Ref Range    WBC 12.7 (H) 4.1 - 11.1 K/uL    RBC 3.01 (L) 4.10 - 5.70 M/uL    HGB 8.4 (L) 12.1 - 17.0 g/dL    HCT 26.9 (L) 36.6 - 50.3 %    MCV 89.4 80.0 - 99.0 FL    MCH 27.9 26.0 - 34.0 PG    MCHC 31.2 30.0 - 36.5 g/dL    RDW 17.6 (H) 11.5 - 14.5 %    PLATELET 660 942 - 452 K/uL    MPV 10.3 8.9 - 12.9 FL    NRBC 0.3 (H) 0  WBC    ABSOLUTE NRBC 0.04 (H) 0.00 - 0.01 K/uL    NEUTROPHILS 71 32 - 75 %    BAND NEUTROPHILS 3 0 - 6 %    LYMPHOCYTES 5 (L) 12 - 49 %    MONOCYTES 10 5 - 13 %    EOSINOPHILS 9 (H) 0 - 7 %    BASOPHILS 0 0 - 1 %    METAMYELOCYTES 1 (H) 0 %    MYELOCYTES 1 (H) 0 %    IMMATURE GRANULOCYTES 0 %    ABS. NEUTROPHILS 9.4 (H) 1.8 - 8.0 K/UL    ABS. LYMPHOCYTES 0.6 (L) 0.8 - 3.5 K/UL    ABS. MONOCYTES 1.3 (H) 0.0 - 1.0 K/UL    ABS. EOSINOPHILS 1.1 (H) 0.0 - 0.4 K/UL    ABS. BASOPHILS 0.0 0.0 - 0.1 K/UL    ABS. IMM. GRANS. 0.0 K/UL    DF MANUAL      RBC COMMENTS HYPOCHROMIA  1+        RBC COMMENTS ANISOCYTOSIS  1+       METABOLIC PANEL, COMPREHENSIVE    Collection Time: 07/19/19  3:03 AM   Result Value Ref Range    Sodium 137 136 - 145 mmol/L    Potassium 4.1 3.5 - 5.1 mmol/L    Chloride 104 97 - 108 mmol/L    CO2 26 21 - 32 mmol/L    Anion gap 7 5 - 15 mmol/L    Glucose 119 (H) 65 - 100 mg/dL    BUN 14 6 - 20 MG/DL    Creatinine 2.10 (H) 0.70 - 1.30 MG/DL    BUN/Creatinine ratio 7 (L) 12 - 20      GFR est AA 38 (L) >60 ml/min/1.73m2    GFR est non-AA 31 (L) >60 ml/min/1.73m2    Calcium 8.3 (L) 8.5 - 10.1 MG/DL    Bilirubin, total 0.5 0.2 - 1.0 MG/DL    ALT (SGPT) 59 12 - 78 U/L    AST (SGOT) 41 (H) 15 - 37 U/L    Alk. phosphatase 93 45 - 117 U/L    Protein, total 6.1 (L) 6.4 - 8.2 g/dL    Albumin 2.6 (L) 3.5 - 5.0 g/dL    Globulin 3.5 2.0 - 4.0 g/dL    A-G Ratio 0.7 (L) 1.1 - 2.2     PHOSPHORUS    Collection Time: 07/19/19  3:03 AM   Result Value Ref Range    Phosphorus 2.4 (L) 2.6 - 4.7 MG/DL   MAGNESIUM    Collection Time: 07/19/19  3:03 AM   Result Value Ref Range    Magnesium 2.7 (H) 1.6 - 2.4 mg/dL   CBC WITH AUTOMATED DIFF    Collection Time: 07/19/19  3:03 AM   Result Value Ref Range    WBC 14.1 (H) 4.1 - 11.1 K/uL    RBC 3.14 (L) 4.10 - 5.70 M/uL    HGB 8.7 (L) 12.1 - 17.0 g/dL    HCT 28.1 (L) 36.6 - 50.3 %    MCV 89.5 80.0 - 99.0 FL    MCH 27.7 26.0 - 34.0 PG    MCHC 31.0 30.0 - 36.5 g/dL    RDW 17.4 (H) 11.5 - 14.5 %    PLATELET 448 692 - 489 K/uL    MPV 10.8 8.9 - 12.9 FL    NRBC 0.3 (H) 0  WBC    ABSOLUTE NRBC 0.04 (H) 0.00 - 0.01 K/uL    NEUTROPHILS 65 32 - 75 %    BAND NEUTROPHILS 1 0 - 6 %    LYMPHOCYTES 7 (L) 12 - 49 %    MONOCYTES 10 5 - 13 %    EOSINOPHILS 15 (H) 0 - 7 %    BASOPHILS 0 0 - 1 %    METAMYELOCYTES 2 (H) 0 %    IMMATURE GRANULOCYTES 0 %    ABS.  NEUTROPHILS 9.3 (H) 1.8 - 8.0 K/UL    ABS. LYMPHOCYTES 1.0 0.8 - 3.5 K/UL    ABS. MONOCYTES 1.4 (H) 0.0 - 1.0 K/UL    ABS. EOSINOPHILS 2.1 (H) 0.0 - 0.4 K/UL    ABS. BASOPHILS 0.0 0.0 - 0.1 K/UL    ABS. IMM. GRANS. 0.0 K/UL    DF MANUAL      PLATELET COMMENTS Large Platelets      RBC COMMENTS ANISOCYTOSIS  1+        RBC COMMENTS OVALOCYTES  PRESENT               Total time spent with patient:  xxx   min. Care Plan discussed with:  Patient     Family      RN      Consulting Physician [facility],         I have reviewed the flowsheets. Chart and Pertinent Notes have been reviewed. No change in PMH ,family and social history from Consult note.       Jeevan Huang MD

## 2019-07-19 NOTE — DIALYSIS
[address]       [phone]    Orders   Mode: CVVH   Factor: 50ml/hr   UFR: 1600ml/hr   Blood Flow Rate: 200ml/min     Metrics   BP / HR: 153/47 72   Blood Flow Rate: 200ml/min   AP:                         -67   RP: 88   TMP: 49   PD: 30   FP: 150   UFR: 1600ml/hr     Comments / Plan:   Patient, orders and consent verified. Code status, labs and notes reviewed. [id number] filter changed due to off unit procedure being planned. All possible blood (165ml) returned by primary RN. Old [id number] filter disposed of in red biohazard bag. New [id number] filter primed/tested/running well. RIJ non-tunneled CVC CDI with transparent dressing and biopatch in place. Limbs and hubs of CVC cleansed with prevantics, +aspiration/+flushx2. Lines visible/secure with blood warmer set to 37*C and attached to the return line. Education and Pre/Post with RO Cisneros RN.

## 2019-07-19 NOTE — CONSULTS
Neurology Consult    Patient ID:  Thomas Hsu  [id number]  39 y.o.  1945    Subjective:     Date of Consultation:  2019    Referring Physician: Dr. Yoandy Castillo    Reason for Consultation:  ?Orlene Cheers damage from hypovolumic shock\"     History of Present Illness:   Thomas Hsu is a 72 y.o.  male whom I was asked to see for eval of neuro status. This patient has a history of HTN, heart disease, DM, alcohol overuse, who was admitted on  for abdominal pain. He developed hemorrhagic shock on . Intubated on vent with sedation. He has pacemaker implanted on . There is concern for for his neurological status. This morning he seems a bit better, with degradation of sedation. According to the family he has no stroke. All detail in chart, reviewed. Past Medical History:   Diagnosis Date    Arthritic-like pain     Hypertension       Past Surgical History:   Procedure Laterality Date    HX PACEMAKER  2019    medtronic dual chamber VVIR     IR INSERT NON TUNL CVC OVER 5 YRS  2019    IR OCCL TXCATH HEMMORAGE W SI  2019    CO INS NEW/RPLCMT PRM PM W/TRANSV ELTRD ATRIAL&VENT N/A 2019    INSERT PPM DUAL performed by Beola Peabody, MD at [address] [facility], [facility]  CATH LAB      Family History   Problem Relation Age of Onset    Diabetes Mother     Hypertension Mother     Heart Disease Mother     Asthma Mother     Arthritis-osteo Mother     Diabetes Father     Hypertension Father     Asthma Father     Arthritis-osteo Father     Diabetes Sister     Gout Sister     Asthma Brother       Social History     Tobacco Use    Smoking status: Former Smoker     Start date: 1966     Last attempt to quit: 1984     Years since quittin.7    Smokeless tobacco: Never Used   Substance Use Topics    Alcohol use:  Yes     Alcohol/week: 11.7 standard drinks     Types: 14 drink(s) per week      No Known Allergies   Prior to Admission medications    Medication Sig Start Date End Date Taking? Authorizing Provider   therapeutic multivitamin [facility]) tablet Take 1 Tab by mouth daily. Yes Provider, Historical   ergocalciferol (VITAMIN D2) 50,000 unit capsule Take 50,000 Units by mouth every Sunday. Yes Provider, Historical   aspirin delayed-release 81 mg tablet Take  by mouth daily. Yes Provider, Historical       Review of Systems:  Unable, patient is intubated. Objective:     Patient Vitals for the past 8 hrs:   BP Temp Pulse Resp SpO2 Weight   07/19/19 1126   69 22 100 %    07/19/19 1100   70 26 100 %    07/19/19 1024 153/47  72      07/19/19 1000   (!) 59 19 100 %    07/19/19 0900   71 18 100 %    07/19/19 0800  97.3 °F (36.3 °C) 70 17 100 %    07/19/19 0758   70 18 100 %    07/19/19 0757     100 %    07/19/19 0700   70 14 100 %    07/19/19 0600   70 14 100 %    07/19/19 0528      62.5 kg (137 lb 12.6 oz)   07/19/19 0500   70 18 100 %      Neurological Focus Exam:  Intubated, on vent. Eyes open, with eye contact . Oculocephalic reflex present  Grimacing to pain stimulation.   Answer simple question with head nod  Follow commands with hand movement  DTR, absent  Plantar stimulation with slight toes movement    Labs:   CBC:   Lab Results   Component Value Date/Time    WBC 14.1 (H) 07/19/2019 03:03 AM    RBC 3.14 (L) 07/19/2019 03:03 AM    HGB 8.7 (L) 07/19/2019 03:03 AM    HCT 28.1 (L) 07/19/2019 03:03 AM    PLATELET 537 35/25/4425 03:03 AM     BMP:   Lab Results   Component Value Date/Time    Glucose 119 (H) 07/19/2019 03:03 AM    Sodium 137 07/19/2019 03:03 AM    Potassium 4.1 07/19/2019 03:03 AM    Chloride 104 07/19/2019 03:03 AM    CO2 26 07/19/2019 03:03 AM    BUN 14 07/19/2019 03:03 AM    Creatinine 2.10 (H) 07/19/2019 03:03 AM    Calcium 8.3 (L) 07/19/2019 03:03 AM     Coagulation:   Lab Results   Component Value Date/Time    Prothrombin time 11.9 (H) 07/05/2019 10:14 AM    INR 1.2 (H) 07/05/2019 10:14 AM    aPTT 29.4 07/05/2019 10:14 AM       Assessment:     Encephalopathy, hopoxic/ischemic       Plan:   Continue current therapy, Optimal BP management to secure adequate  cerebral perfusion.   Gradual withdraw sedation, once cardiopulmonary status permits  Check EEG       Signed By:  Wilton Vigil MD     July 19, 2019

## 2019-07-19 NOTE — WOUND CARE
Wound Consult:  New Patient Visit. Chart reviewed. Consulted for penile skin breakdown. Spoke with patients nurse and we were in at bedside together to assess, turn and provide care. Patient is resting on a SPORT bed. He is intubated, on CVVH, bilateral wrist restraints, Prevalon boots. Assessment:  Penis - linear skin breakdown ~ 3.5 x 0.4 x 0.1 cm in length running horizontally around sides and top of penis within skin folds; moist red/pink denuded skin, no drainage. This area can be pulled back over head of penis to visualize but at rest it becomes non-visible as skin rides back into place over tip of penis. Most likely moisture related skin damage to skin that is well hidden unless pulled back. No skin discoloration to sacrum, buttocks or heels. He has an FMS in place with some leakage around tube but no skin damage noted. Mepilex border dressings are in place to both knees with no skin damage but protecting as patient slides his feet/lower legs of sides of bed. Treatment:  Used No sting barrier wipe to clean and protect skin on penis. Handed off to patient's nurse this might be best way to protect and clean as ointments may just add more moisture buildup. Continue to retract skin routinely for care - no CHG in folds of penis - use no sting barrier wipe. Skin Care Recommendations:  1. Minimize friction/shear: minimize layers of linen/pads under patient. 2. Off load pressure/reposition: continue to turn and reposition approximately every 2 hours; continue to use Prevalon boots. 3. Manage Moisture - keep skin folds dry; incontinence skin care as needed; appropriate sized briefs if needed; anuric at this time; FMS in use to contain stool. 4. Continue to monitor nutrition, pain, and skin risk scale, and skin assessment. Plan:  Call out to MD to discuss for orders. We will continue to reassess routinely and as needed.   Marly Gupta, [address] [facility] Office [phone]  Pager [phone]

## 2019-07-19 NOTE — PROGRESS NOTES
[facility]  Pulmonary, Critical Care, and Sleep Medicine    Progress note    Name: Mirlande Danielson MRN: [id number]   : 1945 Hospital: [facility]    Date: 2019        IMPRESSION:   · Multisystem organ failure  · Hemorrhagic shock  · Acute severe blood loss anemia  · KAYLEEN with severe metabolic acidosis  · Duodenal ulcer- S/P clipping, IR embolization planned  · Acute resp failure with failure to meet MV demands  · PHTN- by ECHO PASP 79 EF 50% no AV/MV disease, but RHC data (below) a bit better. Portopulmonary HTN- ETOH abuse and hypoalbuminemic ? Occult cirrhosis  · S/p LHC/RCH 7/10: RV 60/4, PA 61/15 mean 29, /7, /48, RA mean 4, PCW mean 7, CO 2.85-- echo reviewed: biatrial dilatation, left to right blowing of interatrial septum. Cath findings show precapillary PH after diuresis, likely mixed picture on presentation (combined pre and post capillary PH-- filling pressures normalized after diuresis)  · HTN  · Right effusion-exudate- ? From [facility] or other DDX is hepatic hydrothorax - cultures and cytology negative  · SBO- conservative MGMT  · Leukocytosis      RECOMMENDATIONS:   · VACV- mental status seems to be improving somewhat. Discussed with attending. We will move towards daily SBTs through the weekend   · Head CT scan without acute processes   · Empiric zosyn  · Supportive transfusions - hgb stable   · Off dopamine/debbie/vasopressin   · Levophed    · PPM placement   · On CRRT  · Sedation holidays in am   · On protonix   · Chest x-ray tomorrow  · No anticoagulation   · Vent bundle  · Elective PH work up if and when acute issues resolve  · nephrology following  · Critically ill, at high risk for decline and death. CCT 30'. Discussed with attending and family at bedside      Subjective:       Seems to be waking up more today. MRI on hold.   30mcg propofol, 0.7mcg precedex. PEEP 5cmh2o, and 30% FiO2. Still intubated due to not waking up.  Noted chest x-ray today.      7/17  Seen and examined earlier today. D/W RN-- opens eyes but does not follow commands. Periods of rigidity noted by RN    7/16  On vent 30% FiO2  No active bleeding over night  Remains bradycardic on dopamine - for PPM today  CXR clear  May be able to try SAT / SBT after PPM placement    7/15  On vent 30% FiO2  Received 2 units prbc's over night  On vasopressors  Bradycardic   On CVVH    7/14  Seen and examined  Still on NE and PE  Off vasopressin  Hb stable  Leukocytosis noted  On CRRT  On dopamine for bradycardia- on 3 mcg      7/13  Seen and examined earlier today. Transferred to CVICU after rapid response for SOB. Intubated soon after. Found in refractory shock with massive GI bleed secondary to duodenal ulcer. Pressors adjusted at bedside. PH buffered with supplemental bicarb for severe metabolic acidosis. Receiving large amounts of blood products. Fluid resuscitation ongoing. S/O clipping of visible vessel by GI but at high risk for re-bleed and IR embolization is planned. Worsening renal failure with ATN and recent contrast- CRRT is planned. 7/12  He feels well today. No acute complaints     7/11  States that his breathing feels much better, wife at bedside tells me that he looks better and much more comfortable than previously. CXR much improved. 7/10  stable    7/9  This patient has been seen and evaluated at the request of Dr. Sajan Shukla for PHTN. Patient is a 72 y.o. male h/o ETOH abuse presents with 6 months progressive Lozano and presented with large right effusion- tapped- exudative. Negative micro. Thoracic placed pigtail and now out. ECHO with severe PHTN. Pt alert denies h/o PE, CTD, no h/o lung disease.        Past Medical History:   Diagnosis Date    Arthritic-like pain     Hypertension       Past Surgical History:   Procedure Laterality Date    HX PACEMAKER  07/16/2019    medtronic dual chamber VVIR     IR INSERT NON TUNL CVC OVER 5 YRS  7/13/2019    IR OCCL TXCATH HEMMORAGE W SI  2019    VT INS NEW/RPLCMT PRM PM W/TRANSV ELTRD ATRIAL&VENT N/A 2019    INSERT PPM DUAL performed by Brandon Atwood MD [place] [address], [facility] CATH LAB      Prior to Admission medications    Medication Sig Start Date End Date Taking? Authorizing Provider   therapeutic multivitamin [facility]) tablet Take 1 Tab by mouth daily. Yes Provider, Historical   ergocalciferol (VITAMIN D2) 50,000 unit capsule Take 50,000 Units by mouth every . Yes Provider, Historical   aspirin delayed-release 81 mg tablet Take  by mouth daily. Yes Provider, Historical     No Known Allergies   Social History     Tobacco Use    Smoking status: Former Smoker     Start date: 1966     Last attempt to quit: 1984     Years since quittin.7    Smokeless tobacco: Never Used   Substance Use Topics    Alcohol use:  Yes     Alcohol/week: 11.7 standard drinks     Types: 14 drink(s) per week      Family History   Problem Relation Age of Onset    Diabetes Mother     Hypertension Mother     Heart Disease Mother     Asthma Mother     Arthritis-osteo Mother     Diabetes Father     Hypertension Father     Asthma Father     Arthritis-osteo Father     Diabetes Sister     Gout Sister     Asthma Brother         Current Facility-Administered Medications   Medication Dose Route Frequency    pantoprazole (PROTONIX) 40 mg in sodium chloride 0.9% 10 mL injection  40 mg IntraVENous Q12H    sodium chloride (NS) flush 5-40 mL  5-40 mL IntraVENous Q8H    dexmedeTOMidine (PRECEDEX) 400 mcg in 0.9% sodium chloride 100 mL infusion  0.2-0.7 mcg/kg/hr IntraVENous TITRATE    bicarbonate dialysis (PRISMASOL) BG K 4/Ca 2.5 5000 ml solution   Extracorporeal DIALYSIS CONTINUOUS    balsam peru-castor oil (VENELEX) ointment   Topical BID    piperacillin-tazobactam (ZOSYN) 3.375 g in 0.9% sodium chloride (MBP/ADV) 100 mL  3.375 g IntraVENous Q8H    0.9% sodium chloride infusion  10 mL/hr IntraVENous CONTINUOUS    chlorhexidine (PERIDEX) 0.12 % mouthwash 15 mL  15 mL Oral BID    albuterol-ipratropium (DUO-NEB) 2.5 MG-0.5 MG/3 ML  3 mL Nebulization Q6H RT    DOPamine (INTROPIN) 800 mg in dextrose 5% 250 mL infusion  0-20 mcg/kg/min IntraVENous TITRATE    PHENYLephrine (ARTURO-SYNEPHRINE) 100 mg in 0.9% sodium chloride 250 mL infusion   mcg/min IntraVENous TITRATE    NOREPINephrine (LEVOPHED) 32 mg in dextrose 5% 250 mL infusion  2-30 mcg/min IntraVENous TITRATE    vasopressin (VASOSTRICT) 20 Units in 0.9% sodium chloride 100 mL infusion  0-0.04 Units/min IntraVENous TITRATE    propofol (DIPRIVAN) infusion  0-50 mcg/kg/min IntraVENous TITRATE    sodium chloride (NS) flush 5-40 mL  5-40 mL IntraVENous Q8H    0.9% sodium chloride infusion  3 mL/hr IntraVENous CONTINUOUS    aspirin delayed-release tablet 81 mg  81 mg Oral DAILY    sodium chloride (NS) flush 5-40 mL  5-40 mL IntraVENous Q8H    lactobac ac& pc-s.therm-b.anim (KERLINE Q/RISAQUAD)  1 Cap Oral DAILY    thiamine HCL (B-1) tablet 100 mg  100 mg Oral DAILY    folic acid (FOLVITE) tablet 1 mg  1 mg Oral DAILY    therapeutic multivitamin (THERAGRAN) tablet 1 Tab  1 Tab Oral DAILY       Review of Systems:  A comprehensive review of systems was negative except for that written in the HPI.     Objective:   Vital Signs:    Visit Vitals  /47   Pulse 70   Temp 97.9 °F (36.6 °C)   Resp 20   Ht 5' 8\" (1.727 m)   Wt 62.5 kg (137 lb 12.6 oz)   SpO2 100%   BMI 20.95 kg/m²       O2 Device: Endotracheal tube, Heated, Ventilator   O2 Flow Rate (L/min): 2 l/min   Temp (24hrs), Av.5 °F (36.4 °C), Min:97 °F (36.1 °C), Max:97.9 °F (36.6 °C)       Intake/Output:   Last shift:       0701 -  1900  In: 662.9 [I.V.:257.9]  Out: 583 [Drains:135]  Last 3 shifts: 1901 -  0700  In: 4187.8 [I.V.:1727.8]  Out: 4206 [Drains:210]    Intake/Output Summary (Last 24 hours) at 2019 1250  Last data filed at 2019 1200  Gross per 24 hour   Intake 2858.26 ml Output 3238 ml   Net -379.74 ml      Physical Exam:   General:  Intubated, appears stated age. Head:  Normocephalic, without obvious abnormality, atraumatic. Eyes:  Conjunctivae/corneas - pale    Nose: Nares normal. Septum midline       Neck: Supple, symmetrical, trachea midline       Lungs:   Clear to auscultation bilaterally. Heart:  Regular rate and rhythm, S1, S2 normal, no murmur, click, rub or gallop. Abdomen:   Distended   Extremities: Extremities normal, atraumatic, no cyanosis or edema. Pulses: 2+ and symmetric all extremities. Skin: Skin color, texture, turgor normal. No rashes or lesions             Data review:     Recent Results (from the past 24 hour(s))   METABOLIC PANEL, COMPREHENSIVE    Collection Time: 07/18/19  8:20 PM   Result Value Ref Range    Sodium 137 136 - 145 mmol/L    Potassium 4.0 3.5 - 5.1 mmol/L    Chloride 105 97 - 108 mmol/L    CO2 25 21 - 32 mmol/L    Anion gap 7 5 - 15 mmol/L    Glucose 101 (H) 65 - 100 mg/dL    BUN 14 6 - 20 MG/DL    Creatinine 2.15 (H) 0.70 - 1.30 MG/DL    BUN/Creatinine ratio 7 (L) 12 - 20      GFR est AA 37 (L) >60 ml/min/1.73m2    GFR est non-AA 30 (L) >60 ml/min/1.73m2    Calcium 8.2 (L) 8.5 - 10.1 MG/DL    Bilirubin, total 0.6 0.2 - 1.0 MG/DL    ALT (SGPT) 60 12 - 78 U/L    AST (SGOT) 42 (H) 15 - 37 U/L    Alk.  phosphatase 85 45 - 117 U/L    Protein, total 5.7 (L) 6.4 - 8.2 g/dL    Albumin 2.6 (L) 3.5 - 5.0 g/dL    Globulin 3.1 2.0 - 4.0 g/dL    A-G Ratio 0.8 (L) 1.1 - 2.2     PHOSPHORUS    Collection Time: 07/18/19  8:20 PM   Result Value Ref Range    Phosphorus 2.5 (L) 2.6 - 4.7 MG/DL   MAGNESIUM    Collection Time: 07/18/19  8:20 PM   Result Value Ref Range    Magnesium 2.7 (H) 1.6 - 2.4 mg/dL   CBC WITH AUTOMATED DIFF    Collection Time: 07/18/19  8:20 PM   Result Value Ref Range    WBC 12.7 (H) 4.1 - 11.1 K/uL    RBC 3.01 (L) 4.10 - 5.70 M/uL    HGB 8.4 (L) 12.1 - 17.0 g/dL    HCT 26.9 (L) 36.6 - 50.3 %    MCV 89.4 80.0 - 99.0 FL MCH 27.9 26.0 - 34.0 PG    MCHC 31.2 30.0 - 36.5 g/dL    RDW 17.6 (H) 11.5 - 14.5 %    PLATELET 005 403 - 149 K/uL    MPV 10.3 8.9 - 12.9 FL    NRBC 0.3 (H) 0  WBC    ABSOLUTE NRBC 0.04 (H) 0.00 - 0.01 K/uL    NEUTROPHILS 71 32 - 75 %    BAND NEUTROPHILS 3 0 - 6 %    LYMPHOCYTES 5 (L) 12 - 49 %    MONOCYTES 10 5 - 13 %    EOSINOPHILS 9 (H) 0 - 7 %    BASOPHILS 0 0 - 1 %    METAMYELOCYTES 1 (H) 0 %    MYELOCYTES 1 (H) 0 %    IMMATURE GRANULOCYTES 0 %    ABS. NEUTROPHILS 9.4 (H) 1.8 - 8.0 K/UL    ABS. LYMPHOCYTES 0.6 (L) 0.8 - 3.5 K/UL    ABS. MONOCYTES 1.3 (H) 0.0 - 1.0 K/UL    ABS. EOSINOPHILS 1.1 (H) 0.0 - 0.4 K/UL    ABS. BASOPHILS 0.0 0.0 - 0.1 K/UL    ABS. IMM. GRANS. 0.0 K/UL    DF MANUAL      RBC COMMENTS HYPOCHROMIA  1+        RBC COMMENTS ANISOCYTOSIS  1+       METABOLIC PANEL, COMPREHENSIVE    Collection Time: 07/19/19  3:03 AM   Result Value Ref Range    Sodium 137 136 - 145 mmol/L    Potassium 4.1 3.5 - 5.1 mmol/L    Chloride 104 97 - 108 mmol/L    CO2 26 21 - 32 mmol/L    Anion gap 7 5 - 15 mmol/L    Glucose 119 (H) 65 - 100 mg/dL    BUN 14 6 - 20 MG/DL    Creatinine 2.10 (H) 0.70 - 1.30 MG/DL    BUN/Creatinine ratio 7 (L) 12 - 20      GFR est AA 38 (L) >60 ml/min/1.73m2    GFR est non-AA 31 (L) >60 ml/min/1.73m2    Calcium 8.3 (L) 8.5 - 10.1 MG/DL    Bilirubin, total 0.5 0.2 - 1.0 MG/DL    ALT (SGPT) 59 12 - 78 U/L    AST (SGOT) 41 (H) 15 - 37 U/L    Alk.  phosphatase 93 45 - 117 U/L    Protein, total 6.1 (L) 6.4 - 8.2 g/dL    Albumin 2.6 (L) 3.5 - 5.0 g/dL    Globulin 3.5 2.0 - 4.0 g/dL    A-G Ratio 0.7 (L) 1.1 - 2.2     PHOSPHORUS    Collection Time: 07/19/19  3:03 AM   Result Value Ref Range    Phosphorus 2.4 (L) 2.6 - 4.7 MG/DL   MAGNESIUM    Collection Time: 07/19/19  3:03 AM   Result Value Ref Range    Magnesium 2.7 (H) 1.6 - 2.4 mg/dL   CBC WITH AUTOMATED DIFF    Collection Time: 07/19/19  3:03 AM   Result Value Ref Range    WBC 14.1 (H) 4.1 - 11.1 K/uL    RBC 3.14 (L) 4.10 - 5.70 M/uL HGB 8.7 (L) 12.1 - 17.0 g/dL    HCT 28.1 (L) 36.6 - 50.3 %    MCV 89.5 80.0 - 99.0 FL    MCH 27.7 26.0 - 34.0 PG    MCHC 31.0 30.0 - 36.5 g/dL    RDW 17.4 (H) 11.5 - 14.5 %    PLATELET 310 904 - 961 K/uL    MPV 10.8 8.9 - 12.9 FL    NRBC 0.3 (H) 0  WBC    ABSOLUTE NRBC 0.04 (H) 0.00 - 0.01 K/uL    NEUTROPHILS 65 32 - 75 %    BAND NEUTROPHILS 1 0 - 6 %    LYMPHOCYTES 7 (L) 12 - 49 %    MONOCYTES 10 5 - 13 %    EOSINOPHILS 15 (H) 0 - 7 %    BASOPHILS 0 0 - 1 %    METAMYELOCYTES 2 (H) 0 %    IMMATURE GRANULOCYTES 0 %    ABS. NEUTROPHILS 9.3 (H) 1.8 - 8.0 K/UL    ABS. LYMPHOCYTES 1.0 0.8 - 3.5 K/UL    ABS. MONOCYTES 1.4 (H) 0.0 - 1.0 K/UL    ABS. EOSINOPHILS 2.1 (H) 0.0 - 0.4 K/UL    ABS. BASOPHILS 0.0 0.0 - 0.1 K/UL    ABS. IMM.  GRANS. 0.0 K/UL    DF MANUAL      PLATELET COMMENTS Large Platelets      RBC COMMENTS ANISOCYTOSIS  1+        RBC COMMENTS OVALOCYTES  PRESENT           Imaging:  I have personally reviewed the patients radiographs and have reviewed the reports:  7/5 CT chest moderate to large layering right effusion and RLL ATX LLL ASD no ILD changes no masses- main PA large        YURY Jones

## 2019-07-19 NOTE — PROGRESS NOTES
Hospitalist Progress Note  Jeannette Segovia MD  Answering service: [phone] from in house phone  Cell: [phone]      Date of Service:  2019  NAME:  Yesenia Fuller  :  1945  MRN:  [id number]      Admission Summary: This is a 61-year-old man with a past medical history significant for hypertension, who was in his usual state of health until about a week ago when the patient developed abdominal pain. The pain is located at the epigastric region, 8/10 in severity. No radiation. No known aggravating or relieving factors. The patient described the pain as a dull ache associated with constipation but no nausea, no vomiting. The patient took laxative without any significant relief of the constipation. The patient was brought to the emergency room for further evaluation. When the EMS arrived at the scene, the patient's oxygen saturation was 86% on room air. The oxygen saturation improved to 97% when the patient was started on 2 liters of nasal cannula. The patient denies COPD, congestive heart failure. No heart disease. When the patient arrived at the emergency room, he was found to have elevated BNP level. CT scan of the abdomen and pelvis performed by the emergency room provider shows right pleural effusion and suspected small bowel obstruction. The patient was subsequently referred to the hospitalist service for evaluation for admission. The patient denies fever. No rigors. No chills.   No record of prior admission to this hospital.  The patient has not seen his primary care physician for a while; according to him, he does not like going to the doctor.     Interval history / Subjective:     Patient intubated and sedated,   Daughter at bedside, she said he is responding and follows her command and hold her hand     Assessment & Plan:     Acute severe GI blood loss anemia due to duodenal ulcer   -protonix gtt, transitioned to iv q 12  -GI consulted and s/p EGD on 7/13 duodenal ulcer with bleeding s/p clips, IR consulted and s/p embolization   -s/p total of 6 units pRBC, Hgb 7.4 transfused one unit pRBC on 7/17, Hgb 8.7   -monitor H/H    Hemorrhagic shock due to acute GI blood loss  -s/p 6 units pRBC transfusion, on levophed, off dopamine   -BP normal, continue serial BP monitoring    SSS s/p pacemaker   -HR 30, s/p pacemaker on 7/16, weaned off dobutamin  -cardiologist on board    KAYLEEN on CKD stage III  -creatinine improving  -nephrology on board, on CVVH  -avoid nephrotoxin, monitor renal function     Severe lactic acidosis due to shock  -resolved    Acute hypoxic respiratory failure multifactorial, possible pneumonia  -s/p intubated, on vent , duo neb, IV zosyn  -CTA chest no evidence of acute pulmonary embolus. Bilateral airspace disease may represent atelectasis or pneumonia. Large right pleural effusion. Early versus partial  small bowel obstruction secondary to umbilical hernia. Small ascites. -repeated chest x ray 7/19 slight increase in mild pulmonary edema. -pulmonologist on board    Acute encephalopathy   -CT head no acute abnormality   -Neurologist on board, EEG done on 7/19 follow up on result    Pulmonary HTN  - PaPressure = 79 on TTE   - echo left and right heart failure EF 45-50, mod-severe TR with pulm   - Cardiology on board s/p LHC: clear arteries, RHC  - Pulmonary on board     Partial SBO  -KUB on 7/15 stable nonobstructive bowel gas pattern. NG tube satisfactory position.  abdomen is soft  -started NGT feeding 25 ml/hr  -nutritionist on board    Bilateral lower legs swelling  -doppler study negative for DVT     Reported hx of alcohol abuse  -on precedex, folic acid, thiamin, mvi, CIWA protocol      Full Code; at risk for decompensation       Code status: Full Code  DVT prophylaxis: SCD    Care Plan discussed with: Patient/Family and Nurse  Disposition: TBD   Daughter at bedside, questions answered  Wife, Freddie Arenas at bedside, case discussed and answered her questions 7/18     Hospital Problems  Date Reviewed: 7/5/2019          Codes Class Noted POA    * (Principal) [facility] ICD-10-CM: I50.9  ICD-9-CM: 428.0  7/5/2019 Yes        Pleural effusion, right ICD-10-CM: J90  ICD-9-CM: 511.9  7/5/2019 Unknown              Vital Signs:    Last 24hrs VS reviewed since prior progress note. Most recent are:  Visit Vitals  /47   Pulse 69   Temp 97.9 °F (36.6 °C)   Resp 22   Ht 5' 8\" (1.727 m)   Wt 62.5 kg (137 lb 12.6 oz)   SpO2 100%   BMI 20.95 kg/m²         Intake/Output Summary (Last 24 hours) at 7/19/2019 1342  Last data filed at 7/19/2019 1300  Gross per 24 hour   Intake 3117.26 ml   Output 3321 ml   Net -203.74 ml        Physical Examination:             Constitutional:  Intubated, on Ventilator    ENT:  Oral mucous moist, oropharynx benign. Neck supple,    Resp:  CTA bilaterally. No wheezing/rhonchi/rales. No accessory muscle use   CV:  Regular rhythm, normal rate, no murmurs, gallops, rubs    GI:  Soft, non distended, non tender.  normoactive bowel sounds, no hepatosplenomegaly     Musculoskeletal:  No edema    Neurologic:  Sedated     Skin:  Good turgor, no rashes or ulcers       Data Review:    Review and/or order of clinical lab test  Review and/or order of tests in the radiology section of CPT  Review and/or order of tests in the medicine section of CPT      Labs:     Recent Labs     07/19/19  0303 07/18/19  2020   WBC 14.1* 12.7*   HGB 8.7* 8.4*   HCT 28.1* 26.9*    153     Recent Labs     07/19/19  0303 07/18/19 2020 07/18/19  0354    137 140   K 4.1 4.0 4.1    105 105   CO2 26 25 25   BUN 14 14 16   CREA 2.10* 2.15* 2.45*   * 101* 95   CA 8.3* 8.2* 8.2*   MG 2.7* 2.7* 2.6*   PHOS 2.4* 2.5* 2.9     Recent Labs     07/19/19  0303 07/18/19 2020 07/18/19  0354   SGOT 41* 42* 50*   ALT 59 60 75   AP 93 85 83   TBILI 0.5 0.6 0.6   TP 6. 1* 5.7* 5.7*   ALB 2.6* 2.6* 2.7*   GLOB 3.5 3.1 3.0     No results for input(s): INR, PTP, APTT in the last 72 hours. No lab exists for component: INREXT, INREXT   No results for input(s): FE, TIBC, PSAT, FERR in the last 72 hours. No results found for: FOL, RBCF   No results for input(s): PH, PCO2, PO2 in the last 72 hours. No results for input(s): CPK, CKNDX, TROIQ in the last 72 hours.     No lab exists for component: CPKMB  Lab Results   Component Value Date/Time    Cholesterol, total 134 07/06/2019 03:50 AM    HDL Cholesterol 56 07/06/2019 03:50 AM    LDL, calculated 68 07/06/2019 03:50 AM    Triglyceride 50 07/06/2019 03:50 AM    CHOL/HDL Ratio 2.4 07/06/2019 03:50 AM     Lab Results   Component Value Date/Time    Glucose (POC) 82 07/17/2019 05:13 PM    Glucose (POC) 92 07/17/2019 12:51 PM    Glucose (POC) 175 (H) 07/11/2019 07:56 PM    Glucose (POC) 87 07/05/2019 12:02 PM    Glucose (POC) 101 (H) 07/05/2019 08:47 AM     No results found for: COLOR, APPRN, SPGRU, REFSG, ANGELI, PROTU, GLUCU, KETU, BILU, UROU, TREVOR, LEUKU, GLUKE, EPSU, BACTU, WBCU, RBCU, CASTS, UCRY      Medications Reviewed:     Current Facility-Administered Medications   Medication Dose Route Frequency    0.9% sodium chloride infusion 250 mL  250 mL IntraVENous PRN    pantoprazole (PROTONIX) 40 mg in sodium chloride 0.9% 10 mL injection  40 mg IntraVENous Q12H    sodium chloride (NS) flush 5-40 mL  5-40 mL IntraVENous Q8H    sodium chloride (NS) flush 5-40 mL  5-40 mL IntraVENous PRN    dexmedeTOMidine (PRECEDEX) 400 mcg in 0.9% sodium chloride 100 mL infusion  0.2-0.7 mcg/kg/hr IntraVENous TITRATE    bicarbonate dialysis (PRISMASOL) BG K 4/Ca 2.5 5000 ml solution   Extracorporeal DIALYSIS CONTINUOUS    balsam peru-castor oil (VENELEX) ointment   Topical BID    piperacillin-tazobactam (ZOSYN) 3.375 g in 0.9% sodium chloride (MBP/ADV) 100 mL  3.375 g IntraVENous Q8H    0.9% sodium chloride infusion  10 mL/hr IntraVENous CONTINUOUS    chlorhexidine (PERIDEX) 0.12 % mouthwash 15 mL  15 mL Oral BID    albuterol-ipratropium (DUO-NEB) 2.5 MG-0.5 MG/3 ML  3 mL Nebulization Q6H RT    DOPamine (INTROPIN) 800 mg in dextrose 5% 250 mL infusion  0-20 mcg/kg/min IntraVENous TITRATE    PHENYLephrine (ARTURO-SYNEPHRINE) 100 mg in 0.9% sodium chloride 250 mL infusion   mcg/min IntraVENous TITRATE    NOREPINephrine (LEVOPHED) 32 mg in dextrose 5% 250 mL infusion  2-30 mcg/min IntraVENous TITRATE    vasopressin (VASOSTRICT) 20 Units in 0.9% sodium chloride 100 mL infusion  0-0.04 Units/min IntraVENous TITRATE    0.9% sodium chloride infusion 250 mL  250 mL IntraVENous PRN    0.9% sodium chloride infusion 250 mL  250 mL IntraVENous PRN    0.9% sodium chloride infusion 250 mL  250 mL IntraVENous PRN    propofol (DIPRIVAN) infusion  0-50 mcg/kg/min IntraVENous TITRATE    sodium chloride (NS) flush 5-40 mL  5-40 mL IntraVENous Q8H    sodium chloride (NS) flush 5-40 mL  5-40 mL IntraVENous PRN    simethicone (MYLICON) 77KC/1.0YJ oral drops 80 mg  1.2 mL Oral Multiple    fentaNYL citrate (PF) injection  mcg   mcg IntraVENous Q1H PRN    0.9% sodium chloride infusion 250 mL  250 mL IntraVENous PRN    0.9% sodium chloride infusion  3 mL/hr IntraVENous CONTINUOUS    calcium carbonate (TUMS) chewable tablet 200 mg [elemental]  200 mg Oral TID PRN    aspirin delayed-release tablet 81 mg  81 mg Oral DAILY    sodium chloride (NS) flush 5-40 mL  5-40 mL IntraVENous Q8H    sodium chloride (NS) flush 5-40 mL  5-40 mL IntraVENous PRN    acetaminophen (TYLENOL) tablet 650 mg  650 mg Oral Q4H PRN    ondansetron (ZOFRAN) injection 4 mg  4 mg IntraVENous Q4H PRN    lactobac ac& pc-s.therm-b.anim (KERLINE Q/RISAQUAD)  1 Cap Oral DAILY    LORazepam (ATIVAN) injection 1 mg  1 mg IntraVENous Q6H PRN    naloxone (NARCAN) injection 0.4 mg  0.4 mg IntraVENous PRN    thiamine HCL (B-1) tablet 100 mg  100 mg Oral DAILY    folic acid (FOLVITE) tablet 1 mg  1 mg Oral DAILY    therapeutic multivitamin (THERAGRAN) tablet 1 Tab  1 Tab Oral DAILY     ______________________________________________________________________  EXPECTED LENGTH OF STAY: 5d 4h  ACTUAL LENGTH OF STAY:          14                 Marianne Oneal MD

## 2019-07-19 NOTE — PROGRESS NOTES
NUTRITION  Recommendations:   1. Continue current tube feeds  2. Follow electrolytes with repletion PRN     Diet: NPO  Tube feeds:  Nepro @ 25ml/hr + 1 pkt prosource BID + 120ml flush q3hr    Chart reviewed for tube feed check. Pt remains intubated and sedated. Tube feeds advanced to goal and tolerating without issues. Remains on CVVH since unlikely to tolerate intermittent HD per renal note. Continue current tube feeds as ordered. Provides: 1200kcal + 298kcal diprivan = 1498kcal, 78g protein, 436ml free fluid + 180ml w/ prosource + 960ml flush = 1576ml fluid. Meets 100% energy and protein needs. Low phos today, continue to monitor and repletion PRN. Will continue to follow for tube feeds, diprivan rate, and renal fx. See previous note for goals and monitoring/evaluation.    Estimated Nutrition Needs:   Kcals/day: 3152 Kcals/day  Protein: 74 g(74-86g (1.2-1.4g/kg) on CVVH)  Fluid: 1568 ml(1ml/kcal or per renal)  Based On: [address] [facility] [address] (2003b)  Weight Used: Actual wt(61.5kg)    [place], ROSALIO [address] [place] , Pager #[phone] or [phone]

## 2019-07-19 NOTE — PROGRESS NOTES
1930 - Bedside and Verbal shift change report given to Armando Becerra RN (oncoming nurse) by Yazmin Mc RN (offgoing nurse). Report included the following information SBAR, Kardex, Intake/Output, MAR, Recent Results, Cardiac Rhythm Paced and Alarm Parameters . Medications verified and pt assessed. Pt resting in bed - intubated and sedated. 2100 - H/H resulted.  hgb = 8.4  hct = 26.9  WC = 12.7  Will continue to monitor. 0000 - Pt sbp consistently >100 and able to continue pulling factor of 50 on CRRT - pt tolerating at this time without drastic changes in bp. Will continue to monitor. 46 - Pt daughter, Sean Duvall, on telephone. Updated on pt condition. Opportunity for questions and concerns provided. 9718 - Pt wife, Zaynab Ryder, on telephone. Updated on pt condition. Opportunity for questions and concerns provided. 0400 - labs resulted  hgb = 8.7  hct = 28.1  BUN = 14  CR = 2.10  WC = 14.1  0615 - Dr. Yanely Kwok at bedside. Updated on pt condition overnight. MD confirms and states that pt possesses MRI-safe [employer] permanent pacemaker. 9849 - Dr. Barbara Duvall at bedside - updated on pt condition overnight - MD states to rinse pt blood from CRRT back so MRI is possible. 0730 - Bedside and Verbal shift change report given to Omar Barnett RN (oncoming nurse) by Armando Becerra RN (offgoing nurse). Report included the following information SBAR, Kardex, Intake/Output, MAR, Recent Results, Cardiac Rhythm Paced and Alarm Parameters .

## 2019-07-20 NOTE — PROGRESS NOTES
[facility]  INTENSIVIST DAILY PROGRESS NOTE  Name: Chapis Goodwin   : 1945   MRN: [id number]   Date: 2019 12:02 PM   I have reviewed the flowsheet and previous days notes. The patient is awake and alert this morning. Following commands. Been on PSV 5/5 with TV in 500's. FiO2 30%. On low dose precedex. Family at bedside. Vital Signs:    Visit Vitals  /43   Pulse 70   Temp 96 °F (35.6 °C)   Resp 20   Ht 5' 8\" (1.727 m)   Wt 60.4 kg (133 lb 2.5 oz)   SpO2 100%   BMI 20.25 kg/m²       O2 Device: Endotracheal tube   O2 Flow Rate (L/min): 2 l/min   Temp (24hrs), Av.8 °F (36.6 °C), Min:96 °F (35.6 °C), Max:98.9 °F (37.2 °C)       Intake/Output:   Last shift:       07 -  190  In: 342.2 [I.V.:177.2]  Out: 241 [Drains:50]  Last 3 shifts:  190 -  0700  In: 4617.9 [I.V.:1882.9]  Out: 5812 [Drains:600]    Intake/Output Summary (Last 24 hours) at 2019 1202  Last data filed at 2019 1100  Gross per 24 hour   Intake 2839.76 ml   Output 4046 ml   Net -1206.24 ml     Physical Exam:  General:  Alert, cooperative, no distress   Head:  Normocephalic   Eyes:  EOMs intact. Nose: Nares normal.    Throat: Intubated           Lungs:   Clear to auscultation bilaterally. Chest wall:  No tenderness or deformity. Heart:  Regular rate and rhythm   Abdomen:   Soft, non-tender. Extremities: No edema.        Skin: Dry       Neurologic: Grossly nonfocal       DATA:   Current Facility-Administered Medications   Medication Dose Route Frequency    0.9% sodium chloride infusion 250 mL  250 mL IntraVENous PRN    pantoprazole (PROTONIX) 40 mg in sodium chloride 0.9% 10 mL injection  40 mg IntraVENous Q12H    sodium chloride (NS) flush 5-40 mL  5-40 mL IntraVENous Q8H    sodium chloride (NS) flush 5-40 mL  5-40 mL IntraVENous PRN    dexmedeTOMidine (PRECEDEX) 400 mcg in 0.9% sodium chloride 100 mL infusion  0.2-0.7 mcg/kg/hr IntraVENous TITRATE    bicarbonate dialysis (PRISMASOL) BG K 4/Ca 2.5 5000 ml solution   Extracorporeal DIALYSIS CONTINUOUS    balsam peru-castor oil (VENELEX) ointment   Topical BID    piperacillin-tazobactam (ZOSYN) 3.375 g in 0.9% sodium chloride (MBP/ADV) 100 mL  3.375 g IntraVENous Q8H    0.9% sodium chloride infusion  10 mL/hr IntraVENous CONTINUOUS    chlorhexidine (PERIDEX) 0.12 % mouthwash 15 mL  15 mL Oral BID    albuterol-ipratropium (DUO-NEB) 2.5 MG-0.5 MG/3 ML  3 mL Nebulization Q6H RT    DOPamine (INTROPIN) 800 mg in dextrose 5% 250 mL infusion  0-20 mcg/kg/min IntraVENous TITRATE    PHENYLephrine (ARTURO-SYNEPHRINE) 100 mg in 0.9% sodium chloride 250 mL infusion   mcg/min IntraVENous TITRATE    NOREPINephrine (LEVOPHED) 32 mg in dextrose 5% 250 mL infusion  2-30 mcg/min IntraVENous TITRATE    vasopressin (VASOSTRICT) 20 Units in 0.9% sodium chloride 100 mL infusion  0-0.04 Units/min IntraVENous TITRATE    0.9% sodium chloride infusion 250 mL  250 mL IntraVENous PRN    0.9% sodium chloride infusion 250 mL  250 mL IntraVENous PRN    0.9% sodium chloride infusion 250 mL  250 mL IntraVENous PRN    propofol (DIPRIVAN) infusion  0-50 mcg/kg/min IntraVENous TITRATE    sodium chloride (NS) flush 5-40 mL  5-40 mL IntraVENous Q8H    sodium chloride (NS) flush 5-40 mL  5-40 mL IntraVENous PRN    simethicone (MYLICON) 88NN/1.2AA oral drops 80 mg  1.2 mL Oral Multiple    fentaNYL citrate (PF) injection  mcg   mcg IntraVENous Q1H PRN    0.9% sodium chloride infusion 250 mL  250 mL IntraVENous PRN    0.9% sodium chloride infusion  3 mL/hr IntraVENous CONTINUOUS    calcium carbonate (TUMS) chewable tablet 200 mg [elemental]  200 mg Oral TID PRN    aspirin delayed-release tablet 81 mg  81 mg Oral DAILY    sodium chloride (NS) flush 5-40 mL  5-40 mL IntraVENous Q8H    sodium chloride (NS) flush 5-40 mL  5-40 mL IntraVENous PRN    acetaminophen (TYLENOL) tablet 650 mg  650 mg Oral Q4H PRN    ondansetron [facility]) injection 4 mg  4 mg IntraVENous Q4H PRN    lactobac ac& pc-s.therm-b.anim (KERLINE Q/RISAQUAD)  1 Cap Oral DAILY    LORazepam (ATIVAN) injection 1 mg  1 mg IntraVENous Q6H PRN    naloxone (NARCAN) injection 0.4 mg  0.4 mg IntraVENous PRN    thiamine HCL (B-1) tablet 100 mg  100 mg Oral DAILY    folic acid (FOLVITE) tablet 1 mg  1 mg Oral DAILY    therapeutic multivitamin (THERAGRAN) tablet 1 Tab  1 Tab Oral DAILY       Telemetry:paced          Labs:  Recent Labs     07/20/19  0304 07/19/19  1713 07/19/19  0303   WBC 13.6* 12.8* 14.1*   HGB 8.5* 8.6* 8.7*   HCT 27.4* 27.2* 28.1*    169 184     Recent Labs     07/20/19  0304 07/19/19  1713 07/19/19  0303    136 137   K 4.0 4.1 4.1    103 104   CO2 25 25 26   * 98 119*   BUN 13 15 14   CREA 2.23* 2.31* 2.10*   CA 8.4* 8.4* 8.3*   MG 2.6* 2.6* 2.7*   PHOS 2.0* 2.0* 2.4*   ALB 2.7* 2.6* 2.6*   SGOT 39* 39* 41*   ALT 49 51 59     No results for input(s): PH, PCO2, PO2, HCO3, FIO2 in the last 72 hours. Imaging:  I have personally reviewed the patients radiographs and reports. - small right effusion, stable       IMPRESSION:   · S/p Multisystem organ failure  · Resp failure on vent support primarily due to mental status which is better this morning  · S/p Acute severe blood loss anemia  · KAYLEEN with severe metabolic acidosis  · Duodenal ulcer- S/P clipping, IR embolization planned  · PHTN- by ECHO PASP 79 EF 50% no AV/MV disease, but RHC data (below) a bit better. Portopulmonary HTN- ETOH abuse and hypoalbuminemic ? Occult cirrhosis  · S/p LHC/RCH 7/10: RV 60/4, PA 61/15 mean 29, /7, /48, RA mean 4, PCW mean 7, CO 2.85-- echo reviewed: biatrial dilatation, left to right blowing of interatrial septum. Cath findings show precapillary PH after diuresis, likely mixed picture on presentation (combined pre and post capillary PH-- filling pressures normalized after diuresis)  · HTN  · Right effusion-exudate- ?  From [facility] or other DDX is hepatic hydrothorax - cultures and cytology negative  · SBO- conservative MGMT         ·    PLAN:   · Extubate this morning and monitor  · CRRT per renal  · On empiric zosyn  · PPI  · Outpt pulm follow suggestes  · Swallow eval and PT as improves      ·      The pt is critically ill.     My assessment/plan was discussed with: nurse        Jose Lorenz MD

## 2019-07-20 NOTE — PROCEDURES
ELECTROENCEPHALOGRAM REPORT     Patient Name: Kendall Ramirez  : 1945  Age: 72 y.o. Ordering physician: Tanner  Date of EE2019  Interpreting physician: Noel Albarran DO      PROCEDURE: EEG. CLINICAL INDICATION: The patient is a 72 y.o. male who is being evaluated for baseline electro cerebral activities and to rule out seizure focus. DESCRIPTION OF THE RECORD: .    Throughout the recording, there was diffuse global slowing with reduced amplitude throughout. Hyperventilation was not performed. Photic stimulation did not produce a minimal driving response in the posterior head regions. INTERPRETATION:     This is an abnormal electroencephalogram showing global slowing and loss of voltage suggestive for global cortical dysfunction which is nonspecific and may be seen in underlying metabolic/infectious/inflammatory/structural processes. No clear focal abnormalities or epileptiform activity was seen. Clinical correlation recommended.       [address] [place] [address],   Diplomate, American Board of Psychiatry & Neurology (Neurology)

## 2019-07-20 NOTE — PROGRESS NOTES
Hospitalist Progress Note  Agnes Hayes MD  Answering service: [phone] from in house phone  Cell: [phone]      Date of Service:  2019  NAME:  Ely Curtis  :  1945  MRN:  [id number]      Admission Summary: This is a 45-year-old man with a past medical history significant for hypertension, who was in his usual state of health until about a week ago when the patient developed abdominal pain. The pain is located at the epigastric region, 8/10 in severity. No radiation. No known aggravating or relieving factors. The patient described the pain as a dull ache associated with constipation but no nausea, no vomiting. The patient took laxative without any significant relief of the constipation. The patient was brought to the emergency room for further evaluation. When the EMS arrived at the scene, the patient's oxygen saturation was 86% on room air. The oxygen saturation improved to 97% when the patient was started on 2 liters of nasal cannula. The patient denies COPD, congestive heart failure. No heart disease. When the patient arrived at the emergency room, he was found to have elevated BNP level. CT scan of the abdomen and pelvis performed by the emergency room provider shows right pleural effusion and suspected small bowel obstruction. The patient was subsequently referred to the hospitalist service for evaluation for admission. The patient denies fever. No rigors. No chills.   No record of prior admission to this hospital.  The patient has not seen his primary care physician for a while; according to him, he does not like going to the doctor.     Interval history / Subjective:     Patient is extubated on , he is conscious and alert, answer questions and moves all extremities  Family at bedside     Assessment & Plan:     Acute severe GI blood loss anemia due to duodenal ulcer   -GI consulted and s/p EGD on 7/13 duodenal ulcer with bleeding s/p clips, IR consulted and s/p embolization   -s/p total of 6 units pRBC, Hgb 7.4 transfused one unit pRBC on 7/17, Hgb 8.5 stable  -continue protonix 40 mg iv bid  -monitor H/H    Hemorrhagic shock due to acute GI blood loss  -s/p 6 units pRBC transfusion, on levophed, off dopamine   -BP normal, continue serial BP monitoring    SSS s/p pacemaker   -HR 30, s/p pacemaker on 7/16, weaned off dobutamin  -cardiologist on board    KAYLEEN on CKD stage III  -creatinine improving  -nephrology on board, on CVVH  -avoid nephrotoxin, monitor renal function     Severe lactic acidosis due to shock  -resolved    Acute hypoxic respiratory failure multifactorial, possible pneumonia  -s/p intubated, extubated on 7/20, on 4 l/m via nasal canula, prn duo neb, IV zosyn  -CTA chest no evidence of acute pulmonary embolus. Bilateral airspace disease may represent atelectasis or pneumonia. Large right pleural effusion. Early versus partial  small bowel obstruction secondary to umbilical hernia. Small ascites. -pulmonologist on board    Acute encephalopathy   -CT head no acute abnormality   -EEG non specific  -seen by neurologist    Pulmonary HTN  - PaPressure = 79 on TTE   - echo left and right heart failure EF 45-50, mod-severe TR with pulm   - Cardiology on board s/p LHC: clear arteries, RHC  - Pulmonary following, running tests to evaluate for possible cause for pHTN     Partial SBO  -KUB on 7/15 stable nonobstructive bowel gas pattern. NG tube satisfactory position.  abdomen is soft  -started NGT feeding 25 ml/hr  -nutritionist on board    Bilateral lower legs swelling  -doppler study negative for DVT     Reported hx of alcohol abuse  -off precedex, folic acid, thiamin, mvi, lorazepam per CIWA protocol      Full Code; at risk for decompensation       Code status: Full Code  DVT prophylaxis: SCD    Care Plan discussed with: Patient/Family and Nurse  Disposition: TBD   Wife, Hilda Andrews at bedside, case discussed and answered her questions     Hospital Problems  Date Reviewed: 7/5/2019          Codes Class Noted POA    * (Principal) Acute CHF (congestive heart failure) ([facility] [address] ICD-10-CM: I50.9  ICD-9-CM: 428.0  7/5/2019 Yes        Pleural effusion, right ICD-10-CM: J90  ICD-9-CM: 511.9  7/5/2019 Unknown              Vital Signs:    Last 24hrs VS reviewed since prior progress note. Most recent are:  Visit Vitals  /43   Pulse 70   Temp 97.9 °F (36.6 °C)   Resp 16   Ht 5' 8\" (1.727 m)   Wt 60.4 kg (133 lb 2.5 oz)   SpO2 100%   BMI 20.25 kg/m²         Intake/Output Summary (Last 24 hours) at 7/20/2019 1331  Last data filed at 7/20/2019 1300  Gross per 24 hour   Intake 2604.22 ml   Output 4091 ml   Net -1486.78 ml        Physical Examination:             Constitutional:  Conscious and alert, not cardiorespiratory distress   ENT:  Oral mucous moist, oropharynx benign. Neck supple,    Resp:  CTA bilaterally. No wheezing/rhonchi/rales. No accessory muscle use   CV:  Regular rhythm, normal rate, no murmurs, gallops, rubs    GI:  Soft, non distended, non tender.  normoactive bowel sounds, no hepatosplenomegaly     Musculoskeletal:  No edema    Neurologic:  Conscious and alert, answer simple questions and moves all extremities      Skin:  Good turgor, no rashes or ulcers       Data Review:    Review and/or order of clinical lab test  Review and/or order of tests in the radiology section of CPT  Review and/or order of tests in the medicine section of CPT      Labs:     Recent Labs     07/20/19  0304 07/19/19 1713   WBC 13.6* 12.8*   HGB 8.5* 8.6*   HCT 27.4* 27.2*    169     Recent Labs     07/20/19  0304 07/19/19  1713 07/19/19  0303    136 137   K 4.0 4.1 4.1    103 104   CO2 25 25 26   BUN 13 15 14   CREA 2.23* 2.31* 2.10*   * 98 119*   CA 8.4* 8.4* 8.3*   MG 2.6* 2.6* 2.7*   PHOS 2.0* 2.0* 2.4*     Recent Labs     07/20/19  0304 07/19/19  1713 07/19/19  0303   SGOT 39* 39* 41* ALT 49 51 59   AP 95 94 93   TBILI 0.5 0.5 0.5   TP 6.1* 6.0* 6.1*   ALB 2.7* 2.6* 2.6*   GLOB 3.4 3.4 3.5     No results for input(s): INR, PTP, APTT in the last 72 hours. No lab exists for component: INREXT, INREXT   No results for input(s): FE, TIBC, PSAT, FERR in the last 72 hours. No results found for: FOL, RBCF   No results for input(s): PH, PCO2, PO2 in the last 72 hours. No results for input(s): CPK, CKNDX, TROIQ in the last 72 hours.     No lab exists for component: CPKMB  Lab Results   Component Value Date/Time    Cholesterol, total 134 07/06/2019 03:50 AM    HDL Cholesterol 56 07/06/2019 03:50 AM    LDL, calculated 68 07/06/2019 03:50 AM    Triglyceride 50 07/06/2019 03:50 AM    CHOL/HDL Ratio 2.4 07/06/2019 03:50 AM     Lab Results   Component Value Date/Time    Glucose (POC) 82 07/17/2019 05:13 PM    Glucose (POC) 92 07/17/2019 12:51 PM    Glucose (POC) 175 (H) 07/11/2019 07:56 PM    Glucose (POC) 87 07/05/2019 12:02 PM    Glucose (POC) 101 (H) 07/05/2019 08:47 AM     No results found for: COLOR, APPRN, SPGRU, REFSG, ANGELI, PROTU, GLUCU, KETU, BILU, UROU, TREVOR, LEUKU, GLUKE, EPSU, BACTU, WBCU, RBCU, CASTS, UCRY      Medications Reviewed:     Current Facility-Administered Medications   Medication Dose Route Frequency    0.9% sodium chloride infusion 250 mL  250 mL IntraVENous PRN    pantoprazole (PROTONIX) 40 mg in sodium chloride 0.9% 10 mL injection  40 mg IntraVENous Q12H    sodium chloride (NS) flush 5-40 mL  5-40 mL IntraVENous Q8H    sodium chloride (NS) flush 5-40 mL  5-40 mL IntraVENous PRN    bicarbonate dialysis (PRISMASOL) BG K 4/Ca 2.5 5000 ml solution   Extracorporeal DIALYSIS CONTINUOUS    balsam peru-castor oil (VENELEX) ointment   Topical BID    piperacillin-tazobactam (ZOSYN) 3.375 g in 0.9% sodium chloride (MBP/ADV) 100 mL  3.375 g IntraVENous Q8H    0.9% sodium chloride infusion  10 mL/hr IntraVENous CONTINUOUS    chlorhexidine (PERIDEX) 0.12 % mouthwash 15 mL  15 mL Oral BID    albuterol-ipratropium (DUO-NEB) 2.5 MG-0.5 MG/3 ML  3 mL Nebulization Q6H RT    NOREPINephrine (LEVOPHED) 32 mg in dextrose 5% 250 mL infusion  2-30 mcg/min IntraVENous TITRATE    0.9% sodium chloride infusion 250 mL  250 mL IntraVENous PRN    0.9% sodium chloride infusion 250 mL  250 mL IntraVENous PRN    0.9% sodium chloride infusion 250 mL  250 mL IntraVENous PRN    sodium chloride (NS) flush 5-40 mL  5-40 mL IntraVENous Q8H    sodium chloride (NS) flush 5-40 mL  5-40 mL IntraVENous PRN    simethicone (MYLICON) 09WN/1.2FS oral drops 80 mg  1.2 mL Oral Multiple    fentaNYL citrate (PF) injection  mcg   mcg IntraVENous Q1H PRN    0.9% sodium chloride infusion 250 mL  250 mL IntraVENous PRN    0.9% sodium chloride infusion  3 mL/hr IntraVENous CONTINUOUS    calcium carbonate (TUMS) chewable tablet 200 mg [elemental]  200 mg Oral TID PRN    aspirin delayed-release tablet 81 mg  81 mg Oral DAILY    sodium chloride (NS) flush 5-40 mL  5-40 mL IntraVENous Q8H    sodium chloride (NS) flush 5-40 mL  5-40 mL IntraVENous PRN    acetaminophen (TYLENOL) tablet 650 mg  650 mg Oral Q4H PRN    ondansetron (ZOFRAN) injection 4 mg  4 mg IntraVENous Q4H PRN    lactobac ac& pc-s.therm-b.anim (KERLINE Q/RISAQUAD)  1 Cap Oral DAILY    LORazepam (ATIVAN) injection 1 mg  1 mg IntraVENous Q6H PRN    naloxone (NARCAN) injection 0.4 mg  0.4 mg IntraVENous PRN    thiamine HCL (B-1) tablet 100 mg  100 mg Oral DAILY    folic acid (FOLVITE) tablet 1 mg  1 mg Oral DAILY    therapeutic multivitamin (THERAGRAN) tablet 1 Tab  1 Tab Oral DAILY     ______________________________________________________________________  EXPECTED LENGTH OF STAY: 5d 4h  ACTUAL LENGTH OF STAY:          15                 Romel Graff MD

## 2019-07-20 NOTE — PROGRESS NOTES
1930 - Bedside and Verbal shift change report given to Brea Guerra RN (oncoming nurse) by Alyson Solomon RN (offgoing nurse). Report included the following information SBAR, Kardex, Intake/Output, MAR, Recent Results, Cardiac Rhythm Paced and Alarm Parameters . Medications verified. Pt assessed. Pt intubated and sedated in bed. 0019 - Pt becoming increasingly restless - PRN fentanyl working temporarily. Propofol gtt increased to 40mcg/kg/min and norepinehphrine gtt restarted at 4mcg/min d/t hypotension. CRRT factor set to 0 at this time. 0730 - Bedside and Verbal shift change report given to Alyson Solomon RN (oncoming nurse) by Brea Guerra. CHRISTINA (offgoing nurse). Report included the following information SBAR, Kardex, Intake/Output, MAR, Recent Results, Cardiac Rhythm Paced and Alarm Parameters .

## 2019-07-20 NOTE — PROGRESS NOTES
[facility]   [address] [facility], [address] [place] [address], [facility]  Phone: [phone]   [phone]       Nephrology Progress Note  Yesenia Fuller     1945     [id number]  Date of Admission : 7/4/2019 07/20/19    CC:  Follow up for KAYLEEN      Assessment and Plan   KAYLEEN on CKD   - 2/2 ATN from hemorrhagic shock   - cont CVVH, factor as tolerated  - daily labs     Encephalopathy:  - Plans for MRI Brain    CKD Stage III :  - baseline Cr 1.2-1.3 mg/dl     Hemorraghic Shock /Massive UGI bleed  - S/P emergent clipping by EDG 7/13  - S/P embolization of gastric/diuodenal artery 7/12 in IR  - on pressors      Bradycardia   - Off Dopamine gtt  - s/p PPM placement 7/16     Acute Resp Failure   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 Occult Cirrhosis      Interval History:  Seen and examined. Off pressors this AM.  Agitated, on CVVH, factor of 50/hr now. Review of Systems: Review of systems not obtained due to patient factors.     Current Medications:   Current Facility-Administered Medications   Medication Dose Route Frequency    0.9% sodium chloride infusion 250 mL  250 mL IntraVENous PRN    pantoprazole (PROTONIX) 40 mg in sodium chloride 0.9% 10 mL injection  40 mg IntraVENous Q12H    sodium chloride (NS) flush 5-40 mL  5-40 mL IntraVENous Q8H    sodium chloride (NS) flush 5-40 mL  5-40 mL IntraVENous PRN    dexmedeTOMidine (PRECEDEX) 400 mcg in 0.9% sodium chloride 100 mL infusion  0.2-0.7 mcg/kg/hr IntraVENous TITRATE    bicarbonate dialysis (PRISMASOL) BG K 4/Ca 2.5 5000 ml solution   Extracorporeal DIALYSIS CONTINUOUS    balsam peru-castor oil (VENELEX) ointment   Topical BID    piperacillin-tazobactam (ZOSYN) 3.375 g in 0.9% sodium chloride (MBP/ADV) 100 mL  3.375 g IntraVENous Q8H    0.9% sodium chloride infusion  10 mL/hr IntraVENous CONTINUOUS    chlorhexidine (PERIDEX) 0.12 % mouthwash 15 mL  15 mL Oral BID    albuterol-ipratropium (DUO-NEB) 2.5 MG-0.5 MG/3 ML  3 mL Nebulization Q6H RT    DOPamine (INTROPIN) 800 mg in dextrose 5% 250 mL infusion  0-20 mcg/kg/min IntraVENous TITRATE    PHENYLephrine (ARTURO-SYNEPHRINE) 100 mg in 0.9% sodium chloride 250 mL infusion   mcg/min IntraVENous TITRATE    NOREPINephrine (LEVOPHED) 32 mg in dextrose 5% 250 mL infusion  2-30 mcg/min IntraVENous TITRATE    vasopressin (VASOSTRICT) 20 Units in 0.9% sodium chloride 100 mL infusion  0-0.04 Units/min IntraVENous TITRATE    0.9% sodium chloride infusion 250 mL  250 mL IntraVENous PRN    0.9% sodium chloride infusion 250 mL  250 mL IntraVENous PRN    0.9% sodium chloride infusion 250 mL  250 mL IntraVENous PRN    propofol (DIPRIVAN) infusion  0-50 mcg/kg/min IntraVENous TITRATE    sodium chloride (NS) flush 5-40 mL  5-40 mL IntraVENous Q8H    sodium chloride (NS) flush 5-40 mL  5-40 mL IntraVENous PRN    simethicone (MYLICON) 46HN/5.2TS oral drops 80 mg  1.2 mL Oral Multiple    fentaNYL citrate (PF) injection  mcg   mcg IntraVENous Q1H PRN    0.9% sodium chloride infusion 250 mL  250 mL IntraVENous PRN    0.9% sodium chloride infusion  3 mL/hr IntraVENous CONTINUOUS    calcium carbonate (TUMS) chewable tablet 200 mg [elemental]  200 mg Oral TID PRN    aspirin delayed-release tablet 81 mg  81 mg Oral DAILY    sodium chloride (NS) flush 5-40 mL  5-40 mL IntraVENous Q8H    sodium chloride (NS) flush 5-40 mL  5-40 mL IntraVENous PRN    acetaminophen (TYLENOL) tablet 650 mg  650 mg Oral Q4H PRN    ondansetron (ZOFRAN) injection 4 mg  4 mg IntraVENous Q4H PRN    lactobac ac& pc-s.therm-b.anim (KERLINE Q/RISAQUAD)  1 Cap Oral DAILY    LORazepam (ATIVAN) injection 1 mg  1 mg IntraVENous Q6H PRN    naloxone (NARCAN) injection 0.4 mg  0.4 mg IntraVENous PRN    thiamine HCL (B-1) tablet 100 mg  100 mg Oral DAILY    folic acid (FOLVITE) tablet 1 mg  1 mg Oral DAILY    therapeutic multivitamin [facility]) tablet 1 Tab  1 Tab Oral DAILY      No Known Allergies    Objective:  Vitals:    Vitals:    07/20/19 0755 07/20/19 0800 07/20/19 0835 07/20/19 0900   BP: 105/43      Pulse: 70 70  70   Resp:  15  15   Temp:  96 °F (35.6 °C)     SpO2:  100% 100% 100%   Weight:       Height:         Intake and Output:  07/20 0701 - 07/20 1900  In: 128.9 [I.V.:83.9]  Out: 392 [Drains:50]  07/18 1901 - 07/20 0700  In: 4617.9 [I.V.:1882.9]  Out: 0716 [Drains:600]    Physical Examination:  Pt intubated    Yes   General: Agitated off sedation  Neck:  Lines +  Resp:  CTA  CV:  RRR,  no murmur or rub, no LE edema  GI:  Soft, NT, + Bowel sounds, no hepatosplenomegaly  Neurologic:  Sedated   Psych:             Unable to assess   :                 No callejas     []    High complexity decision making was performed  []    Patient is at high-risk of decompensation with multiple organ involvement    Lab Data Personally Reviewed: I have reviewed all the pertinent labs, microbiology data and radiology studies during assessment.     Recent Labs     07/20/19 0304 07/19/19 1713 07/19/19 0303 07/18/19 2020 07/18/19  0354    136 137 137 140   K 4.0 4.1 4.1 4.0 4.1    103 104 105 105   CO2 25 25 26 25 25   * 98 119* 101* 95   BUN 13 15 14 14 16   CREA 2.23* 2.31* 2.10* 2.15* 2.45*   CA 8.4* 8.4* 8.3* 8.2* 8.2*   MG 2.6* 2.6* 2.7* 2.7* 2.6*   PHOS 2.0* 2.0* 2.4* 2.5* 2.9   ALB 2.7* 2.6* 2.6* 2.6* 2.7*   SGOT 39* 39* 41* 42* 50*   ALT 49 51 59 60 75     Recent Labs     07/20/19  0304 07/19/19 1713 07/19/19  0303 07/18/19 2020 07/18/19  0354   WBC 13.6* 12.8* 14.1* 12.7* 16.8*   HGB 8.5* 8.6* 8.7* 8.4* 8.6*   HCT 27.4* 27.2* 28.1* 26.9* 26.7*    169 184 153 154     No results found for: SDES  Lab Results   Component Value Date/Time    Culture result: NO GROWTH 4 DAYS 07/05/2019 12:25 PM    Culture result: NO GROWTH 4 DAYS 07/05/2019 12:25 PM     Recent Results (from the past 24 hour(s)) METABOLIC PANEL, COMPREHENSIVE    Collection Time: 07/19/19  5:13 PM   Result Value Ref Range    Sodium 136 136 - 145 mmol/L    Potassium 4.1 3.5 - 5.1 mmol/L    Chloride 103 97 - 108 mmol/L    CO2 25 21 - 32 mmol/L    Anion gap 8 5 - 15 mmol/L    Glucose 98 65 - 100 mg/dL    BUN 15 6 - 20 MG/DL    Creatinine 2.31 (H) 0.70 - 1.30 MG/DL    BUN/Creatinine ratio 6 (L) 12 - 20      GFR est AA 34 (L) >60 ml/min/1.73m2    GFR est non-AA 28 (L) >60 ml/min/1.73m2    Calcium 8.4 (L) 8.5 - 10.1 MG/DL    Bilirubin, total 0.5 0.2 - 1.0 MG/DL    ALT (SGPT) 51 12 - 78 U/L    AST (SGOT) 39 (H) 15 - 37 U/L    Alk. phosphatase 94 45 - 117 U/L    Protein, total 6.0 (L) 6.4 - 8.2 g/dL    Albumin 2.6 (L) 3.5 - 5.0 g/dL    Globulin 3.4 2.0 - 4.0 g/dL    A-G Ratio 0.8 (L) 1.1 - 2.2     PHOSPHORUS    Collection Time: 07/19/19  5:13 PM   Result Value Ref Range    Phosphorus 2.0 (L) 2.6 - 4.7 MG/DL   MAGNESIUM    Collection Time: 07/19/19  5:13 PM   Result Value Ref Range    Magnesium 2.6 (H) 1.6 - 2.4 mg/dL   CBC WITH AUTOMATED DIFF    Collection Time: 07/19/19  5:13 PM   Result Value Ref Range    WBC 12.8 (H) 4.1 - 11.1 K/uL    RBC 3.04 (L) 4.10 - 5.70 M/uL    HGB 8.6 (L) 12.1 - 17.0 g/dL    HCT 27.2 (L) 36.6 - 50.3 %    MCV 89.5 80.0 - 99.0 FL    MCH 28.3 26.0 - 34.0 PG    MCHC 31.6 30.0 - 36.5 g/dL    RDW 17.4 (H) 11.5 - 14.5 %    PLATELET 192 404 - 416 K/uL    MPV 10.8 8.9 - 12.9 FL    NRBC 0.2 (H) 0  WBC    ABSOLUTE NRBC 0.02 (H) 0.00 - 0.01 K/uL    NEUTROPHILS 67 32 - 75 %    LYMPHOCYTES 5 (L) 12 - 49 %    MONOCYTES 11 5 - 13 %    EOSINOPHILS 13 (H) 0 - 7 %    BASOPHILS 0 0 - 1 %    IMMATURE GRANULOCYTES 4 (H) 0.0 - 0.5 %    ABS. NEUTROPHILS 8.6 (H) 1.8 - 8.0 K/UL    ABS. LYMPHOCYTES 0.6 (L) 0.8 - 3.5 K/UL    ABS. MONOCYTES 1.4 (H) 0.0 - 1.0 K/UL    ABS. EOSINOPHILS 1.7 (H) 0.0 - 0.4 K/UL    ABS. BASOPHILS 0.0 0.0 - 0.1 K/UL    ABS. IMM.  GRANS. 0.5 (H) 0.00 - 0.04 K/UL    DF SMEAR SCANNED      RBC COMMENTS ANISOCYTOSIS  1+ RBC COMMENTS BASOPHILIC STIPPLING  PRESENT       METABOLIC PANEL, COMPREHENSIVE    Collection Time: 07/20/19  3:04 AM   Result Value Ref Range    Sodium 136 136 - 145 mmol/L    Potassium 4.0 3.5 - 5.1 mmol/L    Chloride 103 97 - 108 mmol/L    CO2 25 21 - 32 mmol/L    Anion gap 8 5 - 15 mmol/L    Glucose 119 (H) 65 - 100 mg/dL    BUN 13 6 - 20 MG/DL    Creatinine 2.23 (H) 0.70 - 1.30 MG/DL    BUN/Creatinine ratio 6 (L) 12 - 20      GFR est AA 35 (L) >60 ml/min/1.73m2    GFR est non-AA 29 (L) >60 ml/min/1.73m2    Calcium 8.4 (L) 8.5 - 10.1 MG/DL    Bilirubin, total 0.5 0.2 - 1.0 MG/DL    ALT (SGPT) 49 12 - 78 U/L    AST (SGOT) 39 (H) 15 - 37 U/L    Alk. phosphatase 95 45 - 117 U/L    Protein, total 6.1 (L) 6.4 - 8.2 g/dL    Albumin 2.7 (L) 3.5 - 5.0 g/dL    Globulin 3.4 2.0 - 4.0 g/dL    A-G Ratio 0.8 (L) 1.1 - 2.2     PHOSPHORUS    Collection Time: 07/20/19  3:04 AM   Result Value Ref Range    Phosphorus 2.0 (L) 2.6 - 4.7 MG/DL   MAGNESIUM    Collection Time: 07/20/19  3:04 AM   Result Value Ref Range    Magnesium 2.6 (H) 1.6 - 2.4 mg/dL   CBC WITH AUTOMATED DIFF    Collection Time: 07/20/19  3:04 AM   Result Value Ref Range    WBC 13.6 (H) 4.1 - 11.1 K/uL    RBC 3.05 (L) 4.10 - 5.70 M/uL    HGB 8.5 (L) 12.1 - 17.0 g/dL    HCT 27.4 (L) 36.6 - 50.3 %    MCV 89.8 80.0 - 99.0 FL    MCH 27.9 26.0 - 34.0 PG    MCHC 31.0 30.0 - 36.5 g/dL    RDW 17.4 (H) 11.5 - 14.5 %    PLATELET 242 188 - 225 K/uL    MPV 10.4 8.9 - 12.9 FL    NRBC 0.1 (H) 0  WBC    ABSOLUTE NRBC 0.02 (H) 0.00 - 0.01 K/uL    NEUTROPHILS 71 32 - 75 %    BAND NEUTROPHILS 2 0 - 6 %    LYMPHOCYTES 5 (L) 12 - 49 %    MONOCYTES 4 (L) 5 - 13 %    EOSINOPHILS 17 (H) 0 - 7 %    BASOPHILS 0 0 - 1 %    METAMYELOCYTES 1 (H) 0 %    IMMATURE GRANULOCYTES 0 %    ABS. NEUTROPHILS 9.9 (H) 1.8 - 8.0 K/UL    ABS. LYMPHOCYTES 0.7 (L) 0.8 - 3.5 K/UL    ABS. MONOCYTES 0.5 0.0 - 1.0 K/UL    ABS. EOSINOPHILS 2.3 (H) 0.0 - 0.4 K/UL    ABS.  BASOPHILS 0.0 0.0 - 0.1 K/UL ABS. IMM. GRANS. 0.0 K/UL    DF MANUAL      RBC COMMENTS ANISOCYTOSIS  1+        RBC COMMENTS OVALOCYTES  PRESENT               Total time spent with patient:  xxx   min. Care Plan discussed with:  Patient     Family      RN      Consulting Physician [id number],         I have reviewed the flowsheets. Chart and Pertinent Notes have been reviewed. No change in PMH ,family and social history from Consult note.       Baldemar Hernadez MD

## 2019-07-20 NOTE — PROGRESS NOTES
0740- bedside report and drips verified. Patient intubated, sedated, and CRRT.    0930- propofol decreased to 30 mcg in preparation of SBT. 1025- patient is extremely agitated, will turn head and squeeze left hand only. Not following any other commands. Will still attempt to perform SBT as ordered; RT called for SBT, propofol stopped. 1030- RT at bedside changing vent setting to CPAP. 12- family at bedside, update given. Questions and concerns addressed. 1045- patient tolerating SBT well. VSS, he is calm and breathing 18-22 on CPAP. 1110- ABG performed by RT. Paged on-call pulmonary MD.    1150- precedex stopped. Patient is calm and following commands. 18- Dr. Hunt Push on unit, update given on status and ABG results given. Orders to extubate patient. 1205- patient able to nod appropriately, lift head, and move all extremities appropriately. Suctioned and extubated to 5 L nasal cannula. Patient able to cough and state name. 1555- dysphagia screening passed. 1830- patient attempting to feed himself dinner. Son at bedside helping him. Patient drank all of the broth, no complaints of abdominal pain or nausea. Will continue to monitor for distress. 2000- Bedside and Verbal shift change report given to Lorena Garcia RN (oncoming nurse) by Tianna Pollard RN (offgoing nurse). Report included the following information SBAR, Kardex, Recent Results and Cardiac Rhythm paced.

## 2019-07-20 NOTE — PROGRESS NOTES
Problem: Falls - Risk of  Goal: *Absence of Falls  Description  Document Luca Slaughter Fall Risk and appropriate interventions in the flowsheet. Outcome: Progressing Towards Goal  Note:   Fall Risk Interventions:  Mobility Interventions: Communicate number of staff needed for ambulation/transfer    Mentation Interventions: Evaluate medications/consider consulting pharmacy    Medication Interventions: Evaluate medications/consider consulting pharmacy    Elimination Interventions: Toileting schedule/hourly rounds              Problem: Heart Failure: Day 5  Goal: Off Pathway (Use only if patient is Off Pathway)  Outcome: Progressing Towards Goal     Problem: Nutrition Deficit  Goal: *Optimize nutritional status  Outcome: Progressing Towards Goal     Problem: Ventilator Management  Goal: *Adequate oxygenation and ventilation  Outcome: Progressing Towards Goal  Goal: *Patient maintains clear airway/free of aspiration  Outcome: Progressing Towards Goal  Goal: *Absence of infection signs and symptoms  Outcome: Progressing Towards Goal  Goal: *Normal spontaneous ventilation  Outcome: Progressing Towards Goal     Problem: Non-Violent Restraints  Goal: *Removal from restraints as soon as assessed to be safe  Outcome: Progressing Towards Goal  Goal: *No harm/injury to patient while restraints in use  Outcome: Progressing Towards Goal  Goal: *Patient's dignity will be maintained  Outcome: Progressing Towards Goal     Problem: Infection - Risk of, Central Venous Catheter-Associated Bloodstream Infection  Goal: *Absence of infection signs and symptoms  Outcome: Progressing Towards Goal     Problem: Pressure Injury - Risk of  Goal: *Prevention of pressure injury  Description  Document Eugenio Scale and appropriate interventions in the flowsheet.   Outcome: Progressing Towards Goal  Note:   Pressure Injury Interventions:  Sensory Interventions: Assess changes in LOC, Assess need for specialty bed, Check visual cues for pain, Float heels, Minimize linen layers    Moisture Interventions: Apply protective barrier, creams and emollients, Limit adult briefs, Maintain skin hydration (lotion/cream)    Activity Interventions: Assess need for specialty bed, Pressure redistribution bed/mattress(bed type)    Mobility Interventions: Assess need for specialty bed, Pressure redistribution bed/mattress (bed type), Turn and reposition approx.  every two hours(pillow and wedges)    Nutrition Interventions: Document food/fluid/supplement intake, Discuss nutritional consult with provider    Friction and Shear Interventions: Apply protective barrier, creams and emollients, Minimize layers                Problem: Acute Renal Failure: Day 6  Goal: Off Pathway (Use only if patient is Off Pathway)  Outcome: Progressing Towards Goal

## 2019-07-20 NOTE — DIALYSIS
[facility]       [phone]    Orders   Mode: CVVH   Factor: 50ml/hr   UFR: 1600ml/hr   Blood Flow Rate: 200ml/min     Metrics   BP / HR: 105/43 70   Blood Flow Rate: 200ml/min   AP:                         -64   RP: 90   TMP: 137   PD: 84   FP: 200   UFR: 1600ml/hr     Comments / Plan:   Patient, orders and consent verified. Code status, labs and notes reviewed. [id number] filter running well with no indication for change at this time. RIJ non-tunneled CVC CDI with transparent dressing and biopatch in place. Lines visible/secure with blood warmer set to 37*C and attached to the return line. Education and Pre/Post with RO Cisneros RN.

## 2019-07-21 NOTE — PROGRESS NOTES
[facility]  INTENSIVIST DAILY PROGRESS NOTE  Name: Félix Burns   : 1945   MRN: [id number]   Date: 2019 12:02 PM   I have reviewed the flowsheet and previous days notes. Overnight Events    Afebrile  BP stable  O2 sats 100% on 3L NC    ROS    Feels well this morning. RN report some mild confusion. Stood up with PT. Vital Signs:    Visit Vitals  /44   Pulse 70   Temp 98.4 °F (36.9 °C)   Resp 15   Ht 5' 8\" (1.727 m)   Wt 59.5 kg (131 lb 2.8 oz)   SpO2 100%   BMI 19.94 kg/m²       O2 Device: Nasal cannula   O2 Flow Rate (L/min): 3 l/min   Temp (24hrs), Av.6 °F (37 °C), Min:97.9 °F (36.6 °C), Max:99.5 °F (37.5 °C)       Intake/Output:   Last shift:       07 -  1900  In: 39 [I.V.:39]  Out: 283   Last 3 shifts: 1901 -  0700  In: 2368.5 [P.O.:20; I.V.:1403.5]  Out: 1653 [Drains:275]    Intake/Output Summary (Last 24 hours) at 2019 1143  Last data filed at 2019 1059  Gross per 24 hour   Intake 644.36 ml   Output 1867 ml   Net -1222.64 ml     Physical Exam:  General:  Alert, cooperative, no distress   Head:  Normocephalic   Eyes:  EOMs intact. Nose: Nares normal.                Lungs:   Clear to auscultation bilaterally, quiet, respirations non-labored   Chest wall:  No tenderness or deformity. Heart:  Regular rate and rhythm   Abdomen:   Soft, non-tender. Extremities: No edema.        Skin: Dry       Neurologic: Grossly nonfocal       DATA:   Current Facility-Administered Medications   Medication Dose Route Frequency    phenol throat spray (CHLORASEPTIC) 1 Spray  1 Spray Oral PRN    0.9% sodium chloride infusion 250 mL  250 mL IntraVENous PRN    pantoprazole (PROTONIX) 40 mg in sodium chloride 0.9% 10 mL injection  40 mg IntraVENous Q12H    sodium chloride (NS) flush 5-40 mL  5-40 mL IntraVENous Q8H    sodium chloride (NS) flush 5-40 mL  5-40 mL IntraVENous PRN    bicarbonate dialysis (PRISMASOL) BG K 4/Ca 2.5 5000 ml solution Extracorporeal DIALYSIS CONTINUOUS    balsam peru-castor oil (VENELEX) ointment   Topical BID    piperacillin-tazobactam (ZOSYN) 3.375 g in 0.9% sodium chloride (MBP/ADV) 100 mL  3.375 g IntraVENous Q8H    0.9% sodium chloride infusion  10 mL/hr IntraVENous CONTINUOUS    chlorhexidine (PERIDEX) 0.12 % mouthwash 15 mL  15 mL Oral BID    albuterol-ipratropium (DUO-NEB) 2.5 MG-0.5 MG/3 ML  3 mL Nebulization Q6H RT    NOREPINephrine (LEVOPHED) 32 mg in dextrose 5% 250 mL infusion  2-30 mcg/min IntraVENous TITRATE    0.9% sodium chloride infusion 250 mL  250 mL IntraVENous PRN    0.9% sodium chloride infusion 250 mL  250 mL IntraVENous PRN    0.9% sodium chloride infusion 250 mL  250 mL IntraVENous PRN    sodium chloride (NS) flush 5-40 mL  5-40 mL IntraVENous Q8H    sodium chloride (NS) flush 5-40 mL  5-40 mL IntraVENous PRN    simethicone (MYLICON) 12PG/9.8ED oral drops 80 mg  1.2 mL Oral Multiple    fentaNYL citrate (PF) injection  mcg   mcg IntraVENous Q1H PRN    0.9% sodium chloride infusion 250 mL  250 mL IntraVENous PRN    0.9% sodium chloride infusion  3 mL/hr IntraVENous CONTINUOUS    calcium carbonate (TUMS) chewable tablet 200 mg [elemental]  200 mg Oral TID PRN    aspirin delayed-release tablet 81 mg  81 mg Oral DAILY    sodium chloride (NS) flush 5-40 mL  5-40 mL IntraVENous Q8H    sodium chloride (NS) flush 5-40 mL  5-40 mL IntraVENous PRN    acetaminophen (TYLENOL) tablet 650 mg  650 mg Oral Q4H PRN    ondansetron (ZOFRAN) injection 4 mg  4 mg IntraVENous Q4H PRN    lactobac ac& pc-s.therm-b.anim (KERLINE Q/RISAQUAD)  1 Cap Oral DAILY    LORazepam (ATIVAN) injection 1 mg  1 mg IntraVENous Q6H PRN    naloxone (NARCAN) injection 0.4 mg  0.4 mg IntraVENous PRN    thiamine HCL (B-1) tablet 100 mg  100 mg Oral DAILY    folic acid (FOLVITE) tablet 1 mg  1 mg Oral DAILY    therapeutic multivitamin (THERAGRAN) tablet 1 Tab  1 Tab Oral DAILY       Telemetry:paced Labs:  Recent Labs     07/21/19  0313 07/20/19  0304 07/19/19  1713   WBC 10.7 13.6* 12.8*   HGB 8.5* 8.5* 8.6*   HCT 27.5* 27.4* 27.2*    193 169     Recent Labs     07/21/19  0313 07/20/19  0304 07/19/19  1713    136 136   K 4.2 4.0 4.1    103 103   CO2 26 25 25   GLU 88 119* 98   BUN 12 13 15   CREA 2.37* 2.23* 2.31*   CA 8.4* 8.4* 8.4*   MG 2.6* 2.6* 2.6*   PHOS 2.2* 2.0* 2.0*   ALB 2.9* 2.7* 2.6*   SGOT 32 39* 39*   ALT 42 49 51     No results for input(s): PH, PCO2, PO2, HCO3, FIO2 in the last 72 hours. Imaging:  I have personally reviewed the patients radiographs and reports. - small right effusion, stable       IMPRESSION:   · S/p Multisystem organ failure  · Resp failure on vent support primarily due to mental status which is better this morning  · S/p Acute severe blood loss anemia  · KAYLEEN with severe metabolic acidosis  · Duodenal ulcer- S/P clipping, IR embolization planned  · PHTN- by ECHO PASP 79 EF 50% no AV/MV disease, but RHC data (below) a bit better. Portopulmonary HTN- ETOH abuse and hypoalbuminemic ? Occult cirrhosis  · S/p LHC/RCH 7/10: RV 60/4, PA 61/15 mean 29, /7, /48, RA mean 4, PCW mean 7, CO 2.85-- echo reviewed: biatrial dilatation, left to right blowing of interatrial septum. Cath findings show precapillary PH after diuresis, likely mixed picture on presentation (combined pre and post capillary PH-- filling pressures normalized after diuresis)  · HTN  · Right effusion-exudate- ?  From [place] [address] or other DDX is hepatic hydrothorax - cultures and cytology negative  · SBO- conservative MGMT         ·    PLAN:   · Wean O2 to keep sats >90%  · CRRT per renal  · On empiric zosyn  · PPI  · Outpt pulm follow suggested  · Encourage IS use  · Swallow eval   · PT/OT      ·        My assessment/plan was discussed with: nurse        Leonie Davidson NP

## 2019-07-21 NOTE — PROGRESS NOTES
Hospitalist Progress Note  Petra Santos MD  Answering service: [phone] from in house phone  Cell: [phone]      Date of Service:  2019  NAME:  Gema Cui  :  1945  MRN:  [id number]      Admission Summary: This is a 29-year-old man with a past medical history significant for hypertension, who was in his usual state of health until about a week ago when the patient developed abdominal pain. The pain is located at the epigastric region, 8/10 in severity. No radiation. No known aggravating or relieving factors. The patient described the pain as a dull ache associated with constipation but no nausea, no vomiting. The patient took laxative without any significant relief of the constipation. The patient was brought to the emergency room for further evaluation. When the EMS arrived at the scene, the patient's oxygen saturation was 86% on room air. The oxygen saturation improved to 97% when the patient was started on 2 liters of nasal cannula. The patient denies COPD, congestive heart failure. No heart disease. When the patient arrived at the emergency room, he was found to have elevated BNP level. CT scan of the abdomen and pelvis performed by the emergency room provider shows right pleural effusion and suspected small bowel obstruction. The patient was subsequently referred to the hospitalist service for evaluation for admission. The patient denies fever. No rigors. No chills.   No record of prior admission to this hospital.  The patient has not seen his primary care physician for a while; according to him, he does not like going to the doctor.     Interval history / Subjective:     Patient is extubated on , he is conscious and alert, answer questions and moves all extremities  Family at bedside     Assessment & Plan:     Acute severe GI blood loss anemia due to duodenal ulcer   -GI consulted and s/p EGD on 7/13 duodenal ulcer with bleeding s/p clips, IR consulted and s/p embolization   -s/p total of 6 units pRBC, Hgb 7.4 transfused one unit pRBC on 7/17, Hgb 8.5 stable  -continue protonix 40 mg iv bid  -monitor H/H    Hemorrhagic shock due to acute GI blood loss  -s/p 6 units pRBC transfusion, on levophed, off dopamine   -BP normal, continue serial BP monitoring    SSS s/p pacemaker   -HR 30, s/p pacemaker on 7/16, weaned off dobutamin  -stable  -cardiologist on board    KAYLEEN on CKD stage III  -creatinine improving  -nephrology on board, on CVVH  -avoid nephrotoxin, monitor renal function     Severe lactic acidosis due to shock  -resolved    Acute hypoxic respiratory failure multifactorial, possible pneumonia  -s/p intubated, extubated on 7/20, on 3 l/m via nasal canula, prn duo neb, IV zosyn  -CTA chest no evidence of acute pulmonary embolus. Bilateral airspace disease may represent atelectasis or pneumonia. Large right pleural effusion. Early versus partial  small bowel obstruction secondary to umbilical hernia. Small ascites. -pulmonologist on board  -Chest x ray pulmonary vascular congestion without overt pulmonary edema. Unchanged  Cardiomegaly. unchanged small right pleural effusion. Acute encephalopathy   -CT head no acute abnormality   -EEG non specific  -seen by neurologist  -improved, conscious and alert, oriented to self, answer simple questions  -passed swallowing, on clear liquid diet, advance diet as tolerated    Pulmonary HTN  - PaPressure = 79 on TTE   - echo left and right heart failure EF 45-50, mod-severe TR with pulm   - Cardiology on board s/p LHC: clear arteries, RHC  - Pulmonary following, running tests to evaluate for possible cause for pHTN     Partial SBO  -KUB on 7/15 stable nonobstructive bowel gas pattern. NG tube satisfactory position.  abdomen is soft  -started NGT feeding 25 ml/hr  -nutritionist on board    Bilateral lower legs swelling  -doppler study negative for DVT Reported hx of alcohol abuse  -off precedex, folic acid, thiamin, mvi, lorazepam per [facility] protocol      Full Code; at risk for decompensation       Code status: Full Code  DVT prophylaxis: SCD    Care Plan discussed with: Patient/Family and Nurse  Disposition: TBD   Wife, Gibran Arrington at bedside, case discussed and answered her questions     Hospital Problems  Date Reviewed: 7/5/2019          Codes Class Noted POA    * (Principal) Acute CHF (congestive heart failure) ([facility] [address] ICD-10-CM: I50.9  ICD-9-CM: 428.0  7/5/2019 Yes        Pleural effusion, right ICD-10-CM: J90  ICD-9-CM: 511.9  7/5/2019 Unknown              Vital Signs:    Last 24hrs VS reviewed since prior progress note. Most recent are:  Visit Vitals  /44   Pulse 70   Temp 98.4 °F (36.9 °C)   Resp 15   Ht 5' 8\" (1.727 m)   Wt 59.5 kg (131 lb 2.8 oz)   SpO2 100%   BMI 19.94 kg/m²         Intake/Output Summary (Last 24 hours) at 7/21/2019 1223  Last data filed at 7/21/2019 1059  Gross per 24 hour   Intake 596 ml   Output 1700 ml   Net -1104 ml        Physical Examination:             Constitutional:  Conscious and alert, not cardiorespiratory distress   ENT:  Oral mucous moist, oropharynx benign. Neck supple,    Resp:  CTA bilaterally. No wheezing/rhonchi/rales. No accessory muscle use   CV:  Regular rhythm, normal rate, no murmurs, gallops, rubs    GI:  Soft, non distended, non tender.  normoactive bowel sounds, no hepatosplenomegaly     Musculoskeletal:  No edema    Neurologic:  Conscious and alert, answer simple questions and moves all extremities      Skin:  Good turgor, no rashes or ulcers       Data Review:    Review and/or order of clinical lab test  Review and/or order of tests in the radiology section of CPT  Review and/or order of tests in the medicine section of CPT      Labs:     Recent Labs     07/21/19 0313 07/20/19  0304   WBC 10.7 13.6*   HGB 8.5* 8.5*   HCT 27.5* 27.4*    193     Recent Labs     07/21/19 0313 07/20/19  0304 07/19/19  1713    136 136   K 4.2 4.0 4.1    103 103   CO2 26 25 25   BUN 12 13 15   CREA 2.37* 2.23* 2.31*   GLU 88 119* 98   CA 8.4* 8.4* 8.4*   MG 2.6* 2.6* 2.6*   PHOS 2.2* 2.0* 2.0*     Recent Labs     07/21/19  0313 07/20/19  0304 07/19/19  1713   SGOT 32 39* 39*   ALT 42 49 51   AP 98 95 94   TBILI 0.6 0.5 0.5   TP 6.1* 6.1* 6.0*   ALB 2.9* 2.7* 2.6*   GLOB 3.2 3.4 3.4     No results for input(s): INR, PTP, APTT in the last 72 hours. No lab exists for component: INREXT, INREXT   No results for input(s): FE, TIBC, PSAT, FERR in the last 72 hours. No results found for: FOL, RBCF   No results for input(s): PH, PCO2, PO2 in the last 72 hours. No results for input(s): CPK, CKNDX, TROIQ in the last 72 hours.     No lab exists for component: CPKMB  Lab Results   Component Value Date/Time    Cholesterol, total 134 07/06/2019 03:50 AM    HDL Cholesterol 56 07/06/2019 03:50 AM    LDL, calculated 68 07/06/2019 03:50 AM    Triglyceride 50 07/06/2019 03:50 AM    CHOL/HDL Ratio 2.4 07/06/2019 03:50 AM     Lab Results   Component Value Date/Time    Glucose (POC) 82 07/17/2019 05:13 PM    Glucose (POC) 92 07/17/2019 12:51 PM    Glucose (POC) 175 (H) 07/11/2019 07:56 PM    Glucose (POC) 87 07/05/2019 12:02 PM    Glucose (POC) 101 (H) 07/05/2019 08:47 AM     No results found for: COLOR, APPRN, SPGRU, REFSG, ANGELI, PROTU, GLUCU, KETU, BILU, UROU, TREVOR, LEUKU, GLUKE, EPSU, BACTU, WBCU, RBCU, CASTS, UCRY      Medications Reviewed:     Current Facility-Administered Medications   Medication Dose Route Frequency    phenol throat spray (CHLORASEPTIC) 1 Spray  1 Spray Oral PRN    0.9% sodium chloride infusion 250 mL  250 mL IntraVENous PRN    pantoprazole (PROTONIX) 40 mg in sodium chloride 0.9% 10 mL injection  40 mg IntraVENous Q12H    sodium chloride (NS) flush 5-40 mL  5-40 mL IntraVENous Q8H    sodium chloride (NS) flush 5-40 mL  5-40 mL IntraVENous PRN    bicarbonate dialysis (PRISMASOL) BG K 4/Ca 2.5 5000 ml solution   Extracorporeal DIALYSIS CONTINUOUS    balsam peru-castor oil (VENELEX) ointment   Topical BID    piperacillin-tazobactam (ZOSYN) 3.375 g in 0.9% sodium chloride (MBP/ADV) 100 mL  3.375 g IntraVENous Q8H    0.9% sodium chloride infusion  10 mL/hr IntraVENous CONTINUOUS    chlorhexidine (PERIDEX) 0.12 % mouthwash 15 mL  15 mL Oral BID    albuterol-ipratropium (DUO-NEB) 2.5 MG-0.5 MG/3 ML  3 mL Nebulization Q6H RT    NOREPINephrine (LEVOPHED) 32 mg in dextrose 5% 250 mL infusion  2-30 mcg/min IntraVENous TITRATE    0.9% sodium chloride infusion 250 mL  250 mL IntraVENous PRN    0.9% sodium chloride infusion 250 mL  250 mL IntraVENous PRN    0.9% sodium chloride infusion 250 mL  250 mL IntraVENous PRN    sodium chloride (NS) flush 5-40 mL  5-40 mL IntraVENous Q8H    sodium chloride (NS) flush 5-40 mL  5-40 mL IntraVENous PRN    simethicone (MYLICON) 38NE/8.8UV oral drops 80 mg  1.2 mL Oral Multiple    fentaNYL citrate (PF) injection  mcg   mcg IntraVENous Q1H PRN    0.9% sodium chloride infusion 250 mL  250 mL IntraVENous PRN    0.9% sodium chloride infusion  3 mL/hr IntraVENous CONTINUOUS    calcium carbonate (TUMS) chewable tablet 200 mg [elemental]  200 mg Oral TID PRN    aspirin delayed-release tablet 81 mg  81 mg Oral DAILY    sodium chloride (NS) flush 5-40 mL  5-40 mL IntraVENous Q8H    sodium chloride (NS) flush 5-40 mL  5-40 mL IntraVENous PRN    acetaminophen (TYLENOL) tablet 650 mg  650 mg Oral Q4H PRN    ondansetron (ZOFRAN) injection 4 mg  4 mg IntraVENous Q4H PRN    lactobac ac& pc-s.therm-b.anim (KERLINE Q/RISAQUAD)  1 Cap Oral DAILY    LORazepam (ATIVAN) injection 1 mg  1 mg IntraVENous Q6H PRN    naloxone (NARCAN) injection 0.4 mg  0.4 mg IntraVENous PRN    thiamine HCL (B-1) tablet 100 mg  100 mg Oral DAILY    folic acid (FOLVITE) tablet 1 mg  1 mg Oral DAILY    therapeutic multivitamin (THERAGRAN) tablet 1 Tab  1 Tab Oral DAILY ______________________________________________________________________  EXPECTED LENGTH OF STAY: 5d 4h  ACTUAL LENGTH OF STAY:          16                 Bernardo Ledesma MD

## 2019-07-21 NOTE — PROGRESS NOTES
[facility]   [address] [address], [address], [facility]  Phone: [phone]   [phone]       Nephrology Progress Note  Thomas Hsu     1945     [id number]  Date of Admission : 7/4/2019 07/21/19    CC:  Follow up for KAYLEEN      Assessment and Plan   KAYLEEN on CKD   - 2/2 ATN from hemorrhagic shock   - cont CVVH, factor as tolerated  - daily labs   - can likely switch to IHD tomorrow    Encephalopathy:  - improving    CKD Stage III :  - baseline Cr 1.2-1.3 mg/dl     Hemorraghic Shock /Massive UGI bleed  - S/P emergent clipping by EDG 7/13  - S/P embolization of gastric/diuodenal artery 7/12 in IR  - on pressors      Bradycardia   - s/p PPM placement 7/16     Acute Resp Failure   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 Occult Cirrhosis      Interval History:  Seen and examined. Extubate and awake and alert, off pressors. On CVVH, factor of 50/hr. No cp, sob, n/v/d reported. Review of Systems: Review of systems not obtained due to patient factors.     Current Medications:   Current Facility-Administered Medications   Medication Dose Route Frequency    phenol throat spray (CHLORASEPTIC) 1 Spray  1 Spray Oral PRN    0.9% sodium chloride infusion 250 mL  250 mL IntraVENous PRN    pantoprazole (PROTONIX) 40 mg in sodium chloride 0.9% 10 mL injection  40 mg IntraVENous Q12H    sodium chloride (NS) flush 5-40 mL  5-40 mL IntraVENous Q8H    sodium chloride (NS) flush 5-40 mL  5-40 mL IntraVENous PRN    bicarbonate dialysis (PRISMASOL) BG K 4/Ca 2.5 5000 ml solution   Extracorporeal DIALYSIS CONTINUOUS    balsam peru-castor oil (VENELEX) ointment   Topical BID    piperacillin-tazobactam (ZOSYN) 3.375 g in 0.9% sodium chloride (MBP/ADV) 100 mL  3.375 g IntraVENous Q8H    0.9% sodium chloride infusion  10 mL/hr IntraVENous CONTINUOUS    chlorhexidine (PERIDEX) 0.12 % mouthwash 15 mL  15 mL Oral BID    albuterol-ipratropium (DUO-NEB) 2.5 MG-0.5 MG/3 ML  3 mL Nebulization Q6H RT    NOREPINephrine (LEVOPHED) 32 mg in dextrose 5% 250 mL infusion  2-30 mcg/min IntraVENous TITRATE    0.9% sodium chloride infusion 250 mL  250 mL IntraVENous PRN    0.9% sodium chloride infusion 250 mL  250 mL IntraVENous PRN    0.9% sodium chloride infusion 250 mL  250 mL IntraVENous PRN    sodium chloride (NS) flush 5-40 mL  5-40 mL IntraVENous Q8H    sodium chloride (NS) flush 5-40 mL  5-40 mL IntraVENous PRN    simethicone (MYLICON) 82FP/1.2KL oral drops 80 mg  1.2 mL Oral Multiple    fentaNYL citrate (PF) injection  mcg   mcg IntraVENous Q1H PRN    0.9% sodium chloride infusion 250 mL  250 mL IntraVENous PRN    0.9% sodium chloride infusion  3 mL/hr IntraVENous CONTINUOUS    calcium carbonate (TUMS) chewable tablet 200 mg [elemental]  200 mg Oral TID PRN    aspirin delayed-release tablet 81 mg  81 mg Oral DAILY    sodium chloride (NS) flush 5-40 mL  5-40 mL IntraVENous Q8H    sodium chloride (NS) flush 5-40 mL  5-40 mL IntraVENous PRN    acetaminophen (TYLENOL) tablet 650 mg  650 mg Oral Q4H PRN    ondansetron (ZOFRAN) injection 4 mg  4 mg IntraVENous Q4H PRN    lactobac ac& pc-s.therm-b.anim (KERLINE Q/RISAQUAD)  1 Cap Oral DAILY    LORazepam (ATIVAN) injection 1 mg  1 mg IntraVENous Q6H PRN    naloxone (NARCAN) injection 0.4 mg  0.4 mg IntraVENous PRN    thiamine HCL (B-1) tablet 100 mg  100 mg Oral DAILY    folic acid (FOLVITE) tablet 1 mg  1 mg Oral DAILY    therapeutic multivitamin (THERAGRAN) tablet 1 Tab  1 Tab Oral DAILY      No Known Allergies    Objective:  Vitals:    Vitals:    07/21/19 0800 07/21/19 0819 07/21/19 0900 07/21/19 1000   BP:   141/57 126/54   Pulse: 70  70 70   Resp: 13  14 13   Temp: 98.4 °F (36.9 °C)      SpO2: 100% 100% 100% 100%   Weight:       Height:         Intake and Output:  07/21 0701 - 07/21 1900  In: 39 [I.V.:39]  Out: 215   07/19 1901 - 07/21 0700  In: 2368.5 [P.O.:20; I.V.:1403.5]  Out: 3994 [Drains:275]    Physical Examination:  General: Awake and alert  Neck:  Lines +  Resp:  CTA  CV:  RRR,  no murmur or rub, no LE edema  GI:  Soft, NT, + Bowel sounds, no hepatosplenomegaly  Neurologic:  Awake, nonfocal exam  Psych:             A+O, normal affect   :                 No callejas     []    High complexity decision making was performed  []    Patient is at high-risk of decompensation with multiple organ involvement    Lab Data Personally Reviewed: I have reviewed all the pertinent labs, microbiology data and radiology studies during assessment.     Recent Labs     07/21/19 0313 07/20/19 0304 07/19/19 1713 07/19/19 0303 07/18/19 2020    136 136 137 137   K 4.2 4.0 4.1 4.1 4.0    103 103 104 105   CO2 26 25 25 26 25   GLU 88 119* 98 119* 101*   BUN 12 13 15 14 14   CREA 2.37* 2.23* 2.31* 2.10* 2.15*   CA 8.4* 8.4* 8.4* 8.3* 8.2*   MG 2.6* 2.6* 2.6* 2.7* 2.7*   PHOS 2.2* 2.0* 2.0* 2.4* 2.5*   ALB 2.9* 2.7* 2.6* 2.6* 2.6*   SGOT 32 39* 39* 41* 42*   ALT 42 49 51 59 60     Recent Labs     07/21/19 0313 07/20/19 0304 07/19/19 1713 07/19/19 0303 07/18/19 2020   WBC 10.7 13.6* 12.8* 14.1* 12.7*   HGB 8.5* 8.5* 8.6* 8.7* 8.4*   HCT 27.5* 27.4* 27.2* 28.1* 26.9*    193 169 184 153     No results found for: SDES  Lab Results   Component Value Date/Time    Culture result: NO GROWTH 4 DAYS 07/05/2019 12:25 PM    Culture result: NO GROWTH 4 DAYS 07/05/2019 12:25 PM     Recent Results (from the past 24 hour(s))   POC G3 - PUL    Collection Time: 07/20/19 11:08 AM   Result Value Ref Range    pH (POC) 7.330 (L) 7.35 - 7.45      pCO2 (POC) 43.5 35.0 - 45.0 MMHG    pO2 (POC) 140 (H) 80 - 100 MMHG    HCO3 (POC) 22.9 22 - 26 MMOL/L    sO2 (POC) 99 (H) 92 - 97 %    Base deficit (POC) 3 mmol/L    Site DRAWN FROM ARTERIAL LINE      Device: VENT      Mode CPAP/SPON      PEEP/CPAP (POC) 5 cmH2O    Pressure support 5 cmH2O    Allens test (POC) YES      Specimen type (POC) ARTERIAL     CBC WITH AUTOMATED DIFF    Collection Time: 07/21/19  3:13 AM   Result Value Ref Range    WBC 10.7 4.1 - 11.1 K/uL    RBC 3.08 (L) 4.10 - 5.70 M/uL    HGB 8.5 (L) 12.1 - 17.0 g/dL    HCT 27.5 (L) 36.6 - 50.3 %    MCV 89.3 80.0 - 99.0 FL    MCH 27.6 26.0 - 34.0 PG    MCHC 30.9 30.0 - 36.5 g/dL    RDW 17.0 (H) 11.5 - 14.5 %    PLATELET 964 003 - 777 K/uL    MPV 10.3 8.9 - 12.9 FL    NRBC 0.0 0  WBC    ABSOLUTE NRBC 0.00 0.00 - 0.01 K/uL    NEUTROPHILS 68 32 - 75 %    BAND NEUTROPHILS 2 0 - 6 %    LYMPHOCYTES 10 (L) 12 - 49 %    MONOCYTES 10 5 - 13 %    EOSINOPHILS 10 (H) 0 - 7 %    BASOPHILS 0 0 - 1 %    IMMATURE GRANULOCYTES 0 %    ABS. NEUTROPHILS 7.4 1.8 - 8.0 K/UL    ABS. LYMPHOCYTES 1.1 0.8 - 3.5 K/UL    ABS. MONOCYTES 1.1 (H) 0.0 - 1.0 K/UL    ABS. EOSINOPHILS 1.1 (H) 0.0 - 0.4 K/UL    ABS. BASOPHILS 0.0 0.0 - 0.1 K/UL    ABS. IMM. GRANS. 0.0 K/UL    DF MANUAL      RBC COMMENTS ANISOCYTOSIS  1+       METABOLIC PANEL, COMPREHENSIVE    Collection Time: 07/21/19  3:13 AM   Result Value Ref Range    Sodium 137 136 - 145 mmol/L    Potassium 4.2 3.5 - 5.1 mmol/L    Chloride 105 97 - 108 mmol/L    CO2 26 21 - 32 mmol/L    Anion gap 6 5 - 15 mmol/L    Glucose 88 65 - 100 mg/dL    BUN 12 6 - 20 MG/DL    Creatinine 2.37 (H) 0.70 - 1.30 MG/DL    BUN/Creatinine ratio 5 (L) 12 - 20      GFR est AA 33 (L) >60 ml/min/1.73m2    GFR est non-AA 27 (L) >60 ml/min/1.73m2    Calcium 8.4 (L) 8.5 - 10.1 MG/DL    Bilirubin, total 0.6 0.2 - 1.0 MG/DL    ALT (SGPT) 42 12 - 78 U/L    AST (SGOT) 32 15 - 37 U/L    Alk.  phosphatase 98 45 - 117 U/L    Protein, total 6.1 (L) 6.4 - 8.2 g/dL    Albumin 2.9 (L) 3.5 - 5.0 g/dL    Globulin 3.2 2.0 - 4.0 g/dL    A-G Ratio 0.9 (L) 1.1 - 2.2     PHOSPHORUS    Collection Time: 07/21/19  3:13 AM   Result Value Ref Range    Phosphorus 2.2 (L) 2.6 - 4.7 MG/DL   MAGNESIUM    Collection Time: 07/21/19  3:13 AM   Result Value Ref Range    Magnesium 2.6 (H) 1.6 - 2.4 mg/dL           Total time spent with patient:  xxx   min. Care Plan discussed with:  Patient     Family      RN      Consulting Physician [id number] [facility],         I have reviewed the flowsheets. Chart and Pertinent Notes have been reviewed. No change in PMH ,family and social history from Consult note.       Rosendo Cancino MD

## 2019-07-21 NOTE — DIALYSIS
[facility]       [phone]    Orders   Mode: CVVH   Factor: 50   UFR: 1600   Blood Flow Rate: 200     Metrics   BP / HR: 153/40; 70   Blood Flow Rate: 200   AP:                         -70   RP: 70   TMP: 50   PD: 30   FP: 130   UFR: 1600     Comments / Plan:      Called to change filter secondary to clotting. Orders, consent, patient, hepatitis statua and equipment verified. New [id number] primed, tested, and running well. RIJ nontunneled catheter with biopatch in place dated 7/16/19, clean, dry, intact. Lines reversed at setup. Report given to RN at bedside.

## 2019-07-21 NOTE — PROGRESS NOTES
PHYSICAL THERAPY REEVALUATION  Patient: Alexander Patel (77 y.o. male)  Date: 7/21/2019  Primary Diagnosis: Acute CHF (congestive heart failure) (HCC) [I50.9]  Procedure(s) (LRB):  INSERT PPM DUAL (N/A) 5 Days Post-Op   Precautions:   (CRRT)  Chart, physical therapy assessment, plan of care and goals were reviewed. ASSESSMENT :  Patient is awake, alert, and following commands s/p extubatuion 7/20. Strength is equal and 4/5 in the LEs. Biggest limiting factor is continuous dialysis, however nursing present throughout re-assessment to monitor. Cleared to stand today. Patient able to sit EOB with HOB elevated from long sitting position. Intermittent assist needed for sitting balance. Cues to shift weight forward. Patient standing with min assist x 2. Some softening of the knees observed, but patient able to correct and demonstrate a couple of side steps. Will be able to tolerate sitting in a chair as CRRT will allow. Discharge disposition TBD pending further gait assessment. Patient will benefit from skilled intervention to address the above impairments. Patients rehabilitation potential is considered to be Good  Factors which may influence rehabilitation potential include:   ? None noted  ? Mental ability/status  ? Medical condition  ? Home/family situation and support systems  ? Safety awareness  ? Pain tolerance/management  ? Other:      PLAN :  Recommendations and Planned Interventions:  ?             Bed Mobility Training             ? Neuromuscular Re-Education  ? Transfer Training                   ? Orthotic/Prosthetic Training  ? Gait Training                         ? Modalities  ? Therapeutic Exercises           ? Edema Management/Control  ? Therapeutic Activities            ? Patient and Family Training/Education  ?              Other (comment):  Frequency/Duration: Patient will be followed by physical therapy 5 times a week to address goals. Discharge Recommendations: Rehab, Home Health and To Be Determined  Further Equipment Recommendations for Discharge: TBD      SUBJECTIVE:   Patient stated I'm ready    OBJECTIVE DATA SUMMARY:     Past Medical History:   Diagnosis Date    Arthritic-like pain     Hypertension      Past Surgical History:   Procedure Laterality Date    HX PACEMAKER  07/16/2019    medtronic dual chamber VVIR     IR INSERT NON TUNL CVC OVER 5 YRS  7/13/2019    IR OCCL TXCATH HEMMORAGE W SI  7/13/2019    NC INS NEW/RPLCMT PRM PM W/TRANSV ELTRD ATRIAL&VENT N/A 7/16/2019    INSERT PPM DUAL performed by Faraz Finney MD at [place] [address] course since last seen and reason for reevaluation: Patient extubated 7/20, now on 3L. Remains on CRRT. Patient experienced acute and severe GI blood loss anemia due to duodenal ulcer  with bleeding s/p clips, IR consulted and s/p embolization. This resulted in hemorrhagic shock. Pacemaker placed on 7/16. Critical Behavior:  Neurologic State: Alert  Orientation Level: Disoriented to situation, Disoriented to time, Oriented to person, Oriented to place  Cognition: Impulsive, Follows commands    Strength:    Strength: Generally decreased, functional     Tone & Sensation:   Tone: Normal  Sensation: Intact    Coordination:  Coordination: Generally decreased, functional    Functional Mobility:  Bed Mobility:  Supine to Sit: Moderate assistance;Assist x2(long sit with HOB elevated)  Sit to Supine:  Moderate assistance;Assist x2     Transfers:  Sit to Stand: Minimum assistance;Assist x2  Stand to Sit: Minimum assistance;Assist x2      Balance:   Sitting: Impaired  Sitting - Static: Fair (occasional)  Sitting - Dynamic: Fair (occasional)  Standing: Impaired  Standing - Static: Fair;Constant support  Standing - Dynamic : Fair    Ambulation/Gait Training:  Distance (ft): 2 Feet (ft)  Assistive Device: Gait belt  Ambulation - Level of Assistance: Minimal assistance;Assist x2  Gait Abnormalities: Decreased step clearance  Base of Support: Narrowed  Speed/Melida: Slow;Shuffled  Step Length: Right shortened;Left shortened    Functional Measure:  Unable to complete 10 MWT or TUG at this time secondary to CRRT. Recommend balance assessment when appropriate. Pain:  Pain Scale 1: Numeric (0 - 10)  Pain Intensity 1: 0     Activity Tolerance:   Vitals stable throughout on 3L O2. No dizziness reported. BP stable with positional changes. Please refer to the flowsheet for vital signs taken during this treatment. After treatment:   ?  Patient left in no apparent distress sitting up in chair  ? Patient left in no apparent distress in bed  ? Call bell left within reach  ? Nursing notified  ? Caregiver present  ? Bed alarm activated    COMMUNICATION/EDUCATION:   The patients plan of care was discussed with: Registered Nurse. ?  Fall prevention education was provided and the patient/caregiver indicated understanding. ? Patient/family have participated as able in goal setting and plan of care. ?  Patient/family agree to work toward stated goals and plan of care. ?  Patient understands intent and goals of therapy, but is neutral about his/her participation. ? Patient is unable to participate in goal setting and plan of care.     Thank you for this referral.  Elba Quintana, PT, DPT  Geriatric Clinical Specialist     Time Calculation: 19 mins

## 2019-07-21 NOTE — DIALYSIS
[facility]       [phone]    Orders   Mode: CVVH   Factor: 50ml/hr   UFR: 1600ml/hr   Blood Flow Rate: 200ml/min     Metrics   BP / HR: 139/64  70   Blood Flow Rate: 200ml/min   AP:                         -78   RP: 85   TMP: 141   PD: 108   FP: 216   UFR: 1600ml/hr     Comments / Plan:   Patient, orders and consent verified. Code status, labs and notes reviewed. [id number] filter running well with no indication for change at this time. RIJ non-tunneled CVC CDI with transparent dressing and biopatch in place. Lines visible/secure with blood warmer set to 37*C and attached to the return line. Education and Pre/Post with RO Cisneros RN.

## 2019-07-21 NOTE — PROGRESS NOTES
0800- bedside report received. Patient in bed on CVVH without complaints. 36- Dr. Rutha Gosselin at bedside. He stated that once the CVVH clots to leave it off in preparation of transitioning to HD tomorrow. 1210- right arterial line discontinued and pressure held. Patient tolerated well. 200- Dr. Massimo Prieto at bedside, update given. No orders received. 1900- CVVH red alarm for clotting. Disconnected patient and was able to rinse back all 165 ml of blood. claudia flushed and capped. Edna paged to breakdown CRRT machine. 1930- Bedside and Verbal shift change report given to Chel Philip RN (oncoming nurse) by Dionicio Urias RN (offgoing nurse). Report included the following information SBAR, Kardex, Recent Results and Cardiac Rhythm paced.

## 2019-07-21 NOTE — PROGRESS NOTES
1950: Bedside shift change report given to Nicole Nicholas, RN (oncoming nurse) by Frankie Gautam RN (offgoing nurse). Report included the following information SBAR, Kardex, Procedure Summary, Intake/Output, MAR, Accordion, Recent Results, Med Rec Status and Cardiac Rhythm Paced . 2000: Assumed care of patient, resting quietly in bed, alert to self and place, pt is calm and cooperative. 2110: Paged [facility] due to high TMP (306). Spoke with Leigh Dailey (dialysis RN), instructed to rinse back. 165 mL blood returned. 2230: CRRT restarted. 0700: Uneventful shift. VSS. In chair position in bed.   0745: Bedside shift change report given to Frankie Gautam RN (oncoming nurse) by Nicole Nicholas RN (offgoing nurse). Report included the following information SBAR, Kardex, Procedure Summary, Intake/Output, MAR, Accordion, Recent Results, Med Rec Status and Cardiac Rhythm paced. Problem: Falls - Risk of  Goal: *Absence of Falls  Description  Document Cedric Mail Fall Risk and appropriate interventions in the flowsheet.   Outcome: Progressing Towards Goal  Note:   Fall Risk Interventions:  Mobility Interventions: Communicate number of staff needed for ambulation/transfer, OT consult for ADLs, Patient to call before getting OOB, PT Consult for mobility concerns, PT Consult for assist device competence    Mentation Interventions: Door open when patient unattended, Increase mobility, More frequent rounding, Reorient patient, Room close to nurse's station, Toileting rounds, Update white board    Medication Interventions: Evaluate medications/consider consulting pharmacy, Patient to call before getting OOB, Teach patient to arise slowly    Elimination Interventions: Call light in reach, Patient to call for help with toileting needs, Stay With Me (per policy), Toilet paper/wipes in reach, Toileting schedule/hourly rounds              Problem: Heart Failure: Day 5  Goal: Off Pathway (Use only if patient is Off Pathway)  Outcome: Progressing Towards Goal  Goal: Activity/Safety  Outcome: Progressing Towards Goal  Goal: Diagnostic Test/Procedures  Outcome: Progressing Towards Goal  Goal: Medications  Outcome: Progressing Towards Goal  Note:   Weaned off gtts today  Goal: Respiratory  Outcome: Progressing Towards Goal  Note:   Extubated to 5L NC     Problem: Heart Failure: Discharge Outcomes  Goal: *Verbalizes understanding and describes prescribed diet  Outcome: Progressing Towards Goal     Problem: Cath Lab Procedures: Discharge Outcomes  Goal: *Stable cardiac rhythm  Outcome: Progressing Towards Goal  Note:   Paced  Goal: *Hemodynamically stable  Outcome: Progressing Towards Goal  Note:   Weaned off pressers today  Goal: *Optimal pain control at patient's stated goal  Outcome: Progressing Towards Goal  Goal: *Pulses palpable, skin color within defined limits, skin temperature warm  Outcome: Progressing Towards Goal     Problem: Infection - Risk of, Central Venous Catheter-Associated Bloodstream Infection  Goal: *Absence of infection signs and symptoms  Outcome: Progressing Towards Goal     Problem: Pressure Injury - Risk of  Goal: *Prevention of pressure injury  Description  Document Eugenio Scale and appropriate interventions in the flowsheet. Outcome: Progressing Towards Goal  Note:   Pressure Injury Interventions:  Sensory Interventions: Assess changes in LOC, Assess need for specialty bed, Check visual cues for pain, Discuss PT/OT consult with provider, Float heels, Keep linens dry and wrinkle-free, Maintain/enhance activity level, Minimize linen layers, Pressure redistribution bed/mattress (bed type), Sit a 90-degree angle/use footstool if needed, Turn and reposition approx.  every two hours (pillows and wedges if needed)    Moisture Interventions: Apply protective barrier, creams and emollients, Assess need for specialty bed, Check for incontinence Q2 hours and as needed, Absorbent underpads, Minimize layers, Moisture barrier    Activity Interventions: Assess need for specialty bed, Increase time out of bed, Pressure redistribution bed/mattress(bed type), PT/OT evaluation    Mobility Interventions: Assess need for specialty bed, Float heels, Pressure redistribution bed/mattress (bed type), PT/OT evaluation, Turn and reposition approx.  every two hours(pillow and wedges)    Nutrition Interventions: Document food/fluid/supplement intake    Friction and Shear Interventions: Apply protective barrier, creams and emollients, Foam dressings/transparent film/skin sealants, Lift sheet, Lift team/patient mobility team                Problem: Pacer/ICD: Discharge Outcomes  Goal: *Hemodynamically stable  Outcome: Progressing Towards Goal  Goal: *Stable cardiac rhythm  Outcome: Progressing Towards Goal  Note:   Paced  Goal: *No signs and symptoms of infection or wound complications  Outcome: Progressing Towards Goal  Goal: *Anxiety reduced or absent  Outcome: Progressing Towards Goal  Goal: *Optimal pain control at patient's stated goal  Outcome: Progressing Towards Goal     Problem: Acute Renal Failure: Day 6  Goal: Treatments/Interventions/Procedures  Outcome: Progressing Towards Goal  Note:   Currently on CVVH

## 2019-07-22 NOTE — PROGRESS NOTES
1950: Bedside shift change report given to Tracie Soto RN (oncoming nurse) by Isaias Wood RN (offgoing nurse). Report included the following information SBAR, Kardex, Procedure Summary, Intake/Output, MAR, Accordion, Recent Results, Med Rec Status and Cardiac Rhythm Paced . 2000: Assumed care of patient resting quietly in bed. Alert and oriented to self and place. No complaints of pain. VSS.   0700: Uneventful shift. VSS. Dr. Lin Balbuena at bedside; orders for HD received. Stated that patient is \"ok to move to stepdown\" from his point of view. 3248: Bedside shift change report given to Daksha Dobson RN (oncoming nurse) by Tracie Soto RN (offgoing nurse). Report included the following information SBAR, Kardex, Procedure Summary, Intake/Output, MAR, Accordion, Recent Results, Med Rec Status and Cardiac Rhythm paced. Problem: Falls - Risk of  Goal: *Absence of Falls  Description  Document [facility] Fall Risk and appropriate interventions in the flowsheet.   Outcome: Progressing Towards Goal  Note:   Fall Risk Interventions:  Mobility Interventions: Communicate number of staff needed for ambulation/transfer, OT consult for ADLs, Patient to call before getting OOB, PT Consult for mobility concerns, PT Consult for assist device competence, Strengthening exercises (ROM-active/passive), Utilize gait belt for transfers/ambulation    Mentation Interventions: Door open when patient unattended, Eyeglasses and hearing aids, Increase mobility, More frequent rounding, Reorient patient, Room close to nurse's station, Update white board    Medication Interventions: Evaluate medications/consider consulting pharmacy, Patient to call before getting OOB, Teach patient to arise slowly    Elimination Interventions: Call light in reach, Patient to call for help with toileting needs, Stay With Me (per policy), Toilet paper/wipes in reach, Toileting schedule/hourly rounds              Problem: Heart Failure: Day 5  Goal: Activity/Safety  Outcome: Progressing Towards Goal  Note:   Stood up and took some steps today with PT  Goal: Diagnostic Test/Procedures  Outcome: Progressing Towards Goal  Goal: Nutrition/Diet  Outcome: Progressing Towards Goal  Note:   Advanced to clear liquids today. Goal: Medications  Outcome: Progressing Towards Goal  Note:   No longer on any gtts; taking medications as prescribed  Goal: Respiratory  Outcome: Progressing Towards Goal     Problem: Heart Failure: Discharge Outcomes  Goal: *Describes available resources and support systems  Description  (eg: Home Health, Palliative Care, Advanced Medical Directive)  Outcome: Progressing Towards Goal     Problem: Cath Lab Procedures: Discharge Outcomes  Goal: *Stable cardiac rhythm  Outcome: Progressing Towards Goal  Goal: *Hemodynamically stable  Outcome: Progressing Towards Goal  Goal: *Optimal pain control at patient's stated goal  Outcome: Progressing Towards Goal  Goal: *Pulses palpable, skin color within defined limits, skin temperature warm  Outcome: Progressing Towards Goal     Problem: Nutrition Deficit  Goal: *Optimize nutritional status  Outcome: Progressing Towards Goal     Problem: Infection - Risk of, Central Venous Catheter-Associated Bloodstream Infection  Goal: *Absence of infection signs and symptoms  Outcome: Progressing Towards Goal     Problem: Pressure Injury - Risk of  Goal: *Prevention of pressure injury  Description  Document Eugenio Scale and appropriate interventions in the flowsheet. Outcome: Progressing Towards Goal  Note:   Pressure Injury Interventions:  Sensory Interventions: Assess changes in LOC, Avoid rigorous massage over bony prominences, Check visual cues for pain, Discuss PT/OT consult with provider, Float heels, Keep linens dry and wrinkle-free, Maintain/enhance activity level, Minimize linen layers, Monitor skin under medical devices, Turn and reposition approx.  every two hours (pillows and wedges if needed)    Moisture Interventions: Absorbent underpads, Apply protective barrier, creams and emollients, Check for incontinence Q2 hours and as needed, Limit adult briefs, Maintain skin hydration (lotion/cream), Minimize layers    Activity Interventions: Assess need for specialty bed, Increase time out of bed, Pressure redistribution bed/mattress(bed type), PT/OT evaluation    Mobility Interventions: Assess need for specialty bed, Float heels, Pressure redistribution bed/mattress (bed type), PT/OT evaluation, Turn and reposition approx.  every two hours(pillow and wedges)    Nutrition Interventions: Document food/fluid/supplement intake    Friction and Shear Interventions: Apply protective barrier, creams and emollients, Foam dressings/transparent film/skin sealants, Lift sheet                Problem: Pacer/ICD: Discharge Outcomes  Goal: *Hemodynamically stable  Outcome: Progressing Towards Goal  Goal: *Stable cardiac rhythm  Outcome: Progressing Towards Goal  Goal: *No signs and symptoms of infection or wound complications  Outcome: Progressing Towards Goal     Problem: Acute Renal Failure: Discharge Outcomes  Goal: *Optimal pain control at patient's stated goal  Outcome: Progressing Towards Goal  Goal: *Hemodynamically stable  Outcome: Progressing Towards Goal  Goal: *Lab values stabilized  Outcome: Progressing Towards Goal

## 2019-07-22 NOTE — DIALYSIS
[facility] Dialysis Team [facility]  [phone]                                                   Vitals   Pre   Post   Assessment   Pre   Post     Temp  Temp: 37.2  98 LOC  AOX4 AOx4   HR   Pulse (Heart Rate): 70  73    Lungs   Clear over  Dim.  Clear over dim      B/P   BP: 122/56 134/63 Cardiac   Sinus rhythm  No change   Resp   Resp Rate: 12 16 Skin   Warm and dry Warm and dry   Pain level  Pain Intensity 1: 0  0 Edema  None    No change   Orders:        Duration:   Start:   2590 End:    1943 Total:   3.5 hrs       Dialyzer:   Dialyzer/Set Up Inspection: Revaclear        K Bath:   Dialysate K (mEq/L): 3.5        Ca Bath:   Dialysate CA (mEq/L): 2.5        Na/Bicarb:   Dialysate NA (mEq/L): 140        Target Fluid Removal:   Goal/Amount of Fluid to Remove (mL): 1500 mL        Access         Type & Location:    RIJ. Ports disinfected per policy. Dressing clean, dry and intact. No S/S of infection noted.                     Medications/ Blood Products Given     Name   Dose   Route and Time     Heparin 2500 units. RIJ. 1720                   Blood Volume Processed (BVP):    75L Net Fluid   Removed:  1500ml     Comments   Time Out KFEI: 8142  Primary Nurse Rpt Pre: Shahram Gauthier RN  Primary Nurse Rpt Post: Shahram Gauthier RN  Pt Education: Procudural  Care Plan: Continue tx as scheduled. Tx Summary: Pt tolerated tx well. At end of tx all blood in circuit returned with 300ml NS. Ports flushed, heparin dwells instilled, lines clamped and sterile caps applied. Report given to Shahram Gauthier RN.   Admiting Diagnosis: CHF  Pt's previous clinic- NA  Consent signed - Informed Consent Verified: Yes   [facility] Consent - [facility] on file  Hepatitis Status- Heb B Ag- Neg (07/13/19), Hep B Ab- susceptible. (07/18/19)   Machine #- Machine Number: [id number]   Telemetry status- Continuous bedside tele. Pre-dialysis wt. - Pre-Dialysis Weight: 97.5

## 2019-07-22 NOTE — PROGRESS NOTES
Occupational Therapy  Chart reviewed. Referral received. Attempted to see to see pt for OT eval. Pt is receiving bedside dialysis. Will continue to follow.  Hiren Nelson, OT

## 2019-07-22 NOTE — PROGRESS NOTES
0800 Bedside shift change report given to Marylin Escobedo (oncoming nurse) by Juan Thomas (offgoing nurse). Report included the following information SBAR, MAR and Cardiac Rhythm Paced. 1000 PT working with patient. 1130 HD at bedside. 1200 Pt resting. No complaints during HD.      1745 Hemodialysis completed. TRANSFER - OUT REPORT:    Verbal report given to celeste Robertson  being transferred to  (unit) for routine progression of care       Report consisted of patients Situation, Background, Assessment and   Recommendations(SBAR). Information from the following report(s) SBAR, MAR and Cardiac Rhythm paced was reviewed with the receiving nurse. Lines:   Quad Lumen 07/13/19 Right Neck (Active)   Central Line Being Utilized Yes 7/22/2019  8:00 AM   Criteria for Appropriate Use Hemodynamically unstable, requiring monitoring lines, vasopressors, or volume resuscitation 7/22/2019  8:00 AM   Site Assessment Other (Comment) 7/17/2019  8:00 PM   Infiltration Assessment 0 7/22/2019  8:00 AM   Affected Extremity/Extremities Color distal to insertion site pink (or appropriate for race); Pulses palpable 7/22/2019  8:00 AM   Date of Last Dressing Change 07/16/19 7/22/2019  8:00 AM   Dressing Status Clean, dry, & intact 7/22/2019  8:00 AM   Dressing Type Disk with Chlorhexadine gluconate (CHG); Transparent 7/22/2019  8:00 AM   Action Taken Open ports on tubing capped 7/22/2019  8:00 AM   Proximal Hub Color/Line Status White; Infusing 7/22/2019  8:00 AM   Positive Blood Return (Medial Site) Yes 7/22/2019  8:00 AM   Medial 1 Hub Color/Line Status Blue;Flushed 7/22/2019  8:00 AM   Positive Blood Return (Lateral Site) Yes 7/22/2019  8:00 AM   Medial 2 Hub Color/Line Status Gray;Flushed 7/22/2019  8:00 AM   Positive Blood Return (Site #3) Yes 7/22/2019  8:00 AM   Distal Hub Color/Line Status Brown;Flushed 7/22/2019  8:00 AM   Positive Blood Return (Site #4) Yes 7/22/2019  8:00 AM   Alcohol Cap Used Yes 7/22/2019  8:00 AM Peripheral IV 07/09/19 Left Forearm (Active)   Site Assessment Clean, dry, & intact 7/22/2019  8:00 AM   Phlebitis Assessment 0 7/22/2019  8:00 AM   Infiltration Assessment 0 7/22/2019  8:00 AM   Dressing Status Clean, dry, & intact 7/22/2019  8:00 AM   Dressing Type Transparent 7/22/2019  8:00 AM   Hub Color/Line Status Blue;Capped 7/22/2019  8:00 AM   Action Taken Open ports on tubing capped 7/22/2019  8:00 AM   Alcohol Cap Used Yes 7/22/2019  8:00 AM       Peripheral IV 07/14/19 Anterior;Proximal;Right Forearm (Active)   Site Assessment Clean, dry, & intact 7/22/2019  8:00 AM   Phlebitis Assessment 0 7/22/2019  8:00 AM   Infiltration Assessment 0 7/22/2019  8:00 AM   Dressing Status Clean, dry, & intact 7/22/2019  8:00 AM   Dressing Type Transparent 7/22/2019  8:00 AM   Hub Color/Line Status Pink;Capped 7/22/2019  8:00 AM   Action Taken Open ports on tubing capped 7/22/2019  8:00 AM   Alcohol Cap Used Yes 7/22/2019  8:00 AM        Opportunity for questions and clarification was provided.       Patient transported with:   Monitor  Registered Nurse

## 2019-07-22 NOTE — PROGRESS NOTES
NUTRITION    Recommendations:   1. Advanced diet to cardiac (cleared for advancement as tolerated per hospitalist note)  2. Appreciate documentation of PO - please continue        Diet: full liquids  Meal intake:   Patient Vitals for the past 168 hrs:   % Diet Eaten   07/22/19 0800 100 %   07/21/19 1751 100 %   07/21/19 1312 100 %   07/21/19 0900 100 %   07/20/19 1815 75 %   Chart reviewed for PO check. Pt extubated on 7/20. Transitioned from CVVH to intermittent HD on 7/21. Renal notes expectations for renal recovery. Diet advanced to full liquids. Cleared for diet advancement as tolerated per MD notes. Intake has been good (see above), but will add Ensure Enlive BID (700kcal, 40g protein) while on full liquids. K+ and Phos WNL. Will continue to follow for PO intake, supplement acceptance. See goals and monitoring/evaluation per previous note.    Estimated Nutrition Needs:   Kcals/day: 9500 Kcals/day  Protein: 74 g(74-86g (1.2-1.4g/kg) on CVVH)  Fluid: 1568 ml(1ml/kcal or per renal)  Based On: [address] (2003b)  Weight Used: Actual wt(61.5kg)    Elayne Posada, RD [address] [place] , Pager #[phone] or [phone]

## 2019-07-22 NOTE — PROGRESS NOTES
[facility]   [address] [facility], [facility] [address] [place], [facility]  Phone: [phone]   [phone]       Nephrology Progress Note  Latricia Narayanan     1945     [id number]  Date of Admission : 7/4/2019 07/22/19    CC:  Follow up for KAYLEEN      Assessment and Plan   KAYLEEN on CKD   - 2/2 ATN from hemorrhagic shock   - CRRT stopped 7/21  - HD today and tomorrow and then TTS  - Ok to transfer out of ICU after dialysis   - daily bladder scan --> expect renal recovery   - daily labs     Encephalopathy:  - improving    CKD Stage III :  - baseline Cr 1.2-1.3 mg/dl     Hemorraghic Shock /Massive UGI bleed  - S/P emergent clipping by EDG 7/13  - S/P embolization of gastric/diuodenal artery 7/12 in IR  - off pressors      Bradycardia   - s/p PPM placement 7/16     Acute Resp Failure   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 Occult Cirrhosis      Interval History:  Seen and examined. Extubated on Saturday, diet being advanmced , off pressors,. CRRT clotted at 7pm.  No cp, sob, n/v/d reported. Review of Systems: Review of systems not obtained due to patient factors.     Current Medications:   Current Facility-Administered Medications   Medication Dose Route Frequency    phenol throat spray (CHLORASEPTIC) 1 Spray  1 Spray Oral PRN    0.9% sodium chloride infusion 250 mL  250 mL IntraVENous PRN    pantoprazole (PROTONIX) 40 mg in sodium chloride 0.9% 10 mL injection  40 mg IntraVENous Q12H    sodium chloride (NS) flush 5-40 mL  5-40 mL IntraVENous Q8H    sodium chloride (NS) flush 5-40 mL  5-40 mL IntraVENous PRN    bicarbonate dialysis (PRISMASOL) BG K 4/Ca 2.5 5000 ml solution   Extracorporeal DIALYSIS CONTINUOUS    balsam peru-castor oil (VENELEX) ointment   Topical BID    piperacillin-tazobactam (ZOSYN) 3.375 g in 0.9% sodium chloride (MBP/ADV) 100 mL  3.375 g IntraVENous Q8H    0.9% sodium chloride infusion  10 mL/hr IntraVENous CONTINUOUS    chlorhexidine (PERIDEX) 0.12 % mouthwash 15 mL  15 mL Oral BID    albuterol-ipratropium (DUO-NEB) 2.5 MG-0.5 MG/3 ML  3 mL Nebulization Q6H RT    NOREPINephrine (LEVOPHED) 32 mg in dextrose 5% 250 mL infusion  2-30 mcg/min IntraVENous TITRATE    0.9% sodium chloride infusion 250 mL  250 mL IntraVENous PRN    0.9% sodium chloride infusion 250 mL  250 mL IntraVENous PRN    0.9% sodium chloride infusion 250 mL  250 mL IntraVENous PRN    sodium chloride (NS) flush 5-40 mL  5-40 mL IntraVENous Q8H    sodium chloride (NS) flush 5-40 mL  5-40 mL IntraVENous PRN    simethicone (MYLICON) 99FA/5.6XA oral drops 80 mg  1.2 mL Oral Multiple    fentaNYL citrate (PF) injection  mcg   mcg IntraVENous Q1H PRN    0.9% sodium chloride infusion 250 mL  250 mL IntraVENous PRN    0.9% sodium chloride infusion  3 mL/hr IntraVENous CONTINUOUS    calcium carbonate (TUMS) chewable tablet 200 mg [elemental]  200 mg Oral TID PRN    aspirin delayed-release tablet 81 mg  81 mg Oral DAILY    sodium chloride (NS) flush 5-40 mL  5-40 mL IntraVENous Q8H    sodium chloride (NS) flush 5-40 mL  5-40 mL IntraVENous PRN    acetaminophen (TYLENOL) tablet 650 mg  650 mg Oral Q4H PRN    ondansetron (ZOFRAN) injection 4 mg  4 mg IntraVENous Q4H PRN    lactobac ac& pc-s.therm-b.anim (KERLINE Q/RISAQUAD)  1 Cap Oral DAILY    LORazepam (ATIVAN) injection 1 mg  1 mg IntraVENous Q6H PRN    naloxone (NARCAN) injection 0.4 mg  0.4 mg IntraVENous PRN    thiamine HCL (B-1) tablet 100 mg  100 mg Oral DAILY    folic acid (FOLVITE) tablet 1 mg  1 mg Oral DAILY    therapeutic multivitamin (THERAGRAN) tablet 1 Tab  1 Tab Oral DAILY      No Known Allergies    Objective:  Vitals:    Vitals:    07/22/19 0300 07/22/19 0400 07/22/19 0500 07/22/19 0600   BP: 109/50 111/52 144/69 132/61   Pulse: 70 70 70 70   Resp: 16 21 16 17   Temp:  99.3 °F (37.4 °C)     SpO2: 100% 100% 100% 100% Weight:    59.8 kg (131 lb 13.4 oz)   Height:    5' 8\" (1.727 m)     Intake and Output:  07/21 1901 - 07/22 0700  In: 220 [I.V.:220]  Out: 0   07/20 0701 - 07/21 1900  In: 1992.6 [P.O.:740; I.V.:1087.6]  Out: 3236 [Drains:75]    Physical Examination:  General: Awake and alert  Neck:  Lines +  Resp:  CTA  CV:  RRR,  no murmur or rub, no LE edema  GI:  Soft, NT, + Bowel sounds, no hepatosplenomegaly  Neurologic:  Awake, nonfocal exam  Psych:             A+O, normal affect   :                 No callejas     []    High complexity decision making was performed  []    Patient is at high-risk of decompensation with multiple organ involvement    Lab Data Personally Reviewed: I have reviewed all the pertinent labs, microbiology data and radiology studies during assessment.     Recent Labs     07/22/19 0416 07/21/19 0313 07/20/19  0304 07/19/19  1713    137 136 136   K 3.7 4.2 4.0 4.1    105 103 103   CO2 25 26 25 25   GLU 85 88 119* 98   BUN 14 12 13 15   CREA 3.33* 2.37* 2.23* 2.31*   CA 8.5 8.4* 8.4* 8.4*   MG 2.4 2.6* 2.6* 2.6*   PHOS 2.6 2.2* 2.0* 2.0*   ALB 2.6* 2.9* 2.7* 2.6*   SGOT 26 32 39* 39*   ALT 35 42 49 51     Recent Labs     07/22/19 0416 07/21/19 0313 07/20/19  0304 07/19/19  1713   WBC 9.8 10.7 13.6* 12.8*   HGB 8.2* 8.5* 8.5* 8.6*   HCT 26.8* 27.5* 27.4* 27.2*    209 193 169     No results found for: SDES  Lab Results   Component Value Date/Time    Culture result: NO GROWTH 4 DAYS 07/05/2019 12:25 PM    Culture result: NO GROWTH 4 DAYS 07/05/2019 12:25 PM     Recent Results (from the past 24 hour(s))   CBC WITH AUTOMATED DIFF    Collection Time: 07/22/19  4:16 AM   Result Value Ref Range    WBC 9.8 4.1 - 11.1 K/uL    RBC 2.97 (L) 4.10 - 5.70 M/uL    HGB 8.2 (L) 12.1 - 17.0 g/dL    HCT 26.8 (L) 36.6 - 50.3 %    MCV 90.2 80.0 - 99.0 FL    MCH 27.6 26.0 - 34.0 PG    MCHC 30.6 30.0 - 36.5 g/dL    RDW 16.7 (H) 11.5 - 14.5 %    PLATELET 436 385 - 568 K/uL    MPV 10.0 8.9 - 12.9 FL    NRBC 0.0 0  WBC    ABSOLUTE NRBC 0.00 0.00 - 0.01 K/uL    NEUTROPHILS 52 32 - 75 %    LYMPHOCYTES 10 (L) 12 - 49 %    MONOCYTES 13 5 - 13 %    EOSINOPHILS 22 (H) 0 - 7 %    BASOPHILS 1 0 - 1 %    IMMATURE GRANULOCYTES 2 (H) 0.0 - 0.5 %    ABS. NEUTROPHILS 5.0 1.8 - 8.0 K/UL    ABS. LYMPHOCYTES 1.0 0.8 - 3.5 K/UL    ABS. MONOCYTES 1.3 (H) 0.0 - 1.0 K/UL    ABS. EOSINOPHILS 2.2 (H) 0.0 - 0.4 K/UL    ABS. BASOPHILS 0.1 0.0 - 0.1 K/UL    ABS. IMM. GRANS. 0.2 (H) 0.00 - 0.04 K/UL    DF SMEAR SCANNED      PLATELET COMMENTS Large Platelets      RBC COMMENTS ANISOCYTOSIS  1+       METABOLIC PANEL, COMPREHENSIVE    Collection Time: 07/22/19  4:16 AM   Result Value Ref Range    Sodium 136 136 - 145 mmol/L    Potassium 3.7 3.5 - 5.1 mmol/L    Chloride 103 97 - 108 mmol/L    CO2 25 21 - 32 mmol/L    Anion gap 8 5 - 15 mmol/L    Glucose 85 65 - 100 mg/dL    BUN 14 6 - 20 MG/DL    Creatinine 3.33 (H) 0.70 - 1.30 MG/DL    BUN/Creatinine ratio 4 (L) 12 - 20      GFR est AA 22 (L) >60 ml/min/1.73m2    GFR est non-AA 18 (L) >60 ml/min/1.73m2    Calcium 8.5 8.5 - 10.1 MG/DL    Bilirubin, total 0.5 0.2 - 1.0 MG/DL    ALT (SGPT) 35 12 - 78 U/L    AST (SGOT) 26 15 - 37 U/L    Alk. phosphatase 96 45 - 117 U/L    Protein, total 6.0 (L) 6.4 - 8.2 g/dL    Albumin 2.6 (L) 3.5 - 5.0 g/dL    Globulin 3.4 2.0 - 4.0 g/dL    A-G Ratio 0.8 (L) 1.1 - 2.2     PHOSPHORUS    Collection Time: 07/22/19  4:16 AM   Result Value Ref Range    Phosphorus 2.6 2.6 - 4.7 MG/DL   MAGNESIUM    Collection Time: 07/22/19  4:16 AM   Result Value Ref Range    Magnesium 2.4 1.6 - 2.4 mg/dL           Total time spent with patient:  xxx   min. Care Plan discussed with:  Patient     Family      RN      Consulting Physician [id number] [facility]        I have reviewed the flowsheets. Chart and Pertinent Notes have been reviewed. No change in PMH ,family and social history from Consult note.       Edward Avendano MD

## 2019-07-22 NOTE — PROGRESS NOTES
[facility]  INTENSIVIST DAILY PROGRESS NOTE  Name: Thomas Hsu   : 1945   MRN: [id number]   Date: 2019 12:02 PM   I have reviewed the flowsheet and previous days notes.   Seen and examined. Awake, comfortable on room air, hemodynamically stable      The patient is awake and alert this morning. Following commands. Been on PSV 5/5 with TV in 500's. FiO2 30%. On low dose precedex. Family at bedside. Vital Signs:    Visit Vitals  /60   Pulse 74   Temp 98.6 °F (37 °C)   Resp 14   Ht 5' 8\" (1.727 m)   Wt 59.8 kg (131 lb 13.4 oz)   SpO2 100%   BMI 20.05 kg/m²       O2 Device: Room air   O2 Flow Rate (L/min): 1 l/min   Temp (24hrs), Av.1 °F (37.3 °C), Min:98.6 °F (37 °C), Max:99.3 °F (37.4 °C)       Intake/Output:   Last shift:       07 - 1900  In: 240 [P.O.:240]  Out: -   Last 3 shifts:  190 -  0700  In: 3784 [P.O.:720; I.V.:901]  Out: 1850     Intake/Output Summary (Last 24 hours) at 2019 1243  Last data filed at 2019 0800  Gross per 24 hour   Intake 1160 ml   Output 692 ml   Net 468 ml     Physical Exam:  General:  Alert, cooperative, no distress   Head:  Normocephalic   Eyes:  EOMs intact. Nose: Nares normal.    Throat: Intubated           Lungs:   Clear to auscultation bilaterally. Chest wall:  No tenderness or deformity. Heart:  Regular rate and rhythm   Abdomen:   Soft, non-tender. Extremities: No edema.        Skin: Dry       Neurologic: Grossly nonfocal       DATA:   Current Facility-Administered Medications   Medication Dose Route Frequency    phenol throat spray (CHLORASEPTIC) 1 Spray  1 Spray Oral PRN    0.9% sodium chloride infusion 250 mL  250 mL IntraVENous PRN    pantoprazole (PROTONIX) 40 mg in sodium chloride 0.9% 10 mL injection  40 mg IntraVENous Q12H    sodium chloride (NS) flush 5-40 mL  5-40 mL IntraVENous Q8H    sodium chloride (NS) flush 5-40 mL  5-40 mL IntraVENous PRN    bicarbonate dialysis (PRISMASOL) BG K 4/Ca 2.5 5000 ml solution   Extracorporeal DIALYSIS CONTINUOUS    balsam peru-castor oil (VENELEX) ointment   Topical BID    piperacillin-tazobactam (ZOSYN) 3.375 g in 0.9% sodium chloride (MBP/ADV) 100 mL  3.375 g IntraVENous Q8H    0.9% sodium chloride infusion  10 mL/hr IntraVENous CONTINUOUS    chlorhexidine (PERIDEX) 0.12 % mouthwash 15 mL  15 mL Oral BID    albuterol-ipratropium (DUO-NEB) 2.5 MG-0.5 MG/3 ML  3 mL Nebulization Q6H RT    0.9% sodium chloride infusion 250 mL  250 mL IntraVENous PRN    0.9% sodium chloride infusion 250 mL  250 mL IntraVENous PRN    0.9% sodium chloride infusion 250 mL  250 mL IntraVENous PRN    sodium chloride (NS) flush 5-40 mL  5-40 mL IntraVENous Q8H    sodium chloride (NS) flush 5-40 mL  5-40 mL IntraVENous PRN    simethicone (MYLICON) 24EZ/0.2BF oral drops 80 mg  1.2 mL Oral Multiple    fentaNYL citrate (PF) injection  mcg   mcg IntraVENous Q1H PRN    0.9% sodium chloride infusion 250 mL  250 mL IntraVENous PRN    0.9% sodium chloride infusion  3 mL/hr IntraVENous CONTINUOUS    calcium carbonate (TUMS) chewable tablet 200 mg [elemental]  200 mg Oral TID PRN    aspirin delayed-release tablet 81 mg  81 mg Oral DAILY    sodium chloride (NS) flush 5-40 mL  5-40 mL IntraVENous Q8H    sodium chloride (NS) flush 5-40 mL  5-40 mL IntraVENous PRN    acetaminophen (TYLENOL) tablet 650 mg  650 mg Oral Q4H PRN    ondansetron (ZOFRAN) injection 4 mg  4 mg IntraVENous Q4H PRN    lactobac ac& pc-s.therm-b.anim (KERLINE Q/RISAQUAD)  1 Cap Oral DAILY    LORazepam (ATIVAN) injection 1 mg  1 mg IntraVENous Q6H PRN    naloxone (NARCAN) injection 0.4 mg  0.4 mg IntraVENous PRN    thiamine HCL (B-1) tablet 100 mg  100 mg Oral DAILY    folic acid (FOLVITE) tablet 1 mg  1 mg Oral DAILY    therapeutic multivitamin (THERAGRAN) tablet 1 Tab  1 Tab Oral DAILY       Telemetry:paced          Labs:  Recent Labs     07/22/19  0416 07/21/19  7679 07/20/19  0304   WBC 9.8 10.7 13.6*   HGB 8.2* 8.5* 8.5*   HCT 26.8* 27.5* 27.4*    209 193     Recent Labs     07/22/19  0416 07/21/19  0313 07/20/19  0304    137 136   K 3.7 4.2 4.0    105 103   CO2 25 26 25   GLU 85 88 119*   BUN 14 12 13   CREA 3.33* 2.37* 2.23*   CA 8.5 8.4* 8.4*   MG 2.4 2.6* 2.6*   PHOS 2.6 2.2* 2.0*   ALB 2.6* 2.9* 2.7*   SGOT 26 32 39*   ALT 35 42 49     No results for input(s): PH, PCO2, PO2, HCO3, FIO2 in the last 72 hours. Imaging:  I have personally reviewed the patients radiographs and reports. - small right effusion, stable       IMPRESSION:   · S/p Multisystem organ failure  · Resp failure on vent support primarily due to mental status which is better this morning  · S/p Acute severe blood loss anemia  · KAYLEEN with severe metabolic acidosis  · Duodenal ulcer- S/P clipping, IR embolization planned  · PHTN- by ECHO PASP 79 EF 50% no AV/MV disease, but RHC data (below) a bit better. Portopulmonary HTN- ETOH abuse and hypoalbuminemic ? Occult cirrhosis  · S/p LH/RCH 7/10: RV 60/4, PA 61/15 mean 29, /7, /48, RA mean 4, PCW mean 7, CO 2.85-- echo reviewed: biatrial dilatation, left to right blowing of interatrial septum. Cath findings show precapillary PH after diuresis, likely mixed picture on presentation (combined pre and post capillary PH-- filling pressures normalized after diuresis)  · HTN  · Right effusion-exudate- ? From [facility] or other DDX is hepatic hydrothorax - cultures and cytology negative  · SBO- conservative MGMT         ·    PLAN:   · On room air  · CRRT per renal  · On empiric zosyn  · PPI  · Outpt pulm follow suggested  · Swallow eval and PT as improves      · Stable for transfer out of ICU. We will be available to see him again if we can help     The pt is critically ill.     My assessment/plan was discussed with: nurse        Jamila Ibrahim MD

## 2019-07-22 NOTE — PROGRESS NOTES
Problem: Mobility Impaired (Adult and Pediatric)  Goal: *Acute Goals and Plan of Care (Insert Text)  Description  Physical Therapy Goals  Revised 7/21/2019  Initiated 7/9/2019  1. Patient will move from supine to sit and sit to supine in bed with supervision within 7 day(s). 2.  Patient will transfer from bed to chair and chair to bed with min assist using the least restrictive device within 7 day(s). 3.  Patient will perform sit to stand with supervision within 7 day(s). 4.  Patient will ambulate with min assist for 50 feet with the least restrictive device within 7 day(s). Initiated 7/9/2019  1. Patient will move from supine to sit and sit to supine  in bed with independence within 7 day(s). 2.  Patient will transfer from bed to chair and chair to bed with independence using the least restrictive device within 7 day(s). 3.  Patient will perform sit to stand with independence within 7 day(s). 4.  Patient will ambulate with modified independence for 150 feet with the least restrictive device within 7 day(s). 5.  Patient will ascend/descend 13 stairs with 1 handrail(s) with modified independence within 7 day(s). Outcome: Progressing Towards Goal     PHYSICAL THERAPY TREATMENT  Patient: Latricia Narayanan [de-identified]72 y.o. male)  Date: 7/22/2019  Diagnosis: Acute CHF (congestive heart failure) ([facility]) [I50.9] Acute CHF (congestive heart failure) ([facility])  Procedure(s) (LRB):  INSERT PPM DUAL (N/A) 6 Days Post-Op  Precautions: PPM Precautions  Chart, physical therapy assessment, plan of care and goals were reviewed. ASSESSMENT:    Patient unable to recall pacemaker implant/precautions, therefore, reviewed with patient and wife present. Patient agreeable to progressive mobility training - Session Goal: Transfer to chair. Patient demonstrated improved ability to attain EOB sitting from supine position in bed (minimal assistance - primarily for trunk righting from sidelying).  Patient required only contact guard assistance for static sitting posture/safety at EOB. With seated LE ROM/strengthening exercises, patient required upmost minimal assistance for trunk control and visual benchmarks for LE ROM. For bed > chair transfer, patient required moderate assist for manual facilitation (alignment of COM over WIN) and minimal assist for steadying (frequent retropulsion and near-tandem WIN). Patient's functional mobility remains limited by: debility, confusion (oriented to person and place), decreased cardiopulmonary tolerance, and impaired sitting/standing balance. At this time, recommend discharge to rehab- working towards tolerating 3 hours of therapy per day. Progression toward goals:  ?    Improving appropriately and progressing toward goals  x    Improving slowly and progressing toward goals  ? Not making progress toward goals and plan of care will be adjusted     PLAN:  Patient continues to benefit from skilled intervention to address the above impairments. Continue treatment per established plan of care. 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     OBJECTIVE DATA SUMMARY:   Critical Behavior:  Neurologic State: Alert, Confused  Orientation Level: Disoriented to situation, Disoriented to time, Oriented to person, Oriented to place  Cognition: Follows commands, Memory loss     Functional Mobility Training:  Bed Mobility:  Supine to Sit: Assist x1;Minimum assistance(+ facilitated initiation)  Transfers:  Sit to Stand: Assist x1;Minimum assistance; Moderate assistance(++ A for aligning COM over WIN)  Stand to Sit: Assist x1;Minimum assistance(For eccentric control)  Balance:  Sitting: Impaired; Without support  Sitting - Static: Good (unsupported)  Sitting - Dynamic: Fair (occasional)  Standing: Impaired; With support  Standing - Static: Fair;Constant support  Standing - Dynamic : Fair;Constant support  Ambulation/Gait Training:  Distance (ft): 2 Feet (ft)  Assistive Device: Gait belt  Ambulation - Level of Assistance: Assist x1; Moderate assistance(2nd person for safety/line management)  Gait Abnormalities: Decreased step clearance        Base of Support: Narrowed(Nearly tandem)     Speed/[place]: Pace decreased (<100 feet/min); Shuffled;Delayed  Step Length: Left shortened;Right shortened    Pain:  Pain Scale 1: Numeric (0 - 10)  Pain Intensity 1: 0    Activity Tolerance:  Please refer to the flowsheet for vital signs taken during this treatment. After treatment:   x    Patient left in no apparent distress sitting up in chair  ? Patient left in no apparent distress in bed  x    Call bell left within reach  x    Nursing notified  ? Caregiver present  ?     Bed alarm activated    COMMUNICATION/COLLABORATION:   The patients plan of care was discussed with: Occupational Therapist, Registered Nurse and Rehabilitation Attendant    Saúl Govea PT, DPT   Time Calculation: 25 mins

## 2019-07-22 NOTE — PROGRESS NOTES
Hospitalist Progress Note  Juan Guthrie MD  Answering service: [phone] from in house phone  Cell: [phone]      Date of Service:  2019  NAME:  Clint Moe  :  1945  MRN:  [id number]      Admission Summary: This is a 81-year-old man with a past medical history significant for hypertension, who was in his usual state of health until about a week ago when the patient developed abdominal pain. The pain is located at the epigastric region, 8/10 in severity. No radiation. No known aggravating or relieving factors. The patient described the pain as a dull ache associated with constipation but no nausea, no vomiting. The patient took laxative without any significant relief of the constipation. The patient was brought to the emergency room for further evaluation. When the EMS arrived at the scene, the patient's oxygen saturation was 86% on room air. The oxygen saturation improved to 97% when the patient was started on 2 liters of nasal cannula. The patient denies COPD, congestive heart failure. No heart disease. When the patient arrived at the emergency room, he was found to have elevated BNP level. CT scan of the abdomen and pelvis performed by the emergency room provider shows right pleural effusion and suspected small bowel obstruction. The patient was subsequently referred to the hospitalist service for evaluation for admission. The patient denies fever. No rigors. No chills.   No record of prior admission to this hospital.  The patient has not seen his primary care physician for a while; according to him, he does not like going to the doctor.     Interval history / Subjective:     Patient is extubated on ,   he is conscious and alert, answer questions and moves all extremities  Family at bedside     Assessment & Plan:     Acute severe GI blood loss anemia due to duodenal ulcer   -GI consulted and s/p EGD on 7/13 duodenal ulcer with bleeding s/p clips, IR consulted and s/p embolization   -s/p total of 6 units pRBC, Hgb 7.4 transfused one unit pRBC on 7/17, Hgb 8.2 stable  -continue protonix 40 mg iv bid  -monitor H/H    Hemorrhagic shock due to acute GI blood loss  -s/p 6 units pRBC transfusion, on levophed, off dopamine   -BP normal, continue serial BP monitoring    SSS s/p pacemaker   -HR 30, s/p pacemaker on 7/16, weaned off dobutamin  -stable  -cardiologist on board    KAYLEEN on CKD stage III  -creatinine improving  -nephrology on board,  CVVH stopped 7/21, on HD  -avoid nephrotoxin, monitor renal function     Severe lactic acidosis due to shock  -resolved    Acute hypoxic respiratory failure multifactorial, possible pneumonia  -s/p intubated, extubated on 7/20, off oxygen SpO2 100% on RA, on prn duo neb, IV zosyn  -CTA chest no evidence of acute pulmonary embolus. Bilateral airspace disease may represent atelectasis or pneumonia. Large right pleural effusion. Early versus partial  small bowel obstruction secondary to umbilical hernia. Small ascites. -pulmonologist on board  -Chest x ray pulmonary vascular congestion without overt pulmonary edema. Unchanged  Cardiomegaly. unchanged small right pleural effusion. Acute encephalopathy   -CT head no acute abnormality   -EEG non specific  -seen by neurologist  -improved, conscious and alert, oriented to self, answer simple questions  -passed swallowing, on full liquid diet, advance diet as tolerated    Pulmonary HTN  - PaPressure = 79 on TTE   - echo left and right heart failure EF 45-50, mod-severe TR with pulm   - Cardiology on board s/p LHC: clear arteries, RHC  - Pulmonary following, running tests to evaluate for possible cause for pHTN     Partial SBO  -KUB on 7/15 stable nonobstructive bowel gas pattern. NG tube satisfactory position.  abdomen is soft  -resolved    Bilateral lower legs swelling  -doppler study negative for DVT     Reported hx of alcohol abuse  -off precedex, folic acid, thiamin, mvi, lorazepam per [facility] protocol      Full Code; at risk for decompensation       Code status: Full Code  DVT prophylaxis: SCD    Care Plan discussed with: Patient/Family and Nurse  Disposition: TBD   Wife, Hilda Andrews at bedside, case discussed and answered her questions     Hospital Problems  Date Reviewed: 7/5/2019          Codes Class Noted POA    * (Principal) Acute CHF (congestive heart failure) ([facility] [address] ICD-10-CM: I50.9  ICD-9-CM: 428.0  7/5/2019 Yes        Pleural effusion, right ICD-10-CM: J90  ICD-9-CM: 511.9  7/5/2019 Unknown              Vital Signs:    Last 24hrs VS reviewed since prior progress note. Most recent are:  Visit Vitals  /60   Pulse 74   Temp 98.6 °F (37 °C)   Resp 14   Ht 5' 8\" (1.727 m)   Wt 59.8 kg (131 lb 13.4 oz)   SpO2 100%   BMI 20.05 kg/m²         Intake/Output Summary (Last 24 hours) at 7/22/2019 1209  Last data filed at 7/22/2019 0800  Gross per 24 hour   Intake 1160 ml   Output 692 ml   Net 468 ml        Physical Examination:             Constitutional:  Conscious and alert, not cardiorespiratory distress   ENT:  Oral mucous moist, oropharynx benign. Neck supple,    Resp:  CTA bilaterally. No wheezing/rhonchi/rales. No accessory muscle use   CV:  Regular rhythm, normal rate, no murmurs, gallops, rubs    GI:  Soft, non distended, non tender.  normoactive bowel sounds, no hepatosplenomegaly     Musculoskeletal:  No edema    Neurologic:  Conscious and alert, answer simple questions and moves all extremities      Skin:  Good turgor, no rashes or ulcers       Data Review:    Review and/or order of clinical lab test  Review and/or order of tests in the radiology section of CPT  Review and/or order of tests in the medicine section of CPT      Labs:     Recent Labs     07/22/19 0416 07/21/19  0313   WBC 9.8 10.7   HGB 8.2* 8.5*   HCT 26.8* 27.5*    209     Recent Labs     07/22/19  0416 07/21/19  0313 07/20/19  0304    137 136 K 3.7 4.2 4.0    105 103   CO2 25 26 25   BUN 14 12 13   CREA 3.33* 2.37* 2.23*   GLU 85 88 119*   CA 8.5 8.4* 8.4*   MG 2.4 2.6* 2.6*   PHOS 2.6 2.2* 2.0*     Recent Labs     07/22/19  0416 07/21/19  0313 07/20/19  0304   SGOT 26 32 39*   ALT 35 42 49   AP 96 98 95   TBILI 0.5 0.6 0.5   TP 6.0* 6.1* 6.1*   ALB 2.6* 2.9* 2.7*   GLOB 3.4 3.2 3.4     No results for input(s): INR, PTP, APTT in the last 72 hours. No lab exists for component: INREXT, INREXT   No results for input(s): FE, TIBC, PSAT, FERR in the last 72 hours. No results found for: FOL, RBCF   No results for input(s): PH, PCO2, PO2 in the last 72 hours. No results for input(s): CPK, CKNDX, TROIQ in the last 72 hours.     No lab exists for component: CPKMB  Lab Results   Component Value Date/Time    Cholesterol, total 134 07/06/2019 03:50 AM    HDL Cholesterol 56 07/06/2019 03:50 AM    LDL, calculated 68 07/06/2019 03:50 AM    Triglyceride 50 07/06/2019 03:50 AM    CHOL/HDL Ratio 2.4 07/06/2019 03:50 AM     Lab Results   Component Value Date/Time    Glucose (POC) 82 07/17/2019 05:13 PM    Glucose (POC) 92 07/17/2019 12:51 PM    Glucose (POC) 175 (H) 07/11/2019 07:56 PM    Glucose (POC) 87 07/05/2019 12:02 PM    Glucose (POC) 101 (H) 07/05/2019 08:47 AM     No results found for: COLOR, APPRN, SPGRU, REFSG, ANGELI, PROTU, GLUCU, KETU, BILU, UROU, TREVOR, LEUKU, GLUKE, EPSU, BACTU, WBCU, RBCU, CASTS, UCRY      Medications Reviewed:     Current Facility-Administered Medications   Medication Dose Route Frequency    phenol throat spray (CHLORASEPTIC) 1 Spray  1 Spray Oral PRN    0.9% sodium chloride infusion 250 mL  250 mL IntraVENous PRN    pantoprazole (PROTONIX) 40 mg in sodium chloride 0.9% 10 mL injection  40 mg IntraVENous Q12H    sodium chloride (NS) flush 5-40 mL  5-40 mL IntraVENous Q8H    sodium chloride (NS) flush 5-40 mL  5-40 mL IntraVENous PRN    bicarbonate dialysis (PRISMASOL) BG K 4/Ca 2.5 5000 ml solution   Extracorporeal DIALYSIS CONTINUOUS    balsam peru-castor oil (VENELEX) ointment   Topical BID    piperacillin-tazobactam (ZOSYN) 3.375 g in 0.9% sodium chloride (MBP/ADV) 100 mL  3.375 g IntraVENous Q8H    0.9% sodium chloride infusion  10 mL/hr IntraVENous CONTINUOUS    chlorhexidine (PERIDEX) 0.12 % mouthwash 15 mL  15 mL Oral BID    albuterol-ipratropium (DUO-NEB) 2.5 MG-0.5 MG/3 ML  3 mL Nebulization Q6H RT    0.9% sodium chloride infusion 250 mL  250 mL IntraVENous PRN    0.9% sodium chloride infusion 250 mL  250 mL IntraVENous PRN    0.9% sodium chloride infusion 250 mL  250 mL IntraVENous PRN    sodium chloride (NS) flush 5-40 mL  5-40 mL IntraVENous Q8H    sodium chloride (NS) flush 5-40 mL  5-40 mL IntraVENous PRN    simethicone (MYLICON) 58CO/4.2IL oral drops 80 mg  1.2 mL Oral Multiple    fentaNYL citrate (PF) injection  mcg   mcg IntraVENous Q1H PRN    0.9% sodium chloride infusion 250 mL  250 mL IntraVENous PRN    0.9% sodium chloride infusion  3 mL/hr IntraVENous CONTINUOUS    calcium carbonate (TUMS) chewable tablet 200 mg [elemental]  200 mg Oral TID PRN    aspirin delayed-release tablet 81 mg  81 mg Oral DAILY    sodium chloride (NS) flush 5-40 mL  5-40 mL IntraVENous Q8H    sodium chloride (NS) flush 5-40 mL  5-40 mL IntraVENous PRN    acetaminophen (TYLENOL) tablet 650 mg  650 mg Oral Q4H PRN    ondansetron (ZOFRAN) injection 4 mg  4 mg IntraVENous Q4H PRN    lactobac ac& pc-s.therm-b.anim (KERLINE Q/RISAQUAD)  1 Cap Oral DAILY    LORazepam (ATIVAN) injection 1 mg  1 mg IntraVENous Q6H PRN    naloxone (NARCAN) injection 0.4 mg  0.4 mg IntraVENous PRN    thiamine HCL (B-1) tablet 100 mg  100 mg Oral DAILY    folic acid (FOLVITE) tablet 1 mg  1 mg Oral DAILY    therapeutic multivitamin (THERAGRAN) tablet 1 Tab  1 Tab Oral DAILY     ______________________________________________________________________  EXPECTED LENGTH OF STAY: 5d 4h  ACTUAL LENGTH OF STAY:          17 Jeannette Segovia MD

## 2019-07-23 PROBLEM — N17.9 AKI (ACUTE KIDNEY INJURY) (HCC): Status: ACTIVE | Noted: 2019-01-01

## 2019-07-23 PROBLEM — J96.91 RESPIRATORY FAILURE WITH HYPOXIA (HCC): Status: ACTIVE | Noted: 2019-01-01

## 2019-07-23 PROBLEM — D62 ACUTE BLOOD LOSS ANEMIA: Status: ACTIVE | Noted: 2019-01-01

## 2019-07-23 PROBLEM — R57.8 HEMORRHAGIC SHOCK (HCC): Status: ACTIVE | Noted: 2019-01-01

## 2019-07-23 NOTE — PROGRESS NOTES
[facility]   [address] [facility], [address] [place] [address], [facility]  Phone: [phone]   [phone]       Nephrology Progress Note  Alexander Patel     1945     [id number]  Date of Admission : 7/4/2019 07/23/19    CC:  Follow up for KAYLEEN      Assessment and Plan   KAYLEEN on CKD   - 2/2 ATN from hemorrhagic shock   - CRRT stopped 7/21. On Intermittent HD MWF schedule   - No signs of renal recovery yet   - daily labs     Encephalopathy:  - improving    CKD Stage III :  - baseline Cr 1.2-1.3 mg/dl     Hemorraghic Shock /Massive UGI bleed  - S/P emergent clipping by EDG 7/13  - S/P embolization of gastric/diuodenal artery 7/12 in IR    Bradycardia   - s/p PPM placement 7/16     Acute Resp Failure   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 Occult Cirrhosis      Interval History:  Seen and examined. Transferred out of ICU. Tolerated HD. No UOP. No complaints today   No cp, sob, n/v/d reported. Review of Systems: A comprehensive review of systems was negative except for that written in the HPI.     Current Medications:   Current Facility-Administered Medications   Medication Dose Route Frequency    heparin (porcine) 1,000 unit/mL injection 2,800 Units  2,800 Units IntraVENous DIALYSIS PRN    phenol throat spray (CHLORASEPTIC) 1 Spray  1 Spray Oral PRN    0.9% sodium chloride infusion 250 mL  250 mL IntraVENous PRN    pantoprazole (PROTONIX) 40 mg in sodium chloride 0.9% 10 mL injection  40 mg IntraVENous Q12H    sodium chloride (NS) flush 5-40 mL  5-40 mL IntraVENous Q8H    sodium chloride (NS) flush 5-40 mL  5-40 mL IntraVENous PRN    bicarbonate dialysis (PRISMASOL) BG K 4/Ca 2.5 5000 ml solution   Extracorporeal DIALYSIS CONTINUOUS    balsam peru-castor oil (VENELEX) ointment   Topical BID    piperacillin-tazobactam (ZOSYN) 3.375 g in 0.9% sodium chloride (MBP/ADV) 100 mL  3.375 g IntraVENous Q8H    0.9% sodium chloride infusion  10 mL/hr IntraVENous CONTINUOUS    chlorhexidine (PERIDEX) 0.12 % mouthwash 15 mL  15 mL Oral BID    albuterol-ipratropium (DUO-NEB) 2.5 MG-0.5 MG/3 ML  3 mL Nebulization Q6H RT    0.9% sodium chloride infusion 250 mL  250 mL IntraVENous PRN    0.9% sodium chloride infusion 250 mL  250 mL IntraVENous PRN    0.9% sodium chloride infusion 250 mL  250 mL IntraVENous PRN    sodium chloride (NS) flush 5-40 mL  5-40 mL IntraVENous Q8H    sodium chloride (NS) flush 5-40 mL  5-40 mL IntraVENous PRN    simethicone (MYLICON) 94HO/7.4GD oral drops 80 mg  1.2 mL Oral Multiple    fentaNYL citrate (PF) injection  mcg   mcg IntraVENous Q1H PRN    0.9% sodium chloride infusion 250 mL  250 mL IntraVENous PRN    0.9% sodium chloride infusion  3 mL/hr IntraVENous CONTINUOUS    calcium carbonate (TUMS) chewable tablet 200 mg [elemental]  200 mg Oral TID PRN    aspirin delayed-release tablet 81 mg  81 mg Oral DAILY    sodium chloride (NS) flush 5-40 mL  5-40 mL IntraVENous Q8H    sodium chloride (NS) flush 5-40 mL  5-40 mL IntraVENous PRN    acetaminophen (TYLENOL) tablet 650 mg  650 mg Oral Q4H PRN    ondansetron (ZOFRAN) injection 4 mg  4 mg IntraVENous Q4H PRN    lactobac ac& pc-s.therm-b.anim (KERLINE Q/RISAQUAD)  1 Cap Oral DAILY    LORazepam (ATIVAN) injection 1 mg  1 mg IntraVENous Q6H PRN    naloxone (NARCAN) injection 0.4 mg  0.4 mg IntraVENous PRN    thiamine HCL (B-1) tablet 100 mg  100 mg Oral DAILY    folic acid (FOLVITE) tablet 1 mg  1 mg Oral DAILY    therapeutic multivitamin (THERAGRAN) tablet 1 Tab  1 Tab Oral DAILY      No Known Allergies    Objective:  Vitals:    Vitals:    07/22/19 2030 07/22/19 2323 07/23/19 0449 07/23/19 0457   BP: 125/67 117/53  124/63   Pulse: 70 70  70   Resp: 18 16  18   Temp: 99 °F (37.2 °C) 98.8 °F (37.1 °C)  98.8 °F (37.1 °C)   TempSrc:       SpO2: 97% 99%  99%   Weight:   60.2 kg (132 lb 11.2 oz) Height:         Intake and Output:  No intake/output data recorded. 07/21 1901 - 07/23 0700  In: 18 [P.O.:240; I.V.:330]  Out: 0     Physical Examination:  General: No distress  Neck:  Lines +  Resp:  CTA  CV:  RRR,  no murmur or rub, no LE edema  GI:  Soft, NT, + Bowel sounds, no hepatosplenomegaly  Neurologic:  AAO X 3    :                 No callejas     []    High complexity decision making was performed  []    Patient is at high-risk of decompensation with multiple organ involvement    Lab Data Personally Reviewed: I have reviewed all the pertinent labs, microbiology data and radiology studies during assessment. Recent Labs     07/23/19  0440 07/22/19  0416 07/21/19  0313    136 137   K 3.8 3.7 4.2    103 105   CO2 28 25 26   GLU 80 85 88   BUN 10 14 12   CREA 2.87* 3.33* 2.37*   CA 8.7 8.5 8.4*   MG 2.2 2.4 2.6*   PHOS 2.4* 2.6 2.2*   ALB 2.7* 2.6* 2.9*   SGOT 27 26 32   ALT 32 35 42     Recent Labs     07/22/19  0416 07/21/19  0313   WBC 9.8 10.7   HGB 8.2* 8.5*   HCT 26.8* 27.5*    209     No results found for: SDES  Lab Results   Component Value Date/Time    Culture result: NO GROWTH 4 DAYS 07/05/2019 12:25 PM    Culture result: NO GROWTH 4 DAYS 07/05/2019 12:25 PM     Recent Results (from the past 24 hour(s))   METABOLIC PANEL, COMPREHENSIVE    Collection Time: 07/23/19  4:40 AM   Result Value Ref Range    Sodium 139 136 - 145 mmol/L    Potassium 3.8 3.5 - 5.1 mmol/L    Chloride 105 97 - 108 mmol/L    CO2 28 21 - 32 mmol/L    Anion gap 6 5 - 15 mmol/L    Glucose 80 65 - 100 mg/dL    BUN 10 6 - 20 MG/DL    Creatinine 2.87 (H) 0.70 - 1.30 MG/DL    BUN/Creatinine ratio 3 (L) 12 - 20      GFR est AA 26 (L) >60 ml/min/1.73m2    GFR est non-AA 22 (L) >60 ml/min/1.73m2    Calcium 8.7 8.5 - 10.1 MG/DL    Bilirubin, total 0.5 0.2 - 1.0 MG/DL    ALT (SGPT) 32 12 - 78 U/L    AST (SGOT) 27 15 - 37 U/L    Alk.  phosphatase 100 45 - 117 U/L    Protein, total 6.2 (L) 6.4 - 8.2 g/dL    Albumin 2.7 (L) 3.5 - 5.0 g/dL    Globulin 3.5 2.0 - 4.0 g/dL    A-G Ratio 0.8 (L) 1.1 - 2.2     PHOSPHORUS    Collection Time: 07/23/19  4:40 AM   Result Value Ref Range    Phosphorus 2.4 (L) 2.6 - 4.7 MG/DL   MAGNESIUM    Collection Time: 07/23/19  4:40 AM   Result Value Ref Range    Magnesium 2.2 1.6 - 2.4 mg/dL           Total time spent with patient:  xxx   min. Care Plan discussed with:  Patient     Family      RN      Consulting Physician [address] [place] [address]        I have reviewed the flowsheets. Chart and Pertinent Notes have been reviewed. No change in PMH ,family and social history from Consult note.       Juan Avendano MD

## 2019-07-23 NOTE — PROGRESS NOTES
Occupational Therapy:   07/23/19    Orders received, chart reviewed and patient evaluated by occupational therapy. Recommend patient to discharge to [facility] pending progression with skilled acute occupational therapy. Recommend with nursing patient to complete as able in order to maintain strength, endurance and independence: OOB to chair 3x/day, ADLs with supervision/setup and mobilizing to the bathroom for toileting with 1 person assist. Thank you for your assistance. Full evaluation to follow.      Thank you,  Nikhil Garcia, OTR/L

## 2019-07-23 NOTE — CARDIO/PULMONARY
Cardiac Wellness: HF education folder to bedside. Pt sleeping but awakens briefly to name being said. Attempted education but pt closed eyes and mumbled responses. Teaching deferred.

## 2019-07-23 NOTE — PROGRESS NOTES
Problem: Mobility Impaired (Adult and Pediatric)  Goal: *Acute Goals and Plan of Care (Insert Text)  Description  Physical Therapy Goals  Revised 7/21/2019  Initiated 7/9/2019  1. Patient will move from supine to sit and sit to supine  in bed with supervision within 7 day(s). 2.  Patient will transfer from bed to chair and chair to bed with min assist using the least restrictive device within 7 day(s). 3.  Patient will perform sit to stand with supervision within 7 day(s). 4.  Patient will ambulate with min assist for 50 feet with the least restrictive device within 7 day(s). Initiated 7/9/2019  1. Patient will move from supine to sit and sit to supine  in bed with independence within 7 day(s). 2.  Patient will transfer from bed to chair and chair to bed with independence using the least restrictive device within 7 day(s). 3.  Patient will perform sit to stand with independence within 7 day(s). 4.  Patient will ambulate with modified independence for 150 feet with the least restrictive device within 7 day(s). 5.  Patient will ascend/descend 13 stairs with 1 handrail(s) with modified independence within 7 day(s). Outcome: Progressing Towards Goal     PHYSICAL THERAPY TREATMENT  Patient: Gema Cui [de-identified]72 y.o. male)  Date: 7/23/2019  Diagnosis: Acute CHF (congestive heart failure) ([facility]) [I50.9] Acute CHF (congestive heart failure) ([facility])  Procedure(s) (LRB):  INSERT PPM DUAL (N/A) 7 Days Post-Op  Precautions: (CRRT)  Chart, physical therapy assessment, plan of care and goals were reviewed. ASSESSMENT:  Patient with improved mobility this session. Overall SBA for bed mobility and CGA for transfers. Patient ambulated 30 feet with RW and CGA, no overt LOB noted. Patient currently relying on RW for balance during gait although prior to admission was independent with gait. Patient tolerated session well and remained OOB in chair at end of session.   Recommend IP rehab at discharge to maximize functional gains and reduce risk of readmission. Progression toward goals:  ?    Improving appropriately and progressing toward goals  ? Improving slowly and progressing toward goals  ? Not making progress toward goals and plan of care will be adjusted     PLAN:  Patient continues to benefit from skilled intervention to address the above impairments. Continue treatment per established plan of care. Discharge Recommendations:  Inpatient Rehab  Further Equipment Recommendations for Discharge:  TBD      SUBJECTIVE:   Patient stated I didn't use a walker before.     OBJECTIVE DATA SUMMARY:   Critical Behavior:  Neurologic State: Alert, Confused  Orientation Level: Oriented to person, Oriented to place  Cognition: Follows commands     Functional Mobility Training:  Bed Mobility:     Supine to Sit: Stand-by assistance              Transfers:  Sit to Stand: Contact guard assistance  Stand to Sit: Contact guard assistance  Stand Pivot Transfers: Contact guard assistance                          Balance:  Sitting: Intact; With support  Standing: Intact; With support  Standing - Dynamic : Fair  Ambulation/Gait Training:  Distance (ft): 30 Feet (ft)  Assistive Device: Gait belt;Walker, rolling  Ambulation - Level of Assistance: Contact guard assistance        Gait Abnormalities: Decreased step clearance        Base of Support: Narrowed     Speed/Melida: Pace decreased (<100 feet/min)  Step Length: Right shortened;Left shortened                    Stairs:              Neuro Re-Education:    Therapeutic Exercises:     Pain:  Pain Scale 1: Numeric (0 - 10)  Pain Intensity 1: 0              Activity Tolerance:   Improving  Please refer to the flowsheet for vital signs taken during this treatment. After treatment:   ?    Patient left in no apparent distress sitting up in chair  ? Patient left in no apparent distress in bed  ? Call bell left within reach  ? Nursing notified  ? Caregiver present  ?     Chair alarm activated    COMMUNICATION/COLLABORATION:   The patients plan of care was discussed with: Registered Nurse    Latrice Cruz, PT   Time Calculation: 15 mins

## 2019-07-23 NOTE — PROGRESS NOTES
Problem: Self Care Deficits Care Plan (Adult)  Goal: *Acute Goals and Plan of Care (Insert Text)  Description  Occupational Therapy Goals  Initiated 7/23/2019  1. Patient will perform grooming, standing at sink, with supervision/set-up within 7 day(s). 2.  Patient will perform lower body dressing with supervision/set-up within 7 day(s). 3.  Patient will perform upper body ADLs with supervision/set-up within 7 day(s). 4.  Patient will perform toilet transfers with supervision/set-up within 7 day(s). 5.  Patient will perform all aspects of toileting with supervision/set-up within 7 day(s). 6.  Patient will participate in upper extremity therapeutic exercise/activities with supervision/set-up for 10 minutes within 7 day(s). Outcome: Progressing Towards Goal     OCCUPATIONAL THERAPY EVALUATION  Patient: Latricia Narayanan (77 y.o. male)  Date: 7/23/2019  Primary Diagnosis: Acute CHF (congestive heart failure) ([facility]) [I50.9]  Procedure(s) (LRB):  INSERT PPM DUAL (N/A) 7 Days Post-Op   Precautions:  Fall    ASSESSMENT :  Based on the objective data described below, patient presents with Stand-by assistance and Setup upper body ADLs, Contact guard assistance lower body ADLs, and Contact guard assistance functional mobility following admission for acute CHF on 7/5. Patient with complex hospital course, with pacemaker placement on 7/16, acute severe blood loss due duodenal ulcer with bleeding s/p clips, IR consulted s/p embolization, which resulted in hemorragic shock requiring intubation, then with CRRT and pressor support. Patient extubated 7/20 and now on HD and with OT orders (PT on board since patient admitted). This date, patient is received in bed, pleasant and agreeable to participate. He demonstrates tailor sitting for lower body dressing with SBA. Unable to recall PPM precautions, therefore reviewed again. Patient performs mobility and toilet transfer with CGA.  He tolerates standing at sink to perform light grooming and is agreeable to sit up in chair. At baseline, patient lives with his wife in a 2 story home and is independent with ADLs. No family present to confirm PLOF. At this time, patient is operating below his baseline and would benefit from short stay at IP rehab to increase activity tolerance, strength, and reinforce PPM precautions to improve safety and independence with ADLs and mobility prior to return home. The following are barriers to ADL independence while in acute care:   - Cognitive and/or behavioral: orientation and insight into deficits  - Medical condition: strength, functional endurance, standing balance, precautions and medical history    - Other:       Patient will benefit from skilled acute intervention to address the above impairments. Patients rehabilitation potential is considered to be Good    Discharge recommendations: Rehab at inpatient facility: patient can tolerate 3 hours of therapy (to regain functional baseline patient requires rehab)  If above is not an option then recommend: Rehab at skilled nursing facility (SNF) (to regain functional baseline patient requires rehab)    Barriers to discharging home, in addition to above listed impairments: none. Equipment recommendations for successful discharge (if) home: TBD at rehab     PLAN :  Recommendations and Planned Interventions: self care training, functional mobility training, therapeutic exercise, balance training, therapeutic activities, cognitive retraining, endurance activities, patient education, home safety training and family training/education    Frequency/Duration: Patient will be followed by occupational therapy 5 times a week to address goals. SUBJECTIVE:   Patient stated I feel good.  RN cleared patient for therapy. Patient pleasant and agreeable to participate.      OBJECTIVE DATA SUMMARY:   HISTORY:   Past Medical History:   Diagnosis Date    Arthritic-like pain     Hypertension      Past Surgical History:   Procedure Laterality Date    HX PACEMAKER  07/16/2019    medtronic dual chamber VVIR     IR INSERT NON TUNL CVC OVER 5 YRS  7/13/2019    IR OCCL TXCATH HEMMORAGE W SI  7/13/2019    [place] NEW/RPLCMT PRM PM W/TRANSV ELTRD ATRIAL&VENT N/A 7/16/2019    INSERT PPM DUAL performed by Diane Hernandez MD at [address]  CATH LAB       Prior Level of Function/Environment/Context: Patient lives with his wife and reports independence with ADLs and mobility without use of AD. Home Situation  Home Environment: Private residence  # Steps to Enter: 3  Rails to Enter: Yes  Hand Rails : Bilateral  One/Two Story Residence: Two story  # of Interior Steps: 15  Interior Rails: Right  Living Alone: No  Support Systems: Spouse/Significant Other/Partner  Patient Expects to be Discharged to[de-identified] Private residence  Current DME Used/Available at Home: None  Tub or Shower Type: Tub/Shower combination    Hand dominance: Right    EXAMINATION OF PERFORMANCE DEFICITS:  Cognitive/Behavioral Status:  Neurologic State: Alert  Orientation Level: Oriented to person;Oriented to place; Disoriented to time;Disoriented to situation  Cognition: Follows commands  Perception: Appears intact  Perseveration: No perseveration noted  Safety/Judgement: Decreased awareness of environment;Decreased awareness of need for safety;Decreased insight into deficits    Skin: observed skin intact. Edema: no abnormal swelling noted    Hearing: Auditory  Auditory Impairment: None    Vision/Perceptual:    Diplopia: No      Range of Motion:  AROM: Within functional limits In bilateral UEs    Strength:  Strength: Generally decreased, functional In bilateral UEs    Coordination:  Coordination: Generally decreased, functional In bilateral UEs  Fine Motor Skills-Upper: Left Intact; Right Intact    Gross Motor Skills-Upper: Left Intact; Right Intact    Tone & Sensation:  Tone: Normal  Sensation: Intact    Balance:  Sitting: Intact; With support  Standing: Intact; With support  Standing - Dynamic : Fair    Functional Mobility and Transfers for ADLs:  Bed Mobility:  Supine to Sit: Stand-by assistance  Sit to Supine: (Patient remained seated in chair at end of session)    Transfers:  Sit to Stand: Contact guard assistance  Stand to Sit: Contact guard assistance  Bed to Chair: Contact guard assistance  Bathroom Mobility: Contact guard assistance  Toilet Transfer : Contact guard assistance(with grab bar use)    ADL Assessment:  Feeding: Setup;Supervision    Oral Facial Hygiene/Grooming: Contact guard assistance(in standing)    Bathing: Minimum assistance    Upper Body Dressing: Setup;Stand-by assistance    Lower Body Dressing: Contact guard assistance    Toileting: Contact guard assistance    ADL Intervention and task modifications:  Grooming  Grooming Assistance: (standing at sink)  Washing Hands: Contact guard assistance    Lower Body Dressing Assistance  Socks: Supervision  Leg Crossed Method Used: Yes  Position Performed: Seated edge of bed    Toileting  Bladder Hygiene: Supervision  Clothing Management: Contact guard assistance  Adaptive Equipment: Grab bars; Walker    Cognitive Retraining  Safety/Judgement: Decreased awareness of environment;Decreased awareness of need for safety;Decreased insight into deficits    Patient instructed and indicated understanding the benefits of maintaining activity tolerance, functional mobility, and independence with self care tasks during acute stay to ensure safe return home and to baseline. Encouraged patient to increase frequency and duration OOB, be out of bed for all meals, perform daily ADLs (as approved by RN/MD regarding bathing etc), and performing functional mobility to/from bathroom.      Functional Measure:  Barthel Index:    Bathin  Bladder: 5  Bowels: 10  Groomin  Dressin  Feedin  Mobility: 0  Stairs: 0  Toilet Use: 5  Transfer (Bed to Chair and Back): 10  Total: 45/100        Percentage of impairment   0%   1-19% 20-39%   40-59%   60-79%   80-99%   100%   Barthel Score 0-100 100 99-80 79-60 59-40 20-39 1-19   0     The Barthel ADL Index: Guidelines  1. The index should be used as a record of what a patient does, not as a record of what a patient could do. 2. The main aim is to establish degree of independence from any help, physical or verbal, however minor and for whatever reason. 3. The need for supervision renders the patient not independent. 4. A patient's performance should be established using the best available evidence. Asking the patient, friends/relatives and nurses are the usual sources, but direct observation and common sense are also important. However direct testing is not needed. 5. Usually the patient's performance over the preceding 24-48 hours is important, but occasionally longer periods will be relevant. 6. Middle categories imply that the patient supplies over 50 per cent of the effort. 7. Use of aids to be independent is allowed. Amanda Ferrell., Barthel, D.W. (1584). Functional evaluation: the Barthel Index. [address] [facility] (14)2. Cinthya [place] ana paula REA Cordero, Leni Nolasco., Seng Whitley., [place], [address] [facility] (1999). Measuring the change indisability after inpatient rehabilitation; comparison of the responsiveness of the Barthel Index and Functional [place] Measure. Journal of Neurology, Neurosurgery, and Psychiatry, 66(4), 157-590. Wilberto Massey, N.J.A, Michael Cerda,  W.J.M, & Joanne Partiad, M.A. (2004.) Assessment of post-stroke quality of life in cost-effectiveness studies: The usefulness of the Barthel Index and the EuroQoL-5D.  Quality of Life Research, 15, 365-63     Occupational Therapy Evaluation Charge Determination   History Examination Decision-Making   LOW Complexity : Brief history review  MEDIUM Complexity : 3-5 performance deficits relating to physical, cognitive , or psychosocial skils that result in activity limitations and / or participation restrictions MEDIUM Complexity : Patient may present with comorbidities that affect occupational performnce. Miniml to moderate modification of tasks or assistance (eg, physical or verbal ) with assesment(s) is necessary to enable patient to complete evaluation       Based on the above components, the patient evaluation is determined to be of the following complexity level: LOW   Pain:  Patient denied pain this session. Activity Tolerance:   Good, tolerates ADLs without rest breaks and SpO2 stable on RA; Denied feeling lightheaded or dizzy during session. Please refer to the flowsheet for vital signs taken during this treatment. After treatment patient left:   Up in chair  Bed alarm/tab alert on  Call light within reach  RN notified   COMMUNICATION/EDUCATION:   The patients plan of care was discussed with: Physical Therapist, Registered Nurse and . Patient/family have participated as able in goal setting and plan of care. and Patient/family agree to work toward stated goals and plan of care. This patients plan of care is appropriate for delegation to EMANUEL.     Thank you for this referral.  Alea Ramsey OT  Time Calculation: 17 mins

## 2019-07-23 NOTE — PROGRESS NOTES
RODRÍGUEZ:   Noted transfer to 3N. Requested consult from nephrologist when needs confirmed if pt is now in need of chronic HD in order for CM to set up community chair. CM notes patient is a Sound Bundle. Pt came from home with wife. PT/OT following. Based on length of hospitalization and amount of time spent in ICU, will likely need SNF or rehab. Referrals to be made based on pt needs (SNF vs rehab) and possible need for community HD.     PERNELL Kim

## 2019-07-23 NOTE — ACP (ADVANCE CARE PLANNING)
Advance Care Planning Note    Name: Thomas Hsu  YOB: 1945  MRN: [id number]  Admission Date: 7/4/2019 11:31 PM    Date of discussion: 7/23/2019    Active Diagnoses:    Hospital Problems  Date Reviewed: 7/23/2019          Codes Class Noted POA    Hemorrhagic shock ([facility] [address] ICD-10-CM: R57.8  ICD-9-CM: 785.59  7/23/2019 Yes        Acute blood loss anemia ICD-10-CM: D62  ICD-9-CM: 285.1  7/23/2019 Yes        Respiratory failure with hypoxia [facility]) ICD-10-CM: J96.91  ICD-9-CM: 518.81  7/23/2019 Yes        KAYLEEN (acute kidney injury) [facility]) ICD-10-CM: N17.9  ICD-9-CM: 584.9  7/23/2019 Yes        * (Principal) Acute CHF (congestive heart failure) ([facility]) ICD-10-CM: I50.9  ICD-9-CM: 428.0  7/5/2019 Yes        Pleural effusion, right ICD-10-CM: J90  ICD-9-CM: 511.9  7/5/2019 Yes               These active diagnoses are of sufficient risk that focused discussion on advance care planning is indicated in order to allow the patient to thoughtfully consider personal goals of care, and if situations arise that prevent the ability to personally give input, to ensure appropriate representation of their personal desires for different levels and aggressiveness of care. Discussion:     Persons present and participating in discussion: Thomas Hsu, patient's wife Vale Chang and Juventino Moody MD.    Discussion: Addressed Decompensated medical problems, Co-morbidities, Advance directives, prognosis and confirmation of a Surrogate Decision Maker. Time Spent:     Total time spent face-to-face in education and discussion: 16 minutes.      Juventino Moody MD  7/23/2019  10:10 AM

## 2019-07-23 NOTE — PROGRESS NOTES
Hospitalist Progress Note  Mayito Vega MD  Answering service: [phone] OR [phone] from in house phone  Cell: [phone]      Date of Service:  2019  NAME:  Maggi Phelps  :  1945  MRN:  [id number]      Admission Summary:     72-year-old gentleman with a past medical history significant for hypertension, was in his usual state of health until about a week prior when the patient developed abdominal pain. The pain was located at the epigastric region, 8/10 in severity without any radiation. No known aggravating or relieving factors. The patient described the pain as a dull ache associated with constipation but no nausea, no vomiting. The patient took laxative without any significant relief of the constipation. When the EMS arrived at the scene, the patient's oxygen saturation was 86% on room air. The oxygen saturation improved to 97% when the patient was started on 2 liters of nasal cannula. The patient denied any history of COPD, or congestive heart failure. When the patient arrived at the emergency room, he was found to have elevated BNP level. CT scan of the abdomen and pelvis performed by the emergency room provider showed right pleural effusion and suspected small bowel obstruction. The patient was subsequently referred to the hospitalist service for evaluation for admission. The patient denied fevers, rigors or chills. No record of prior admission to this hospital.  The patient has not seen his primary care physician for some time.     Interval history / Subjective:     CC: Abdominal pain. Chart reviewed. Patient extubated 19. Transferred out of the ICU 19. Appeared comfortable sitting up in the chair on early am rounds. Denied any nausea, vomiting, vomiting of blood, abdominal pain or bloody stools. Assessment & Plan:     1) GI: Acute Upper GI bleed due to a bleeding Duodenal ulcer. -S/P EGD on 19. S/P Clip placement. S/P embolization by Interventional Radiology.  -Resolved probable Partial SBO.   -continue protonix 40 mg iv bid  -GI eval noted and appreciated. 2) Hematology: Acute blood loss anemia due to a bleeding Duodenal Ulcer. -S/P transfusion six units PRBCs. - Close monitoring of Hemoglobin/hematocrit. 3) Hemodynamics: Resolved Hypovolemic shock due to the acute blood loss. Off Pressors. 4) CVS: S/P Permanent  pacemaker placement for Sick Sinus Syndrome (7/16/19). - Primary hypertension. -2D ECHO with EF 46-50 percent and systolic dysfunction.  -Cardiology F/U noted and appreciated. 5) KAYLEEN on CKD stage 2 due to acute tubular necrosis from Hemorrhagic shock. -CRRT discontinued 7/21/19.  -Now on HD. -Nephrology F/U noted and appreciated. 6) Resp: Resolving Acute Hypoxemic respiratory failure due to probable basilar atelectasis vs pneumonia and small pleural effusion.  -Pulmonary hypertension. -S/P intubation/extubation 7/20/19. -FIO2 to maintain saturation at least 94 percent, nebulizers, empiric antibiotics, incentive spirometry.  -Pulmonary F/U noted and appreciated. 7) CNS: Resolving Acute probable multifactorial metabolic encephalopathy   -CT head no acute abnormality   -EEG non specific  -Neurology eval noted and appreciated. 8) Social: Reported Alcohol Use disorder. Off prcedex. CIWA protocol. Cessation counselling done. 9) Nutrition: daily multivitamin/minerals, thiamine and Folic Acid. 10) Prophylaxis: GI and DVT. 11) Directives: Full Code with an extremely guarded prognosis. Readdressed with patient and family. At increased risk of decompensation. 12) Plan: Patient's hospital stay has been prolonged due to the decompensation in the multiple medical problems. Discussed in detail with patient and patient's wife Nas Ochoa at bedside. Can be reached at [phone].          Hospital Problems  Date Reviewed: 7/5/2019          Codes Class Noted POA    * (Principal) Acute CHF (congestive heart failure) ([facility]) ICD-10-CM: I50.9  ICD-9-CM: 428.0  7/5/2019 Yes        Pleural effusion, right ICD-10-CM: J90  ICD-9-CM: 511.9  7/5/2019 Unknown              Vital Signs:    Last 24hrs VS reviewed since prior progress note. Most recent are:  Visit Vitals  /63 (BP 1 Location: Left arm, BP Patient Position: At rest)   Pulse 69   Temp 98.3 °F (36.8 °C)   Resp 16   Ht 5' 8\" (1.727 m)   Wt 60.2 kg (132 lb 11.2 oz)   SpO2 99%   BMI 20.18 kg/m²         Intake/Output Summary (Last 24 hours) at 7/23/2019 0935  Last data filed at 7/22/2019 0958  Gross per 24 hour   Intake 100 ml   Output    Net 100 ml        Physical Examination:             Constitutional:  No acute distress. ENT:  Oral mucous moist, oropharynx benign. Neck supple,    Resp:  Decreased air entry bibasilarly. CV:  Regular rhythm, normal rate, no murmurs, gallops, rubs    GI:  Soft, non distended, non tender.  normoactive bowel sounds, no hepatosplenomegaly     Musculoskeletal:  No edema    Neurologic:  Conscious and alert, answer simple questions and moves all extremities      Skin:  Good turgor, no rashes or ulcers       Data Review:    Review and/or order of clinical lab test  Review and/or order of tests in the radiology section of CPT  Review and/or order of tests in the medicine section of CPT      Labs:     Recent Labs     07/23/19 0440 07/22/19 0416   WBC 8.3 9.8   HGB 8.5* 8.2*   HCT 27.8* 26.8*    232     Recent Labs     07/23/19 0440 07/22/19 0416 07/21/19  0313    136 137   K 3.8 3.7 4.2    103 105   CO2 28 25 26   BUN 10 14 12   CREA 2.87* 3.33* 2.37*   GLU 80 85 88   CA 8.7 8.5 8.4*   MG 2.2 2.4 2.6*   PHOS 2.4* 2.6 2.2*     Recent Labs     07/23/19 0440 07/22/19 0416 07/21/19  0313   SGOT 27 26 32   ALT 32 35 42    96 98   TBILI 0.5 0.5 0.6   TP 6.2* 6.0* 6.1*   ALB 2.7* 2.6* 2.9*   GLOB 3.5 3.4 3.2     No results for input(s): INR, PTP, APTT in the last 72 hours.    No lab exists for component: INREXT, INREXT   No results for input(s): FE, TIBC, PSAT, FERR in the last 72 hours. No results found for: FOL, RBCF   No results for input(s): PH, PCO2, PO2 in the last 72 hours. No results for input(s): CPK, CKNDX, TROIQ in the last 72 hours.     No lab exists for component: CPKMB  Lab Results   Component Value Date/Time    Cholesterol, total 134 07/06/2019 03:50 AM    HDL Cholesterol 56 07/06/2019 03:50 AM    LDL, calculated 68 07/06/2019 03:50 AM    Triglyceride 50 07/06/2019 03:50 AM    CHOL/HDL Ratio 2.4 07/06/2019 03:50 AM     Lab Results   Component Value Date/Time    Glucose (POC) 82 07/17/2019 05:13 PM    Glucose (POC) 92 07/17/2019 12:51 PM    Glucose (POC) 175 (H) 07/11/2019 07:56 PM    Glucose (POC) 87 07/05/2019 12:02 PM    Glucose (POC) 101 (H) 07/05/2019 08:47 AM     No results found for: COLOR, APPRN, SPGRU, REFSG, ANGELI, PROTU, GLUCU, KETU, BILU, UROU, TREVOR, LEUKU, GLUKE, EPSU, BACTU, WBCU, RBCU, CASTS, UCRY      Medications Reviewed:     Current Facility-Administered Medications   Medication Dose Route Frequency    heparin (porcine) 1,000 unit/mL injection 2,800 Units  2,800 Units IntraVENous DIALYSIS PRN    phenol throat spray (CHLORASEPTIC) 1 Spray  1 Spray Oral PRN    0.9% sodium chloride infusion 250 mL  250 mL IntraVENous PRN    pantoprazole (PROTONIX) 40 mg in sodium chloride 0.9% 10 mL injection  40 mg IntraVENous Q12H    sodium chloride (NS) flush 5-40 mL  5-40 mL IntraVENous Q8H    sodium chloride (NS) flush 5-40 mL  5-40 mL IntraVENous PRN    bicarbonate dialysis (PRISMASOL) BG K 4/Ca 2.5 5000 ml solution   Extracorporeal DIALYSIS CONTINUOUS    balsam peru-castor oil (VENELEX) ointment   Topical BID    piperacillin-tazobactam (ZOSYN) 3.375 g in 0.9% sodium chloride (MBP/ADV) 100 mL  3.375 g IntraVENous Q8H    0.9% sodium chloride infusion  10 mL/hr IntraVENous CONTINUOUS    chlorhexidine (PERIDEX) 0.12 % mouthwash 15 mL  15 mL Oral BID  albuterol-ipratropium (DUO-NEB) 2.5 MG-0.5 MG/3 ML  3 mL Nebulization Q6H RT    0.9% sodium chloride infusion 250 mL  250 mL IntraVENous PRN    0.9% sodium chloride infusion 250 mL  250 mL IntraVENous PRN    0.9% sodium chloride infusion 250 mL  250 mL IntraVENous PRN    sodium chloride (NS) flush 5-40 mL  5-40 mL IntraVENous Q8H    sodium chloride (NS) flush 5-40 mL  5-40 mL IntraVENous PRN    simethicone (MYLICON) 56JY/6.5EK oral drops 80 mg  1.2 mL Oral Multiple    fentaNYL citrate (PF) injection  mcg   mcg IntraVENous Q1H PRN    0.9% sodium chloride infusion 250 mL  250 mL IntraVENous PRN    0.9% sodium chloride infusion  3 mL/hr IntraVENous CONTINUOUS    calcium carbonate (TUMS) chewable tablet 200 mg [elemental]  200 mg Oral TID PRN    aspirin delayed-release tablet 81 mg  81 mg Oral DAILY    sodium chloride (NS) flush 5-40 mL  5-40 mL IntraVENous Q8H    sodium chloride (NS) flush 5-40 mL  5-40 mL IntraVENous PRN    acetaminophen (TYLENOL) tablet 650 mg  650 mg Oral Q4H PRN    ondansetron (ZOFRAN) injection 4 mg  4 mg IntraVENous Q4H PRN    lactobac ac& pc-s.therm-b.anim (KERLINE Q/RISAQUAD)  1 Cap Oral DAILY    LORazepam (ATIVAN) injection 1 mg  1 mg IntraVENous Q6H PRN    naloxone (NARCAN) injection 0.4 mg  0.4 mg IntraVENous PRN    thiamine HCL (B-1) tablet 100 mg  100 mg Oral DAILY    folic acid (FOLVITE) tablet 1 mg  1 mg Oral DAILY    therapeutic multivitamin (THERAGRAN) tablet 1 Tab  1 Tab Oral DAILY     ______________________________________________________________________  EXPECTED LENGTH OF STAY: 5d 4h  ACTUAL LENGTH OF STAY:          18                 Dora Almanzar MD

## 2019-07-24 NOTE — PROGRESS NOTES
Received direction from nephrologist to set up outpatient HD for KAYLEEN for pt at [facility] location. Reviewed this with pt, wife at bedside as well as included their daughter Sean Duvall (work phone [phone]) and discussed options. All confirmed that their preferred location is [facility] due to proximity to home. Also discussed rehab vs home health stay. Offered freedom of choice. Pt needs rehab center offering HD in house. Daughter/pt/wife prefer [facility] due to proximity to home. Referral placed to [facility] as well as [facility] for ongoing HD needs. Called [facility] and confirmed their center is full with no availability. Will speak with pt/family regarding alternate center. PERNELL Nick      3:20pm  Received response from Wiliam Wiseman 1154 central intake that their may not be chair availability at [facility]. Requested confirmation and if no availability will need to seek placement at another center. PERNELL Kebede      3:50pm  Met with wife at bedside, pt currently out of room at HD. Advised of no chairs at [facility] and provided her list of other centers. She wants referral sent to [facility] as that is next closest center. She is familiar with area and is comfortable with driving pt to/from, she only drives short distances per her d/t her own health issues. Referral pending with [facility].      PERNELL Kebede

## 2019-07-24 NOTE — PROGRESS NOTES
UPDATED RODRÍGUEZ:    1. Pt is not accepted to inpt rehab due to recommendation by IPR physician of home health due to pt's ambulation and evaluation by PT today. 2.  [facility] cannot accept. Family's preference is now [facility].   Referral sent to [facility] and answered all 13 questions posed [facility]    PERNELL Kebede

## 2019-07-24 NOTE — PROGRESS NOTES
Bedside and Verbal shift change report given to Carline Woodard (student RN)  (oncoming nurse) by Dayton Dobbs RN (offgoing nurse).  Report included the following information SBAR, Kardex, Intake/Output, MAR and Recent Results.

## 2019-07-24 NOTE — PALLIATIVE CARE
Palliative Medicine Social Work    Mr. Fallon Armstrong is a 72year old gentleman with a history significant for HTN, CHF and EtOH. He was admitted on 7/4 with abdominal pain, constipation and bilateral DONNY. Work-up revealed R pleural effusion and bacterial PNA and umbilical hernia causing partial SBO. Patient had thoracentesis (7/5); chest tube removed (7/7). Echo (7/7) revealed EF (46-50%); Left ventricular global hypokinesis; Moderately dilated right ventricle; Severely dilated left atrium; Severely dilated right atrium and Severe pulmonary hypertension. RRT called  (7/13) for hemorraghic shock, acute anemia and KAYLEEN. Emergent EGD at bedside revealed duodenal ulcer eroding into gastroduodenal artery s/p clip and IR embolization; intubated with initiation of CVVH(7/13); developed bradycardia (7/15) s/p pacemaker (7/16). EEG (7/20) which indicated global cortical dysfunction, no seizures; extubated (7/20); converted to HD (7/22) and nephrology hopeful for renal recovery. Patient is cleared for diet and making progress with therapies. Patient has no previous admissions on record. Patient has no AMD on file. His wife, Clif Irizarryely ([phone]) is NOK. There are two supportive children. Note that attending clarified code status on 7/15 for no CPR. Will readdress. Palliative was consulted to assist with care goals. Will follow up. Thank you for the opportunity to be involved in the care of Mr. Alvarado. Cecilia Joseph, TAMMIEW, [facility]-  Palliative Medicine   Respecting Choices ® ACP Facilitator   [phone]

## 2019-07-24 NOTE — PROGRESS NOTES
NUTRITION COMPLETE ASSESSMENT  RECOMMENDATIONS:   1. Diet advancement to cardiac - cleared for diet advancement per previous MD notes  2. Discontinue thiamine (in place for 12 days)  3. Daily weights     Interventions/Plan:   Food/Nutrient Delivery:  (advance diet per MD) (-)     (-)    Assessment:   Reason for Assessment: [x] Reassessment     Diet: full liquids  Nutritionally Significant Medications: [x] Reviewed: folic acid, soo-Q, protonix, zosyn, MVI, thiamine (100mg); PRN: zofran, simethicone  Meal intake:  Patient Vitals for the past 168 hrs:   % Diet Eaten   07/24/19 0900 100 %   07/22/19 0800 100 %   07/21/19 1751 100 %   07/21/19 1312 100 %   07/21/19 0900 100 %   07/20/19 1815 75 %     Subjective: Pt in dialysis. Spoke with wife extensively at bedside regarding appropriate diet at home. Objective:  Pt admitted for CHF. PMHx: HTN, Arthritis, CHF. Cardiomyopathy, possibly ETOH induced with pacemaker placed 7/16. Respiratory failure with PNA. Extubated on 7/20. KAYLEEN on CKD with plans for HD to continue after discharge. GIB resolved along with previous ileus vs pSBO. TF earlier this admit. Diet advanced to full liquids. Happy to see appetite has been good on full liquids. Cleared for diet advancement as tolerated per previous MD notes. Will change diet order. Supplements not needed if eating well. Spoke with wife regarding low sodium diet for home. Admit wt on standing scale 74.7kg, wt down to 59kg. 2+ edema on admit but now resolved so likely d/t improvement influid status. Last 3 Recorded Weights in this Encounter    07/22/19 0600 07/23/19 0449 07/24/19 0438   Weight: 59.8 kg (131 lb 13.4 oz) 60.2 kg (132 lb 11.2 oz) 59 kg (130 lb)     Will continue to follow for po intake, wt/fluid trends. Estimated Nutrition Needs:   Kcals/day: 2214 Kcals/day(1582-1714kcal)  Protein: 72 g(72-84g (1.2-1.4g/kg))  Fluid: 1582 ml(1ml/kcal or per renal)   Based On:  Cehn Saenz(x 1.2-1.3)  Weight Used: Actual wt(59.8kg)    Pt expected to meet estimated nutrient needs:  [x]   Yes     []  No [] Unable to predict at this time  Nutrition Diagnosis:   1. Inadequate oral intake(improving) related to respiratory failure as evidenced by extubated, diet advanced with good appetite    2. (Incresed protein/energy needs) related to KAYLEEN on CKD as evidenced by plans for HD at discharge    Goals:     Consumption of at least 75% meals and 1-2 supplements/day in 5-7 days     Monitoring & Evaluation:    - Total energy intake   - Weight/weight change     Previous Nutrition Goals Met:   Progressing  Previous Recommendations:    N/A    Education & Discharge Needs:   [] None Identified   [x] Identified and addressed    [] Participated in care plan, discharge planning, and/or interdisciplinary rounds        Cultural, Evangelical and ethnic food preferences identified: None    Skin Integrity: [x]Intact  []Other  Edema: []None [x]Other: trace   Last BM: 7/23  Food Allergies: [x]None []Other  Diet Restrictions: Cultural/Episcopal Preference(s): None     Anthropometrics:     Weight Loss Metrics 7/24/2019 12/16/2014 11/18/2014 11/11/2014   Today's Wt 130 lb 179 lb 182 lb 188 lb 12.8 oz   BMI 19.77 kg/m2 27.22 kg/m2 27.68 kg/m2 28.71 kg/m2      Weight Source: Bed  Height: 5' 8\" (172.7 cm),    Body mass index is 19.77 kg/m².        Labs:    Lab Results   Component Value Date/Time    Sodium 139 07/24/2019 05:33 AM    Potassium 3.7 07/24/2019 05:33 AM    Chloride 105 07/24/2019 05:33 AM    CO2 26 07/24/2019 05:33 AM    Glucose 77 07/24/2019 05:33 AM    BUN 19 07/24/2019 05:33 AM    Creatinine 5.15 (H) 07/24/2019 05:33 AM    Calcium 8.7 07/24/2019 05:33 AM    Magnesium 2.3 07/24/2019 05:33 AM    Phosphorus 3.7 07/24/2019 05:33 AM    Albumin 2.7 (L) 07/23/2019 04:40 AM     Kathe Killian, RD [address] , Pager #[phone] or [phone]

## 2019-07-24 NOTE — PROGRESS NOTES
Occupational Therapy: Nurse reports patient just left for dialysis. Will follow.   CHERRY Moctezuma/ALIYA

## 2019-07-24 NOTE — PROGRESS NOTES
Hospitalist Progress Note  Ad Sharma MD  Answering service: [phone] OR [phone] from in house phone  Cell: [phone]      Date of Service:  2019  NAME:  Mirlande Danielson  :  1945  MRN:  [id number]      Admission Summary:     62-year-old gentleman with a past medical history significant for hypertension, was in his usual state of health until about a week prior when the patient developed abdominal pain. The pain was located at the epigastric region, 8/10 in severity without any radiation. No known aggravating or relieving factors. The patient described the pain as a dull ache associated with constipation but no nausea, no vomiting. The patient took laxative without any significant relief of the constipation. When the EMS arrived at the scene, the patient's oxygen saturation was 86% on room air. The oxygen saturation improved to 97% when the patient was started on 2 liters of nasal cannula. The patient denied any history of COPD, or congestive heart failure. When the patient arrived at the emergency room, he was found to have elevated BNP level. CT scan of the abdomen and pelvis performed by the emergency room provider showed right pleural effusion and suspected small bowel obstruction. The patient was subsequently referred to the hospitalist service for evaluation for admission. The patient denied fevers, rigors or chills. No record of prior admission to this hospital.  The patient has not seen his primary care physician for some time.     Interval history / Subjective:     CC: Abdominal pain. Appeared comfortable sitting up in the chair on early am rounds. Denied any nausea, vomiting of blood, abdominal pain or bloody stools. Per patient's wife at bedside, patient doing much better. Assessment & Plan:     1) GI: Acute Upper GI bleed due to a bleeding Duodenal ulcer. -S/P EGD on 19. S/P Clip placement.  S/P embolization by Interventional Radiology.  -Resolved probable Partial SBO.   -continue protonix 40 mg iv bid  -GI eval noted and appreciated. 2) Hematology: Acute blood loss anemia due to a bleeding Duodenal Ulcer. -S/P transfusion six units PRBCs. - Close monitoring of Hemoglobin/hematocrit. 3) Hemodynamics: Resolved Hypovolemic shock due to the acute blood loss. Off Pressors. 4) CVS: S/P Permanent  pacemaker placement for Sick Sinus Syndrome (7/16/19). - Primary hypertension. -2D ECHO with EF 46-50 percent and systolic dysfunction.  -Cardiology F/U noted and appreciated. 5) KAYLEEN on CKD stage 2 due to acute tubular necrosis from Hemorrhagic shock. -CRRT discontinued 7/21/19.  -Now on HD. -Nephrology F/U noted and appreciated. 6) Resp: Resolving Acute Hypoxemic respiratory failure due to probable basilar atelectasis vs pneumonia and small pleural effusion.  -Pulmonary hypertension. -S/P intubation/extubation 7/20/19. -FIO2 to maintain saturation at least 94 percent, nebulizers, empiric antibiotics, incentive spirometry.  -Pulmonary F/U noted and appreciated. 7) CNS: Resolving Acute probable multifactorial metabolic encephalopathy   -CT head no acute abnormality   -EEG non specific  -Neurology eval noted and appreciated. 8) Social: Reported Alcohol Use disorder. Off prcedex. CIWA protocol. Cessation counselling done. 9) Nutrition: Probable moderate Protein calorie malnutrition. Daily multivitamin/minerals, thiamine and Folic Acid. 10) Prophylaxis: GI and DVT. 11) Directives: Full Code with an extremely guarded prognosis. Readdressed with patient and family. At increased risk of decompensation. Palliative care consult to re-address goals of care and Advance Directives. 12) Plan: Patient's hospital stay has been prolonged due to the decompensation in the multiple medical problems. Discussed in detail with patient and patient's wife Mitch Him at bedside.  Can be reached at [phone]. Hospital Problems  Date Reviewed: 7/23/2019          Codes Class Noted POA    Hemorrhagic shock ([facility] ICD-10-CM: R57.8  ICD-9-CM: 785.59  7/23/2019 Yes        Acute blood loss anemia ICD-10-CM: D62  ICD-9-CM: 285.1  7/23/2019 Yes        Respiratory failure with hypoxia ([facility] ICD-10-CM: J96.91  ICD-9-CM: 518.81  7/23/2019 Yes        KAYLEEN (acute kidney injury) ([facility].) ICD-10-CM: N17.9  ICD-9-CM: 584.9  7/23/2019 Yes        * (Principal) Acute CHF (congestive heart failure) ([facility]) ICD-10-CM: I50.9  ICD-9-CM: 428.0  7/5/2019 Yes        Pleural effusion, right ICD-10-CM: J90  ICD-9-CM: 511.9  7/5/2019 Yes              Vital Signs:    Last 24hrs VS reviewed since prior progress note. Most recent are:  Visit Vitals  /56 (BP 1 Location: Left arm, BP Patient Position: At rest)   Pulse 70   Temp 98.3 °F (36.8 °C)   Resp 16   Ht 5' 8\" (1.727 m)   Wt 59 kg (130 lb)   SpO2 98%   BMI 19.77 kg/m²       No intake or output data in the 24 hours ending 07/24/19 0929     Physical Examination:             Constitutional:  No acute distress. ENT:  Oral mucous moist, oropharynx benign. Neck supple,    Resp:  Decreased air entry bibasilarly. CV:  Regular rhythm, normal rate, no murmurs, gallops, rubs    GI:  Soft, non distended, non tender. normoactive bowel sounds, no hepatosplenomegaly     Musculoskeletal:  No edema    Neurologic:  Awake, alert and oriented.      Skin:  Good turgor, no rashes or ulcers       Data Review:    Review and/or order of clinical lab test  Review and/or order of tests in the radiology section of CPT  Review and/or order of tests in the medicine section of CPT      Labs:     Recent Labs     07/24/19  0533 07/23/19  0440   WBC 9.0 8.3   HGB 8.0* 8.5*   HCT 26.2* 27.8*    299     Recent Labs     07/24/19  0533 07/23/19  0440 07/22/19  0416    139 136   K 3.7 3.8 3.7    105 103   CO2 26 28 25   BUN 19 10 14   CREA 5.15* 2.87* 3.33*   GLU 77 80 85   CA 8.7 8.7 8.5   MG 2.3 2.2 2.4   PHOS 3.7 2.4* 2.6     Recent Labs     07/23/19  0440 07/22/19  0416   SGOT 27 26   ALT 32 35    96   TBILI 0.5 0.5   TP 6.2* 6.0*   ALB 2.7* 2.6*   GLOB 3.5 3.4     No results for input(s): INR, PTP, APTT in the last 72 hours. No lab exists for component: INREXT, INREXT   No results for input(s): FE, TIBC, PSAT, FERR in the last 72 hours. No results found for: FOL, RBCF   No results for input(s): PH, PCO2, PO2 in the last 72 hours. No results for input(s): CPK, CKNDX, TROIQ in the last 72 hours.     No lab exists for component: CPKMB  Lab Results   Component Value Date/Time    Cholesterol, total 134 07/06/2019 03:50 AM    HDL Cholesterol 56 07/06/2019 03:50 AM    LDL, calculated 68 07/06/2019 03:50 AM    Triglyceride 50 07/06/2019 03:50 AM    CHOL/HDL Ratio 2.4 07/06/2019 03:50 AM     Lab Results   Component Value Date/Time    Glucose (POC) 82 07/17/2019 05:13 PM    Glucose (POC) 92 07/17/2019 12:51 PM    Glucose (POC) 175 (H) 07/11/2019 07:56 PM    Glucose (POC) 87 07/05/2019 12:02 PM    Glucose (POC) 101 (H) 07/05/2019 08:47 AM     No results found for: COLOR, APPRN, SPGRU, REFSG, ANGELI, PROTU, GLUCU, KETU, BILU, UROU, TREVOR, LEUKU, GLUKE, EPSU, BACTU, WBCU, RBCU, CASTS, UCRY      Medications Reviewed:     Current Facility-Administered Medications   Medication Dose Route Frequency    albuterol-ipratropium (DUO-NEB) 2.5 MG-0.5 MG/3 ML  3 mL Nebulization Q6H PRN    piperacillin-tazobactam (ZOSYN) 3.375 g in 0.9% sodium chloride (MBP/ADV) 100 mL  3.375 g IntraVENous Q12H    heparin (porcine) 1,000 unit/mL injection 2,800 Units  2,800 Units IntraVENous DIALYSIS PRN    phenol throat spray (CHLORASEPTIC) 1 Spray  1 Spray Oral PRN    0.9% sodium chloride infusion 250 mL  250 mL IntraVENous PRN    pantoprazole (PROTONIX) 40 mg in sodium chloride 0.9% 10 mL injection  40 mg IntraVENous Q12H    sodium chloride (NS) flush 5-40 mL  5-40 mL IntraVENous Q8H    sodium chloride (NS) flush 5-40 mL  5-40 mL IntraVENous PRN    balsam peru-castor oil (VENELEX) ointment   Topical BID    chlorhexidine (PERIDEX) 0.12 % mouthwash 15 mL  15 mL Oral BID    0.9% sodium chloride infusion 250 mL  250 mL IntraVENous PRN    0.9% sodium chloride infusion 250 mL  250 mL IntraVENous PRN    0.9% sodium chloride infusion 250 mL  250 mL IntraVENous PRN    sodium chloride (NS) flush 5-40 mL  5-40 mL IntraVENous Q8H    sodium chloride (NS) flush 5-40 mL  5-40 mL IntraVENous PRN    simethicone (MYLICON) 81NL/2.3JF oral drops 80 mg  1.2 mL Oral Multiple    fentaNYL citrate (PF) injection  mcg   mcg IntraVENous Q1H PRN    0.9% sodium chloride infusion 250 mL  250 mL IntraVENous PRN    calcium carbonate (TUMS) chewable tablet 200 mg [elemental]  200 mg Oral TID PRN    aspirin delayed-release tablet 81 mg  81 mg Oral DAILY    sodium chloride (NS) flush 5-40 mL  5-40 mL IntraVENous Q8H    sodium chloride (NS) flush 5-40 mL  5-40 mL IntraVENous PRN    acetaminophen (TYLENOL) tablet 650 mg  650 mg Oral Q4H PRN    ondansetron (ZOFRAN) injection 4 mg  4 mg IntraVENous Q4H PRN    lactobac ac& pc-s.therm-b.anim (KERLINE Q/RISAQUAD)  1 Cap Oral DAILY    LORazepam (ATIVAN) injection 1 mg  1 mg IntraVENous Q6H PRN    naloxone (NARCAN) injection 0.4 mg  0.4 mg IntraVENous PRN    thiamine HCL (B-1) tablet 100 mg  100 mg Oral DAILY    folic acid (FOLVITE) tablet 1 mg  1 mg Oral DAILY    therapeutic multivitamin (THERAGRAN) tablet 1 Tab  1 Tab Oral DAILY     ______________________________________________________________________  EXPECTED LENGTH OF STAY: 5d 4h  ACTUAL LENGTH OF STAY:          [facility] Kennedy Franco MD

## 2019-07-24 NOTE — PROGRESS NOTES
Problem: Mobility Impaired (Adult and Pediatric)  Goal: *Acute Goals and Plan of Care (Insert Text)  Description  Physical Therapy Goals  Revised 7/21/2019  Initiated 7/9/2019  1. Patient will move from supine to sit and sit to supine  in bed with supervision within 7 day(s). 2.  Patient will transfer from bed to chair and chair to bed with min assist using the least restrictive device within 7 day(s). 3.  Patient will perform sit to stand with supervision within 7 day(s). 4.  Patient will ambulate with min assist for 50 feet with the least restrictive device within 7 day(s). Initiated 7/9/2019  1. Patient will move from supine to sit and sit to supine  in bed with independence within 7 day(s). 2.  Patient will transfer from bed to chair and chair to bed with independence using the least restrictive device within 7 day(s). 3.  Patient will perform sit to stand with independence within 7 day(s). 4.  Patient will ambulate with modified independence for 150 feet with the least restrictive device within 7 day(s). 5.  Patient will ascend/descend 13 stairs with 1 handrail(s) with modified independence within 7 day(s). Outcome: Progressing Towards Goal   PHYSICAL THERAPY TREATMENT  Patient: Michelle Cutler [de-identified]72 y.o. male)  Date: 7/24/2019  Diagnosis: Acute CHF (congestive heart failure) ([facility]) [I50.9] Acute CHF (congestive heart failure) ([facility])  Procedure(s) (LRB):  INSERT PPM DUAL (N/A) 8 Days Post-Op  Precautions: fall  Chart, physical therapy assessment, plan of care and goals were reviewed. ASSESSMENT:  Pt seen following RN clearance this morning. Pt resting in bed with wife present upon arrival.  Pt agreeable to bed mobility, transfers, gait, and OOB to chair for lunch time. Pt making excellent progress with functional mobility tolerance and independence. Pt may be progressing towards home with HHPT and family support (potentially with RW) vs brief rehab stay.   Wife states she can be home with pt and he only has [address] home for 1st floor living. One must consider that pt's wife has been ill recently also with multiple heart attacks, so we do not want to stress her with pt's needs. However, pt able to demonstrate all tasks today with upwards of CGA with DME. Will continue to follow and progress as appropriate for optimal goal of return home as mentioned above. Next session: assess walker need with gait training; OOB from flat surface without rail/per home needs    Progression toward goals:  ?    Improving appropriately and progressing toward goals  ? Improving slowly and progressing toward goals  ? Not making progress toward goals and plan of care will be adjusted     PLAN:  Patient continues to benefit from skilled intervention to address the above impairments. Continue treatment per established plan of care. Discharge Recommendations:  Rehab vs Home Health (optimistic pt will progress for HHPT within the week)  Further Equipment Recommendations for Discharge:  rolling walker (?) if going home     SUBJECTIVE:   Patient stated Id like to go home if possible. ..    OBJECTIVE DATA SUMMARY:   Critical Behavior:  Neurologic State: Alert  Orientation Level: Oriented to place, Oriented to person, Oriented to situation  Cognition: Follows commands  Safety/Judgement: Decreased awareness of environment, Decreased awareness of need for safety, Decreased insight into deficits  Functional Mobility Training:  Bed Mobility:     Supine to Sit: Additional time;Supervision(HOB elevated and rail provided)  Sit to Supine: (NT; OOB to chair)           Transfers:  Sit to Stand: Contact guard assistance  Stand to Sit: Setup        Bed to Chair: Contact guard assistance                    Balance:  Sitting: Intact  Sitting - Static: Good (unsupported)  Sitting - Dynamic: Good (unsupported)  Standing: Intact; With support  Standing - Static: Fair  Standing - Dynamic : Fair  Ambulation/Gait Training:  Distance (ft): 125 Feet (ft)  Assistive Device: Gait belt;Walker, rolling  Ambulation - Level of Assistance: Stand-by assistance        Gait Abnormalities: Decreased step clearance        Base of Support: Narrowed     Speed/Melida: Pace decreased (<100 feet/min)  Step Length: Right shortened;Left shortened    Stairs:     Stairs - Level of Assistance: (NT)        Pain:  Pain Scale 1: Numeric (0 - 10)  Pain Intensity 1: 0    Activity Tolerance:   NAD  After treatment:   ?    Patient left in no apparent distress sitting up in chair  ? Patient left in no apparent distress in bed  ? Call bell left within reach  ? Nursing notified  ? Caregiver present  ?     Bed alarm activated    COMMUNICATION/COLLABORATION:   The patients plan of care was discussed with: Registered Nurse    Enma Palomares   Time Calculation: 19 mins

## 2019-07-24 NOTE — PROGRESS NOTES
Visited with . Ashley Amaya and his wife who was present at bedside after palliative consult received. No specific worries or concerns expressed to this . Pt and wife shared that they feel that pt is receiving good care. Explored any additional needs - none expressed at this time. Assurance of continued prayers offered. Per chart review, pt and family have had several visits with chaplains during the hospitalization. Pt received information during one of these visits regarding AMD, but no AMD completed per  notes. Chaplains are available for continued support as needed; please page at [phone].     [facility], Palliative

## 2019-07-24 NOTE — DIALYSIS
[facility]  [phone]                                                   Vitals   Pre   Post   Assessment   Pre   Post     Temp  Temp:98.4   98.3f LOC  AOX4 AOx4   HR   Pulse (Heart Rate): 70     70 Lungs   Clear over  Dim.  Clear over dim      B/P   BP: 109/56 119/60 Cardiac   RRR.  No change   Resp   Resp Rate:18 16 Skin   Warm and dry Warm and dry   Pain level  Pain Intensity 1: 0  0 Edema  None    No change   Orders:        Duration:   Start:   8273 End:    0816 Total:   3.5 hrs       Dialyzer:   Dialyzer/Set Up Inspection: Revaclear        K Bath:   Dialysate K (mEq/L): 3.5        Ca Bath:   Dialysate CA (mEq/L): 2.5        Na/Bicarb:   Dialysate NA (mEq/L): 140        Target Fluid Removal:   Goal/Amount of Fluid to Remove (mL): 1500 mL        Access         Type & Location:    RIJ. Ports disinfected per policy. Dressing clean, dry and intact. No S/S of infection noted.                             Medications/ Blood Products Given     Name   Dose   Route and Time     Heparin 2500 units. RIJ. [id number]                   Blood Volume Processed (BVP):    77L Net Fluid   Removed:  1500ml     Comments   Time Out Done:  0152  Primary Nurse Rpt Pre: Alexi Pate  Primary Nurse Rpt Post: Tip Askew [facility]  Pt Education: Procudural  Care Plan: Continue tx as scheduled. Tx Summary: Pt tolerated tx well. Goal was dropped due to low Bps in the 90's. At end of tx all blood in circuit returned with 300ml NS. Ports flushed, heparin dwells instilled, lines clamped and sterile caps applied. Admiting Diagnosis: CHF  Pt's previous clinic- NA  Consent signed - Informed Consent Verified: Yes   Luis Consent - TWE on file  Hepatitis Status- Heb B Ag- Neg (07/13/19), Hep B Ab- susceptible. (07/18/19)   Machine #- Machine Number: [id number]  Telemetry status- None  Pre-dialysis wt. - Pre-Dialysis Weight: yesenia

## 2019-07-24 NOTE — PROGRESS NOTES
[facility]   [address] [facility], [address] [place] [address], [facility]  Phone: [phone]   [phone]       Nephrology Progress Note  Dora Richardson     1945     [id number]  Date of Admission : 7/4/2019 07/24/19    CC:  Follow up for KAYLEEN      Assessment and Plan   KAYLEEN on CKD   - 2/2 ATN from hemorrhagic shock   - CRRT stopped 7/21. 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 Occult Cirrhosis      Interval History:  Seen and examined. doing well and no complaints. Remains anuric. No cp, sob, n/v/d reported. Review of Systems: A comprehensive review of systems was negative except for that written in the HPI.     Current Medications:   Current Facility-Administered Medications   Medication Dose Route Frequency    albuterol-ipratropium (DUO-NEB) 2.5 MG-0.5 MG/3 ML  3 mL Nebulization Q6H PRN    piperacillin-tazobactam (ZOSYN) 3.375 g in 0.9% sodium chloride (MBP/ADV) 100 mL  3.375 g IntraVENous Q12H    heparin (porcine) 1,000 unit/mL injection 2,800 Units  2,800 Units IntraVENous DIALYSIS PRN    phenol throat spray (CHLORASEPTIC) 1 Spray  1 Spray Oral PRN    0.9% sodium chloride infusion 250 mL  250 mL IntraVENous PRN    pantoprazole (PROTONIX) 40 mg in sodium chloride 0.9% 10 mL injection  40 mg IntraVENous Q12H    sodium chloride (NS) flush 5-40 mL  5-40 mL IntraVENous Q8H    sodium chloride (NS) flush 5-40 mL  5-40 mL IntraVENous PRN    balsam peru-castor oil (VENELEX) ointment   Topical BID    chlorhexidine (PERIDEX) 0.12 % mouthwash 15 mL  15 mL Oral BID    0.9% sodium chloride infusion 250 mL  250 mL IntraVENous PRN    0.9% sodium chloride infusion 250 mL  250 mL IntraVENous PRN    0.9% sodium chloride infusion 250 mL  250 mL IntraVENous PRN    sodium chloride (NS) flush 5-40 mL  5-40 mL IntraVENous Q8H    sodium chloride (NS) flush 5-40 mL  5-40 mL IntraVENous PRN    simethicone (MYLICON) 26VN/3.2OK oral drops 80 mg  1.2 mL Oral Multiple    fentaNYL citrate (PF) injection  mcg   mcg IntraVENous Q1H PRN    0.9% sodium chloride infusion 250 mL  250 mL IntraVENous PRN    calcium carbonate (TUMS) chewable tablet 200 mg [elemental]  200 mg Oral TID PRN    aspirin delayed-release tablet 81 mg  81 mg Oral DAILY    sodium chloride (NS) flush 5-40 mL  5-40 mL IntraVENous Q8H    sodium chloride (NS) flush 5-40 mL  5-40 mL IntraVENous PRN    acetaminophen (TYLENOL) tablet 650 mg  650 mg Oral Q4H PRN    ondansetron (ZOFRAN) injection 4 mg  4 mg IntraVENous Q4H PRN    lactobac ac& pc-s.therm-b.anim (KERLINE Q/RISAQUAD)  1 Cap Oral DAILY    LORazepam (ATIVAN) injection 1 mg  1 mg IntraVENous Q6H PRN    naloxone (NARCAN) injection 0.4 mg  0.4 mg IntraVENous PRN    thiamine HCL (B-1) tablet 100 mg  100 mg Oral DAILY    folic acid (FOLVITE) tablet 1 mg  1 mg Oral DAILY    therapeutic multivitamin (THERAGRAN) tablet 1 Tab  1 Tab Oral DAILY      No Known Allergies    Objective:  Vitals:    Vitals:    07/24/19 0438 07/24/19 0445 07/24/19 0715 07/24/19 1200   BP:  129/71 121/56 105/62   Pulse:  71 70 70   Resp:  16 16 16   Temp:  99.6 °F (37.6 °C) 98.3 °F (36.8 °C) 98.4 °F (36.9 °C)   TempSrc:       SpO2:  98% 98% 99%   Weight: 59 kg (130 lb)      Height:         Intake and Output:  07/24 0701 - 07/24 1900  In: 240 [P.O.:240]  Out: -   No intake/output data recorded.     Physical Examination:  General: No distress  Neck:  Lines +  Resp:  CTA  CV:  RRR,  no murmur or rub, no LE edema  GI:  Soft, NT, + Bowel sounds, no hepatosplenomegaly  Neurologic:  AAO X 3    :                 No callejas     []    High complexity decision making was performed  []    Patient is at high-risk of decompensation with multiple organ involvement    Lab Data Personally Reviewed: I have reviewed all the pertinent labs, microbiology data and radiology studies during assessment.     Recent Labs     07/24/19 0533 07/23/19 0440 07/22/19 0416    139 136   K 3.7 3.8 3.7    105 103   CO2 26 28 25   GLU 77 80 85   BUN 19 10 14   CREA 5.15* 2.87* 3.33*   CA 8.7 8.7 8.5   MG 2.3 2.2 2.4   PHOS 3.7 2.4* 2.6   ALB  --  2.7* 2.6*   SGOT  --  27 26   ALT  --  32 35     Recent Labs     07/24/19 0533 07/23/19 0440 07/22/19 0416   WBC 9.0 8.3 9.8   HGB 8.0* 8.5* 8.2*   HCT 26.2* 27.8* 26.8*    299 232     No results found for: SDES  Lab Results   Component Value Date/Time    Culture result: NO GROWTH 4 DAYS 07/05/2019 12:25 PM    Culture result: NO GROWTH 4 DAYS 07/05/2019 12:25 PM     Recent Results (from the past 24 hour(s))   METABOLIC PANEL, BASIC    Collection Time: 07/24/19  5:33 AM   Result Value Ref Range    Sodium 139 136 - 145 mmol/L    Potassium 3.7 3.5 - 5.1 mmol/L    Chloride 105 97 - 108 mmol/L    CO2 26 21 - 32 mmol/L    Anion gap 8 5 - 15 mmol/L    Glucose 77 65 - 100 mg/dL    BUN 19 6 - 20 MG/DL    Creatinine 5.15 (H) 0.70 - 1.30 MG/DL    BUN/Creatinine ratio 4 (L) 12 - 20      GFR est AA 13 (L) >60 ml/min/1.73m2    GFR est non-AA 11 (L) >60 ml/min/1.73m2    Calcium 8.7 8.5 - 10.1 MG/DL   CBC WITH AUTOMATED DIFF    Collection Time: 07/24/19  5:33 AM   Result Value Ref Range    WBC 9.0 4.1 - 11.1 K/uL    RBC 2.89 (L) 4.10 - 5.70 M/uL    HGB 8.0 (L) 12.1 - 17.0 g/dL    HCT 26.2 (L) 36.6 - 50.3 %    MCV 90.7 80.0 - 99.0 FL    MCH 27.7 26.0 - 34.0 PG    MCHC 30.5 30.0 - 36.5 g/dL    RDW 16.5 (H) 11.5 - 14.5 %    PLATELET 249 289 - 750 K/uL    MPV 9.6 8.9 - 12.9 FL    NRBC 0.0 0  WBC    ABSOLUTE NRBC 0.00 0.00 - 0.01 K/uL    NEUTROPHILS 41 32 - 75 %    LYMPHOCYTES 13 12 - 49 %    MONOCYTES 16 (H) 5 - 13 %    EOSINOPHILS 28 (H) 0 - 7 %    BASOPHILS 1 0 - 1 %    IMMATURE GRANULOCYTES 1 (H) 0.0 - 0.5 %    ABS. NEUTROPHILS 3.7 1.8 - 8.0 K/UL    ABS. LYMPHOCYTES 1.2 0.8 - 3.5 K/UL    ABS. MONOCYTES 1.4 (H) 0.0 - 1.0 K/UL    ABS. EOSINOPHILS 2.5 (H) 0.0 - 0.4 K/UL    ABS. BASOPHILS 0.1 0.0 - 0.1 K/UL    ABS. IMM. GRANS. 0.1 (H) 0.00 - 0.04 K/UL    DF SMEAR SCANNED      RBC COMMENTS ANISOCYTOSIS  1+       MAGNESIUM    Collection Time: 07/24/19  5:33 AM   Result Value Ref Range    Magnesium 2.3 1.6 - 2.4 mg/dL   PHOSPHORUS    Collection Time: 07/24/19  5:33 AM   Result Value Ref Range    Phosphorus 3.7 2.6 - 4.7 MG/DL           Total time spent with patient:  xxx   min. Care Plan discussed with:  Patient     Family      RN      Consulting Physician [address],         I have reviewed the flowsheets. Chart and Pertinent Notes have been reviewed. No change in PMH ,family and social history from Consult note.       Dotty Lopez MD

## 2019-07-25 NOTE — H&P
Interventional and Vascular Radiology History and Physical    Patient: Ibrahima Geiger 72 y.o. male       Chief Complaint: Abdominal Pain      History of Present Illness: dialysis     History:    Past Medical History:   Diagnosis Date    Arthritic-like pain     Hypertension      Family History   Problem Relation Age of Onset    Diabetes Mother     Hypertension Mother     Heart Disease Mother     Asthma Mother     Arthritis-osteo Mother     Diabetes Father     Hypertension Father     Asthma Father     Arthritis-osteo Father     Diabetes Sister     Gout Sister     Asthma Brother      Social History     Socioeconomic History    Marital status:      Spouse name: Not on file    Number of children: Not on file    Years of education: Not on file    Highest education level: Not on file   Occupational History    Not on file   Social Needs    Financial resource strain: Not on file    Food insecurity:     Worry: Not on file     Inability: Not on file    Transportation needs:     Medical: Not on file     Non-medical: Not on file   Tobacco Use    Smoking status: Former Smoker     Start date: 1966     Last attempt to quit: 1984     Years since quittin.7    Smokeless tobacco: Never Used   Substance and Sexual Activity    Alcohol use:  Yes     Alcohol/week: 11.7 standard drinks     Types: 14 drink(s) per week    Drug use: No    Sexual activity: Yes   Lifestyle    Physical activity:     Days per week: Not on file     Minutes per session: Not on file    Stress: Not on file   Relationships    Social connections:     Talks on phone: Not on file     Gets together: Not on file     Attends Anabaptism service: Not on file     Active member of club or organization: Not on file     Attends meetings of clubs or organizations: Not on file     Relationship status: Not on file    Intimate partner violence:     Fear of current or ex partner: Not on file     Emotionally abused: Not on file Physically abused: Not on file     Forced sexual activity: Not on file   Other Topics Concern     Service Not Asked    Blood Transfusions Not Asked    Caffeine Concern Not Asked    Occupational Exposure Not Asked    Hobby Hazards Not Asked    Sleep Concern Not Asked    Stress Concern Not Asked    Weight Concern Not Asked    Special Diet Not Asked    Back Care Not Asked    Exercise Not Asked    Bike Helmet Not Asked   [address] Not Asked    Self-Exams Not Asked   Social History Narrative    Not on file       Allergies: No Known Allergies    Current Medications:  Current Facility-Administered Medications   Medication Dose Route Frequency    albuterol-ipratropium (DUO-NEB) 2.5 MG-0.5 MG/3 ML  3 mL Nebulization Q6H PRN    piperacillin-tazobactam (ZOSYN) 3.375 g in 0.9% sodium chloride (MBP/ADV) 100 mL  3.375 g IntraVENous Q12H    heparin (porcine) 1,000 unit/mL injection 2,800 Units  2,800 Units IntraVENous DIALYSIS PRN    phenol throat spray (CHLORASEPTIC) 1 Spray  1 Spray Oral PRN    pantoprazole (PROTONIX) 40 mg in sodium chloride 0.9% 10 mL injection  40 mg IntraVENous Q12H    balsam peru-castor oil (VENELEX) ointment   Topical BID    chlorhexidine (PERIDEX) 0.12 % mouthwash 15 mL  15 mL Oral BID    sodium chloride (NS) flush 5-40 mL  5-40 mL IntraVENous Q8H    sodium chloride (NS) flush 5-40 mL  5-40 mL IntraVENous PRN    simethicone (MYLICON) 68UT/8.6YF oral drops 80 mg  1.2 mL Oral Multiple    fentaNYL citrate (PF) injection  mcg   mcg IntraVENous Q1H PRN    calcium carbonate (TUMS) chewable tablet 200 mg [elemental]  200 mg Oral TID PRN    aspirin delayed-release tablet 81 mg  81 mg Oral DAILY    sodium chloride (NS) flush 5-40 mL  5-40 mL IntraVENous Q8H    sodium chloride (NS) flush 5-40 mL  5-40 mL IntraVENous PRN    acetaminophen (TYLENOL) tablet 650 mg  650 mg Oral Q4H PRN    ondansetron (ZOFRAN) injection 4 mg  4 mg IntraVENous Q4H PRN    lactobac ac& pc-s.therm-b.anim (KERLINE Q/RISAQUAD)  1 Cap Oral DAILY    LORazepam (ATIVAN) injection 1 mg  1 mg IntraVENous Q6H PRN    naloxone (NARCAN) injection 0.4 mg  0.4 mg IntraVENous PRN    thiamine HCL (B-1) tablet 100 mg  100 mg Oral DAILY    folic acid (FOLVITE) tablet 1 mg  1 mg Oral DAILY    therapeutic multivitamin (THERAGRAN) tablet 1 Tab  1 Tab Oral DAILY        Physical Exam:  Blood pressure 125/56, pulse 70, temperature 98.2 °F (36.8 °C), resp. rate 16, height 5' 8\" (1.727 m), weight 58 kg (127 lb 13.9 oz), SpO2 100 %. LUNG: clear to auscultation bilaterally      Alerts:    Hospital Problems  Date Reviewed: 7/23/2019          Codes Class Noted POA    Hemorrhagic shock ([facility] ICD-10-CM: R57.8  ICD-9-CM: 785.59  7/23/2019 Yes        Acute blood loss anemia ICD-10-CM: D62  ICD-9-CM: 285.1  7/23/2019 Yes        Respiratory failure with hypoxia [facility]) ICD-10-CM: J96.91  ICD-9-CM: 518.81  7/23/2019 Yes        KAYLEEN (acute kidney injury) ([facility] ICD-10-CM: N17.9  ICD-9-CM: 584.9  7/23/2019 Yes        * (Principal) Acute CHF (congestive heart failure) ([facility]) ICD-10-CM: I50.9  ICD-9-CM: 428.0  7/5/2019 Yes        Pleural effusion, right ICD-10-CM: J90  ICD-9-CM: 511.9  7/5/2019 Yes              Laboratory:      Recent Labs     07/25/19  0358   HGB 8.3*   HCT 27.0*   WBC 8.0      BUN 9   CREA 3.36*   K 3.5         Plan of Care/Planned Procedure:  Risks, benefits, and alternatives reviewed with patient and he agrees to proceed with the procedure. Conscious sedation will be performed with IV fentanyl and versed.  Plan is for permcath placement       Maranda Farley MD

## 2019-07-25 NOTE — CONSULTS
Palliative Medicine Consult  Raul: [phone] ([phone])    Patient Name: Maggi Phelps  YOB: 1945    Date of Initial Consult: 7-25-19  Reason for Consult:  Advanced care planning   Requesting Provider: Blanca Rose, hospitalist team   Primary Care Physician: Maci Win DO     SUMMARY:   Maggi Phelps is a 72 y.o. with a past history of CHF, HTN, etoh use who was admitted on 7/4/2019 from home w/ abdominal pain, constipation. Found to have R pleural effusion, PNA, umbilical hernia causing partial SBO. S/p thoracentesis 7/5, chest tube removed 7/7, echo 7/7 showing EF 46-50% and severe pulm HTN. Had RRT called 7/13 due to hemorrhagic shock, KAYLEEN, acute anemia. Intubated. Emergent EGD showing duodenal ulcer eroding into gastroduodenal artery s/p clipping and embolization. Started CVVH 7/13, converted to HD 7/20. S/p pacemaker 7/15. Extubated 7/20. Current medical issues leading to Palliative Medicine involvement include: readdressing AMD. Earlier this admission pt wished to have DNR code status per ACP note, and wanted to complete AMD which was not done due to medical issues. Social: Pt  to Real Cavazos, 2 adult children. PALLIATIVE DIAGNOSES:   1. Fatigue, secondary to multiple medical issues   2. SOB improved  3. Advanced care planning     PLAN:   1. Earlier today along w/ [place] LCSW meet w/ pt who just had permacath placed and eager to eat breakfast.   2. Aware of his hospital stay and is glad that he made it through all that he did. Explain our role, that we are also glad that he has made all the gains that he had- and to take advantage of him again being awake/alert to make medical decisions including completing an AMD.   3. Per ACP notes here, before intubation pt had expressed interest in having a DNR status but this was never changed in connect care perhaps because pt wanted to engage his wife and family in the decision.    4. Pt wishes to have wife present before doing an AMD. He tells us that she will be coming today, checked back twice and left message on her cell phone. Perhaps will be able to catch her tmrw. 5. Goals clear for recovery as possible, has made it through a difficult hospital stay and hopes that renal recovery will be made although knows that dialysis may also be long term. 6. Initial consult note routed to primary continuity provider and/or primary health care team members  7. Communicated plan of care with: Palliative IDTL[facility] Team     GOALS OF CARE / TREATMENT PREFERENCES:     GOALS OF CARE:  Patient/Health Care Proxy Stated Goals: Rehabilitation    TREATMENT PREFERENCES:   Code Status: Full Code    Advance Care Planning:  [] [facility] Interdisciplinary Team has updated the ACP Navigator with [address] and Patient Capacity      Primary Decision Maker: Tahmina Alvarado [facility]) - Spouse - [phone]    Advance Care Planning 7/5/2019   Confirm Advance Directive None   Patient Would Like to Complete Advance Directive No       Medical Interventions: Full interventions             Other:    As far as possible, the palliative care team has discussed with patient / health care proxy about goals of care / treatment preferences for patient. HISTORY:     History obtained from: Chart, pt, staff    CHIEF COMPLAINT: I am hungry     HPI/SUBJECTIVE:    The patient is:   [x] Verbal and participatory  [] Non-participatory due to:     Pt engaging, alert and hungry. Just had permacath placed when I saw him earlier this morning. No pain or other complaints.      Clinical Pain Assessment (nonverbal scale for severity on nonverbal patients):   Clinical Pain Assessment  Severity: 0     Activity (Movement): Lying quietly, normal position    Duration: for how long has pt been experiencing pain (e.g., 2 days, 1 month, years)  Frequency: how often pain is an issue (e.g., several times per day, once every few days, constant)     FUNCTIONAL ASSESSMENT:     Palliative Performance Scale (PPS):  PPS: 50       PSYCHOSOCIAL/SPIRITUAL SCREENING:     Palliative IDT has assessed this patient for cultural preferences / practices and a referral made as appropriate to needs (Cultural Services, Patient Advocacy, Ethics, etc.)    Any spiritual / Rastafarian concerns:  [] Yes /  [x] No    Caregiver Burnout:  [] Yes /  [x] No /  [] No Caregiver Present      Anticipatory grief assessment:   [x] Normal  / [] Maladaptive       ESAS Anxiety: Anxiety: 0    ESAS Depression: Depression: 0        REVIEW OF SYSTEMS:     Positive and pertinent negative findings in ROS are noted above in HPI. The following systems were [x] reviewed / [] unable to be reviewed as noted in HPI  Other findings are noted below. Systems: constitutional, ears/nose/mouth/throat, respiratory, gastrointestinal, genitourinary, musculoskeletal, integumentary, neurologic, psychiatric, endocrine. Positive findings noted below. Modified ESAS Completed by: provider   Fatigue: 3 Drowsiness: 0   Depression: 0 Pain: 0   Anxiety: 0 Nausea: 0   Anorexia: 2 Dyspnea: 1     Constipation: No     Stool Occurrence(s): 1        PHYSICAL EXAM:     From RN flowsheet:  Wt Readings from Last 3 Encounters:   07/25/19 127 lb 13.9 oz (58 kg)   12/16/14 179 lb (81.2 kg)   11/18/14 182 lb (82.6 kg)     Blood pressure 125/56, pulse 70, temperature 98.2 °F (36.8 °C), resp. rate 16, height 5' 8\" (1.727 m), weight 127 lb 13.9 oz (58 kg), SpO2 100 %.     Pain Scale 1: Numeric (0 - 10)  Pain Intensity 1: 0     Pain Location 1: Other (comment)(taina)  Pain Orientation 1: Right, Upper  Pain Description 1: Aching  Pain Intervention(s) 1: Medication (see MAR)  Last bowel movement, if known:     Constitutional: awake, alert, oriented   Eyes: pupils equal, anicteric  Respiratory: breathing not labored  Musculoskeletal: no deformity  Skin: warm, dry  Neurologic: following commands, moving all extremities  Psychiatric: full affect, no hallucinations       HISTORY:     Principal Problem:    Acute CHF (congestive heart failure) ([facility] (2019)    Active Problems:    Pleural effusion, right (2019)      Hemorrhagic shock ([facility] (2019)      Acute blood loss anemia (2019)      Respiratory failure with hypoxia ([facility] (2019)      KAYLEEN (acute kidney injury) ([facility] (2019)      Past Medical History:   Diagnosis Date    Arthritic-like pain     Hypertension       Past Surgical History:   Procedure Laterality Date    HX PACEMAKER  2019    medtronic dual chamber VVIR     IR INSERT NON TUNL CVC OVER 5 YRS  2019    IR INSERT TUNL CVC W/O PORT OVER 5 YR  2019    IR OCCL TXCATH HEMMORAGE W SI  2019    WV INS NEW/RPLCMT PRM PM W/TRANSV ELTRD ATRIAL&VENT N/A 2019    INSERT PPM DUAL performed by Merlinda Penning, MD at [facility]  CATH LAB      Family History   Problem Relation Age of Onset    Diabetes Mother     Hypertension Mother     Heart Disease Mother     Asthma Mother    [place] Blue Mother     Diabetes Father     Hypertension Father     Asthma Father     Arthritis-osteo Father     Diabetes Sister     Gout Sister     Asthma Brother       History reviewed, no pertinent family history. Social History     Tobacco Use    Smoking status: Former Smoker     Start date: 1966     Last attempt to quit: 1984     Years since quittin.7    Smokeless tobacco: Never Used   Substance Use Topics    Alcohol use:  Yes     Alcohol/week: 11.7 standard drinks     Types: 14 drink(s) per week     No Known Allergies   Current Facility-Administered Medications   Medication Dose Route Frequency    albuterol-ipratropium (DUO-NEB) 2.5 MG-0.5 MG/3 ML  3 mL Nebulization Q6H PRN    piperacillin-tazobactam (ZOSYN) 3.375 g in 0.9% sodium chloride (MBP/ADV) 100 mL  3.375 g IntraVENous Q12H    heparin (porcine) 1,000 unit/mL injection 2,800 Units  2,800 Units IntraVENous DIALYSIS PRN    phenol throat spray (CHLORASEPTIC) 1 Spray  1 Spray Oral PRN    pantoprazole (PROTONIX) 40 mg in sodium chloride 0.9% 10 mL injection  40 mg IntraVENous Q12H    balsam peru-castor oil (VENELEX) ointment   Topical BID    chlorhexidine (PERIDEX) 0.12 % mouthwash 15 mL  15 mL Oral BID    sodium chloride (NS) flush 5-40 mL  5-40 mL IntraVENous Q8H    sodium chloride (NS) flush 5-40 mL  5-40 mL IntraVENous PRN    simethicone (MYLICON) 92DQ/3.0WN oral drops 80 mg  1.2 mL Oral Multiple    fentaNYL citrate (PF) injection  mcg   mcg IntraVENous Q1H PRN    calcium carbonate (TUMS) chewable tablet 200 mg [elemental]  200 mg Oral TID PRN    aspirin delayed-release tablet 81 mg  81 mg Oral DAILY    sodium chloride (NS) flush 5-40 mL  5-40 mL IntraVENous Q8H    sodium chloride (NS) flush 5-40 mL  5-40 mL IntraVENous PRN    acetaminophen (TYLENOL) tablet 650 mg  650 mg Oral Q4H PRN    ondansetron (ZOFRAN) injection 4 mg  4 mg IntraVENous Q4H PRN    lactobac ac& pc-s.therm-b.anim (KERLINE Q/RISAQUAD)  1 Cap Oral DAILY    LORazepam (ATIVAN) injection 1 mg  1 mg IntraVENous Q6H PRN    naloxone (NARCAN) injection 0.4 mg  0.4 mg IntraVENous PRN    thiamine HCL (B-1) tablet 100 mg  100 mg Oral DAILY    folic acid (FOLVITE) tablet 1 mg  1 mg Oral DAILY    therapeutic multivitamin (THERAGRAN) tablet 1 Tab  1 Tab Oral DAILY          LAB AND IMAGING FINDINGS:     Lab Results   Component Value Date/Time    WBC 8.0 07/25/2019 03:58 AM    HGB 8.3 (L) 07/25/2019 03:58 AM    PLATELET 293 89/12/4686 03:58 AM     Lab Results   Component Value Date/Time    Sodium 142 07/25/2019 03:58 AM    Potassium 3.5 07/25/2019 03:58 AM    Chloride 105 07/25/2019 03:58 AM    CO2 28 07/25/2019 03:58 AM    BUN 9 07/25/2019 03:58 AM    Creatinine 3.36 (H) 07/25/2019 03:58 AM    Calcium 8.7 07/25/2019 03:58 AM    Magnesium 2.1 07/25/2019 03:58 AM    Phosphorus 2.7 07/25/2019 03:58 AM      Lab Results   Component Value Date/Time    AST (SGOT) 27 07/23/2019 04:40 AM    Alk. phosphatase 100 07/23/2019 04:40 AM    Protein, total 6.2 (L) 07/23/2019 04:40 AM    Albumin 2.7 (L) 07/23/2019 04:40 AM    Globulin 3.5 07/23/2019 04:40 AM     Lab Results   Component Value Date/Time    INR 1.2 (H) 07/05/2019 10:14 AM    Prothrombin time 11.9 (H) 07/05/2019 10:14 AM    aPTT 29.4 07/05/2019 10:14 AM      No results found for: IRON, FE, TIBC, IBCT, PSAT, FERR   No results found for: PH, PCO2, PO2  No components found for: GLPOC   No results found for: CPK, CKMB             Total time: 50 min   Counseling / coordination time, spent as noted above: 35min   > 50% counseling / coordination?: yes    Prolonged service was provided for  []30 min   []75 min in face to face time in the presence of the patient, spent as noted above. Time Start:   Time End:   Note: this can only be billed with 35022 (initial) or 77636 (follow up). If multiple start / stop times, list each separately.

## 2019-07-25 NOTE — PROGRESS NOTES
Problem: Heart Failure: Day 5  Goal: Off Pathway (Use only if patient is Off Pathway)  Outcome: Progressing Towards Goal     Problem: Heart Failure: Day 5  Goal: Diagnostic Test/Procedures  Outcome: Progressing Towards Goal     Problem: Heart Failure: Day 5  Goal: Nutrition/Diet  Outcome: Progressing Towards Goal     Problem: Heart Failure: Day 5  Goal: Discharge Planning  Outcome: Progressing Towards Goal     Problem: Heart Failure: Day 5  Goal: Medications  Outcome: Progressing Towards Goal     Problem: Heart Failure: Day 5  Goal: Respiratory  Outcome: Progressing Towards Goal

## 2019-07-25 NOTE — PROGRESS NOTES
TRANSFER - OUT REPORT:    Verbal report given to Hiram Boswell RN(name) on Maggi Phelps  being transferred to [facility](unit) for routine progression of care       Report consisted of patients Situation, Background, Assessment and   Recommendations(SBAR). Information from the following report(s) Procedure Summary and MAR was reviewed with the receiving nurse.     Lines:   Quad Lumen 07/13/19 Right Neck (Active)   Central Line Being Utilized Yes 7/24/2019 11:30 PM   Criteria for Appropriate Use Dialysis/apheresis 7/24/2019 11:30 PM   Site Assessment Clean, dry, & intact 7/24/2019 11:30 PM   Infiltration Assessment 0 7/24/2019  6:50 PM   Affected Extremity/Extremities Color distal to insertion site pink (or appropriate for race) 7/24/2019  6:50 PM   Date of Last Dressing Change 07/23/19 7/24/2019  6:45 PM   Dressing Status Clean, dry, & intact 7/24/2019  6:50 PM   Dressing Type Disk with Chlorhexadine gluconate (CHG) 7/24/2019  6:50 PM   Action Taken Open ports on tubing capped 7/24/2019  6:50 PM   Proximal Hub Color/Line Status White;Capped 7/24/2019  6:50 PM   Positive Blood Return (Medial Site) Yes 7/24/2019  6:50 PM   Medial 1 Hub Color/Line Status Blue;Capped 7/24/2019  6:50 PM   Positive Blood Return (Lateral Site) Yes 7/24/2019  6:50 PM   Medial 2 Hub Color/Line Status Gray;Capped 7/24/2019  6:50 PM   Positive Blood Return (Site #3) Yes 7/24/2019  6:50 PM   Distal Hub Color/Line Status Brown;Capped 7/24/2019  6:50 PM   Positive Blood Return (Site #4) Yes 7/24/2019  6:50 PM   Alcohol Cap Used Yes 7/24/2019  6:50 PM       Peripheral IV 07/09/19 Left Forearm (Active)   Site Assessment Clean, dry, & intact 7/24/2019 11:30 PM   Phlebitis Assessment 0 7/24/2019 11:30 PM   Infiltration Assessment 0 7/24/2019 11:30 PM   Dressing Status Clean, dry, & intact 7/24/2019 11:30 PM   Dressing Type Transparent 7/24/2019 11:30 PM   Hub Color/Line Status Blue;Capped 7/24/2019 11:30 PM   Action Taken Open ports on tubing capped 7/24/2019 6:50 PM   Alcohol Cap Used Yes 7/24/2019 11:30 PM       Peripheral IV 07/14/19 Anterior;Proximal;Right Forearm (Active)   Site Assessment Clean, dry, & intact 7/24/2019 11:30 PM   Phlebitis Assessment 0 7/24/2019 11:30 PM   Infiltration Assessment 0 7/24/2019 11:30 PM   Dressing Status Clean, dry, & intact 7/24/2019 11:30 PM   Dressing Type Transparent 7/24/2019 11:30 PM   Hub Color/Line Status Pink;Capped 7/24/2019 11:30 PM   Action Taken Open ports on tubing capped 7/24/2019  6:50 PM   Alcohol Cap Used Yes 7/24/2019 11:30 PM        Opportunity for questions and clarification was provided.       Patient transported with:   Monitor

## 2019-07-25 NOTE — PROGRESS NOTES
Bedside and Verbal shift change report given to Daniella Castellanos RN (oncoming nurse) by Stefania Lopes RN (offgoing nurse).  Report included the following information SBAR, Kardex, Intake/Output, MAR and Recent Results.

## 2019-07-25 NOTE — PROGRESS NOTES
[address] has preliminary accepted pt for T, Th, Sat second shift pending MD approval and receipt of labs still outstanding. PERNELL Patrick      Update:  Spoke with Orly Pineda  for [facility], phone [phone] and they can likely accept on Tuesday for first day of treatment. If they receive the confirmation that the rest of labs are completed, pt may be able to be discharged on Saturday, July 27th, 2019. CM to communicate with Trav Domínguez on Friday to coordinate to determine if pt could leave on Saturday after HD treatment here.       PERNELL Patrick

## 2019-07-25 NOTE — PROGRESS NOTES
Patient's old ellen site bled, pressure and new dressing applied. Pressure elastic tape applied, placed the patient flat on bed, instructed to stay for a while for at least 30 min. Central line dressing also changed since the sites are beside each other. Patient was starting to eat breakfast and was found to be on sitting position when the daughter reported that the patient was bleeding. Will monitor.

## 2019-07-25 NOTE — PROGRESS NOTES
Problem: Acute Renal Failure: Day 6  Goal: Off Pathway (Use only if patient is Off Pathway)  Outcome: Progressing Towards Goal  Goal: Treatments/Interventions/Procedures  Outcome: Progressing Towards Goal  Pt dialyzed this afternoon, 1.5L removed. VSS following dialysis session. 2330: Bedside shift change report given to Sylvia Shaffer (oncoming nurse) by Giovanna Dill RN (offgoing nurse). Report included the following information SBAR, Kardex, MAR, Accordion, Recent Results and Cardiac Rhythm paced. CHG bath completed.

## 2019-07-25 NOTE — PROGRESS NOTES
Patient resting in bed AOX3 CHG bath given and bed changed for permanent dialysis port    0350 patient resting in bed no complaints at this time blood drawn and sent to lab    0730 Bedside and Verbal shift change report given to Ashley Mott RN  (oncoming nurse) by Argentina Aj RN  (offgoing nurse). Report included the following information SBAR, MAR, Recent Results and Med Rec Status.

## 2019-07-25 NOTE — PROGRESS NOTES
[facility]   [address] [facility], [address] [place] [address], [facility]  Phone: [phone]   [phone]       Nephrology Progress Note  Clari Steel     1945     [id number]  Date of Admission : 7/4/2019 07/25/19    CC:  Follow up for KAYLEEN      Assessment and Plan   KAYLEEN on CKD   - 2/2 ATN from hemorrhagic shock   - CRRT stopped 7/21. On Intermittent HD MWF schedule   - No signs of renal recovery yet   - CM to set up out pt dialysis at [address]   - daily labs     Encephalopathy:  -resolved     CKD Stage III :  - baseline Cr 1.2-1.3 mg/dl     Hemorraghic Shock /Massive UGI bleed  - S/P emergent clipping by EDG 7/13  - S/P embolization of gastric/diuodenal artery 7/12 in IR    Bradycardia   - s/p PPM placement 7/16     Acute Resp Failure   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 Occult Cirrhosis      Interval History:  Seen and examined. s/p permacath today. No cp, sob, n/v/d reported. Discussed w./  about out pt dialysis       Review of Systems: A comprehensive review of systems was negative except for that written in the HPI.     Current Medications:   Current Facility-Administered Medications   Medication Dose Route Frequency    albuterol-ipratropium (DUO-NEB) 2.5 MG-0.5 MG/3 ML  3 mL Nebulization Q6H PRN    piperacillin-tazobactam (ZOSYN) 3.375 g in 0.9% sodium chloride (MBP/ADV) 100 mL  3.375 g IntraVENous Q12H    heparin (porcine) 1,000 unit/mL injection 2,800 Units  2,800 Units IntraVENous DIALYSIS PRN    phenol throat spray (CHLORASEPTIC) 1 Spray  1 Spray Oral PRN    pantoprazole (PROTONIX) 40 mg in sodium chloride 0.9% 10 mL injection  40 mg IntraVENous Q12H    balsam peru-castor oil (VENELEX) ointment   Topical BID    chlorhexidine (PERIDEX) 0.12 % mouthwash 15 mL  15 mL Oral BID    sodium chloride (NS) flush 5-40 mL  5-40 mL IntraVENous Q8H    sodium chloride (NS) flush 5-40 mL  5-40 mL IntraVENous PRN    simethicone (MYLICON) 42OS/7.9BU oral drops 80 mg  1.2 mL Oral Multiple    fentaNYL citrate (PF) injection  mcg   mcg IntraVENous Q1H PRN    calcium carbonate (TUMS) chewable tablet 200 mg [elemental]  200 mg Oral TID PRN    aspirin delayed-release tablet 81 mg  81 mg Oral DAILY    sodium chloride (NS) flush 5-40 mL  5-40 mL IntraVENous Q8H    sodium chloride (NS) flush 5-40 mL  5-40 mL IntraVENous PRN    acetaminophen (TYLENOL) tablet 650 mg  650 mg Oral Q4H PRN    ondansetron (ZOFRAN) injection 4 mg  4 mg IntraVENous Q4H PRN    lactobac ac& pc-s.therm-b.anim (KERLINE Q/RISAQUAD)  1 Cap Oral DAILY    LORazepam (ATIVAN) injection 1 mg  1 mg IntraVENous Q6H PRN    naloxone (NARCAN) injection 0.4 mg  0.4 mg IntraVENous PRN    thiamine HCL (B-1) tablet 100 mg  100 mg Oral DAILY    folic acid (FOLVITE) tablet 1 mg  1 mg Oral DAILY    therapeutic multivitamin (THERAGRAN) tablet 1 Tab  1 Tab Oral DAILY      No Known Allergies    Objective:  Vitals:    Vitals:    07/25/19 0915 07/25/19 0930 07/25/19 0953 07/25/19 1152   BP: 115/56 117/60 126/50 125/56   Pulse: 70 70 70    Resp: 15 16 16 16   Temp:   97.9 °F (36.6 °C) 98.2 °F (36.8 °C)   TempSrc:       SpO2: 100% 100% 100% 100%   Weight:       Height:         Intake and Output:  No intake/output data recorded. 07/23 1901 - 07/25 0700  In: 240 [P.O.:240]  Out: 1500     Physical Examination:  General: No distress  Neck:  Lines +  Resp:  CTA  CV:  RRR,  no murmur or rub, no LE edema  GI:  Soft, NT, + Bowel sounds, no hepatosplenomegaly  Neurologic:  AAO X 3    :                 No callejas     []    High complexity decision making was performed  []    Patient is at high-risk of decompensation with multiple organ involvement    Lab Data Personally Reviewed: I have reviewed all the pertinent labs, microbiology data and radiology studies during assessment.     Recent Labs     07/25/19  1979 07/24/19 0533 07/23/19 0440    139 139   K 3.5 3.7 3.8    105 105   CO2 28 26 28   GLU 73 77 80   BUN 9 19 10   CREA 3.36* 5.15* 2.87*   CA 8.7 8.7 8.7   MG 2.1 2.3 2.2   PHOS 2.7 3.7 2.4*   ALB  --   --  2.7*   SGOT  --   --  27   ALT  --   --  32     Recent Labs     07/25/19  0358 07/24/19 0533 07/23/19 0440   WBC 8.0 9.0 8.3   HGB 8.3* 8.0* 8.5*   HCT 27.0* 26.2* 27.8*    284 299     No results found for: SDES  Lab Results   Component Value Date/Time    Culture result: NO GROWTH 4 DAYS 07/05/2019 12:25 PM    Culture result: NO GROWTH 4 DAYS 07/05/2019 12:25 PM     Recent Results (from the past 24 hour(s))   PHOSPHORUS    Collection Time: 07/25/19  3:58 AM   Result Value Ref Range    Phosphorus 2.7 2.6 - 4.7 MG/DL   MAGNESIUM    Collection Time: 07/25/19  3:58 AM   Result Value Ref Range    Magnesium 2.1 1.6 - 2.4 mg/dL   METABOLIC PANEL, BASIC    Collection Time: 07/25/19  3:58 AM   Result Value Ref Range    Sodium 142 136 - 145 mmol/L    Potassium 3.5 3.5 - 5.1 mmol/L    Chloride 105 97 - 108 mmol/L    CO2 28 21 - 32 mmol/L    Anion gap 9 5 - 15 mmol/L    Glucose 73 65 - 100 mg/dL    BUN 9 6 - 20 MG/DL    Creatinine 3.36 (H) 0.70 - 1.30 MG/DL    BUN/Creatinine ratio 3 (L) 12 - 20      GFR est AA 22 (L) >60 ml/min/1.73m2    GFR est non-AA 18 (L) >60 ml/min/1.73m2    Calcium 8.7 8.5 - 10.1 MG/DL   CBC WITH AUTOMATED DIFF    Collection Time: 07/25/19  3:58 AM   Result Value Ref Range    WBC 8.0 4.1 - 11.1 K/uL    RBC 2.99 (L) 4.10 - 5.70 M/uL    HGB 8.3 (L) 12.1 - 17.0 g/dL    HCT 27.0 (L) 36.6 - 50.3 %    MCV 90.3 80.0 - 99.0 FL    MCH 27.8 26.0 - 34.0 PG    MCHC 30.7 30.0 - 36.5 g/dL    RDW 16.2 (H) 11.5 - 14.5 %    PLATELET 490 311 - 883 K/uL    MPV 9.9 8.9 - 12.9 FL    NRBC 0.0 0  WBC    ABSOLUTE NRBC 0.00 0.00 - 0.01 K/uL    NEUTROPHILS 43 32 - 75 %    LYMPHOCYTES 16 12 - 49 %    MONOCYTES 14 (H) 5 - 13 %    EOSINOPHILS 26 (H) 0 - 7 %    IMMATURE GRANULOCYTES 0 0.0 - 0.5 %    ABS. NEUTROPHILS 3.4 1.8 - 8.0 K/UL    ABS. LYMPHOCYTES 1.3 0.8 - 3.5 K/UL    ABS. MONOCYTES 1.1 (H) 0.0 - 1.0 K/UL    ABS. EOSINOPHILS 2.1 (H) 0.0 - 0.4 K/UL    ABS. IMM. GRANS. 0.0 0.00 - 0.04 K/UL    DF AUTOMATED      RBC COMMENTS MACROCYTOSIS  1+        RBC COMMENTS CHRISTINA FINCH @09 /     RBC COMMENTS       CORRECTED ON 07/25 AT 0941: PREVIOUSLY REPORTED AS ANISOCYTOSIS 1+ MACROCYTOSIS 1+    BASOPHILS 1 0 - 1 %    ABS. BASOPHILS 0.1 0.0 - 0.1 K/UL           Total time spent with patient:  xxx   min. Care Plan discussed with:  Patient     Family      RN      Consulting Physician [address] [facility],         I have reviewed the flowsheets. Chart and Pertinent Notes have been reviewed. No change in PMH ,family and social history from Consult note.       Quinn Saldaña MD

## 2019-07-25 NOTE — PALLIATIVE CARE
Palliative Medicine Social Work      Mr. [email or web address] is a 72year old gentleman with a history significant for HTN, CHF and EtOH. He was admitted on 7/4 with abdominal pain, constipation and bilateral DONNY. Work-up revealed R pleural effusion and bacterial PNA and umbilical hernia causing partial SBO. Patient had thoracentesis (7/5); chest tube removed (7/7). Echo (7/7) revealed EF (46-50%); Left ventricular global hypokinesis;  Moderately dilated right ventricle; Severely dilated left atrium; Severely dilated right atrium and Severe pulmonary hypertension. RRT called  (7/13) for hemorraghic shock, acute anemia and KAYLEEN. Emergent EGD at bedside revealed duodenal ulcer eroding into gastroduodenal artery s/p clip and IR embolization; intubated with initiation of CVVH(7/13); developed bradycardia (7/15) s/p pacemaker (7/16). EEG (7/20) which indicated global cortical dysfunction, no seizures; extubated (7/20); converted to HD (7/22) and nephrology hopeful for renal recovery. Patient is cleared for diet and making progress with therapies; had permanent dialysis catheter placed this morning.     Dr. Winter Silverman and I checked in on patient this morning. He was alert, oriented and just starting to work on his breakfast.  Introduced our services and how we might support. Discussed his complicated course during this admission; periods of time when his condition was tenuous and family was in the role of decision-making. Discussed this as an opportune time to complete AMD and further clarify his wishes for future. He was open to this and wants his wife present for the discussion. Will return later. Thank you for the opportunity to be involved in the care of Mr. Alvarado. Cecilia Joseph, TAMMIEW, [facility]  Palliative Medicine   [employer]ing Choices ® ACP Facilitator   [phone]

## 2019-07-25 NOTE — PROGRESS NOTES
Hospitalist Progress Note  Keiry Blackwood MD  Answering service: [phone] from in house phone  Cell: [phone]      Date of Service:  2019  NAME:  Félix Burns  :  1945  MRN:  [id number]      Admission Summary:     79-year-old gentleman with a past medical history significant for hypertension, was in his usual state of health until about a week prior when the patient developed abdominal pain. The pain was located at the epigastric region, 8/10 in severity without any radiation. No known aggravating or relieving factors. The patient described the pain as a dull ache associated with constipation but no nausea, no vomiting. The patient took laxative without any significant relief of the constipation. When the EMS arrived at the scene, the patient's oxygen saturation was 86% on room air. The oxygen saturation improved to 97% when the patient was started on 2 liters of nasal cannula. The patient denied any history of COPD, or congestive heart failure. When the patient arrived at the emergency room, he was found to have elevated BNP level. CT scan of the abdomen and pelvis performed by the emergency room provider showed right pleural effusion and suspected small bowel obstruction. The patient was subsequently referred to the hospitalist service for evaluation for admission. The patient denied fevers, rigors or chills. No record of prior admission to this hospital.  The patient has not seen his primary care physician for some time.     Interval history / Subjective:     CC: Abdominal pain. Patient appeared comfortable lying in bed on return from Permacath placement. Stated that he slept well overnight. Denied any new complaints. Assessment & Plan:     1) GI: Acute Upper GI bleed due to a bleeding Duodenal ulcer. -S/P EGD on 19. S/P Clip placement.  S/P embolization by Interventional Radiology.  -Resolved probable Partial SBO due to Umbilical hernia which was reduced in the ER.  -continue protonix 40 mg iv bid  -GI eval noted and appreciated. 2) Hematology: Acute blood loss anemia due to a bleeding Duodenal Ulcer. -S/P transfusion six units PRBCs. - Close monitoring of Hemoglobin/hematocrit. 3) Hemodynamics: Resolved Hypovolemic shock due to the acute blood loss. Off Pressors. 4) CVS: S/P Permanent  pacemaker placement for Sick Sinus Syndrome (7/16/19). - Primary hypertension. -2D ECHO with EF 46-50 percent and systolic dysfunction.  -Cardiology F/U noted and appreciated. 5) KAYLEEN on CKD stage 2 due to acute tubular necrosis from Hemorrhagic shock. -CRRT discontinued 7/21/19.  -No significant improvement in kidney function. -S/P permacath placement 7/25/19  -Now on intermittent HD MWF. -Nephrology F/U noted and appreciated. 6) Resp: Resolving Acute Hypoxemic respiratory failure due to probable basilar atelectasis vs pneumonia and small pleural effusion.  -Pulmonary hypertension. -S/P intubation/extubation 7/20/19. -FIO2 to maintain saturation at least 94 percent, nebulizers, empiric antibiotics, incentive spirometry.  -Pulmonary F/U noted and appreciated. 7) CNS: Resolving Acute probable multifactorial metabolic encephalopathy   -CT head no acute abnormality   -EEG non specific  -Neurology eval noted and appreciated. 8) Social: Reported Alcohol Use disorder. Off prcedex. [facility] protocol. Cessation counselling done. 9) Nutrition: Probable moderate Protein calorie malnutrition. Daily multivitamin/minerals, thiamine and Folic Acid. 10) Prophylaxis: GI and DVT. 11) Directives: Full Code with an extremely guarded prognosis. Readdressed with patient and family. At increased risk of decompensation. Palliative care eval to re-address goals of care and Advance Directives appreciated.     12) Plan: Patient's hospital stay has been prolonged due to the decompensation in the multiple medical problems. Will likely need Homehealth nursing arrangements on discharge. Discussed in detail with patient and patient's wife Georgana Dandy at bedside. Can be reached at [phone]. Hospital Problems  Date Reviewed: 7/23/2019          Codes Class Noted POA    Hemorrhagic shock ([facility].) ICD-10-CM: R57.8  ICD-9-CM: 785.59  7/23/2019 Yes        Acute blood loss anemia ICD-10-CM: D62  ICD-9-CM: 285.1  7/23/2019 Yes        Respiratory failure with hypoxia ([facility] ICD-10-CM: J96.91  ICD-9-CM: 518.81  7/23/2019 Yes        KAYLEEN (acute kidney injury) ([facility] 75.) ICD-10-CM: N17.9  ICD-9-CM: 584.9  7/23/2019 Yes        * (Principal) Acute CHF (congestive heart failure) (H[facility]) ICD-10-CM: I50.9  ICD-9-CM: 428.0  7/5/2019 Yes        Pleural effusion, right ICD-10-CM: J90  ICD-9-CM: 511.9  7/5/2019 Yes              Vital Signs:    Last 24hrs VS reviewed since prior progress note. Most recent are:  Visit Vitals  /50 (BP 1 Location: Left arm)   Pulse 70   Temp 97.9 °F (36.6 °C)   Resp 16   Ht 5' 8\" (1.727 m)   Wt 58 kg (127 lb 13.9 oz)   SpO2 100%   BMI 19.44 kg/m²         Intake/Output Summary (Last 24 hours) at 7/25/2019 1006  Last data filed at 7/24/2019 1815  Gross per 24 hour   Intake    Output 1500 ml   Net -1500 ml        Physical Examination:             Constitutional:  No acute distress. ENT:  Oral mucous moist, oropharynx benign. Neck supple,    Resp:  Decreased air entry bibasilarly. CV:  Regular rhythm, normal rate, no murmurs, gallops, rubs    GI:  Soft, non distended, non tender. normoactive bowel sounds, no hepatosplenomegaly     Musculoskeletal:  No edema    Neurologic:  Awake, alert and oriented.      Skin:  Good turgor, no rashes or ulcers       Data Review:    Review and/or order of clinical lab test  Review and/or order of tests in the radiology section of CPT  Review and/or order of tests in the medicine section of CPT      Labs:     Recent Labs     07/25/19  0358 07/24/19  0533   WBC 8.0 9.0   HGB 8.3* 8.0* HCT 27.0* 26.2*    284     Recent Labs     07/25/19  0358 07/24/19  0533 07/23/19  0440    139 139   K 3.5 3.7 3.8    105 105   CO2 28 26 28   BUN 9 19 10   CREA 3.36* 5.15* 2.87*   GLU 73 77 80   CA 8.7 8.7 8.7   MG 2.1 2.3 2.2   PHOS 2.7 3.7 2.4*     Recent Labs     07/23/19  0440   SGOT 27   ALT 32      TBILI 0.5   TP 6.2*   ALB 2.7*   GLOB 3.5     No results for input(s): INR, PTP, APTT in the last 72 hours. No lab exists for component: INREXT, INREXT   No results for input(s): FE, TIBC, PSAT, FERR in the last 72 hours. No results found for: FOL, RBCF   No results for input(s): PH, PCO2, PO2 in the last 72 hours. No results for input(s): CPK, CKNDX, TROIQ in the last 72 hours.     No lab exists for component: CPKMB  Lab Results   Component Value Date/Time    Cholesterol, total 134 07/06/2019 03:50 AM    HDL Cholesterol 56 07/06/2019 03:50 AM    LDL, calculated 68 07/06/2019 03:50 AM    Triglyceride 50 07/06/2019 03:50 AM    CHOL/HDL Ratio 2.4 07/06/2019 03:50 AM     Lab Results   Component Value Date/Time    Glucose (POC) 82 07/17/2019 05:13 PM    Glucose (POC) 92 07/17/2019 12:51 PM    Glucose (POC) 175 (H) 07/11/2019 07:56 PM    Glucose (POC) 87 07/05/2019 12:02 PM    Glucose (POC) 101 (H) 07/05/2019 08:47 AM     No results found for: COLOR, APPRN, SPGRU, REFSG, ANGELI, PROTU, GLUCU, KETU, BILU, UROU, TREVOR, LEUKU, GLUKE, EPSU, BACTU, WBCU, RBCU, CASTS, UCRY      Medications Reviewed:     Current Facility-Administered Medications   Medication Dose Route Frequency    albuterol-ipratropium (DUO-NEB) 2.5 MG-0.5 MG/3 ML  3 mL Nebulization Q6H PRN    piperacillin-tazobactam (ZOSYN) 3.375 g in 0.9% sodium chloride (MBP/ADV) 100 mL  3.375 g IntraVENous Q12H    heparin (porcine) 1,000 unit/mL injection 2,800 Units  2,800 Units IntraVENous DIALYSIS PRN    phenol throat spray (CHLORASEPTIC) 1 Spray  1 Spray Oral PRN    pantoprazole (PROTONIX) 40 mg in sodium chloride 0.9% 10 mL injection 40 mg IntraVENous Q12H    balsam peru-castor oil (VENELEX) ointment   Topical BID    chlorhexidine (PERIDEX) 0.12 % mouthwash 15 mL  15 mL Oral BID    sodium chloride (NS) flush 5-40 mL  5-40 mL IntraVENous Q8H    sodium chloride (NS) flush 5-40 mL  5-40 mL IntraVENous PRN    simethicone (MYLICON) 60NQ/7.7MQ oral drops 80 mg  1.2 mL Oral Multiple    fentaNYL citrate (PF) injection  mcg   mcg IntraVENous Q1H PRN    calcium carbonate (TUMS) chewable tablet 200 mg [elemental]  200 mg Oral TID PRN    aspirin delayed-release tablet 81 mg  81 mg Oral DAILY    sodium chloride (NS) flush 5-40 mL  5-40 mL IntraVENous Q8H    sodium chloride (NS) flush 5-40 mL  5-40 mL IntraVENous PRN    acetaminophen (TYLENOL) tablet 650 mg  650 mg Oral Q4H PRN    ondansetron (ZOFRAN) injection 4 mg  4 mg IntraVENous Q4H PRN    lactobac ac& pc-s.therm-b.anim (KERLINE Q/RISAQUAD)  1 Cap Oral DAILY    LORazepam (ATIVAN) injection 1 mg  1 mg IntraVENous Q6H PRN    naloxone (NARCAN) injection 0.4 mg  0.4 mg IntraVENous PRN    thiamine HCL (B-1) tablet 100 mg  100 mg Oral DAILY    folic acid (FOLVITE) tablet 1 mg  1 mg Oral DAILY    therapeutic multivitamin (THERAGRAN) tablet 1 Tab  1 Tab Oral DAILY     ______________________________________________________________________  EXPECTED LENGTH OF STAY: 5d 4h  ACTUAL LENGTH OF STAY:          20                 Terrie Kayser, MD

## 2019-07-25 NOTE — PROGRESS NOTES
Problem: Mobility Impaired (Adult and Pediatric)  Goal: *Acute Goals and Plan of Care (Insert Text)  Description  Physical Therapy Goals  Revised 7/21/2019  Initiated 7/9/2019  1. Patient will move from supine to sit and sit to supine  in bed with supervision within 7 day(s). 2.  Patient will transfer from bed to chair and chair to bed with min assist using the least restrictive device within 7 day(s). 3.  Patient will perform sit to stand with supervision within 7 day(s). 4.  Patient will ambulate with min assist for 50 feet with the least restrictive device within 7 day(s). Initiated 7/9/2019  1. Patient will move from supine to sit and sit to supine  in bed with independence within 7 day(s). 2.  Patient will transfer from bed to chair and chair to bed with independence using the least restrictive device within 7 day(s). 3.  Patient will perform sit to stand with independence within 7 day(s). 4.  Patient will ambulate with modified independence for 150 feet with the least restrictive device within 7 day(s). 5.  Patient will ascend/descend 13 stairs with 1 handrail(s) with modified independence within 7 day(s). Outcome: Progressing Towards Goal  PHYSICAL THERAPY TREATMENT  Patient: Lisa Collins [de-identified]72 y.o. male)  Date: 7/25/2019  Diagnosis: Acute CHF (congestive heart failure) ([facility]) [I50.9] Acute CHF (congestive heart failure) ([facility])  Procedure(s) (LRB):  INSERT PPM DUAL (N/A) 9 Days Post-Op  Precautions: (CRRT)  Chart, physical therapy assessment, plan of care and goals were reviewed. ASSESSMENT:  Based on the objective data described below, the patient presents with Stand-by assistance level overall for transfers. Gait training completed at Stand-by assistance to contact guard for stairs,  125 feet and using a rolling walker.    The following are barriers to independence while in acute care:   -Cognitive and/or behavioral:     -Medical condition: functional endurance and functional mobility     -Other:            PLAN:  Patient continues to benefit from skilled intervention to address the above impairments. Continue treatment per established plan of care. Discharge recommendations: Home health (to increase independence and safety)    Equipment recommendations for successful discharge (if) home: RW - placed MD order this date       SUBJECTIVE:   Patient stated yes I do want a walker for discharge    OBJECTIVE DATA SUMMARY:   Critical Behavior:  Neurologic State: Alert, Appropriate for age  Orientation Level: Oriented to person, Oriented to place, Oriented to situation  Cognition: Appropriate decision making, Appropriate for age attention/concentration, Appropriate safety awareness, Follows commands  Safety/Judgement: Decreased awareness of environment, Decreased awareness of need for safety, Decreased insight into deficits  Functional Mobility Training:  Bed Mobility:     Supine to Sit: Stand-by assistance  Sit to Supine: Stand-by assistance           Transfers:  Sit to Stand: Stand-by assistance  Stand to Sit: Stand-by assistance        Bed to Chair: Stand-by assistance                    Balance:  Sitting: Intact  Sitting - Static: Good (unsupported)  Sitting - Dynamic: Good (unsupported)  Standing: Intact; With support  Standing - Static: Good;Constant support  Standing - Dynamic : Good;Constant support  Ambulation/Gait Training:  Distance (ft): 125 Feet (ft)  Assistive Device: Gait belt;Walker, rolling  Ambulation - Level of Assistance: Stand-by assistance        Gait Abnormalities: Decreased step clearance        Base of Support: Narrowed     Speed/Melida: Slow  Step Length: Right shortened;Left shortened              Stairs:  Number of Stairs Trained: 3  Stairs - Level of Assistance: Contact guard assistance   Rail Use: Left     Pain:  Pt denied pain.     Activity Tolerance:   Fair and requires rest breaks between activities  Please refer to the flowsheet for vital signs taken during this treatment.     After treatment patient left:   Supine in bed  Call light within reach  RN notified     COMMUNICATION/COLLABORATION:   The patients plan of care was discussed with: Registered Nurse    Isabelle Crawford, PT   Time Calculation: 15 mins

## 2019-07-25 NOTE — PROGRESS NOTES
Chart reviewed and noted patient PEREZ at this time. Will follow up for OT evaluation as able and appropriate. Thank you.

## 2019-07-25 NOTE — PROGRESS NOTES
Update:  Received call from [address], phone [phone] requesting xray, H & P, and core antibody results. Core Antibody to be drawn and not yet resulted so this is still pending. Per [facility] admissions, nanci has not denied pt. Requested admissions call them today as center directly told this CM there is no availability. Requested that records be sent to [facility] as second choice to expedite placement. Al of [facility] admissions, verbalized understanding and advised she would follow up today. Core Antibody NEEDS to be faxed to [facility] admissions, fax [phone]. RODRÍGUEZ:  1. Permacath being placed today. 2.  Pt has been dialyzing here M, W, F and plan is for outpatient HD set up for KAYLEEN may end up with renal recovery. 3.  [facility], awaiting determination for acceptance from [facility], family's second choice. 4.  Total core and IGM ordered and likely resulted Monday, will be needed by outpatient HD per Alexander Cooper. 5.  IPR (inpt rehab [facility], [address]) has denied pt as per their physiatrist pt is too high level for IPR and recommendation is for home health. 6.  [facility] to [facility].     PERNELL Wadsworth

## 2019-07-25 NOTE — PROGRESS NOTES
Transitional Care Team: Follow-Up [facility] Note        In to see Mr. Fallon Armstrong and his family. He is resting quietly at this time. Has had permacath placed today. He thinks he is being discharged this evening, but I advised that I believe the plans are still being coordinated with Katie bobby. Advised that it looks more likely for tomorrow or Saturday. Followed up on palliative medicine's recommendation to complete an AMD while he is here in the hospital.  Asked Mr. Alvarado's sister if Mrs. Fallon Armstrong will be coming tomorrow to visit. She says she will be bringing her so there may be an opportunity for palliative or the [facility] team to facilitate completion of this then. Will continue to follow and prepare for discharge.

## 2019-07-26 NOTE — PROGRESS NOTES
Occupational Therapy  Chart reviewed Patient currently with dialysis, not available for OT. Note HH recommended by PT for continued progression of care. Will retry Monday.  Ondina Tipton OTR/L

## 2019-07-26 NOTE — PROCEDURES
[facility] Team [facility] Ac[facility]  [phone]    Vitals   Pre   Post   Assessment   Pre   Post     Temp  Temp: 98 °F (36.7 °C) (07/26/19 1450)  97.9 LOC  A&Ox4 A&Ox4   HR   Pulse (Heart Rate): 69 (07/26/19 1450) 69 Lungs   CTA, room air  CTA   B/P   BP: 128/68 (07/26/19 1450) 119/60 Cardiac   S1S2 S1S2    Resp   Resp Rate: 16 (07/26/19 1450) 16 Skin   Warm, dry warm and dry   Pain level  Pain Intensity 1: 0 (07/26/19 1205) 0 Edema  none   none   Orders:    Duration:   Start:   14:50 End:   18:20 Total:   3.5 hr   Dialyzer:   Dialyzer/Set Up Inspection: Revaclear (07/26/19 1450)   K Bath:   Dialysate K (mEq/L): 3.5 (07/26/19 1450)   Ca Bath:   Dialysate CA (mEq/L): 2.5 (07/26/19 1450)   Na/Bicarb:   Dialysate NA (mEq/L): 140 (07/26/19 1450)   Target Fluid Removal:   Goal/Amount of Fluid to Remove (mL): 1500 mL (07/26/19 1450)   Access     Type & Location:   Right tunneled IJ. Procedure dressing intact.    Labs     Obtained/Reviewed   Critical Results Called   Date when labs were drawn-  Hgb-    HGB   Date Value Ref Range Status   07/26/2019 7.8 (L) 12.1 - 17.0 g/dL Final     K-    Potassium   Date Value Ref Range Status   07/26/2019 3.8 3.5 - 5.1 mmol/L Final     Ca-   Calcium   Date Value Ref Range Status   07/26/2019 8.6 8.5 - 10.1 MG/DL Final     Bun-   BUN   Date Value Ref Range Status   07/26/2019 18 6 - 20 MG/DL Final     Creat-   Creatinine   Date Value Ref Range Status   07/26/2019 5.59 (H) 0.70 - 1.30 MG/DL Final     Comment:     INVESTIGATED PER DELTA CHECK PROTOCOL      Medications/ Blood Products Given     Name   Dose   Route and Time     Heparin 1:1000 1900 units  Venous dwell   Heparin 1:1000 1900 units Arterial dwell        Blood Volume Processed (BVP):   78.8 L Net Fluid   Removed:  1500 ml   Comments   Time Out Done: 14:40  Primary Nurse Rpt Pre: Feng Oden RN  Primary Nurse Rpt Post: Feng Oden RN  Pt Education: CVC care, s/s infection  Care Plan: HD today, continue MWF  Tx Summary:  1400: Safety checks complete  1440: Patient arrives to suite via transport. Timeout performed. 1450: CVC assessed, no redness or warmth, dried blood is noted on procedure dressing. CVC accessed per procedure using alcohol x 60 sec each lumen and prevantics x 5 sec each lumen. CVC flushed with NS easily. HD initiated. Medications reviewed. 1820: HD treatment complete. All possible blood rinsed back to patient. CVC de-accessed per procedure using alcohol x 60 sec per lumen and prevantics x 5 sec each hub. Flushed, heparinized and capped. VSS. Patient tolerated HD treatment well. Report called to primary RN. 1835: Patient left dialysis suite via transport.   Admitting Diagnosis: hemorrhagic shock, KAYLEEN  Pt's previous clinic: n/a KAYLEEN  Informed Consent Verified: Yes (07/26/19 1450)  Jacintoita Consent: verified  Hepatitis Status: HBsAg: neg (07/13/2019) HBsAb: <3/susceptibile (07/13/2019)  Machine Number: [id number] (07/26/19 1450)

## 2019-07-26 NOTE — WOUND CARE
Wound Consult: Follow UP Visit. Chart reviewed. Consulted for penile skin breakdown while in ICU; in to reassess. Patient is resting on a Versacare bed with accumax mattress. Patient is alert, moves in bed independently to turn for assessment, his wife is at bedside. Assessment:  Penis - dorsal surface with pink, resurfaced skin and small 0.6 x 0.2 cm of yellow moist scab remaining; discussed with patient and his wife that appeared to be moisture damage along with staff retracting foreskin and skin being tight while in ICU; it has healed nicely since seen last week and I suggested that they just continue to wash and dry area well at home. He has no discoloration or injury to sacrum, buttocks or heels. Skin Care Recommendations:  1. Minimize friction/shear: minimize layers of linen/pads under patient. 2. Off load pressure/reposition: continue to turn and reposition approximately every 2 hours; float heels as needed; waffle cushion for sitting if needed. 3. Manage Moisture - promote continence. 4. Continue to monitor nutrition, pain, and skin risk scale, and skin assessment. Plan:  Please re-consult if needed.   Kim Patel, [address] Office [phone]  Pager [phone]

## 2019-07-26 NOTE — PROGRESS NOTES
Hospitalist Progress Note  Edmond Serna MD  Answering service: [phone] or [phone] from in house phone  Cell: [phone]      Date of Service:  2019  NAME:  Lisa Collins  :  1945  MRN:  [id number]      Admission Summary:     62-year-old gentleman with a past medical history significant for hypertension, was in his usual state of health until about a week prior when the patient developed abdominal pain. The pain was located at the epigastric region, 8/10 in severity without any radiation. No known aggravating or relieving factors. The patient described the pain as a dull ache associated with constipation but no nausea, no vomiting. The patient took laxative without any significant relief of the constipation. When the EMS arrived at the scene, the patient's oxygen saturation was 86% on room air. The oxygen saturation improved to 97% when the patient was started on 2 liters of nasal cannula. The patient denied any history of COPD, or congestive heart failure. When the patient arrived at the emergency room, he was found to have elevated BNP level. CT scan of the abdomen and pelvis performed by the emergency room provider showed right pleural effusion and suspected small bowel obstruction. The patient was subsequently referred to the hospitalist service for evaluation for admission. The patient denied fevers, rigors or chills. No record of prior admission to this hospital.  The patient has not seen his primary care physician for some time.     Interval history / Subjective:     CC: Abdominal pain. Appeared comfortable sitting up having breakfast. Denied any new complaints. Assessment & Plan:     1) GI: Acute Upper GI bleed due to a bleeding Duodenal ulcer. -S/P EGD on 19. S/P Clip placement.  S/P embolization by Interventional Radiology.  -Resolved probable Partial SBO due to Umbilical hernia which was reduced in the ER.  -continue protonix 40 mg iv bid  -GI eval noted and appreciated. 2) Hematology: Acute blood loss anemia due to a bleeding Duodenal Ulcer. -S/P transfusion six units PRBCs. - Close monitoring of Hemoglobin/hematocrit. 3) Hemodynamics: Resolved Hypovolemic shock due to the acute blood loss. Off Pressors. 4) CVS: S/P Permanent  pacemaker placement for Sick Sinus Syndrome (7/16/19). - Primary hypertension. -2D ECHO with EF 46-50 percent and systolic dysfunction.  -Cardiology F/U noted and appreciated. 5) KAYLEEN on CKD stage 2 due to acute tubular necrosis from Hemorrhagic shock. -CRRT discontinued 7/21/19.  -No significant improvement in kidney function. -S/P permacath placement 7/25/19  -Now on intermittent HD MWF. -Nephrology F/U noted and appreciated. 6) Resp: Resolving Acute Hypoxemic respiratory failure due to probable basilar atelectasis vs pneumonia and small pleural effusion.  -Pulmonary hypertension. -S/P intubation/extubation 7/20/19. -FIO2 to maintain saturation at least 94 percent, nebulizers, empiric antibiotics, incentive spirometry.  -Pulmonary F/U noted and appreciated. 7) CNS: Resolving Acute probable multifactorial metabolic encephalopathy   -CT head no acute abnormality   -EEG non specific  -Neurology eval noted and appreciated. 8) Social: Reported Alcohol Use disorder. Off precedex. CIWA protocol. Cessation counselling done. 9) Nutrition: Probable moderate Protein calorie malnutrition. Daily multivitamin/minerals, thiamine and Folic Acid. 10) Prophylaxis: GI and DVT. 11) Directives: Full Code with an extremely guarded prognosis. Readdressed with patient and family. At increased risk of decompensation. Palliative care eval to re-address goals of care and Advance Directives appreciated. 12) Plan: Patient's hospital stay has been prolonged due to the decompensation in the multiple medical problems. Will likely need Homehealth nursing arrangements on discharge. Discussed in detail with patient and patient's wife Milton Mcdaniel at bedside. Can be reached at [phone]. D/W RN. Hospital Problems  Date Reviewed: 7/23/2019          Codes Class Noted POA    Hemorrhagic shock ([facility] ICD-10-CM: R57.8  ICD-9-CM: 785.59  7/23/2019 Yes        Acute blood loss anemia ICD-10-CM: D62  ICD-9-CM: 285.1  7/23/2019 Yes        Respiratory failure with hypoxia ([facility] ICD-10-CM: J96.91  ICD-9-CM: 518.81  7/23/2019 Yes        KAYLEEN (acute kidney injury) ([facility] ICD-10-CM: N17.9  ICD-9-CM: 584.9  7/23/2019 Yes        * (Principal) Acute CHF (congestive heart failure) ([facility]) ICD-10-CM: I50.9  ICD-9-CM: 428.0  7/5/2019 Yes        Pleural effusion, right ICD-10-CM: J90  ICD-9-CM: 511.9  7/5/2019 Yes              Vital Signs:    Last 24hrs VS reviewed since prior progress note. Most recent are:  Visit Vitals  /61 (BP 1 Location: Left arm, BP Patient Position: At rest)   Pulse 71   Temp 98.6 °F (37 °C)   Resp 14   Ht 5' 8\" (1.727 m)   Wt 60.1 kg (132 lb 7.9 oz)   SpO2 100%   BMI 20.15 kg/m²         Intake/Output Summary (Last 24 hours) at 7/26/2019 0931  Last data filed at 7/25/2019 1502  Gross per 24 hour   Intake 120 ml   Output 1 ml   Net 119 ml        Physical Examination:             Constitutional:  No acute distress. ENT:  Oral mucous moist, oropharynx benign. Neck supple,    Resp:  Decreased air entry bibasilarly. CV:  Regular rhythm, normal rate, no murmurs, gallops, rubs. GI:  Soft, non distended, non tender. normoactive bowel sounds, no hepatosplenomegaly     Musculoskeletal:  No edema    Neurologic:  Awake, alert and oriented.      Skin:  Good turgor, no rashes or ulcers       Data Review:    Review and/or order of clinical lab test  Review and/or order of tests in the radiology section of CPT  Review and/or order of tests in the medicine section of CPT      Labs:     Recent Labs     07/26/19  0542 07/25/19  0358   WBC 7.9 8.0   HGB 7.8* 8.3*   HCT 25.6* 27.0*    311 Recent Labs     07/26/19  0542 07/25/19  0358 07/24/19  0533    142 139   K 3.8 3.5 3.7    105 105   CO2 25 28 26   BUN 18 9 19   CREA 5.59* 3.36* 5.15*   GLU 74 73 77   CA 8.6 8.7 8.7   MG 2.2 2.1 2.3   PHOS 4.0 2.7 3.7     No results for input(s): SGOT, GPT, ALT, AP, TBIL, TBILI, TP, ALB, GLOB, GGT, AML, LPSE in the last 72 hours. No lab exists for component: AMYP, HLPSE  No results for input(s): INR, PTP, APTT in the last 72 hours. No lab exists for component: INREXT, INREXT   No results for input(s): FE, TIBC, PSAT, FERR in the last 72 hours. No results found for: FOL, RBCF   No results for input(s): PH, PCO2, PO2 in the last 72 hours. No results for input(s): CPK, CKNDX, TROIQ in the last 72 hours.     No lab exists for component: CPKMB  Lab Results   Component Value Date/Time    Cholesterol, total 134 07/06/2019 03:50 AM    HDL Cholesterol 56 07/06/2019 03:50 AM    LDL, calculated 68 07/06/2019 03:50 AM    Triglyceride 50 07/06/2019 03:50 AM    CHOL/HDL Ratio 2.4 07/06/2019 03:50 AM     Lab Results   Component Value Date/Time    Glucose (POC) 83 07/25/2019 04:06 PM    Glucose (POC) 82 07/17/2019 05:13 PM    Glucose (POC) 92 07/17/2019 12:51 PM    Glucose (POC) 175 (H) 07/11/2019 07:56 PM    Glucose (POC) 87 07/05/2019 12:02 PM     No results found for: COLOR, APPRN, SPGRU, REFSG, ANGELI, PROTU, GLUCU, KETU, BILU, UROU, TREVOR, LEUKU, GLUKE, EPSU, BACTU, WBCU, RBCU, CASTS, UCRY      Medications Reviewed:     Current Facility-Administered Medications   Medication Dose Route Frequency    albuterol-ipratropium (DUO-NEB) 2.5 MG-0.5 MG/3 ML  3 mL Nebulization Q6H PRN    piperacillin-tazobactam (ZOSYN) 3.375 g in 0.9% sodium chloride (MBP/ADV) 100 mL  3.375 g IntraVENous Q12H    heparin (porcine) 1,000 unit/mL injection 2,800 Units  2,800 Units IntraVENous DIALYSIS PRN    phenol throat spray (CHLORASEPTIC) 1 Spray  1 Spray Oral PRN    pantoprazole (PROTONIX) 40 mg in sodium chloride 0.9% 10 mL injection  40 mg IntraVENous Q12H    balsam peru-castor oil (VENELEX) ointment   Topical BID    chlorhexidine (PERIDEX) 0.12 % mouthwash 15 mL  15 mL Oral BID    sodium chloride (NS) flush 5-40 mL  5-40 mL IntraVENous Q8H    sodium chloride (NS) flush 5-40 mL  5-40 mL IntraVENous PRN    simethicone (MYLICON) 41FQ/4.5IE oral drops 80 mg  1.2 mL Oral Multiple    fentaNYL citrate (PF) injection  mcg   mcg IntraVENous Q1H PRN    calcium carbonate (TUMS) chewable tablet 200 mg [elemental]  200 mg Oral TID PRN    aspirin delayed-release tablet 81 mg  81 mg Oral DAILY    sodium chloride (NS) flush 5-40 mL  5-40 mL IntraVENous Q8H    sodium chloride (NS) flush 5-40 mL  5-40 mL IntraVENous PRN    acetaminophen (TYLENOL) tablet 650 mg  650 mg Oral Q4H PRN    ondansetron (ZOFRAN) injection 4 mg  4 mg IntraVENous Q4H PRN    lactobac ac& pc-s.therm-b.anim (KERLINE Q/RISAQUAD)  1 Cap Oral DAILY    LORazepam (ATIVAN) injection 1 mg  1 mg IntraVENous Q6H PRN    naloxone (NARCAN) injection 0.4 mg  0.4 mg IntraVENous PRN    thiamine HCL (B-1) tablet 100 mg  100 mg Oral DAILY    folic acid (FOLVITE) tablet 1 mg  1 mg Oral DAILY    therapeutic multivitamin (THERAGRAN) tablet 1 Tab  1 Tab Oral DAILY     ______________________________________________________________________  EXPECTED LENGTH OF STAY: 5d 4h  ACTUAL LENGTH OF STAY:          21                 Lizette Weldon MD

## 2019-07-26 NOTE — NURSE NAVIGATOR
Follow up scheduled with [facility] with first available appointment with Dr. Carlos Bowser on 8/7/19 at 1:45 pm.  Information on After Visit Summary. Care Management Specialist contacted for primary care appointment or [facility]. Called care manager.

## 2019-07-26 NOTE — PROGRESS NOTES
[facility]   [address] [facility], [address] [place] [address], [facility]  Phone: [phone]   [phone]       Nephrology Progress Note  Portillo Robertson     1945     [id number]  Date of Admission : 7/4/2019 07/26/19    CC:  Follow up for KAYLEEN      Assessment and Plan   KAYLEEN on CKD   - 2/2 ATN from hemorrhagic shock   - CRRT stopped 7/21. On Intermittent HD MWF schedule   - No signs of renal recovery yet . 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 Occult Cirrhosis      Interval History:  Seen and examined. No complaints. Denies any N/V/CP/SOB    Review of Systems: A comprehensive review of systems was negative except for that written in the HPI.     Current Medications:   Current Facility-Administered Medications   Medication Dose Route Frequency    albuterol-ipratropium (DUO-NEB) 2.5 MG-0.5 MG/3 ML  3 mL Nebulization Q6H PRN    piperacillin-tazobactam (ZOSYN) 3.375 g in 0.9% sodium chloride (MBP/ADV) 100 mL  3.375 g IntraVENous Q12H    heparin (porcine) 1,000 unit/mL injection 2,800 Units  2,800 Units IntraVENous DIALYSIS PRN    phenol throat spray (CHLORASEPTIC) 1 Spray  1 Spray Oral PRN    pantoprazole (PROTONIX) 40 mg in sodium chloride 0.9% 10 mL injection  40 mg IntraVENous Q12H    balsam peru-castor oil (VENELEX) ointment   Topical BID    chlorhexidine (PERIDEX) 0.12 % mouthwash 15 mL  15 mL Oral BID    sodium chloride (NS) flush 5-40 mL  5-40 mL IntraVENous Q8H    sodium chloride (NS) flush 5-40 mL  5-40 mL IntraVENous PRN    simethicone (MYLICON) 09BW/5.4DE oral drops 80 mg  1.2 mL Oral Multiple    fentaNYL citrate (PF) injection  mcg   mcg IntraVENous Q1H PRN    calcium carbonate (TUMS) chewable tablet 200 mg [elemental]  200 mg Oral TID PRN    aspirin delayed-release tablet 81 mg  81 mg Oral DAILY    sodium chloride (NS) flush 5-40 mL  5-40 mL IntraVENous Q8H    sodium chloride (NS) flush 5-40 mL  5-40 mL IntraVENous PRN    acetaminophen (TYLENOL) tablet 650 mg  650 mg Oral Q4H PRN    ondansetron (ZOFRAN) injection 4 mg  4 mg IntraVENous Q4H PRN    lactobac ac& pc-s.therm-b.anim (KERLINE Q/RISAQUAD)  1 Cap Oral DAILY    LORazepam (ATIVAN) injection 1 mg  1 mg IntraVENous Q6H PRN    naloxone (NARCAN) injection 0.4 mg  0.4 mg IntraVENous PRN    thiamine HCL (B-1) tablet 100 mg  100 mg Oral DAILY    folic acid (FOLVITE) tablet 1 mg  1 mg Oral DAILY    therapeutic multivitamin (THERAGRAN) tablet 1 Tab  1 Tab Oral DAILY      No Known Allergies    Objective:  Vitals:    Vitals:    07/25/19 1935 07/26/19 0005 07/26/19 0410 07/26/19 0722   BP: 132/61 124/75 123/64 115/61   Pulse: 70 69 71 71   Resp: 16 16 16 14   Temp: 98.2 °F (36.8 °C) 98.6 °F (37 °C) 98.6 °F (37 °C) 98.6 °F (37 °C)   TempSrc:       SpO2: 98% 99% 100% 100%   Weight:   60.1 kg (132 lb 7.9 oz)    Height:         Intake and Output:  No intake/output data recorded. 07/24 1901 - 07/26 0700  In: 120 [P.O.:120]  Out: 1     Physical Examination:  General: No distress  Neck:  Lines +  Resp:  CTA  CV:  RRR,  no murmur or rub, no LE edema  GI:  Soft, NT, + Bowel sounds, no hepatosplenomegaly  Neurologic:  AAO X 3    :                 No callejas     []    High complexity decision making was performed  []    Patient is at high-risk of decompensation with multiple organ involvement    Lab Data Personally Reviewed: I have reviewed all the pertinent labs, microbiology data and radiology studies during assessment.     Recent Labs 07/26/19 0542 07/25/19 0358 07/24/19 0533    142 139   K 3.8 3.5 3.7    105 105   CO2 25 28 26   GLU 74 73 77   BUN 18 9 19   CREA 5.59* 3.36* 5.15*   CA 8.6 8.7 8.7   MG 2.2 2.1 2.3   PHOS 4.0 2.7 3.7     Recent Labs     07/26/19 0542 07/25/19 0358 07/24/19 0533   WBC 7.9 8.0 9.0   HGB 7.8* 8.3* 8.0*   HCT 25.6* 27.0* 26.2*    311 284     No results found for: SDES  Lab Results   Component Value Date/Time    Culture result: NO GROWTH 4 DAYS 07/05/2019 12:25 PM    Culture result: NO GROWTH 4 DAYS 07/05/2019 12:25 PM     Recent Results (from the past 24 hour(s))   GLUCOSE, POC    Collection Time: 07/25/19  4:06 PM   Result Value Ref Range    Glucose (POC) 83 65 - 100 mg/dL    Performed by Silvia Walters    PHOSPHORUS    Collection Time: 07/26/19  5:42 AM   Result Value Ref Range    Phosphorus 4.0 2.6 - 4.7 MG/DL   MAGNESIUM    Collection Time: 07/26/19  5:42 AM   Result Value Ref Range    Magnesium 2.2 1.6 - 2.4 mg/dL   METABOLIC PANEL, BASIC    Collection Time: 07/26/19  5:42 AM   Result Value Ref Range    Sodium 138 136 - 145 mmol/L    Potassium 3.8 3.5 - 5.1 mmol/L    Chloride 104 97 - 108 mmol/L    CO2 25 21 - 32 mmol/L    Anion gap 9 5 - 15 mmol/L    Glucose 74 65 - 100 mg/dL    BUN 18 6 - 20 MG/DL    Creatinine 5.59 (H) 0.70 - 1.30 MG/DL    BUN/Creatinine ratio 3 (L) 12 - 20      GFR est AA 12 (L) >60 ml/min/1.73m2    GFR est non-AA 10 (L) >60 ml/min/1.73m2    Calcium 8.6 8.5 - 10.1 MG/DL   CBC WITH AUTOMATED DIFF    Collection Time: 07/26/19  5:42 AM   Result Value Ref Range    WBC 7.9 4.1 - 11.1 K/uL    RBC 2.82 (L) 4.10 - 5.70 M/uL    HGB 7.8 (L) 12.1 - 17.0 g/dL    HCT 25.6 (L) 36.6 - 50.3 %    MCV 90.8 80.0 - 99.0 FL    MCH 27.7 26.0 - 34.0 PG    MCHC 30.5 30.0 - 36.5 g/dL    RDW 16.3 (H) 11.5 - 14.5 %    PLATELET 630 125 - 682 K/uL    MPV 9.9 8.9 - 12.9 FL    NRBC 0.0 0  WBC    ABSOLUTE NRBC 0.00 0.00 - 0.01 K/uL    NEUTROPHILS 49 32 - 75 %    LYMPHOCYTES 14 12 - 49 % MONOCYTES 12 5 - 13 %    EOSINOPHILS 24 (H) 0 - 7 %    BASOPHILS 1 0 - 1 %    IMMATURE GRANULOCYTES 0 0.0 - 0.5 %    ABS. NEUTROPHILS 3.9 1.8 - 8.0 K/UL    ABS. LYMPHOCYTES 1.1 0.8 - 3.5 K/UL    ABS. MONOCYTES 0.9 0.0 - 1.0 K/UL    ABS. EOSINOPHILS 1.9 (H) 0.0 - 0.4 K/UL    ABS. BASOPHILS 0.1 0.0 - 0.1 K/UL    ABS. IMM. GRANS. 0.0 0.00 - 0.04 K/UL    DF SMEAR SCANNED      RBC COMMENTS ANISOCYTOSIS  1+               Total time spent with patient:  xxx   min. Care Plan discussed with:  Patient     Family      RN      Consulting Physician [facility],         I have reviewed the flowsheets. Chart and Pertinent Notes have been reviewed. No change in PMH ,family and social history from Consult note.       Cy House MD

## 2019-07-26 NOTE — DIALYSIS
HD TRANSFER - OUT REPORT:    Verbal report given to Dawson Driver RN on Maru Das being transferred to [facility] for routine progression of care       Report consisted of patient's Situation, Background, Assessment and Recommendations(SBAR). Information from the following report(s) Procedure Summary was reviewed with the receiving nurse.    $$ Method: Hemodialysis (07/26/19 1450)    Fluid Removed  NET Fluid Removed (mL): 1500 ml (07/26/19 1820)     Patient response to treatment:  Stable    Hemodialysis End Time: 3296 (07/26/19 1820)  If not documented, dialysis nurse to update post-dialysis row in HD/Filtration flowsheet     Medications /Volume expansion agents or Fluid boluses administered during treatment? no    Post-dialysis medication administration due?  no    Line heparinization? yes  Lines: Right tunneled CVC    Opportunity for questions and clarification was provided.       Patient transported with: [employer]

## 2019-07-26 NOTE — DIALYSIS
TRANSFER - IN REPORT:    Verbal report received from Truong Borjas RN on Colin Necessary being received from [facility] for ordered procedure      Report consisted of patients Situation, Background, Assessment and Recommendations(SBAR). Information from the following report(s) SBAR was reviewed with the receiving nurse. Opportunity for questions and clarification was provided. Assessment completed upon patients arrival to unit and care assumed.

## 2019-07-26 NOTE — PROGRESS NOTES
Problem: Mobility Impaired (Adult and Pediatric)  Goal: *Acute Goals and Plan of Care (Insert Text)  Description  Physical Therapy Goals  Revised 7/21/2019  Initiated 7/9/2019  1. Patient will move from supine to sit and sit to supine  in bed with supervision within 7 day(s). 2.  Patient will transfer from bed to chair and chair to bed with min assist using the least restrictive device within 7 day(s). 3.  Patient will perform sit to stand with supervision within 7 day(s). 4.  Patient will ambulate with min assist for 50 feet with the least restrictive device within 7 day(s). Initiated 7/9/2019  1. Patient will move from supine to sit and sit to supine  in bed with independence within 7 day(s). 2.  Patient will transfer from bed to chair and chair to bed with independence using the least restrictive device within 7 day(s). 3.  Patient will perform sit to stand with independence within 7 day(s). 4.  Patient will ambulate with modified independence for 150 feet with the least restrictive device within 7 day(s). 5.  Patient will ascend/descend 13 stairs with 1 handrail(s) with modified independence within 7 day(s). Outcome: Progressing Towards Goal  PHYSICAL THERAPY TREATMENT  Patient: Wai West [de-identified]72 y.o. male)  Date: 7/26/2019  Diagnosis: Acute CHF (congestive heart failure) ([facility]) [I50.9] Acute CHF (congestive heart failure) ([facility])  Procedure(s) (LRB):  INSERT PPM DUAL (N/A) 10 Days Post-Op  Precautions: (CRRT)  Chart, physical therapy assessment, plan of care and goals were reviewed. ASSESSMENT:  Based on the objective data described below, the patient presents with Stand-by assistance level overall for transfers. Gait training completed at Supervision, 125 feet and using a rolling walker. Pt performed stairs with contact guard.   Focus of today's intervention - pt/family teaching/education - wife instructed in proper cuing of patient for safe transfers with use of Rolling walker; instructed in how to fold walker and guard pt for stairs. The following are barriers to independence while in acute care:   -Cognitive and/or behavioral:     -Medical condition: functional endurance and functional mobility     -Other:            PLAN:  Patient continues to benefit from skilled intervention to address the above impairments. Continue treatment per established plan of care. Discharge recommendations: Home health (to increase independence and safety)    Equipment recommendations for successful discharge (if) home: RW has been delivered       SUBJECTIVE:   Patient stated THis is my wife.     OBJECTIVE DATA SUMMARY:   Critical Behavior:  Neurologic State: Alert  Orientation Level: Oriented X4  Cognition: Appropriate for age attention/concentration, Appropriate safety awareness, Follows commands  Safety/Judgement: Decreased awareness of environment, Decreased awareness of need for safety, Decreased insight into deficits  Functional Mobility Training:  Bed Mobility:     Supine to Sit: Stand-by assistance  Sit to Supine: Stand-by assistance           Transfers:  Sit to Stand: Stand-by assistance  Stand to Sit: Stand-by assistance        Bed to Chair: Stand-by assistance                    Balance:  Sitting: Intact  Sitting - Static: Good (unsupported)  Sitting - Dynamic: Good (unsupported)  Standing: Intact; With support  Standing - Static: Good;Constant support  Standing - Dynamic : Good;Constant support  Ambulation/Gait Training:  Distance (ft): 125 Feet (ft)  Assistive Device: Walker, rolling;Gait belt  Ambulation - Level of Assistance: Supervision        Gait Abnormalities: Decreased step clearance        Base of Support: Narrowed     Speed/Melida: Slow  Step Length: Right shortened;Left shortened              Stairs:  Number of Stairs Trained: 3  Stairs - Level of Assistance: Contact guard assistance   Rail Use: Left     Pain:  Pt denied pain.      Activity Tolerance:   Good - for OOB and ambulating in hallway. Please refer to the flowsheet for vital signs taken during this treatment.     After treatment patient left:   Up in chair  Call light within reach  RN notified  Family at bedside     COMMUNICATION/COLLABORATION:   The patients plan of care was discussed with: Registered Nurse    Andi Rahman, PT   Time Calculation: 25 mins

## 2019-07-26 NOTE — ROUTINE PROCESS
[facility]P [facility] apt scheduled with Dr. Belgica Arrieta on 8/5/19 at 3:00PM, the was the earliest available apt. [facility] visit scheduled for 7/29/19 at [facility] will contact patient for additional information and to confirm visit.   Apts added to AVS

## 2019-07-27 NOTE — PROGRESS NOTES
Hospitalist Progress Note  Rosa Elena Ng MD  Answering service: [phone] OR [phone] from in house phone  Cell: [phone]      Date of Service:  2019  NAME:  Kendall Ramirez  :  1945  MRN:  [id number]      Admission Summary:     59-year-old gentleman with a past medical history significant for hypertension, was in his usual state of health until about a week prior when the patient developed abdominal pain. The pain was located at the epigastric region, 8/10 in severity without any radiation. No known aggravating or relieving factors. The patient described the pain as a dull ache associated with constipation but no nausea, no vomiting. The patient took laxative without any significant relief of the constipation. When the EMS arrived at the scene, the patient's oxygen saturation was 86% on room air. The oxygen saturation improved to 97% when the patient was started on 2 liters of nasal cannula. The patient denied any history of COPD, or congestive heart failure. When the patient arrived at the emergency room, he was found to have elevated BNP level. CT scan of the abdomen and pelvis performed by the emergency room provider showed right pleural effusion and suspected small bowel obstruction. The patient was subsequently referred to the hospitalist service for evaluation for admission. The patient denied fevers, rigors or chills. No record of prior admission to this hospital.  The patient has not seen his primary care physician for some time.     Interval history / Subjective:     CC: Abdominal pain. Appeared comfortable sitting up having breakfast. Denied any new complaints. Assessment & Plan:     1) GI: Acute Upper GI bleed due to a bleeding Duodenal ulcer. -S/P EGD on 19. S/P Clip placement.  S/P embolization by Interventional Radiology.  -Resolved probable Partial SBO due to Umbilical hernia which was reduced in the ER.  -continue protonix 40 mg iv bid  -GI eval noted and appreciated. 2) Hematology: Acute blood loss anemia due to a bleeding Duodenal Ulcer. -S/P transfusion six units PRBCs. - Close monitoring of Hemoglobin/hematocrit. 3) Hemodynamics: Resolved Hypovolemic shock due to the acute blood loss. Off Pressors. 4) CVS: S/P Permanent  pacemaker placement for Sick Sinus Syndrome (7/16/19). - Primary hypertension. -2D ECHO with EF 46-50 percent and systolic dysfunction.  -Cardiology F/U noted and appreciated. 5) KAYLEEN on CKD stage 2 due to acute tubular necrosis from Hemorrhagic shock. -CRRT discontinued 7/21/19.  -No significant improvement in kidney function. -S/P permacath placement 7/25/19  -Now on intermittent HD MWF. -Arrangements being made for outpatient HD at [facility].  -Nephrology F/U noted and appreciated. 6) Resp: Resolving Acute Hypoxemic respiratory failure due to probable basilar atelectasis vs pneumonia and small pleural effusion.  -Pulmonary hypertension. -S/P intubation/extubation 7/20/19. -FIO2 to maintain saturation at least 94 percent, nebulizers, empiric antibiotics, incentive spirometry.  -Pulmonary F/U noted and appreciated. 7) CNS: Resolving Acute probable multifactorial metabolic encephalopathy   -CT head no acute abnormality   -EEG non specific  -Neurology eval noted and appreciated. 8) Social: Reported Alcohol Use disorder. Off precedex. CIWA protocol. Cessation counselling done. 9) Nutrition: Probable moderate Protein calorie malnutrition. Daily multivitamin/minerals, thiamine and Folic Acid. 10) Prophylaxis: GI and DVT. 11) Directives: Full Code with an extremely guarded prognosis. Readdressed with patient and family. At increased risk of decompensation. Palliative care eval to re-address goals of care and Advance Directives appreciated.     12) Plan: Patient's hospital stay has been prolonged due to the decompensation in the multiple medical problems. Will likely need Homehealth nursing arrangements on discharge. Discussed in detail with patient and patient's wife Zaynab Ryder. Can be reached at [phone]. D/W RN. Hospital Problems  Date Reviewed: 7/23/2019          Codes Class Noted POA    Hemorrhagic shock ([facility] ICD-10-CM: R57.8  ICD-9-CM: 785.59  7/23/2019 Yes        Acute blood loss anemia ICD-10-CM: D62  ICD-9-CM: 285.1  7/23/2019 Yes        Respiratory failure with hypoxia ([facility] ICD-10-CM: J96.91  ICD-9-CM: 518.81  7/23/2019 Yes        KAYLEEN (acute kidney injury) ([facility].) ICD-10-CM: N17.9  ICD-9-CM: 584.9  7/23/2019 Yes        * (Principal) Acute CHF (congestive heart failure) (HCC) ICD-10-CM: I50.9  ICD-9-CM: 428.0  7/5/2019 Yes        Pleural effusion, right ICD-10-CM: J90  ICD-9-CM: 511.9  7/5/2019 Yes              Vital Signs:    Last 24hrs VS reviewed since prior progress note. Most recent are:  Visit Vitals  /60 (BP 1 Location: Left arm, BP Patient Position: At rest)   Pulse 69   Temp 98.1 °F (36.7 °C)   Resp 16   Ht 5' 8\" (1.727 m)   Wt 58.4 kg (128 lb 12 oz)   SpO2 94%   BMI 19.58 kg/m²         Intake/Output Summary (Last 24 hours) at 7/27/2019 1045  Last data filed at 7/27/2019 0749  Gross per 24 hour   Intake 440 ml   Output 1500 ml   Net -1060 ml        Physical Examination:             Constitutional:  No acute distress. ENT:  Oral mucous moist, oropharynx benign. Neck supple,    Resp:  Decreased air entry bibasilarly. CV:  Regular rhythm, normal rate, no murmurs, gallops, rubs. GI:  Soft, non distended, non tender. normoactive bowel sounds, no hepatosplenomegaly     Musculoskeletal:  No edema    Neurologic:  Awake, alert and oriented. Skin:  Good turgor, no rashes or ulcers. Right chest Permacath site dry and clean.        Data Review:    Review and/or order of clinical lab test  Review and/or order of tests in the radiology section of CPT  Review and/or order of tests in the medicine section of CPT      Labs: Recent Labs     07/27/19  0420 07/26/19  0542   WBC 7.9 7.9   HGB 8.0* 7.8*   HCT 26.1* 25.6*    300     Recent Labs     07/27/19  0420 07/26/19  0542 07/25/19  0358    138 142   K 3.7 3.8 3.5    104 105   CO2 29 25 28   BUN 8 18 9   CREA 3.31* 5.59* 3.36*   GLU 77 74 73   CA 8.4* 8.6 8.7   MG 1.9 2.2 2.1   PHOS 2.5* 4.0 2.7     No results for input(s): SGOT, GPT, ALT, AP, TBIL, TBILI, TP, ALB, GLOB, GGT, AML, LPSE in the last 72 hours. No lab exists for component: AMYP, HLPSE  No results for input(s): INR, PTP, APTT in the last 72 hours. No lab exists for component: INREXT, INREXT   No results for input(s): FE, TIBC, PSAT, FERR in the last 72 hours. No results found for: FOL, RBCF   No results for input(s): PH, PCO2, PO2 in the last 72 hours. No results for input(s): CPK, CKNDX, TROIQ in the last 72 hours.     No lab exists for component: CPKMB  Lab Results   Component Value Date/Time    Cholesterol, total 134 07/06/2019 03:50 AM    HDL Cholesterol 56 07/06/2019 03:50 AM    LDL, calculated 68 07/06/2019 03:50 AM    Triglyceride 50 07/06/2019 03:50 AM    CHOL/HDL Ratio 2.4 07/06/2019 03:50 AM     Lab Results   Component Value Date/Time    Glucose (POC) 83 07/25/2019 04:06 PM    Glucose (POC) 82 07/17/2019 05:13 PM    Glucose (POC) 92 07/17/2019 12:51 PM    Glucose (POC) 175 (H) 07/11/2019 07:56 PM    Glucose (POC) 87 07/05/2019 12:02 PM     No results found for: COLOR, APPRN, SPGRU, REFSG, ANGELI, PROTU, GLUCU, KETU, BILU, UROU, TREVOR, LEUKU, GLUKE, EPSU, BACTU, WBCU, RBCU, CASTS, UCRY      Medications Reviewed:     Current Facility-Administered Medications   Medication Dose Route Frequency    heparin (porcine) 1,000 unit/mL injection 3,800 Units  3,800 Units Hemodialysis DIALYSIS PRN    albuterol-ipratropium (DUO-NEB) 2.5 MG-0.5 MG/3 ML  3 mL Nebulization Q6H PRN    piperacillin-tazobactam (ZOSYN) 3.375 g in 0.9% sodium chloride (MBP/ADV) 100 mL  3.375 g IntraVENous Q12H    phenol throat spray (CHLORASEPTIC) 1 Spray  1 Spray Oral PRN    pantoprazole (PROTONIX) 40 mg in sodium chloride 0.9% 10 mL injection  40 mg IntraVENous Q12H    balsam peru-castor oil (VENELEX) ointment   Topical BID    chlorhexidine (PERIDEX) 0.12 % mouthwash 15 mL  15 mL Oral BID    sodium chloride (NS) flush 5-40 mL  5-40 mL IntraVENous Q8H    sodium chloride (NS) flush 5-40 mL  5-40 mL IntraVENous PRN    simethicone (MYLICON) 41NE/3.5OS oral drops 80 mg  1.2 mL Oral Multiple    fentaNYL citrate (PF) injection  mcg   mcg IntraVENous Q1H PRN    calcium carbonate (TUMS) chewable tablet 200 mg [elemental]  200 mg Oral TID PRN    aspirin delayed-release tablet 81 mg  81 mg Oral DAILY    sodium chloride (NS) flush 5-40 mL  5-40 mL IntraVENous Q8H    sodium chloride (NS) flush 5-40 mL  5-40 mL IntraVENous PRN    acetaminophen (TYLENOL) tablet 650 mg  650 mg Oral Q4H PRN    ondansetron (ZOFRAN) injection 4 mg  4 mg IntraVENous Q4H PRN    lactobac ac& pc-s.therm-b.anim (KERLINE Q/RISAQUAD)  1 Cap Oral DAILY    LORazepam (ATIVAN) injection 1 mg  1 mg IntraVENous Q6H PRN    naloxone (NARCAN) injection 0.4 mg  0.4 mg IntraVENous PRN    thiamine HCL (B-1) tablet 100 mg  100 mg Oral DAILY    folic acid (FOLVITE) tablet 1 mg  1 mg Oral DAILY    therapeutic multivitamin (THERAGRAN) tablet 1 Tab  1 Tab Oral DAILY     ______________________________________________________________________  EXPECTED LENGTH OF STAY: 5d 4h  ACTUAL LENGTH OF STAY:          22                 Braulio Porras MD

## 2019-07-27 NOTE — PROGRESS NOTES
Problem: Patient Education: Go to Patient Education Activity  Goal: Patient/Family Education  Outcome: Progressing Towards Goal     Problem: Falls - Risk of  Goal: *Absence of Falls  Description  Document Monet Gómez Fall Risk and appropriate interventions in the flowsheet. Outcome: Progressing Towards Goal  Note:   Fall Risk Interventions:  Mobility Interventions: Patient to call before getting OOB    Mentation Interventions: Adequate sleep, hydration, pain control, Door open when patient unattended, Evaluate medications/consider consulting pharmacy, Increase mobility, More frequent rounding, Reorient patient, Toileting rounds    Medication Interventions: Patient to call before getting OOB    Elimination Interventions: Call light in reach, Patient to call for help with toileting needs              Problem: Pressure Injury - Risk of  Goal: *Prevention of pressure injury  Description  Document Eugenio Scale and appropriate interventions in the flowsheet. Outcome: Progressing Towards Goal  Note:   Pressure Injury Interventions:  Sensory Interventions: Assess changes in LOC, Discuss PT/OT consult with provider, Keep linens dry and wrinkle-free, Minimize linen layers    Moisture Interventions: Apply protective barrier, creams and emollients, Absorbent underpads, Internal/External urinary devices, Maintain skin hydration (lotion/cream)    Activity Interventions: Increase time out of bed, Pressure redistribution bed/mattress(bed type), PT/OT evaluation    Mobility Interventions: HOB 30 degrees or less, Pressure redistribution bed/mattress (bed type), PT/OT evaluation, Turn and reposition approx.  every two hours(pillow and wedges)    Nutrition Interventions: Document food/fluid/supplement intake    Friction and Shear Interventions: Apply protective barrier, creams and emollients, HOB 30 degrees or less, Lift team/patient mobility team, Minimize layers, Transferring/repositioning devices

## 2019-07-27 NOTE — PROGRESS NOTES
Bedside and Verbal shift change report given to 1700 Fredi Villasenor (oncoming nurse) by DAPHNE Felder RN (offgoing nurse). Report given with SBAR, Kardex, Intake/Output, MAR and Recent Results.

## 2019-07-27 NOTE — PROGRESS NOTES
Bedside and Verbal shift change report given to Kel Winters (oncoming nurse) by Sofia Smith (offgoing nurse). Report included the following information SBAR, MAR and Med Rec Status.

## 2019-07-28 NOTE — PROGRESS NOTES
Problem: Patient Education: Go to Patient Education Activity  Goal: Patient/Family Education  Outcome: Progressing Towards Goal     Problem: Falls - Risk of  Goal: *Absence of Falls  Description  Document Mukesh Butler Fall Risk and appropriate interventions in the flowsheet. Outcome: Progressing Towards Goal  Note:   Fall Risk Interventions:  Mobility Interventions: Patient to call before getting OOB    Mentation Interventions: Adequate sleep, hydration, pain control    Medication Interventions: Patient to call before getting OOB    Elimination Interventions: Call light in reach  Problem: Patient Education: Go to Patient Education Activity  Goal: Patient/Family Education  Outcome: Progressing Towards Goal     Problem: Heart Failure: Day 5  Goal: Off Pathway (Use only if patient is Off Pathway)  Outcome: Progressing Towards Goal  Goal: Activity/Safety  Outcome: Progressing Towards Goal  Goal: Diagnostic Test/Procedures  Outcome: Progressing Towards Goal  Goal: Nutrition/Diet  Outcome: Progressing Towards Goal  Goal: Medications  Outcome: Progressing Towards Goal  Goal: Respiratory  Outcome: Progressing Towards Goal     Problem: Heart Failure: Discharge Outcomes  Goal: *Demonstrates ability to perform prescribed activity without shortness of breath or discomfort  Outcome: Progressing Towards Goal  Goal: *Left ventricular function assessment completed prior to or during stay, or planned for post-discharge  Outcome: Progressing Towards Goal  Goal: *ACEI prescribed if LVEF less than 40% and no contraindications or ARB prescribed  Outcome: Progressing Towards Goal  Goal: *Verbalizes understanding/describes prescribed medications  Outcome: Progressing Towards Goal  Goal: *Describes available resources and support systems  Description  (eg: Home Health, Palliative Care, Advanced Medical Directive)  Outcome: Progressing Towards Goal     Problem:  Activity Intolerance  Goal: *Oxygen saturation during activity within specified parameters  Outcome: Progressing Towards Goal  Goal: *Able to remain out of bed as prescribed  Outcome: Progressing Towards Goal     Problem: Patient Education: Go to Patient Education Activity  Goal: Patient/Family Education  Outcome: Progressing Towards Goal     Problem: Nutrition Deficit  Goal: *Optimize nutritional status  Outcome: Progressing Towards Goal     Problem: Infection - Risk of, Central Venous Catheter-Associated Bloodstream Infection  Goal: *Absence of infection signs and symptoms  Outcome: Progressing Towards Goal     Problem: Patient Education: Go to Patient Education Activity  Goal: Patient/Family Education  Outcome: Progressing Towards Goal     Problem: Pacer/ICD: Discharge Outcomes  Goal: *Hemodynamically stable  Outcome: Progressing Towards Goal  Goal: *Stable cardiac rhythm  Outcome: Progressing Towards Goal  Goal: *Lungs clear or at baseline  Outcome: Progressing Towards Goal  Goal: *Identifies cardiac risk factors  Outcome: Progressing Towards Goal  Goal: *No signs and symptoms of infection or wound complications  Outcome: Progressing Towards Goal     Problem: Acute Renal Failure: Day 6  Goal: Off Pathway (Use only if patient is Off Pathway)  Outcome: Progressing Towards Goal  Goal: Nutrition/Diet  Outcome: Progressing Towards Goal  Goal: Medications  Outcome: Progressing Towards Goal  Goal: Respiratory  Outcome: Progressing Towards Goal  Goal: Psychosocial  Outcome: Progressing Towards Goal

## 2019-07-28 NOTE — PROGRESS NOTES
Hospitalist Progress Note  Early MD Ora  Answering service: [phone] or [phone] from in house phone  Cell: [phone]      Date of Service:  2019  NAME:  Clint Moe  :  1945  MRN:  [id number]      Admission Summary:     68-year-old gentleman with a past medical history significant for hypertension, was in his usual state of health until about a week prior when the patient developed abdominal pain. The pain was located at the epigastric region, 8/10 in severity without any radiation. No known aggravating or relieving factors. The patient described the pain as a dull ache associated with constipation but no nausea, no vomiting. The patient took laxative without any significant relief of the constipation. When the EMS arrived at the scene, the patient's oxygen saturation was 86% on room air. The oxygen saturation improved to 97% when the patient was started on 2 liters of nasal cannula. The patient denied any history of COPD, or congestive heart failure. When the patient arrived at the emergency room, he was found to have elevated BNP level. CT scan of the abdomen and pelvis performed by the emergency room provider showed right pleural effusion and suspected small bowel obstruction. The patient was subsequently referred to the hospitalist service for evaluation for admission. The patient denied fevers, rigors or chills. No record of prior admission to this hospital.  The patient has not seen his primary care physician for some time.     Interval history / Subjective:     CC: Abdominal pain. Appeared comfortable sitting up in bed. Denied any new complaints. Assessment & Plan:     1) GI: Acute Upper GI bleed due to a bleeding Duodenal ulcer. -S/P EGD on 19. S/P Clip placement.  S/P embolization by Interventional Radiology.  -Resolved probable Partial SBO due to Umbilical hernia which was reduced in the ER.  -continue protonix 40 mg iv bid  -GI eval noted and appreciated. 2) Hematology: Acute blood loss anemia due to a bleeding Duodenal Ulcer. -S/P transfusion six units PRBCs. - Close monitoring of Hemoglobin/hematocrit. 3) Hemodynamics: Resolved Hypovolemic shock due to the acute blood loss. Off Pressors. 4) CVS: S/P Permanent  pacemaker placement for Sick Sinus Syndrome (7/16/19). - Primary hypertension. -2D ECHO with EF 46-50 percent and systolic dysfunction.  -Cardiology F/U noted and appreciated. 5) KAYLEEN on CKD stage 2 due to acute tubular necrosis from Hemorrhagic shock. -CRRT discontinued 7/21/19.  -No significant improvement in kidney function. -S/P permacath placement 7/25/19  -Now on intermittent HD MWF. -Arrangements being made for outpatient HD at [facility].  -Nephrology F/U noted and appreciated. 6) Resp: Resolved acute Hypoxemic respiratory failure due to probable basilar atelectasis vs pneumonia and small pleural effusion.  -Pulmonary hypertension. -S/P intubation/extubation 7/20/19. -FIO2 to maintain saturation at least 94 percent, nebulizers, empiric antibiotics, incentive spirometry.  -Pulmonary F/U noted and appreciated. 7) CNS: Resolving Acute probable multifactorial metabolic encephalopathy   -CT head no acute abnormality   -EEG non specific  -Neurology eval noted and appreciated. 8) Social: Reported Alcohol Use disorder. Off precedex. CIWA protocol. Cessation counselling done. 9) Nutrition: Probable moderate Protein calorie malnutrition. Daily multivitamin/minerals, Thiamine and Folic Acid. 10) Prophylaxis: GI and DVT. 11) Directives: Full Code with an extremely guarded prognosis. Readdressed with patient and family. At increased risk of decompensation. Palliative care eval to re-address goals of care and Advance Directives appreciated.     12) Plan: Patient's hospital stay has been prolonged due to the decompensation in the multiple medical problems. Will likely need Homehealth nursing arrangements on discharge. Discussed in detail with patient and patient's wife Yobany Vyas. Can be reached at [phone]. Hospital Problems  Date Reviewed: 7/23/2019          Codes Class Noted POA    Hemorrhagic shock ([facility] ICD-10-CM: R57.8  ICD-9-CM: 785.59  7/23/2019 Yes        Acute blood loss anemia ICD-10-CM: D62  ICD-9-CM: 285.1  7/23/2019 Yes        Respiratory failure with hypoxia ([facility] ICD-10-CM: J96.91  ICD-9-CM: 518.81  7/23/2019 Yes        KAYLEEN (acute kidney injury) ([facility] ICD-10-CM: N17.9  ICD-9-CM: 584.9  7/23/2019 Yes        * (Principal) Acute CHF (congestive heart failure) (HCC) ICD-10-CM: I50.9  ICD-9-CM: 428.0  7/5/2019 Yes        Pleural effusion, right ICD-10-CM: J90  ICD-9-CM: 511.9  7/5/2019 Yes              Vital Signs:    Last 24hrs VS reviewed since prior progress note. Most recent are:  Visit Vitals  /73 (BP 1 Location: Left arm, BP Patient Position: At rest)   Pulse 70   Temp 98.4 °F (36.9 °C)   Resp 16   Ht 5' 8\" (1.727 m)   Wt 59.6 kg (131 lb 6.3 oz)   SpO2 96%   BMI 19.98 kg/m²         Intake/Output Summary (Last 24 hours) at 7/28/2019 0857  Last data filed at 7/28/2019 0550  Gross per 24 hour   Intake 640 ml   Output    Net 640 ml        Physical Examination:             Constitutional:  No acute distress. ENT:  Oral mucous moist, oropharynx benign. Neck supple,    Resp:  Decreased air entry bibasilarly. CV:  Regular rhythm, normal rate, no murmurs, gallops, rubs. GI:  Soft, non distended, non tender. normoactive bowel sounds, no hepatosplenomegaly     Musculoskeletal:  No edema    Neurologic:  Awake, alert and oriented. Skin:  Good turgor, no rashes or ulcers. Right chest Permacath site dry and clean.        Data Review:    Review and/or order of clinical lab test  Review and/or order of tests in the radiology section of CPT  Review and/or order of tests in the medicine section of CPT      Labs:     Recent Labs 07/28/19 0325 07/27/19  0420   WBC 7.5 7.9   HGB 8.3* 8.0*   HCT 27.2* 26.1*    289     Recent Labs     07/28/19 0325 07/27/19  0420 07/26/19  0542    140 138   K 3.8 3.7 3.8    104 104   CO2 27 29 25   BUN 16 8 18   CREA 5.30* 3.31* 5.59*   GLU 80 77 74   CA 8.8 8.4* 8.6   MG 2.0 1.9 2.2   PHOS 3.6 2.5* 4.0     No results for input(s): SGOT, GPT, ALT, AP, TBIL, TBILI, TP, ALB, GLOB, GGT, AML, LPSE in the last 72 hours. No lab exists for component: AMYP, HLPSE  No results for input(s): INR, PTP, APTT in the last 72 hours. No lab exists for component: INREXT, INREXT   No results for input(s): FE, TIBC, PSAT, FERR in the last 72 hours. No results found for: FOL, RBCF   No results for input(s): PH, PCO2, PO2 in the last 72 hours. No results for input(s): CPK, CKNDX, TROIQ in the last 72 hours.     No lab exists for component: CPKMB  Lab Results   Component Value Date/Time    Cholesterol, total 134 07/06/2019 03:50 AM    HDL Cholesterol 56 07/06/2019 03:50 AM    LDL, calculated 68 07/06/2019 03:50 AM    Triglyceride 50 07/06/2019 03:50 AM    CHOL/HDL Ratio 2.4 07/06/2019 03:50 AM     Lab Results   Component Value Date/Time    Glucose (POC) 83 07/25/2019 04:06 PM    Glucose (POC) 82 07/17/2019 05:13 PM    Glucose (POC) 92 07/17/2019 12:51 PM    Glucose (POC) 175 (H) 07/11/2019 07:56 PM    Glucose (POC) 87 07/05/2019 12:02 PM     No results found for: COLOR, APPRN, SPGRU, REFSG, ANGELI, PROTU, GLUCU, KETU, BILU, UROU, TREVOR, LEUKU, GLUKE, EPSU, BACTU, WBCU, RBCU, CASTS, UCRY      Medications Reviewed:     Current Facility-Administered Medications   Medication Dose Route Frequency    heparin (porcine) 1,000 unit/mL injection 3,800 Units  3,800 Units Hemodialysis DIALYSIS PRN    albuterol-ipratropium (DUO-NEB) 2.5 MG-0.5 MG/3 ML  3 mL Nebulization Q6H PRN    piperacillin-tazobactam (ZOSYN) 3.375 g in 0.9% sodium chloride (MBP/ADV) 100 mL  3.375 g IntraVENous Q12H    phenol throat spray (CHLORASEPTIC) 1 Spray  1 Spray Oral PRN    pantoprazole (PROTONIX) 40 mg in sodium chloride 0.9% 10 mL injection  40 mg IntraVENous Q12H    balsam peru-castor oil (VENELEX) ointment   Topical BID    chlorhexidine (PERIDEX) 0.12 % mouthwash 15 mL  15 mL Oral BID    sodium chloride (NS) flush 5-40 mL  5-40 mL IntraVENous Q8H    sodium chloride (NS) flush 5-40 mL  5-40 mL IntraVENous PRN    simethicone (MYLICON) 20EI/2.2HI oral drops 80 mg  1.2 mL Oral Multiple    fentaNYL citrate (PF) injection  mcg   mcg IntraVENous Q1H PRN    calcium carbonate (TUMS) chewable tablet 200 mg [elemental]  200 mg Oral TID PRN    aspirin delayed-release tablet 81 mg  81 mg Oral DAILY    sodium chloride (NS) flush 5-40 mL  5-40 mL IntraVENous Q8H    sodium chloride (NS) flush 5-40 mL  5-40 mL IntraVENous PRN    acetaminophen (TYLENOL) tablet 650 mg  650 mg Oral Q4H PRN    ondansetron (ZOFRAN) injection 4 mg  4 mg IntraVENous Q4H PRN    lactobac ac& pc-s.therm-b.anim (KERLINE Q/RISAQUAD)  1 Cap Oral DAILY    LORazepam (ATIVAN) injection 1 mg  1 mg IntraVENous Q6H PRN    naloxone (NARCAN) injection 0.4 mg  0.4 mg IntraVENous PRN    thiamine HCL (B-1) tablet 100 mg  100 mg Oral DAILY    folic acid (FOLVITE) tablet 1 mg  1 mg Oral DAILY    therapeutic multivitamin (THERAGRAN) tablet 1 Tab  1 Tab Oral DAILY     ______________________________________________________________________  EXPECTED LENGTH OF STAY: 5d 4h  ACTUAL LENGTH OF STAY:          23                 Mayito Vega MD

## 2019-07-29 PROBLEM — D62 ACUTE BLOOD LOSS ANEMIA: Status: RESOLVED | Noted: 2019-01-01 | Resolved: 2019-01-01

## 2019-07-29 PROBLEM — R57.8 HEMORRHAGIC SHOCK (HCC): Status: RESOLVED | Noted: 2019-01-01 | Resolved: 2019-01-01

## 2019-07-29 PROBLEM — I50.9 ACUTE CHF (CONGESTIVE HEART FAILURE) (HCC): Status: RESOLVED | Noted: 2019-01-01 | Resolved: 2019-01-01

## 2019-07-29 PROBLEM — J96.91 RESPIRATORY FAILURE WITH HYPOXIA (HCC): Status: RESOLVED | Noted: 2019-01-01 | Resolved: 2019-01-01

## 2019-07-29 PROBLEM — J90 PLEURAL EFFUSION, RIGHT: Status: RESOLVED | Noted: 2019-01-01 | Resolved: 2019-01-01

## 2019-07-29 PROBLEM — N17.9 AKI (ACUTE KIDNEY INJURY) (HCC): Status: RESOLVED | Noted: 2019-01-01 | Resolved: 2019-01-01

## 2019-07-29 NOTE — PROGRESS NOTES
Bedside and Verbal shift change report given to Elli Alvarado (oncoming nurse) by Yamile Medeiros (offgoing nurse). Report included the following information SBAR, Recent Results and Med Rec Status.

## 2019-07-29 NOTE — DISCHARGE SUMMARY
Discharge Summary       PATIENT ID: Michael Cuenca  MRN: [id number]   YOB: 1945    DATE OF ADMISSION: 7/4/2019 11:31 PM    DATE OF DISCHARGE: 7/29/2019    PRIMARY CARE PROVIDER: Josie Walter DO     ATTENDING PHYSICIAN: Ayush Bee MD   DISCHARGING PROVIDER: Ayush Bee MD    To contact this individual call [phone] and ask the  to page. If unavailable ask to be transferred the Adult Hospitalist Department. CONSULTATIONS: IP CONSULT TO GENERAL SURGERY  IP CONSULT TO THORACIC SURGERY  IP CONSULT TO NEPHROLOGY  IP CONSULT TO GASTROENTEROLOGY  IP CONSULT TO PEDIATRIC INTERVENTIONAL RADIOLOGY  IP CONSULT TO NEUROLOGY  IP CONSULT TO PALLIATIVE CARE - PROVIDER  IP CONSULT TO CARDIOLOGY  IP CONSULT TO CARDIOLOGY  IP CONSULT TO PULMONOLOGY    PROCEDURES/SURGERIES: Procedure(s):  INSERT PPM DUAL    ADMITTING [address] [place] [address] COURSE:     Admission Summary:      82-year-old gentleman with a past medical history significant for hypertension, was in his usual state of health until about a week prior when the patient developed abdominal pain. The pain was located at the epigastric region, 8/10 in severity without any radiation. No known aggravating or relieving factors. The patient described the pain as a dull ache associated with constipation but no nausea, no vomiting. The patient took laxative without any significant relief of the constipation. When the EMS arrived at the scene, the patient's oxygen saturation was 86% on room air. The oxygen saturation improved to 97% when the patient was started on 2 liters of nasal cannula. The patient denied any history of COPD, or congestive heart failure.  When the patient arrived at the emergency room, he was found to have elevated BNP level.  CT scan of the abdomen and pelvis performed by the emergency room provider showed right pleural effusion and suspected small bowel obstruction.  The patient was subsequently referred to the hospitalist service for evaluation for admission. The patient denied fevers, rigors or chills. No record of prior admission to this hospital.  The patient has not seen his primary care physician for some time.     Interval history / Subjective:      CC: Abdominal pain.     Appeared comfortable sitting up in bed. Denied any new complaints.      Assessment & Plan:      1) GI: Acute Upper GI bleed due to a bleeding Duodenal ulcer. -S/P EGD on 7/13/19. S/P Clip placement. S/P embolization by Interventional Radiology.  -Resolved probable Partial SBO due to Umbilical hernia which was reduced in the ER.  -continue protonix 40 mg iv bid  -GI eval noted and appreciated.    2) Hematology: Acute blood loss anemia due to a bleeding Duodenal Ulcer. -S/P transfusion six units PRBCs. - Close monitoring of Hemoglobin/hematocrit.    3) Hemodynamics: Resolved Hypovolemic shock due to the acute blood loss. Off Pressors.    4) CVS: S/P Permanent  pacemaker placement for Sick Sinus Syndrome (7/16/19). - Primary hypertension. -2D ECHO with EF 46-50 percent and systolic dysfunction.  -Cardiology F/U noted and appreciated.     5) KAYLEEN on CKD stage 2 due to acute tubular necrosis from Hemorrhagic shock. -CRRT discontinued 7/21/19.  -No significant improvement in kidney function. -S/P permacath placement 7/25/19  -Now on intermittent HD MWF. -Arrangements being made for outpatient HD at [facility].  -Nephrology F/U noted and appreciated.     6) Resp: Resolved acute Hypoxemic respiratory failure due to probable basilar atelectasis vs pneumonia and small pleural effusion.  -Pulmonary hypertension. -S/P intubation/extubation 7/20/19.   -FIO2 to maintain saturation at least 94 percent, nebulizers, empiric antibiotics, incentive spirometry.  -Pulmonary F/U noted and appreciated.     7) CNS: Resolving Acute probable multifactorial metabolic encephalopathy   -CT head no acute abnormality   -EEG non specific  -Neurology eval noted and appreciated.     8) Social: Reported Alcohol Use disorder. Off precedex. CIWA protocol. Cessation counselling done.     9) Nutrition: Probable moderate Protein calorie malnutrition. Daily multivitamin/minerals, Thiamine and Folic Acid.     10) Prophylaxis: GI and DVT.    11) Directives: Full Code with an extremely guarded prognosis. Readdressed with patient and family. At increased risk of decompensation. Palliative care eval to re-address goals of care and Advance Directives appreciated.     12) Plan: Patient's hospital stay has been prolonged due to the decompensation in the multiple medical problems. Will likely need Homehealth nursing arrangements on discharge.                Results for Brandi Fairdealing (MRN [id number]) as of 7/29/2019 12:21   Ref. Range 7/25/2019 03:58 7/26/2019 05:42 7/27/2019 04:20 7/28/2019 03:25 7/29/2019 03:22   WBC Latest Ref Range: 4.1 - 11.1 K/uL 8.0 7.9 7.9 7.5 5.5   NRBC Latest Ref Range: 0  WBC 0.0 0.0 0.0 0.0 0.0   RBC Latest Ref Range: 4.10 - 5.70 M/uL 2.99 (L) 2.82 (L) 2.93 (L) 3.03 (L) 2.77 (L)   HGB Latest Ref Range: 12.1 - 17.0 g/dL 8.3 (L) 7.8 (L) 8.0 (L) 8.3 (L) 7.6 (L)   HCT Latest Ref Range: 36.6 - 50.3 % 27.0 (L) 25.6 (L) 26.1 (L) 27.2 (L) 24.9 (L)   MCV Latest Ref Range: 80.0 - 99.0 FL 90.3 90.8 89.1 89.8 89.9   MCH Latest Ref Range: 26.0 - 34.0 PG 27.8 27.7 27.3 27.4 27.4   MCHC Latest Ref Range: 30.0 - 36.5 g/dL 30.7 30.5 30.7 30.5 30.5   RDW Latest Ref Range: 11.5 - 14.5 % 16.2 (H) 16.3 (H) 15.8 (H) 15.7 (H) 15.9 (H)   PLATELET Latest Ref Range: 150 - 400 K/uL 311 300 289 288 245   MPV Latest Ref Range: 8.9 - 12.9 FL 9.9 9.9 9.6 9.5 9.7   NEUTROPHILS Latest Ref Range: 32 - 75 % 43 49      LYMPHOCYTES Latest Ref Range: 12 - 49 % 16 14            DISCHARGE DIAGNOSES / PLAN:      1.  as stephanie.       ADDITIONAL CARE RECOMMENDATIONS: na    PENDING TEST RESULTS:   At the time of discharge the following test results are still pending: na    FOLLOW UP APPOINTMENTS: Follow-up Information     Follow up With Specialties Details Why Contact Info    Gene Peterson MD General Surgery In 2 weeks for hernia follow up and to schedule hernia repair [address] [facility]  [address] [place]  [phone]      Kyle Srinivasan MD Cardiology On 8/7/2019 at 1:45 pm on 8/7/19 [id number] [address]  [address] [address] [address] [place], [place] [facility] Go on 8/5/2019 For hospital follow up appointment at 3:00PM  [address] [facility]  [address]  [phone]       [address] On 7/31/2019 home health nursing visits, PT/OT evaluate and treat, aide if needed, [facility] [address]  [phone]             DIET: Renal Diet     ACTIVITY: Activity as tolerated    WOUND CARE: na    EQUIPMENT needed: na      DISCHARGE MEDICATIONS:  Current Discharge Medication List      START taking these medications    Details   pantoprazole (PROTONIX) 40 mg tablet Take 1 Tab by mouth daily. Qty: 30 Tab, Refills: 0         CONTINUE these medications which have NOT CHANGED    Details   therapeutic multivitamin (THERAGRAN) tablet Take 1 Tab by mouth daily. ergocalciferol (VITAMIN D2) 50,000 unit capsule Take 50,000 Units by mouth every Sunday. aspirin delayed-release 81 mg tablet Take  by mouth daily. NOTIFY YOUR PHYSICIAN FOR ANY OF THE FOLLOWING:   Fever over 101 degrees for 24 hours. Chest pain, shortness of breath, fever, chills, nausea, vomiting, diarrhea, change in mentation, falling, weakness, bleeding. Severe pain or pain not relieved by medications. Or, any other signs or symptoms that you may have questions about.     DISPOSITION:    Home With:   OT  PT    RN       Long term SNF/Inpatient Rehab   x Independent/assisted living    Hospice    Other:       PATIENT CONDITION AT DISCHARGE:     Functional status    Poor     Deconditioned    x Independent      Cognition   x  Lucid     Forgetful     Dementia Catheters/lines (plus indication)    Fernandez    x percath    PEG     None      Code status    x Full code     DNR      PHYSICAL EXAMINATION AT DISCHARGE:   Refer to Progress Note    Visit Vitals  /68   Pulse 70   Temp 97.8 °F (36.6 °C) (Oral)   Resp 16   Ht 5' 8\" (1.727 m)   Wt 60.2 kg (132 lb 11.5 oz)   SpO2 100%   BMI 20.18 kg/m²          CHRONIC MEDICAL DIAGNOSES:  Problem List as of 7/29/2019 Date Reviewed: 7/29/2019          Codes Class Noted - Resolved    Vitamin D deficiency ICD-10-CM: E55.9  ICD-9-CM: 268.9  11/18/2014 - Present        Arthritis of wrist, right, degenerative ICD-10-CM: M19.031  ICD-9-CM: 715.93  11/18/2014 - Present        History of wrist fracture, Right ICD-10-CM: Z87.81  ICD-9-CM: V15.51  11/18/2014 - Present        Overweight (BMI 25.0-29. 9) ICD-10-CM: E66.3  ICD-9-CM: 278.02  11/18/2014 - Present        HTN (hypertension) ICD-10-CM: I10  ICD-9-CM: 401.9  11/11/2014 - Present        Blurred vision, bilateral ICD-10-CM: H53.8  ICD-9-CM: 368.8  11/11/2014 - Present        RESOLVED: Hemorrhagic shock ([facility] ICD-10-CM: R57.8  ICD-9-CM: 785.59  7/23/2019 - 7/29/2019        RESOLVED: Acute blood loss anemia ICD-10-CM: D62  ICD-9-CM: 285.1  7/23/2019 - 7/29/2019        RESOLVED: Respiratory failure with hypoxia ([facility] ICD-10-CM: J96.91  ICD-9-CM: 518.81  7/23/2019 - 7/29/2019        RESOLVED: KAYLEEN (acute kidney injury) ([facility] ICD-10-CM: N17.9  ICD-9-CM: 584.9  7/23/2019 - 7/29/2019        * (Principal) RESOLVED: Acute CHF (congestive heart failure) ([facility] ICD-10-CM: I50.9  ICD-9-CM: 428.0  7/5/2019 - 7/29/2019        RESOLVED: Pleural effusion, right ICD-10-CM: J90  ICD-9-CM: 511.9  7/5/2019 - 7/29/2019              Greater than 20  minutes were spent with the patient on counseling and coordination of care    Signed:   Aimee Delacruz MD  7/29/2019  12:25 PM

## 2019-07-29 NOTE — DIALYSIS
TRANSFER - IN REPORT:    Verbal report received from RIRI Conway RN on Kendra Solum being received from 3N for ordered procedure      Report consisted of patients Situation, Background, Assessment and Recommendations(SBAR). Information from the following report(s) SBAR was reviewed with the receiving nurse. Opportunity for questions and clarification was provided. Assessment completed upon patients arrival to unit and care assumed.

## 2019-07-29 NOTE — PROGRESS NOTES
[facility] Transitional Care Team: Discharge [facility] Note    Date of Assessment: 07/29/19  Time of Assessment:  9:38 AM    Assessment & Plan   RODRÍGUEZ Diagnoses:  GI: Acute Upper GI bleed due to a bleeding duodenal ulcer. -S/P EGD on 7/13/19. S/P Clip placement. S/P embolization by Interventional Radiology.  -Resolved probable partial SBO due to umbilical hernia which was reduced in the ER.  -continue Protonix 40 mg po daily at discharge    Hematology: Acute blood loss anemia due to a bleeding duodenal ulcer. -S/P transfusion of six units PRBCs. Resolved hypovolemic shock due to the acute blood loss     S/P Permanent  pacemaker placement for Sick Sinus Syndrome (7/16/19). Primary hypertension. -2D ECHO with EF 46-50 percent and systolic dysfunction. KAYLEEN on CKD Stage 2 due to acute tubular necrosis from hemorrhagic shock.  -no significant improvement in kidney function. -S/P permacath placement 7/25/19  -now on intermittent HD TTS as outpatient, starting 7/30.  -arrangements being made for outpatient HD at [facility]. Resolved acute hypoxemic respiratory failure due to probable basilar atelectasis vs pneumonia and small pleural effusion.  -Pulmonary hypertension. -S/P intubation/extubation 7/20/19. Resolving acute probable multifactorial metabolic encephalopathy   -CT head no acute abnormality   -EEG non specific    Reported Alcohol Use disorder.   -Cessation counseling done. Readmission Risks:  moderate to high    1. Prolonged complex hospital stay  2. Frail older adult with multiple co-morbidities  3. New HD patient  4.  Need for motivation to adhere to dietary restrictions for HF and possibly also for renal disease      Nurse Navigator: none assigned yet but should have [facility] NN because his PCP is with [facility] [address] appointment with Dr. Mahesh Houser on 8/5 and with Dr. Ariela Ball on 8/7    Code status: Full  Recommended Disposition:  PT, OT, RN, also SW and nursing aide if need is assessed--using [facility]    F/U information:  1--Wants to discuss completion of AMD with Dr. Marina Velasquez at appt Monday 8/5  2--Needs HF self-management reinforcement--daily weights on digital scale (if at all possible) and maintenance of low-salt diet; may also have dietary restrictions for renal disease, but deferring to nephrology for this  3--Did not require HTN meds in hospital due to episodes of hypotension and shock; BP stable at D/C without these agents     Number of Admissions this year: only current admission    Heart Failure Bundle:  no      Advance Care Plan:     Persons included in Conversation:  patient and family  Length of ACP Conversation in minutes:  <16 minutes (Non-Billable)        General ACP for ALL Patients with Decision Making Capacity:  Have had a couple of conversations with  and Mrs. Alvarado regarding the importance of having an AMD.  Appreciate also the more recent palliaitve medicine consult for goals of care discussion and completion of an AMD.  When I asked on day of discharge, with Mrs. Alvarado present and the [place]'s adult daughter visiting, if I could help Mr. Alvarado complete as much of an AMD as he felt comfortable doing, he declined and stated he would talk to Dr. Marina Velasquez about this on Monday. The Chaplins were previously given a sample AMD to help facilitate discussions with his family and health care agents. I will communicate this desire to Dr. Marina Velasquez. Medication reconciliation was performed by ULISES NP. Important to note, his BP has been stable off of his previously prescribed HTN meds. This will be communicated to his PCP, Dr. Marina Velasquez, in case the addition of this medication is needed in future. Discharge Needs: home with [facility] PT/OT/SN and other assistance as needed. His wife will be his primary transportation to dialysis but hopefully, he will qualify for transportation soon to ease her caregiver burden.   Recommended digital scale for daily weights--Mrs. Connor will see if one of their children can provide this. He will need reinforcement on dietary restrictions and self-management of HF.       ULISES NP left patient and family with VANESSA calender/follow up appointments. No [facility] NN will be assigned; he is part of the Sound bundle and is no longer on the BS HF bundle due to cardiac cath during this hospital stay. [facility] information given to the Chaplins with explanation of services. Ely Curtis is a 72 y.o. male inpatient at [facility] admitted with abdominal pain x 1 week, constipation, chest pain, leg swelling, abdominal distention and upper back pain on  7/5/19. Chart reviewed by Heather Velásquez NP and [facility] Understanding of Goals) program previously introduced to patient/family. The Transitional Care Team bridges the gaps in care and education surrounding discharge from the acute care facility. The objective is to empower the patient and family in taking a proactive role in the task of preventing readmission within the first thirty days after discharge from the acute care setting. The team is also involved in the efforts to reduce readmission to the acute care setting after stabilization and discharge from the acute care environment either to the skilled nursing facilities or community.        Past Medical History:   Diagnosis Date    Arthritic-like pain     Hypertension        Advance Care Planning 7/5/2019   Confirm Advance Directive None   Patient Would Like to Complete Advance Directive No

## 2019-07-29 NOTE — DISCHARGE INSTRUCTIONS
DISCHARGE SUMMARY from Nurse    PATIENT INSTRUCTIONS:    After general anesthesia or intravenous sedation, for 24 hours or while taking prescription Narcotics:  · Limit your activities  · Do not drive and operate hazardous machinery  · Do not make important personal or business decisions  · Do  not drink alcoholic beverages  · If you have not urinated within 8 hours after discharge, please contact your surgeon on call. Report the following to your surgeon:  · Excessive pain, swelling, redness or odor of or around the surgical area  · Temperature over 100.5  · Nausea and vomiting lasting longer than 4 hours or if unable to take medications  · Any signs of decreased circulation or nerve impairment to extremity: change in color, persistent  numbness, tingling, coldness or increase pain  · Any questions    What to do at Home:  Recommended activity: Activity as tolerated, Ambulate in house and Bedrest,     If you experience any of the following symptoms , please follow up with . *  Please give a list of your current medications to your Primary Care Provider. *  Please update this list whenever your medications are discontinued, doses are      changed, or new medications (including over-the-counter products) are added. *  Please carry medication information at all times in case of emergency situations. These are general instructions for a healthy lifestyle:    No smoking/ No tobacco products/ Avoid exposure to second hand smoke  Surgeon General's Warning:  Quitting smoking now greatly reduces serious risk to your health.     Obesity, smoking, and sedentary lifestyle greatly increases your risk for illness    A healthy diet, regular physical exercise & weight monitoring are important for maintaining a healthy lifestyle    You may be retaining fluid if you have a history of heart failure or if you experience any of the following symptoms:  Weight gain of 3 pounds or more overnight or 5 pounds in a week, increased swelling in our hands or feet or shortness of breath while lying flat in bed. Please call your doctor as soon as you notice any of these symptoms; do not wait until your next office visit. The discharge information has been reviewed with the patient. The patient verbalized understanding. Discharge medications reviewed with the patient and appropriate educational materials and side effects teaching were provided. ___________________________________________________________________________________________________________________________________   Discharge Instructions       PATIENT ID: Yesenia Fuller  MRN: [id number]   YOB: 1945    DATE OF ADMISSION: 7/4/2019 11:31 PM    DATE OF DISCHARGE: 7/29/2019    PRIMARY CARE PROVIDER: Mario Gomez DO     ATTENDING PHYSICIAN: Benedicto Barone MD  DISCHARGING PROVIDER: Ryan Stokes MD    To contact this individual call [phone] and ask the  to page. If unavailable ask to be transferred the Adult Hospitalist Department.     DISCHARGE DIAGNOSES aute to chronic renal failure, GI bleed     CONSULTATIONS: IP CONSULT TO GENERAL SURGERY  IP CONSULT TO THORACIC SURGERY  IP CONSULT TO NEPHROLOGY  IP CONSULT TO GASTROENTEROLOGY  IP CONSULT TO PEDIATRIC INTERVENTIONAL RADIOLOGY  IP CONSULT TO NEUROLOGY  IP CONSULT TO PALLIATIVE CARE - PROVIDER  IP CONSULT TO CARDIOLOGY  IP CONSULT TO CARDIOLOGY  IP CONSULT TO PULMONOLOGY    PROCEDURES/SURGERIES: Procedure(s):  INSERT PPM DUAL    PENDING TEST RESULTS:   At the time of discharge the following test results are still pending:     FOLLOW UP APPOINTMENTS:   Follow-up Information     Follow up With Specialties Details Why Contact Polina Pteerson MD General Surgery In 2 weeks for hernia follow up and to schedule hernia repair [address] [facility]  [address] [facility]  [phone]      Kyle Srinivasan MD Cardiology On 8/7/2019 at 1:45 pm on 8/7/19 [id number] [address] Rhina Dykes DO Internal Medicine Go on 8/5/2019 For hospital follow up appointment at 3:00PM  [address] [facility]  [address]  [phone]       [address] On 7/31/2019 home health nursing visits, PT/OT evaluate and treat, aide if needed, Clarice Ricardo  [phone]           ADDITIONAL CARE RECOMMENDATIONS:     DIET: Regular Diet  Oral Nutritional Supplements: Boost Glucose Control Once daily     ACTIVITY: Activity as tolerated, Bedrest and No driving for 2 weeks    WOUND CARE:     EQUIPMENT needed:       DISCHARGE MEDICATIONS:   See Medication Reconciliation Form    · It is important that you take the medication exactly as they are prescribed. · Keep your medication in the bottles provided by the pharmacist and keep a list of the medication names, dosages, and times to be taken in your wallet. · Do not take other medications without consulting your doctor. NOTIFY YOUR PHYSICIAN FOR ANY OF THE FOLLOWING:   Fever over 101 degrees for 24 hours. Chest pain, shortness of breath, fever, chills, nausea, vomiting, diarrhea, change in mentation, falling, weakness, bleeding. Severe pain or pain not relieved by medications. Or, any other signs or symptoms that you may have questions about.       DISPOSITION:    Home With:   OT  PT  HH  RN       SNF/Inpatient Rehab/LTAC   x Independent/assisted living    Hospice    Other:          Signed:   Magdalena Swain MD  7/29/2019  12:23 PM

## 2019-07-29 NOTE — DIALYSIS
HD TRANSFER - OUT REPORT:    Verbal report given to LAUREN Gudino RN on Katie Fragoso being transferred to [facility] for routine progression of care. Report consisted of patient's Situation, Background, Assessment and Recommendations(SBAR). Information from the following report(s) Procedure Summary was reviewed with the receiving nurse.    $$ Method: Hemodialysis (07/29/19 0801)    NET Fluid Removed (mL): 1500 ml (07/29/19 1125)     Patient response to treatment:  Stable    Hemodialysis End Time: 6113 (07/29/19 1125)  If not documented, dialysis nurse to update post-dialysis row in HD/Filtration flowsheet     Medications /Volume expansion agents or Fluid boluses administered during treatment? no  Post-dialysis medication administration due?  no    Line heparinization? yes  Lines: RIJ tunneled CVC    Opportunity for questions and clarification was provided.       Patient transported with: [employer]

## 2019-07-29 NOTE — PROCEDURES
[facility] Dialysis Team [facility] Ac[facility]  [phone]    Vitals   Pre   Post   Assessment   Pre   Post     Temp  Temp: 98.4 °F (36.9 °C) (07/29/19 0801)  97.8 LOC  A&Ox4 A&Ox4   HR   Pulse (Heart Rate): 73 (07/29/19 0801) 70 Lungs   Clear  CTA   B/P   BP: 142/71 (07/29/19 0801) 126/68 Cardiac   S1S2  S1S2   Resp   Resp Rate: 16 (07/29/19 0801) 16 Skin   Warm, dry  warm, dry   Pain level  Pain Intensity 1: 0 (07/29/19 0324) 0 Edema  None   none   Orders:    Duration:   Start:   4366 End:   1125 Total:   3.5 hr   Dialyzer:   Dialyzer/Set Up Inspection: Stephon Jimenez (07/29/19 0801)   K Bath:   Dialysate K (mEq/L): 3.5 (07/29/19 0801)   Ca Bath:   Dialysate CA (mEq/L): 2.5 (07/29/19 0801)   Na/Bicarb:   Dialysate NA (mEq/L): 140 (07/29/19 0801)   Target Fluid Removal:   Goal/Amount of Fluid to Remove (mL): 2000 mL (07/29/19 0801)   Access     Type & Location:   RIJ tunneled CVC   Labs     Obtained/Reviewed   Critical Results Called   Date when labs were drawn-  Hgb-    HGB   Date Value Ref Range Status   07/29/2019 7.6 (L) 12.1 - 17.0 g/dL Final     K-    Potassium   Date Value Ref Range Status   07/29/2019 3.9 3.5 - 5.1 mmol/L Final     Ca-   Calcium   Date Value Ref Range Status   07/29/2019 8.6 8.5 - 10.1 MG/DL Final     Bun-   BUN   Date Value Ref Range Status   07/29/2019 23 (H) 6 - 20 MG/DL Final     Creat-   Creatinine   Date Value Ref Range Status   07/29/2019 6.84 (H) 0.70 - 1.30 MG/DL Final      Medications/ Blood Products Given     Name   Dose   Route and Time     Heparin 1:1000 1900 units Venous dwell   Heparin 1:1000 1900 units Arterial dwell        Blood Volume Processed (BVP):   77.4 Net Fluid   Removed:  1500 mL   Comments   Time Out Done: 07:40  Primary Nurse Rpt Pre: RIRI Dawson, RN  Primary Nurse Rpt Post: LAUREN Gudino RN  Pt Education: CVC care, s/s CVC infection  Care Plan: HD today, potential discharge for this afternoon. Tx Summary:  0740: Safety checks complete. Timeout performed.   8342: CVC assessed, no redness, warmth or drainage noted. Procedure dressing CDI. Accessed CVC per procedure using alcohol x 60 each lumen and prevantics x 5 sec each hub. Aspirated and discarded 5 ml. NS used to flush CVC with ease. HD treatment initiated. Access secure, lines visible. Medications reviewed. 1010: MD Tori at bedside for exam and to discuss plan of care. 1125: HD treatment complete. All possible blood rinsed back. CVC dressing changed and de-accessed per procedure using alcohol x 60 sec each lumen and prevantics x 5 sec each hub. Flushed, heparinized and capped. VSS. Patient tolerated treatment well. SBAR called to primary RN. 1230: Patient transported from suite via transport.   Admitting Diagnosis: hemorrhagic shock, KAYLEEN  Pt's previous clinic: n/a KAYLEEN  Informed Consent Verified: Yes (07/26/19 1450)  Jacintoita Consent: Verified  Hepatitis Status: HBsAg: neg (07/13/2019) HBsAb: <3/susceptibile (07/13/2019) obtained from [facility] Number: [id number] (07/29/19 0801)

## 2019-07-29 NOTE — PROGRESS NOTES
1930: Bedside shift change report given to Perry Castaneda RN (oncoming nurse) by Matilde Andrea RN (offgoing nurse). Report included the following information SBAR, Kardex, Procedure Summary, Intake/Output, MAR, Accordion, Recent Results, Med Rec Status and Cardiac Rhythm paced . 2000: Assumed care of patient resting quietly in bed. AO x 4, no complaints of pain. VSS. Will continue to monitor. 0700: Uneventful shift. VSS. Spoke with Elsa Tirado RN (dialysis RN). Will put in orders for transport soon, supplies requested. 0730: Bedside shift change report given to Matilde Andrea RN (oncoming nurse) by Perry Castaneda RN (offgoing nurse). Report included the following information SBAR, Kardex, Procedure Summary, Intake/Output, MAR, Accordion, Recent Results, Med Rec Status and Cardiac Rhythm NSR. Problem: Falls - Risk of  Goal: *Absence of Falls  Description  Document Grover Mirta Fall Risk and appropriate interventions in the flowsheet.   Outcome: Progressing Towards Goal  Note:   Fall Risk Interventions:  Mobility Interventions: Communicate number of staff needed for ambulation/transfer, OT consult for ADLs, Patient to call before getting OOB, PT Consult for mobility concerns, PT Consult for assist device competence    Mentation Interventions: Door open when patient unattended, Increase mobility, More frequent rounding, Reorient patient, Room close to nurse's station    Medication Interventions: Evaluate medications/consider consulting pharmacy, Patient to call before getting OOB, Teach patient to arise slowly    Elimination Interventions: Call light in reach, Patient to call for help with toileting needs, Toilet paper/wipes in reach, Toileting schedule/hourly rounds, Urinal in reach    History of Falls Interventions: Door open when patient unattended, Investigate reason for fall, Room close to nurse's station, Utilize gait belt for transfer/ambulation         Problem: Heart Failure: Day 5  Goal: Activity/Safety  Outcome: Progressing Towards Goal  Goal: Nutrition/Diet  Outcome: Progressing Towards Goal  Goal: Discharge Planning  Outcome: Progressing Towards Goal  Goal: Medications  Outcome: Progressing Towards Goal     Problem: Heart Failure: Discharge Outcomes  Goal: *Verbalizes understanding and describes prescribed diet  Outcome: Progressing Towards Goal  Goal: *Verbalizes understanding/describes prescribed medications  Outcome: Progressing Towards Goal     Problem: Activity Intolerance  Goal: *Oxygen saturation during activity within specified parameters  Outcome: Progressing Towards Goal  Goal: *Able to remain out of bed as prescribed  Outcome: Progressing Towards Goal     Problem: Cath Lab Procedures: Discharge Outcomes  Goal: *Stable cardiac rhythm  Outcome: Progressing Towards Goal  Goal: *Hemodynamically stable  Outcome: Progressing Towards Goal  Goal: *Optimal pain control at patient's stated goal  Outcome: Progressing Towards Goal  Goal: *Pulses palpable, skin color within defined limits, skin temperature warm  Outcome: Progressing Towards Goal  Goal: *Lungs clear or at baseline  Outcome: Progressing Towards Goal     Problem: Nutrition Deficit  Goal: *Optimize nutritional status  Outcome: Progressing Towards Goal     Problem: Infection - Risk of, Central Venous Catheter-Associated Bloodstream Infection  Goal: *Absence of infection signs and symptoms  Outcome: Progressing Towards Goal     Problem: Pressure Injury - Risk of  Goal: *Prevention of pressure injury  Description  Document Eugenio Scale and appropriate interventions in the flowsheet.   Outcome: Progressing Towards Goal  Note:   Pressure Injury Interventions:  Sensory Interventions: Assess changes in LOC, Assess need for specialty bed, Float heels, Discuss PT/OT consult with provider, Keep linens dry and wrinkle-free, Maintain/enhance activity level, Minimize linen layers, Monitor skin under medical devices, Pad between skin to skin    Moisture Interventions: Apply protective barrier, creams and emollients, Check for incontinence Q2 hours and as needed, Limit adult briefs    Activity Interventions: Assess need for specialty bed, Pressure redistribution bed/mattress(bed type), Increase time out of bed, PT/OT evaluation    Mobility Interventions: Float heels, Pressure redistribution bed/mattress (bed type), PT/OT evaluation, Turn and reposition approx.  every two hours(pillow and wedges)    Nutrition Interventions: Document food/fluid/supplement intake    Friction and Shear Interventions: Feet elevated on foot rest, Lift sheet                Problem: Pacer/ICD: Discharge Outcomes  Goal: *Hemodynamically stable  Outcome: Progressing Towards Goal  Goal: *Stable cardiac rhythm  Outcome: Progressing Towards Goal  Goal: *Lungs clear or at baseline  Outcome: Progressing Towards Goal  Goal: *Demonstrates ability to perform prescribed activity without shortness of breath or discomfort  Outcome: Progressing Towards Goal  Goal: *Verbalizes home exercise program, activity guidelines, cardiac precautions  Outcome: Progressing Towards Goal  Goal: *Verbalizes understanding and describes prescribed diet  Outcome: Progressing Towards Goal  Goal: *Verbalizes understanding and describes medication purposes and frequencies  Outcome: Progressing Towards Goal     Problem: Acute Renal Failure: Day 6  Goal: Activity/Safety  Outcome: Progressing Towards Goal  Goal: Nutrition/Diet  Outcome: Progressing Towards Goal  Goal: Medications  Outcome: Progressing Towards Goal  Goal: Treatments/Interventions/Procedures  Outcome: Progressing Towards Goal     Problem: Acute Renal Failure: Discharge Outcomes  Goal: *Optimal pain control at patient's stated goal  Outcome: Progressing Towards Goal  Goal: *Hemodynamically stable  Outcome: Progressing Towards Goal  Goal: *Lab values stabilized  Outcome: Progressing Towards Goal

## 2019-07-29 NOTE — PROGRESS NOTES
[facility]   [address] [facility], [address] [place] [address], [facility]  Phone: [phone]   [phone]       Nephrology Progress Note  Clari Steel     1945     [id number]  Date of Admission : 7/4/2019 07/29/19    CC:  Follow up for KAYLEEN      Assessment and Plan   KAYLEEN on CKD   - 2/2 ATN from hemorrhagic shock   - CRRT stopped 7/21. On Intermittent HD MWF schedule   - No signs of renal recovery yet . 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 Occult Cirrhosis      Interval History:  Seen and examined on dialysis. BP stable. No complaints. Denies any N/V/CP/SOB    Review of Systems: A comprehensive review of systems was negative except for that written in the HPI.     Current Medications:   Current Facility-Administered Medications   Medication Dose Route Frequency    heparin (porcine) 1,000 unit/mL injection 3,800 Units  3,800 Units Hemodialysis DIALYSIS PRN    albuterol-ipratropium (DUO-NEB) 2.5 MG-0.5 MG/3 ML  3 mL Nebulization Q6H PRN    piperacillin-tazobactam (ZOSYN) 3.375 g in 0.9% sodium chloride (MBP/ADV) 100 mL  3.375 g IntraVENous Q12H    phenol throat spray (CHLORASEPTIC) 1 Spray  1 Spray Oral PRN    pantoprazole (PROTONIX) 40 mg in sodium chloride 0.9% 10 mL injection  40 mg IntraVENous Q12H    balsam peru-castor oil (VENELEX) ointment   Topical BID    chlorhexidine (PERIDEX) 0.12 % mouthwash 15 mL  15 mL Oral BID    sodium chloride (NS) flush 5-40 mL  5-40 mL IntraVENous Q8H    sodium chloride (NS) flush 5-40 mL 5-40 mL IntraVENous PRN    simethicone (MYLICON) 87BT/4.3EI oral drops 80 mg  1.2 mL Oral Multiple    fentaNYL citrate (PF) injection  mcg   mcg IntraVENous Q1H PRN    calcium carbonate (TUMS) chewable tablet 200 mg [elemental]  200 mg Oral TID PRN    aspirin delayed-release tablet 81 mg  81 mg Oral DAILY    sodium chloride (NS) flush 5-40 mL  5-40 mL IntraVENous Q8H    sodium chloride (NS) flush 5-40 mL  5-40 mL IntraVENous PRN    acetaminophen (TYLENOL) tablet 650 mg  650 mg Oral Q4H PRN    ondansetron (ZOFRAN) injection 4 mg  4 mg IntraVENous Q4H PRN    lactobac ac& pc-s.therm-b.anim (KERLINE Q/RISAQUAD)  1 Cap Oral DAILY    LORazepam (ATIVAN) injection 1 mg  1 mg IntraVENous Q6H PRN    naloxone (NARCAN) injection 0.4 mg  0.4 mg IntraVENous PRN    thiamine HCL (B-1) tablet 100 mg  100 mg Oral DAILY    folic acid (FOLVITE) tablet 1 mg  1 mg Oral DAILY    therapeutic multivitamin (THERAGRAN) tablet 1 Tab  1 Tab Oral DAILY      No Known Allergies    Objective:  Vitals:    Vitals:    07/29/19 0915 07/29/19 0930 07/29/19 0945 07/29/19 1000   BP: 131/65 115/66 117/67 116/66   Pulse: 73 72 72 71   Resp:       Temp:       TempSrc:       SpO2:       Weight:       Height:         Intake and Output:  No intake/output data recorded. 07/27 1901 - 07/29 0700  In: 3073 [P.O.:920; I.V.:300]  Out: -     Physical Examination:  General: No distress  Neck:  Lines +  Resp:  CTA  CV:  RRR,  no murmur or rub, no LE edema  GI:  Soft, NT, + Bowel sounds, no hepatosplenomegaly  Neurologic:  AAO X 3    :                 No callejas     []    High complexity decision making was performed  []    Patient is at high-risk of decompensation with multiple organ involvement    Lab Data Personally Reviewed: I have reviewed all the pertinent labs, microbiology data and radiology studies during assessment.     Recent Labs     07/29/19  0322 07/28/19  0325 07/27/19  0420    139 140   K 3.9 3.8 3.7    104 104   CO2 27 27 29   GLU 72 80 77   BUN 23* 16 8   CREA 6.84* 5.30* 3.31*   CA 8.6 8.8 8.4*   MG 1.8 2.0 1.9   PHOS 4.3 3.6 2.5*     Recent Labs     07/29/19  0322 07/28/19  0325 07/27/19  0420   WBC 5.5 7.5 7.9   HGB 7.6* 8.3* 8.0*   HCT 24.9* 27.2* 26.1*    288 289     No results found for: SDES  Lab Results   Component Value Date/Time    Culture result: NO GROWTH 4 DAYS 07/05/2019 12:25 PM    Culture result: NO GROWTH 4 DAYS 07/05/2019 12:25 PM     Recent Results (from the past 24 hour(s))   METABOLIC PANEL, BASIC    Collection Time: 07/29/19  3:22 AM   Result Value Ref Range    Sodium 140 136 - 145 mmol/L    Potassium 3.9 3.5 - 5.1 mmol/L    Chloride 105 97 - 108 mmol/L    CO2 27 21 - 32 mmol/L    Anion gap 8 5 - 15 mmol/L    Glucose 72 65 - 100 mg/dL    BUN 23 (H) 6 - 20 MG/DL    Creatinine 6.84 (H) 0.70 - 1.30 MG/DL    BUN/Creatinine ratio 3 (L) 12 - 20      GFR est AA 10 (L) >60 ml/min/1.73m2    GFR est non-AA 8 (L) >60 ml/min/1.73m2    Calcium 8.6 8.5 - 10.1 MG/DL   CBC W/O DIFF    Collection Time: 07/29/19  3:22 AM   Result Value Ref Range    WBC 5.5 4.1 - 11.1 K/uL    RBC 2.77 (L) 4.10 - 5.70 M/uL    HGB 7.6 (L) 12.1 - 17.0 g/dL    HCT 24.9 (L) 36.6 - 50.3 %    MCV 89.9 80.0 - 99.0 FL    MCH 27.4 26.0 - 34.0 PG    MCHC 30.5 30.0 - 36.5 g/dL    RDW 15.9 (H) 11.5 - 14.5 %    PLATELET 562 664 - 427 K/uL    MPV 9.7 8.9 - 12.9 FL    NRBC 0.0 0  WBC    ABSOLUTE NRBC 0.00 0.00 - 0.01 K/uL   MAGNESIUM    Collection Time: 07/29/19  3:22 AM   Result Value Ref Range    Magnesium 1.8 1.6 - 2.4 mg/dL   PHOSPHORUS    Collection Time: 07/29/19  3:22 AM   Result Value Ref Range    Phosphorus 4.3 2.6 - 4.7 MG/DL           Total time spent with patient:  xxx   min. Care Plan discussed with:  Patient     Family      RN      Consulting Physician [facility],         I have reviewed the flowsheets. Chart and Pertinent Notes have been reviewed.    No change in PMH ,family and social history from Consult note.       Kim Gomze MD

## 2019-07-29 NOTE — PROGRESS NOTES
PT Note:    Chart reviewed and attempted to see for PT weekly reassessment. Patient currently PEREZ for dialysis. Will continue to follow.

## 2019-07-29 NOTE — PROGRESS NOTES
Received confirmation from Walter Ojeda that pt can report to his first outpatient HD tomorrow, Tuesday July 30, 2019 and needs to arrive by 10:30am.  Rest of schedule will be provided to pt at that time.     PERNELL Higgins

## 2019-07-29 NOTE — PROGRESS NOTES
Occupational Therapy: Nurse reports that patient left the floor for dialysis at approximately 0800. Will follow and see as able and appropriate.   CHERRY Sanches/ALIYA

## 2019-07-29 NOTE — PROGRESS NOTES
Cm called Snow Carcamo  for [facility], phone [phone] to discuss what is still needed to confirm start date of placement for tomorrow. Left message for Kole Beard. All labs ordered have been resulted. Also called [place] admissions [phone] to discuss referral status. She confirmed that they have all labs needed and are awaiting start date/time once [facility] outpt unit confirms acceptance. PERNELL Lancaster    Spoke with pt's wife at the bedside and reviewed discharge plans. She is aware that family will need to do transport to/from HD, likely starting tomorrow. Pt is in HD now so need to confirm if start date of outpatient will indeed be Tuesday. Offered freedom of choice for home health and wife would like to use [facility]. ORders sent for RN, Pt/OT evaluate and treat, SW and aide to [facility]. Will update AVS once accepted.     PERNELL Lancaster

## 2019-08-01 PROBLEM — K92.2 UPPER GI BLEED: Status: ACTIVE | Noted: 2019-01-01

## 2019-08-01 NOTE — ROUTINE PROCESS
TRANSFER - OUT REPORT: 
 
Verbal report given to Gaviota VASQUEZ(name) on Maru Das  being transferred to [facility]) for routine progression of care Report consisted of patients Situation, Background, Assessment and  
Recommendations(SBAR). Information from the following report(s) was reviewed with the receiving nurse. Lines:  
Peripheral IV 08/01/19 Left;Mid Forearm (Active) Site Assessment Clean, dry, & intact 8/1/2019  6:12 AM  
Phlebitis Assessment 0 8/1/2019  6:12 AM  
Infiltration Assessment 0 8/1/2019  6:12 AM  
Dressing Status Clean, dry, & intact 8/1/2019  6:12 AM  
Dressing Type Transparent 8/1/2019  6:12 AM  
Hub Color/Line Status Pink;Patent; Flushed 8/1/2019  6:12 AM  
Action Taken Blood drawn 8/1/2019  6:12 AM  
   
Peripheral IV 08/01/19 Right Forearm (Active) Site Assessment Clean, dry, & intact 8/1/2019  8:45 AM  
Phlebitis Assessment 0 8/1/2019  8:45 AM  
Dressing Status Clean, dry, & intact 8/1/2019  8:45 AM  
Hub Color/Line Status Blue 8/1/2019  8:45 AM  
Action Taken Blood drawn 8/1/2019  8:45 AM  
  
 
Opportunity for questions and clarification was provided. Patient transported with: 
 [employer]

## 2019-08-01 NOTE — PROGRESS NOTES
Noted pt's return to ED and need for readmission. CM familiar with pt from most recent admission and discharge home. Pt had prolonged hospitalization d/t acute complex medical issues and discharged home with new outpatient HD for KAYLEEN. Pt discharged home on Monday, with family support. Pt was set up with At [address] and outpatient HD at [facility] clinic, Tues, thurs, Saturday 2nd shift. Unit CM will follow.  
 
PERNELL Turk

## 2019-08-01 NOTE — WOUND CARE
New Patient Visit: patient known to me from previous recent admission. He is having dialysis, resting on [facility] bed with accumax mattress; moves well in bed. In with his nurse Gaviota who noted hypopigmented skin on penis and boggy heels on admission. Assessment: 
Penis - linear hypopigmented skin on penis with small light scab - much improved since discharge with nearly complete healing - this was area where foreskin apparently had been retracted and left in that position with subsequent maceration/skin breakdown. Heels - soft but no discoloration, redness or tenderness. Gaviota hands off no discoloration or injury to buttocks/sacrum. Recommendations/Plan: 
Off load heels as needed; patient currently does this himself but please do for him if he becomes unable. Skin care - keep foreskin in place and pulled back over penis. Patient anuric. Encourage repositioning routinely and provide this if patient unable. Please re-consult if any needs arise. Julissa Freeman RN,[facility] Office [phone] Pager # [phone]

## 2019-08-01 NOTE — H&P
History & Physical 
 
Primary Care Provider: Artur Coombs DO Source of Information: Patient History of Presenting Illness:  
Mr Bob Monday is a 73 y/o AAM with MHx of HTN, ESRD on HD(MWF), recent GI bleed s/p clip and embolization and SSS s/p pacemaker presents to the hospital with c/o hematemesis X1 which started this morning. Patient was recently discharged from hospital on 7/29 after prolonged hospital stay and treatment for Upper GI bleed s/p clip and embolization. At the time he was transfused with 6u PRBC. Post discharge patient was doing okay until this morning where he woke up with feeling nauseous. He has one episode of vomiting which was blood mixed with the GI content. EMS states when they arrived, they found about 150-200 mL of BRB with dark red clots that he had vomited. He denies abdominal pain, melena, hematochezia, dizziness, chest pain or bleed per rectum. He denied NSAID use. During his discharge three days ago his Hct was 7.6 and today on presentation 7.9. Patient also has transient hypotension on arrival to the ED Review of Systems: 
Review of Systems Constitutional: Per HPI HENT: Negative for ear pain, facial swelling, sore throat and trouble swallowing.   
Eyes: No visual disturbance. Negative for pain and discharge. Respiratory: Negative for chest tightness, shortness of breath and wheezing.   
Cardiovascular: Negative for chest pain and palpitations. Gastrointestinal: Per HPI Genitourinary: Negative for difficulty urinating, flank pain and hematuria. Musculoskeletal: Negative for arthralgias, joint swelling, myalgias and neck pain. Skin: Negative for color change and rash. Neurological: Negative for  dizziness, headache, weakness or numbness. Hematological: Negative for adenopathy. Does not bruise/bleed easily.   
Psychiatric/Behavioral: Negative for behavioral problems, confusion and sleep disturbance.  
 All other systems reviewed and are negative. Past Medical History:  
Diagnosis Date  Arthritic-like pain  Hypertension Past Surgical History:  
Procedure Laterality Date  HX PACEMAKER  07/16/2019  
 medtronic dual chamber VVIR   IR INSERT NON TUNL CVC OVER 5 YRS  7/13/2019  IR INSERT TUNL CVC W/O PORT OVER 5 YR  7/25/2019  IR OCCL TXCATH HEMMORAGE W SI  7/13/2019  MI INS NEW/RPLCMT PRM PM W/TRANSV ELTRD ATRIAL&VENT N/A 7/16/2019 INSERT PPM DUAL [place] [address], [address] CATH LAB Prior to Admission medications Medication Sig Start Date End Date Taking? Authorizing Provider  
pantoprazole (PROTONIX) 40 mg tablet Take 1 Tab by mouth daily. 7/29/19  Yes Byron Gutierrez MD  
therapeutic multivitamin [facility]) tablet Take 1 Tab by mouth daily. Yes Provider, Historical  
aspirin delayed-release 81 mg tablet Take  by mouth daily. Yes Provider, Historical  
 
No Known Allergies Family History Problem Relation Age of Onset  Diabetes Mother  Hypertension Mother  Heart Disease Mother  Asthma Mother  [facility] Arthritis-osteo Mother  Diabetes Father  Hypertension Father  Asthma Father  Arthritis-osteo Father  Diabetes Sister  Gout Sister  Asthma Brother SOCIAL HISTORY: 
Patient resides: 
Independently x Assisted Living SNF With family care Smoking history:  
None x Former Chronic Alcohol history:  
None Social   
Chronic x Ambulates:  
Independently   
w/cane x  
w/walker   
w/wc CODE STATUS: 
DNR Full x Other Objective:  
 
Physical Exam:  
 
Visit Vitals /54 Pulse 71 Temp 98 °F (36.7 °C) Resp 18 SpO2 100% O2 Device: Room air General:  Alert, cooperative, no distress, appears stated age. Slightly pale Head:  Normocephalic, without obvious abnormality, atraumatic. Eyes:  Conjunctivae/corneas clear. PERRL, EOMs intact. Nose: Nares normal. Septum midline. Mucosa normal. No drainage or sinus tenderness. Throat: Lips, mucosa, and tongue normal. Teeth and gums normal.  
Neck: Supple, symmetrical, trachea midline, no adenopathy, thyroid: no enlargement/tenderness/nodules, no carotid bruit and no JVD. Back:   Symmetric, no curvature. ROM normal. No CVA tenderness. Lungs:   Clear to auscultation bilaterally. Chest wall:  No tenderness or deformity. Heart:  Regular rate and rhythm, S1, S2 normal, no murmur, click, rub or gallop. Abdomen:   Soft, non-tender. Bowel sounds normal. No masses,  No organomegaly. Extremities: Extremities normal, atraumatic, no cyanosis or edema. Pulses: 2+ and symmetric all extremities. Skin: Skin color, texture, turgor normal. No rashes or lesions Neurologic: CNII-XII intact. EKG:  
 
 
Data Review:  
 
Recent Days: 
Recent Labs 08/01/19 
4152 WBC 9.3 HGB 7.9*  
HCT 27.0*  
 Recent Labs 08/01/19 
0338   
K 3.8  CO2 25 * BUN 21* CREA 5.64* CA 8.9 ALB 2.9* TBILI 0.3 SGOT 31 ALT 12 INR 1.1 No results for input(s): PH, PCO2, PO2, HCO3, FIO2 in the last 72 hours. 24 Hour Results: 
Recent Results (from the past 24 hour(s)) SAMPLES BEING HELD Collection Time: 08/01/19  6:15 AM  
Result Value Ref Range SAMPLES BEING HELD 1RED COMMENT Add-on orders for these samples will be processed based on acceptable specimen integrity and analyte stability, which may vary by analyte. TYPE & SCREEN Collection Time: 08/01/19  6:15 AM  
Result Value Ref Range Crossmatch Expiration 08/04/2019 ABO/Rh(D) B POSITIVE Antibody screen NEG   
CBC WITH AUTOMATED DIFF Collection Time: 08/01/19  6:15 AM  
Result Value Ref Range WBC 9.3 4.1 - 11.1 K/uL  
 RBC 2.91 (L) 4.10 - 5.70 M/uL HGB 7.9 (L) 12.1 - 17.0 g/dL HCT 27.0 (L) 36.6 - 50.3 %  MCV 92.8 80.0 - 99.0 FL  
 MCH 27.1 26.0 - 34.0 PG  
 MCHC 29.3 (L) 30.0 - 36.5 g/dL  
 RDW 15.8 (H) 11.5 - 14.5 % PLATELET 331 498 - 608 K/uL MPV 10.1 8.9 - 12.9 FL  
 NRBC 0.0 0  WBC ABSOLUTE NRBC 0.00 0.00 - 0.01 K/uL NEUTROPHILS 39 32 - 75 % LYMPHOCYTES 37 12 - 49 % MONOCYTES 11 5 - 13 % EOSINOPHILS 11 (H) 0 - 7 % BASOPHILS 2 (H) 0 - 1 % IMMATURE GRANULOCYTES 0 0.0 - 0.5 % ABS. NEUTROPHILS 3.6 1.8 - 8.0 K/UL  
 ABS. LYMPHOCYTES 3.5 0.8 - 3.5 K/UL  
 ABS. MONOCYTES 1.0 0.0 - 1.0 K/UL  
 ABS. EOSINOPHILS 1.0 (H) 0.0 - 0.4 K/UL  
 ABS. BASOPHILS 0.1 0.0 - 0.1 K/UL  
 ABS. IMM. GRANS. 0.0 0.00 - 0.04 K/UL  
 DF AUTOMATED METABOLIC PANEL, COMPREHENSIVE Collection Time: 08/01/19  6:15 AM  
Result Value Ref Range Sodium 140 136 - 145 mmol/L Potassium 3.8 3.5 - 5.1 mmol/L Chloride 104 97 - 108 mmol/L  
 CO2 25 21 - 32 mmol/L Anion gap 11 5 - 15 mmol/L Glucose 135 (H) 65 - 100 mg/dL BUN 21 (H) 6 - 20 MG/DL Creatinine 5.64 (H) 0.70 - 1.30 MG/DL  
 BUN/Creatinine ratio 4 (L) 12 - 20 GFR est AA 12 (L) >60 ml/min/1.73m2 GFR est non-AA 10 (L) >60 ml/min/1.73m2 Calcium 8.9 8.5 - 10.1 MG/DL Bilirubin, total 0.3 0.2 - 1.0 MG/DL  
 ALT (SGPT) 12 12 - 78 U/L  
 AST (SGOT) 31 15 - 37 U/L Alk. phosphatase 105 45 - 117 U/L Protein, total 6.3 (L) 6.4 - 8.2 g/dL Albumin 2.9 (L) 3.5 - 5.0 g/dL Globulin 3.4 2.0 - 4.0 g/dL A-G Ratio 0.9 (L) 1.1 - 2.2 PROTHROMBIN TIME + INR Collection Time: 08/01/19  6:15 AM  
Result Value Ref Range INR 1.1 0.9 - 1.1 Prothrombin time 10.9 9.0 - 11.1 sec Imaging:  
 
Assessment: # Upper GI bleed # Chronic anemia from recent GI loss # ESRD on HD # HTN # Chronic systolic heart failure # SSS s/p pacemaker Plan: # Upper GI bleed  
- admit to [facility] - Keep NPO  
- start on IVF- gentle hydration due to hx of CHF/dialysis - blood group and cross match  
- start on IV PPI BID  
- monitor H/H q8hrly, transfuse if hgb<7 
 - GI consult # Chronic anemia from recent GI loss - s/p 6u PRBC transfusion on last admission  
- stable so far, with acute worsening - plan as above # ESRD on HD  
- Nephrology consult for in-house HD # HTN: hold home meds due to episodic hypotension # Chronic systolic heart failure: NYHA l-ll on presentation- no acute exacerbation # SSS s/p pacemaker DVT prop: SCDs GI prop: PPI Code: Full Dispo: home once stable Function status: walks with a cane/Walker Signed By: Gisela Rinaldi MD   
 August 1, 2019

## 2019-08-01 NOTE — PROGRESS NOTES

## 2019-08-01 NOTE — PROCEDURES
[facility] Dialysis Team [place]  [phone] Vitals   Pre   Post   Assessment   Pre   Post    
Temp  Temp: 98.6 °F (37 °C) (08/01/19 1418)  98.3 LOC  AO3 AO3 HR   Pulse (Heart Rate): 84 (08/01/19 1418) 77 Lungs   Clear to diminshed Unlabored Room air  Clear to diminshed Unlabored Room air B/P   BP: 137/47 (08/01/19 1418) 135/50 Cardiac   Paced  monitored remotely  Paced  monitored remotely Resp   Resp Rate: 28 (08/01/19 1418) 15 Skin   CDI  CDI Pain level  Pain Intensity 1: 0 (08/01/19 1406) 0 Edema   
none 
 none Orders: Duration:   Start:    1415 End:    1715 Total:   3:00 Dialyzer:   Dialyzer/Set Up Inspection: Roya Barba (08/01/19 1415) K Bath:   Dialysate K (mEq/L): 2 (08/01/19 1415) Ca Bath:   Dialysate CA (mEq/L): 2.5 (08/01/19 1415) Na/Bicarb:   Dialysate NA (mEq/L): 14 (08/01/19 1415) Target Fluid Removal:   Goal/Amount of Fluid to Remove (mL): 1500 mL (08/01/19 1415) Access  CVC Type & Location:   Tunneled R sub. CDI last dressing change 7/29/19 Each catheter limb disinfected for 60 seconds per limb with alcohol swabs. Caps removed, dialysis CVC hub scrubbed with Prevantics for 5 seconds, followed by a 5 second dry time per Hospital P&P.  Labs Obtained/Reviewed Critical Results Called   Date when labs were drawn- 
Hgb-   
HGB Date Value Ref Range Status 08/01/2019 7.9 (L) 12.1 - 17.0 g/dL Final  
 
K-   
Potassium Date Value Ref Range Status 08/01/2019 3.8 3.5 - 5.1 mmol/L Final  
 
Ca-  
Calcium Date Value Ref Range Status 08/01/2019 8.9 8.5 - 10.1 MG/DL Final  
 
Bun-  
BUN Date Value Ref Range Status 08/01/2019 21 (H) 6 - 20 MG/DL Final  
 
Creat-  
Creatinine Date Value Ref Range Status 08/01/2019 5.64 (H) 0.70 - 1.30 MG/DL Final  
 
  
Medications/ Blood Products Given Name   Dose   Route and Time    
none Blood Volume Processed (BVP):    61.2L Net Fluid Removed:  1000 Comments Time Out Done: 1410 Primary Nurse Rpt Pre: Ted Gomes Primary Nurse Rpt Post: Ted Gomes Pt Education: family education on HD machine Care Plan: ongoing Tx Summary: 
1415 tx started with no problems. Pt rested through treatment. 1715 tx ended. Each catheter limb disinfected for 60 seconds per limb with alcohol swabs. Dialysis CVC hubs scrubbed with Prevantics for 5 seconds, followed by a 5 second dry time per Hospital P&P, red and blue dialysis caps applied to ports. New CVC dressing applied using aseptic technique. Admiting Diagnosis: Acute GI bleed Pt's previous clinic- [facility] Consent signed - Informed Consent Verified: Yes (08/01/19 1415) Luis Consent - yes Hepatitis Status- HBsAg 7/13/19 HBsAB 7/13/19 Machine #- Machine Number: [id number] (08/01/19 1415) Telemetry status-remotely

## 2019-08-01 NOTE — ED TRIAGE NOTES
Pt arrives via EMS from home for N/V that began at 0520 this morning. EMS reports approximately 150 mL of bright red blood and dark red clots noted at the house, pt did not vomit en route. Pt not vomiting in triage, denies CP/SOB. Pt had pacer placed a week ago. Dr. Molina Lyons is cardiologist. Pt has a dialysis port to R subclavian, was last dialyzed on Tuesday, schedule today at noon.

## 2019-08-01 NOTE — PROGRESS NOTES
Admission Medication Reconciliation: 
 
Information obtained from:  Family members/RxQuery Comments/Recommendations: Updated PTA meds/reviewed patient's allergies. Family members provided medication history. Notes: 
R subclavian HD catheter (recent KAYLEEN) Medication changes (since last review): 
Removed Ergocalciferol (did not know he was supposed to take) Thank you for allowing me to participate in the care of your patient. Carmenza Corrales PharmD, RN #[id number] Allergies:  Patient has no known allergies. Significant PMH/Disease States:  
Past Medical History:  
Diagnosis Date Arthritic-like pain Hypertension Chief Complaint for this Admission: Chief Complaint Patient presents with Blood in Vomit Prior to Admission Medications:  
Prior to Admission Medications Prescriptions Last Dose Informant Patient Reported? Taking?  
aspirin delayed-release 81 mg tablet 7/31/2019 at Unknown time  Yes Yes Sig: Take  by mouth daily. pantoprazole (PROTONIX) 40 mg tablet 7/31/2019 at Unknown time  No Yes Sig: Take 1 Tab by mouth daily. therapeutic multivitamin (THERAGRAN) tablet 7/31/2019 at Unknown time  Yes Yes Sig: Take 1 Tab by mouth daily. Facility-Administered Medications: None

## 2019-08-01 NOTE — CONSULTS
NEPHROLOGY CONSULT NOTE Patient: Alexander Patel MRN: [id number]  PCP: Suzette Banda DO  
:     1945  Age:   72 y.o. Sex:  male Referring physician: Bre Ramey MD 
Reason for consultation: 72 y.o. male with Upper GI bleed [L52.9] complicated by KAYLEEN Admission Date: 2019  6:01 AM  LOS: 0 days ASSESSMENT and PLAN :  
KAYLEEN - HD dependent - 2/2 ATN from hemorrhagic shock during his last admission 
- HD TTS at [facility] 
- HD today after EGD 
  
CKD Stage III : 
- baseline Cr 1.2-1.3 mg/dl prior to KAYLEEN 
  
History of Hemorraghic Shock from Upper GI bleed last admission - S/P emergent clipping by EDG  
- S/P embolization of gastric/diuodenal artery  in IR 
- for EGD today HTN: 
- BP stable 
  
Bradycardia s/p PPM placement  
  
Chronic Alcoholism Active Problems / Assessment AAActive  : Active Problems: 
  Upper GI bleed (2019) Subjective: HPI: Alexander Patel is a 72 y.o.  male who has been admitted to the hospital for hematemesis. He was here last month with hemorrhagic shock from an upper GI bleed requiring GI and IR interventions. He developed KAYLEEN requiring CRRT. He was transitioned to IHD and was dialysis dependent w/ no signs of renal recovery at d/c. He was getting dialysis TTS at [facility] He is due for HD today. No cp, sob, n/v/d reported. Past Medical Hx:  
Past Medical History:  
Diagnosis Date  Arthritic-like pain  Hypertension Past Surgical Hx: 
  
Past Surgical History:  
Procedure Laterality Date  HX PACEMAKER  2019  
 medtronic dual chamber VVIR   IR INSERT NON TUNL CVC OVER 5 YRS  2019  IR INSERT TUNL CVC W/O PORT OVER 5 YR  2019  IR OCCL TXCATH HEMMORAGE W SI  2019  [place] INS NEW/RPLCMT PRM PM W/TRANSV ELTRD ATRIAL&VENT N/A 2019 INSERT PPM DUAL performed by Aletha Rodriguez MD at [place] [address], [facility] [address] CATH LAB Medications: Prior to Admission medications Medication Sig Start Date End Date Taking? Authorizing Provider  
pantoprazole (PROTONIX) 40 mg tablet Take 1 Tab by mouth daily. 7/29/19  Yes Braulio Robbins MD  
therapeutic multivitamin [facility]) tablet Take 1 Tab by mouth daily. Yes Provider, Historical  
aspirin delayed-release 81 mg tablet Take  by mouth daily. Yes Provider, Historical  
 
 
No Known Allergies Social Hx:  reports that he quit smoking about 34 years ago. He started smoking about 52 years ago. He has never used smokeless tobacco. He reports that he drinks about 11.7 standard drinks of alcohol per week. He reports that he does not use drugs. Family History Problem Relation Age of Onset  Diabetes Mother  Hypertension Mother  Heart Disease Mother  Asthma Mother [facility] Arthritis-osteo Mother  Diabetes Father  Hypertension Father  Asthma Father  Arthritis-osteo Father  Diabetes Sister  Gout Sister  Asthma Brother Review of Systems: A twelve point review of system was performed today. Pertinent positives and negatives are mentioned in the HPI. The reminder of the ROS is negative and noncontributory. Objective:   
Vitals:   
Vitals:  
 08/01/19 0800 08/01/19 0815 08/01/19 0956 08/01/19 1122 BP: 129/50 126/54 130/48 122/52 Pulse: 81 71 71 70 Resp: 24 18 18 14 Temp:   98.4 °F (36.9 °C) 99.1 °F (37.3 °C) SpO2: 100% 100% 100% 100% I&O's:  No intake/output data recorded. Visit Vitals /52 (BP 1 Location: Right arm, BP Patient Position: At rest) Pulse 70 Temp 99.1 °F (37.3 °C) Resp 14 SpO2 100% Physical Exam: 
General:Alert, No distress, HEENT: Eyes are PERRL. Conjunctiva without pallor ,erythema. The sclerae without icterus. .  
Neck:Supple,no mass palpable Lungs : Clears to auscultation Bilaterally, Normal respiratory effort CVS: RRR, S1 S2 normal, No rub, no LE edema Abdomen: Soft, Non tender, No hepatosplenomegaly, bowel sounds present Extremities: No cyanosis, No clubbing Skin: No rash or lesions. Lymph nodes: No palpable nodes MS: No joint swelling, erythema, warmth Neurologic: non focal, AAO x 3 Psych: normal affect Laboratory Results: 
 
Lab Results Component Value Date BUN 21 (H) 08/01/2019  08/01/2019  
 K 3.8 08/01/2019  08/01/2019 CO2 25 08/01/2019 Lab Results Component Value Date BUN 21 (H) 08/01/2019 BUN 23 (H) 07/29/2019 BUN 16 07/28/2019 BUN 8 07/27/2019 BUN 18 07/26/2019  
 K 3.8 08/01/2019  
 K 3.9 07/29/2019  
 K 3.8 07/28/2019  
 K 3.7 07/27/2019  
 K 3.8 07/26/2019 Lab Results Component Value Date WBC 9.3 08/01/2019 RBC 2.91 (L) 08/01/2019 HGB 7.9 (L) 08/01/2019 HCT 27.0 (L) 08/01/2019 MCV 92.8 08/01/2019 MCH 27.1 08/01/2019 RDW 15.8 (H) 08/01/2019  08/01/2019 Lab Results Component Value Date PHOS 4.3 07/29/2019 Urine dipstick: No results found for: COLOR, APPRN, SPGRU, REFSG, ANGELI, PROTU, GLUCU, KETU, BILU, UROU, TREVOR, LEUKU, GLUKE, EPSU, BACTU, WBCU, RBCU, CASTS, UCRY I have reviewed the following: All pertinent labs, microbiology data, radiology imaging for my assessment Thank you for allowing us to participate in the care of this patient. We will follow patient. Please dont hesitate to call with any questions Teresa Jaramillo MD 
8/1/2019 [address], [address] [facility] Phone - [phone] Fax - [phone] 
www. [facility]

## 2019-08-01 NOTE — ROUTINE PROCESS
Maru Das 1945 
[id number] Situation: 
Verbal report received from: [facility] Procedure: Procedure(s): ESOPHAGOGASTRODUODENOSCOPY (EGD) Background: 
 
Preoperative diagnosis: gi bleed Postoperative diagnosis: 1. Duodenal Ulcer :  Dr. Becker (s): Endoscopy Technician-1: Ja Tian 
Endoscopy RN-1: Fox Perez RN Specimens: * No specimens in log * H. Pylori  no Assessment: 
Intra-procedure medications Anesthesia gave intra-procedure sedation and medications, see anesthesia flow sheet yes Intravenous fluids: NS@ Lethaniel Haagensen Vital signs stable Abdominal assessment: round and soft Recommendation: 
Return to floor.

## 2019-08-01 NOTE — H&P
[address] 
[address] Pre-procedure History and Physical    
 
NAME:  Kendra Gentile :   1945 MRN:   [id number] CHIEF COMPLAINT/HPI: See procedure note PMH: 
Past Medical History:  
Diagnosis Date  Arthritic-like pain  Hypertension PSH: 
Past Surgical History:  
Procedure Laterality Date  HX PACEMAKER  2019  
 medtronic dual chamber VVIR   IR INSERT NON TUNL CVC OVER 5 YRS  2019  IR INSERT TUNL CVC W/O PORT OVER 5 YR  2019  IR OCCL TXCATH HEMMORAGE W SI  2019  [place] INS NEW/RPLCMT PRM PM W/TRANSV ELTRD ATRIAL&VENT N/A 2019 INSERT PPM DUAL performed by Sheela Mejia MD at [place] [address], [facility] [address] [facility] Allergies: 
No Known Allergies Home Medications: 
Prior to Admission Medications Prescriptions Last Dose Informant Patient Reported? Taking?  
aspirin delayed-release 81 mg tablet 2019 at Unknown time  Yes Yes Sig: Take  by mouth daily. pantoprazole (PROTONIX) 40 mg tablet 2019 at Unknown time  No Yes Sig: Take 1 Tab by mouth daily. therapeutic multivitamin (THERAGRAN) tablet 2019 at Unknown time  Yes Yes Sig: Take 1 Tab by mouth daily. Facility-Administered Medications: None Hospital Medications: 
Current Facility-Administered Medications Medication Dose Route Frequency  sodium chloride (NS) flush 5-40 mL  5-40 mL IntraVENous Q8H  
 sodium chloride (NS) flush 5-40 mL  5-40 mL IntraVENous PRN  
 acetaminophen (TYLENOL) tablet 650 mg  650 mg Oral Q4H PRN  
 sodium chloride (NS) flush 5-40 mL  5-40 mL IntraVENous Q8H  
 sodium chloride (NS) flush 5-40 mL  5-40 mL IntraVENous PRN  pantoprazole (PROTONIX) 40 mg in sodium chloride 0.9% 10 mL injection  40 mg IntraVENous Q12H  
 0.9% sodium chloride infusion  50 mL/hr IntraVENous CONTINUOUS Family History: 
Family History Problem Relation Age of Onset  Diabetes Mother  Hypertension Mother  Heart Disease Mother  Asthma Mother [address] [place] [address] Arthritis-osteo Mother  Diabetes Father  Hypertension Father  Asthma Father  Arthritis-osteo Father  Diabetes Sister  Gout Sister  Asthma Brother Social History: 
Social History Tobacco Use  Smoking status: Former Smoker Start date: 1966 Last attempt to quit: 1984 Years since quittin.7  Smokeless tobacco: Never Used Substance Use Topics  Alcohol use: Yes Alcohol/week: 11.7 standard drinks Types: 14 drink(s) per week PHYSICAL EXAM PRIOR TO SEDATION: 
General: Alert, in no acute distress Lungs:            CTA bilaterally Heart:  Normal S1, S2 Abdomen: Soft, Non distended, Non tender. Normoactive bowel sounds. Assessment:  
Stable for sedation administration. Plan:  
· Endoscopic procedure with sedation Signed By: Abebe No MD   
 2019  12:24 PM

## 2019-08-01 NOTE — PROGRESS NOTES
TRANSFER - OUT REPORT: 
 
Verbal report given to Gaviota(name) on Ibrahima Geiger  being transferred to [facility]) for routine progression of care Report consisted of patients Situation, Background, Assessment and  
Recommendations(SBAR). Information from the following report(s) SBAR, Procedure Summary, Intake/Output and MAR was reviewed with the receiving nurse. Lines:  
Peripheral IV 08/01/19 Left;Mid Forearm (Active) Site Assessment Clean, dry, & intact 8/1/2019  9:56 AM  
Phlebitis Assessment 0 8/1/2019  9:56 AM  
Infiltration Assessment 0 8/1/2019  9:56 AM  
Dressing Status Clean, dry, & intact 8/1/2019  9:56 AM  
Dressing Type Transparent 8/1/2019  9:56 AM  
Hub Color/Line Status Pink; Infusing;Flushed 8/1/2019  9:56 AM  
Action Taken Open ports on tubing capped 8/1/2019  9:56 AM  
Alcohol Cap Used Yes 8/1/2019  9:56 AM  
   
Peripheral IV 08/01/19 Right Forearm (Active) Site Assessment Clean, dry, & intact 8/1/2019  9:56 AM  
Phlebitis Assessment 0 8/1/2019  9:56 AM  
Infiltration Assessment 0 8/1/2019  9:56 AM  
Dressing Status Clean, dry, & intact 8/1/2019  9:56 AM  
Dressing Type Tape;Transparent 8/1/2019  9:56 AM  
Hub Color/Line Status Blue 8/1/2019  9:56 AM  
Action Taken Open ports on tubing capped 8/1/2019  9:56 AM  
Alcohol Cap Used Yes 8/1/2019  9:56 AM  
  
 
Opportunity for questions and clarification was provided.

## 2019-08-01 NOTE — PROGRESS NOTES
Received report from Jacque in the ED, pt arrived on unit at 3186. Problem: Falls - Risk of 
Goal: *Absence of Falls Description Document Sonia Boston Fall Risk and appropriate interventions in the flowsheet. Outcome: Progressing Towards Goal 
Note:  
Fall Risk Interventions: 
Mobility Interventions: Bed/chair exit alarm, Communicate number of staff needed for ambulation/transfer, PT Consult for mobility concerns, Patient to call before getting OOB Mentation Interventions: Adequate sleep, hydration, pain control, Bed/chair exit alarm, Door open when patient unattended, Toileting rounds, Update white board Elimination Interventions: Bed/chair exit alarm, Call light in reach, Patient to call for help with toileting needs, Stay With Me (per policy), Toilet paper/wipes in reach, Toileting schedule/hourly rounds, Urinal in reach Bed alarm activated Problem: Patient Education: Go to Patient Education Activity Goal: Patient/Family Education Outcome: Progressing Towards Goal 
Note:  
EGD at 1200, educated pt to monitor output and alert nurse to assess Problem: Chronic Renal Failure Goal: *Fluid and electrolytes stabilized Outcome: Progressing Towards Goal 
Note: PT scheduled for HD today 8/1 Episode of maroon stool, spoke with Luis COTTON and Dr. Trae Salamanca. Ordered received for H&H Q4. 
Spoke with Dr. Cesar Eye HD ordered. Wound care consult placed.

## 2019-08-01 NOTE — ED PROVIDER NOTES
72 y.o. male with past medical history significant for HTN, CHF, CKD newly started on HD MWF who presents from home via EMS with chief complaint of hematemesis. Patient was recently admitted here 7/4/19 for acute upper GI bleed 2/2 a bleeding duodenal ulcer, S/P EGD on 7/13/19. S/P Clip placement and S/P embolization by Interventional Radiology. He recieved 6 units pRBCs d/t acute blood loss anemia. He required intubation while here and was extubated without difficulty, and also required pressors. He was evaluated by general surgery for possible SBO, which resolved, suspected from umbilical hernia which was reduced in the ED. He also had a pacemaker placed 7/16 for SSS. Patient noted to have elevated BNP. His ECHO showed an ED of 02-40% and systolic dysfunction. He also had an KAYLEEN on CKD stage 2 due to acute tubular necrosis from Hemorrhagic shock. The patient had a permacath placed 7/25, noted to be on HD MWF moving forward. He had dialysis prior to discharge 3 days ago. He was sent home to start protonix. Patient had dialysis again 2 days ago, and is scheduled this afternoon. Patient states this morning, he woke up with nausea and 2 episodes of hematemesis. EMS states when they arrived, they found about 150-200 mL of BRB with dark red clots that he had vomited. Patient has not vomited since. Patient states his BMs have been loose, but denies blood in his stool. Patient denies any pain, SOB, or fever. Patient denies taking any medications PTA. He denies recent falls. There are no other acute medical concerns at this time. Social hx: Former smoker (Quit 1966); Current heavy EtOH use PCP: Vinny Napoles DO Cardiologist: Huy Marcus MD 
GI: [place] Note written by Ricci Torres, as dictated by Jennifer Bennett DO 6:17 AM 
 
The history is provided by the patient, medical records and the EMS personnel. No  was used. Past Medical History:  
Diagnosis Date  Arthritic-like pain  Hypertension Past Surgical History:  
Procedure Laterality Date  HX PACEMAKER  2019  
 medtronic dual chamber VVIR   IR INSERT NON TUNL CVC OVER 5 YRS  2019  IR INSERT TUNL CVC W/O PORT OVER 5 YR  2019  IR OCCL TXCATH HEMMORAGE W SI  2019  [place] INS NEW/RPLCMT PRM PM W/TRANSV ELTRD ATRIAL&VENT N/A 2019 INSERT PPM DUAL performed by Diane Hernandez MD at [address] [facility] [address] [facility] Family History:  
Problem Relation Age of Onset  Diabetes Mother  Hypertension Mother  Heart Disease Mother  Asthma Mother Tommie Patrickk Arthritis-osteo Mother  Diabetes Father  Hypertension Father  Asthma Father  Arthritis-osteo Father  Diabetes Sister  Gout Sister  Asthma Brother Social History Socioeconomic History  Marital status:  Spouse name: Not on file  Number of children: Not on file  Years of education: Not on file  Highest education level: Not on file Occupational History  Not on file Social Needs  Financial resource strain: Not on file  Food insecurity:  
  Worry: Not on file Inability: Not on file  Transportation needs:  
  Medical: Not on file Non-medical: Not on file Tobacco Use  Smoking status: Former Smoker Start date: 1966 Last attempt to quit: 1984 Years since quittin.7  Smokeless tobacco: Never Used Substance and Sexual Activity  Alcohol use: Yes Alcohol/week: 11.7 standard drinks Types: 14 drink(s) per week  Drug use: No  
 Sexual activity: Yes Lifestyle  Physical activity:  
  Days per week: Not on file Minutes per session: Not on file  Stress: Not on file Relationships  Social connections:  
  Talks on phone: Not on file Gets together: Not on file Attends Bahai service: Not on file Active member of club or organization: Not on file Attends meetings of clubs or organizations: Not on file Relationship status: Not on file  Intimate partner violence:  
  Fear of current or ex partner: Not on file Emotionally abused: Not on file Physically abused: Not on file Forced sexual activity: Not on file Other Topics Concern [address] Service Not Asked  Blood Transfusions Not Asked  Caffeine Concern Not Asked  Occupational Exposure Not Asked Ryan Flatten Hazards Not Asked  Sleep Concern Not Asked  Stress Concern Not Asked  Weight Concern Not Asked  Special Diet Not Asked  Back Care Not Asked  Exercise Not Asked  Bike Helmet Not Asked  Seat Belt Not Asked  Self-Exams Not Asked Social History Narrative  Not on file ALLERGIES: Patient has no known allergies. Review of Systems Constitutional: Negative for fever. HENT: Negative for trouble swallowing. Eyes: Negative for visual disturbance. Respiratory: Negative for cough and shortness of breath. Cardiovascular: Negative for chest pain. Gastrointestinal: Positive for nausea and vomiting. Negative for abdominal pain. Genitourinary: Negative for difficulty urinating and flank pain. Musculoskeletal: Negative for arthralgias, back pain, gait problem, myalgias and neck pain. Skin: Negative for rash. Neurological: Negative for headaches. Hematological: Does not bruise/bleed easily. Psychiatric/Behavioral: Negative for sleep disturbance. All other systems reviewed and are negative. Vitals:  
 08/01/19 9026 BP: 108/42 Pulse: 80 Resp: 13 Temp: 98 °F (36.7 °C) SpO2: 100% Physical Exam  
Constitutional: He is oriented to person, place, and time. He appears well-developed and well-nourished. Dried blood noted on T-shirt. HENT:  
Head: Normocephalic and atraumatic. Eyes: Conjunctivae are normal.  
Neck: Normal range of motion. Cardiovascular: Normal rate and intact distal pulses. Pulmonary/Chest: Effort normal and breath sounds normal. No respiratory distress. Portacath to right chest wall. Pacemaker left chest wall. Abdominal: Soft. Bowel sounds are normal. There is no tenderness. There is no rebound and no guarding. Musculoskeletal: Normal range of motion. Neurological: He is alert and oriented to person, place, and time. Skin: Skin is warm and dry. Psychiatric: His behavior is normal.  
Nursing note and vitals reviewed. Note written by Ricci Knott, as dictated by Elaine Chase DO 6:17 AM 
 
MDM Number of Diagnoses or Management Options Diagnosis management comments:  
 
Hospitalist Angelicaexthanh for Admission 7:27 AM 
 
ED Room Number: VU98/48 Patient Name and age:  Ibrahima Geiger 72 y.o.  male Working Diagnosis: No diagnosis found. Readmission: yes Isolation Requirements:  no 
Recommended Level of Care:  step down Code Status:  Full Code Department:[facility] Adult ED - [phone] Other:  GI bleed, h/o recent bleed and 3 week hospitalization w/ AICD/pacer placement and initiation of HD for ESRD. Brief hypotension of 80 systolic, but now 902 sys. No vomiting here, but had hematemesis prior to arrival.   
 
 
  
 
Procedures 6:29 AM 
Family is now at bedside. Discussed plan of care, they are agreeable. 6:35 AM 
BP 80/50. Requesting second IV placement. 7:24 AM 
Patient's sBP increased to 101 after IVF. HR currently is 70 bpm. Patient has not had any vomiting in the ED. Consented the patient for blood, he is agreeable if blood transfusion is recommended. CONSULT NOTE: 
7:31 AM Elaine Chase DO communicated with Dr. Rochelle Mix, Consult for Hospitalist via [facility] Text. Discussed available diagnostic tests and clinical findings. She will see and admit the patient.

## 2019-08-01 NOTE — ANESTHESIA PREPROCEDURE EVALUATION
Relevant Problems No relevant active problems Anesthetic History No history of anesthetic complications Review of Systems / Medical History Patient summary reviewed, nursing notes reviewed and pertinent labs reviewed Pulmonary Within defined limits Neuro/Psych Within defined limits Cardiovascular Within defined limits Hypertension GI/Hepatic/Renal 
Within defined limits Renal disease: ESRD and dialysis Comments: Dialysis TThS, not today Endo/Other Within defined limits Arthritis Other Findings Physical Exam 
 
Airway Mallampati: II 
TM Distance: > 6 cm Neck ROM: normal range of motion Mouth opening: Normal 
 
 Cardiovascular Regular rate and rhythm,  S1 and S2 normal,  no murmur, click, rub, or gallop Dental 
No notable dental hx Pulmonary Breath sounds clear to auscultation Abdominal 
GI exam deferred Other Findings Anesthetic Plan ASA: 3 Anesthesia type: MAC Anesthetic plan and risks discussed with: Patient

## 2019-08-01 NOTE — PROGRESS NOTES
I have reviewed and agree with the documentation and medication administration of [facility] RN. 1739: Pt complete dialysis. Pt up 1 assist with walker to the bathroom. 1744: Hgb 5.7, Hospitalist paged orders to transfuse 2 unit PRBCs. Consent placed on chart. Bedside shift change report given to Shahid Elam RN (oncoming nurse) by Joey Santiago RN (offgoing nurse). Report included the following information SBAR, Kardex, MAR, Accordion, Recent Results, Med Rec Status, Cardiac Rhythm NSR and Alarm Parameters .

## 2019-08-01 NOTE — ED NOTES
Verbal shift change report given to Misha Trimble RN (oncoming nurse) by Sendy Burroughs RN (offgoing nurse). Report included the following information SBAR, ED Summary, Intake/Output, MAR and Recent Results.

## 2019-08-01 NOTE — ANESTHESIA POSTPROCEDURE EVALUATION
Procedure(s): ESOPHAGOGASTRODUODENOSCOPY (EGD). MAC Anesthesia Post Evaluation Multimodal analgesia: multimodal analgesia not used between 6 hours prior to anesthesia start to PACU discharge Patient location during evaluation: PACU Patient participation: complete - patient participated Level of consciousness: awake Pain score: 0 Pain management: adequate Airway patency: patent Anesthetic complications: no 
Cardiovascular status: acceptable Respiratory status: acceptable Hydration status: acceptable Comments: I have evaluated the patient and meets criteria for discharge from PACU. Adina Trent MD 
 
 
 
Vitals Value Taken Time /51 8/1/2019  1:10 PM  
Temp 37.1 °C (98.7 °F) 8/1/2019  1:04 PM  
Pulse 70 8/1/2019  1:11 PM  
Resp 21 8/1/2019  1:11 PM  
SpO2 100 % 8/1/2019  1:11 PM  
Vitals shown include unvalidated device data.

## 2019-08-01 NOTE — CONSULTS
[facility]. 
 [address] [place] [address] GASTROENTEROLOGY CONSULTATION NOTE Will Kirti Campos, Rehana0 [facility] office [phone] NP/PA in-hospital cell phone M-F until 4:30PM 
After 5PM or on weekends, please call  for physician on call NAME:  Nury Lewis :   1945 MRN:   [id number] Referring Physician: Dr. Ermelinda Malhotra Consult Date: 2019 8:55 AM 
 
Chief Complaint: hematemesis History of Present Illness:  Patient is a 72 y.o. who is seen in consultation at the request of Dr. Marianela Betancourt for GI bleed. Patient has a past medical history significant for hypertension, congestive heart failure, and chronic kidney disease (recently started on T,TH,S hemodialysis). He presented to the ED with complaints of hematemesis. Patient was admitted to the hospital on 19. For review, patient was recently admitted from 19 to 19. He was seen during that admission for an UGI bleed. EGD (19) by Dr. Emily Hitchcock for hematemesis/melena showed 5 erosions in the whole esophagus; 20 mm duodenal ulcer with active bleeding (status post clip). Status post successful embolization of gastroduodenal artery and branches by IR on 19. During that admission patient was intubated and on pressors. Pacemaker was placed during admission. Patient awakened this morning with nausea and 2 episodes of hematemesis. Per EMS report, 150-200 ML of bright red blood with dark clots was found. Patient has had no further vomiting. Nausea is managed. No reflux, dysphagia, or odynophagia. No abdominal pain. Patient reports a dark, loose stool yesterday and this morning in the ED. No fevers. No NSAID use. Patient is unsure of anticoagulation.    
 
I have reviewed the emergency room note, hospital admission note, notes by all other clinicians who have seen the patient during this hospitalization to date. I have reviewed the problem list and the reason for this hospitalization. I have reviewed the allergies and the medications the patient was taking at home prior to this hospitalization. PMH: 
Past Medical History:  
Diagnosis Date  Arthritic-like pain  Hypertension PSH: 
Past Surgical History:  
Procedure Laterality Date  HX PACEMAKER  07/16/2019  
 medtronic dual chamber VVIR   IR INSERT NON TUNL CVC OVER 5 YRS  7/13/2019  IR INSERT TUNL CVC W/O PORT OVER 5 YR  7/25/2019  IR OCCL TXCATH HEMMORAGE W SI  7/13/2019  ID INS NEW/RPLCMT PRM PM W/TRANSV ELTRD ATRIAL&VENT N/A 7/16/2019 INSERT PPM DUAL performed [place] [address], [facility]/s Dr CATH LAB Allergies: 
No Known Allergies Home Medications: 
Prior to Admission Medications Prescriptions Last Dose Informant Patient Reported? Taking?  
aspirin delayed-release 81 mg tablet 7/31/2019 at Unknown time  Yes Yes Sig: Take  by mouth daily. pantoprazole (PROTONIX) 40 mg tablet 7/31/2019 at Unknown time  No Yes Sig: Take 1 Tab by mouth daily. therapeutic multivitamin (THERAGRAN) tablet 7/31/2019 at Unknown time  Yes Yes Sig: Take 1 Tab by mouth daily. Facility-Administered Medications: None Hospital Medications: 
Current Facility-Administered Medications Medication Dose Route Frequency  sodium chloride 0.9 % bolus infusion 1,000 mL  1,000 mL IntraVENous NOW  sodium chloride (NS) flush 5-40 mL  5-40 mL IntraVENous Q8H  
 sodium chloride (NS) flush 5-40 mL  5-40 mL IntraVENous PRN  
 acetaminophen (TYLENOL) tablet 650 mg  650 mg Oral Q4H PRN Current Outpatient Medications Medication Sig  pantoprazole (PROTONIX) 40 mg tablet Take 1 Tab by mouth daily.  therapeutic multivitamin (THERAGRAN) tablet Take 1 Tab by mouth daily.  aspirin delayed-release 81 mg tablet Take  by mouth daily. Social History: 
Social History Tobacco Use  Smoking status: Former Smoker Start date: 1966 Last attempt to quit: 1984 Years since quittin.7  Smokeless tobacco: Never Used Substance Use Topics  Alcohol use: Yes Alcohol/week: 11.7 standard drinks Types: 14 drink(s) per week Family History: 
Family History Problem Relation Age of Onset  Diabetes Mother  Hypertension Mother  Heart Disease Mother  Asthma Mother Amada Espinoza Arthritis-osteo Mother  Diabetes Father  Hypertension Father  Asthma Father  Arthritis-osteo Father  Diabetes Sister  Gout Sister  Asthma Brother Review of Systems: 
Constitutional: negative fever, negative chills, negative weight loss Eyes:   negative visual changes ENT:   negative sore throat, tongue or lip swelling Respiratory:  negative cough, negative dyspnea Cards:  negative for chest pain, palpitations, lower extremity edema GI:   See HPI 
:  negative for frequency, dysuria Integument:  negative for rash and pruritus Heme:  negative for easy bruising and gum/nose bleeding Musculoskeletal:negative for myalgias, back pain and muscle weakness Neuro:  negative for headaches, dizziness Psych: negative for feelings of anxiety, depression Objective:  
 
Patient Vitals for the past 8 hrs: 
 BP Temp Pulse Resp SpO2  
19 0800 129/50  81 24 100 % 19 0700 106/48  74 17 100 % 19 0608 108/42 98 °F (36.7 °C) 80 13 100 %  0701 -  1900 In: 1000 [I.V.:1000] Out: - No intake/output data recorded. EXAM:   
 CONST:  Pleasant male lying in bed, no acute distress, dried blood on t-shirt NEURO:  Alert and oriented x 3 HEENT: EOMI, no scleral icterus LUNGS: CTA bilaterally anteriorly CARD:  S1 S2 
 ABD:  Soft, non distended, no tenderness, no rebound, no guarding. + Bowel sounds. EXT:  Warm PSYCH: Not anxious or agitated Data Review Recent Labs 19 
8356 WBC 9.3 HGB 7.9*  
HCT 27.0*  
 Recent Labs 08/01/19 
5074   
K 3.8  CO2 25 BUN 21* CREA 5.64* * CA 8.9 Recent Labs 08/01/19 
6517 SGOT 31  
  
TP 6.3* ALB 2.9*  
GLOB 3.4 Recent Labs 08/01/19 
8922 INR 1.1 PTP 10.9 Assessment:  
· GI bleed: hematemesis. Hgb 7.9 (7.6 on 7/29), platelets 248, INR 1.1.  
· History of large duodenal ulcer with visible vessel, clipped, status post IR embolization (7/2019) Patient Active Problem List  
Diagnosis Code  
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  
 History of wrist fracture, Right Z87.81  
 Overweight (BMI 25.0-29. 9) E66.3  Upper GI bleed K92.2 Plan:  
· NPO · PPI BID · Trend CBC and transfuse as necessary · EGD today with Dr. Joann Johnson. Risks of the procedure to include (but not limited to) anesthesia, bleeding, infection, and perforation were discussed. Patient understands and is in agreement with the plan. Please verify consent has been obtained. · Patient was discussed with and will be seen by Dr. Santosh Liang · Thank you for allowing me to participate in care of [facility] Signed By: Nan Cr [place]   
 8/1/2019  8:55 AM 
  
 
Gastroenterology Attending Physician attestation statement and comments. This patient was seen and examined by me in a face-to-face visit today. I reviewed the medical record including lab work, imaging and other provider notes. I confirmed the history as described above. I spoke to the patient, reviewed the medical record including lab work, imaging and other provider notes. I discussed this case in detail with Yani COTTON. I formulated an  assessment of this patient and developed a treatment plan. I agree with the above consultation note. I agree with the history, exam and assessment and plan as outlined in the note. I would like to add the following: Abd: normoactive BS, nt, nd, no rebound/guarding. Brisk UGIB. Concern for recurrent duodenal ulcer bleed. Emergent EGD today. Dr. Chelly Peguero

## 2019-08-01 NOTE — PROCEDURES
[address] [facility] Mary Hilario. Genna Lr M.D. 
[address] [place] [address] 
[phone] Esophagogastroduodenoscopy (EGD) Procedure Note NAME: Geronimo Burns :  1945 MRN:  [id number] Indications: UGIB : Yobany Amaya MD 
 
Referring Provider:  Jj Simon DO Medicine:  MAC anesthesia Procedure Details: After informed consent was obtained with all risks and benefits of the procedure explained and preprocedure exam completed, the patient was placed in the left lateral decubitus position. Universal protocol for patient identification was performed and documented in the nursing notes. Throughout the procedure, the patient's blood pressure was monitored at least every five minutes; pulse, and oxygen saturations were monitored continuously. All vital signs were documented in the nursing notes. The endoscope was inserted into the mouth and advanced under direct vision to second portion of the duodenum. A careful inspection was made as the gastroscope was withdrawn, including a retroflexed view of the proximal stomach; findings and interventions are described below. Findings:  
Esophagus:normal 
Stomach: poor visualization of the fundus due to large clot, ovesco clip found in the fundus Duodenum: at least 15 mm ulceration with ovesco clip at the site in the proximal duodenal bulb-probable cause of UGIB s/p hemospray to the area Interventions:  
 hemospray in the duodenum Specimens: * No specimens in log * EBL: None Complications: No immediate complications Impression: 
-See post-procedure diagnoses. Probable repeat UGIB from duodenal ulcer s/p hemospray Recommendations: CLD Serial CBCs, transfuse PRN General Surgery consultation Will need elective EGD once acute issues are dealt with to remove the Ovesco clip in his stomach Signed by:  Yobany Amaya MD 
       2019 12:59 PM

## 2019-08-02 NOTE — PROGRESS NOTES
Nephrology Progress Note Clari Steel Date of Admission : 8/1/2019 CC:  Follow up for KAYLEEN Assessment and Plan KAYLEEN - HD dependent - 2/2 ATN from hemorrhagic shock during his last admission 
- HD TTS at [facility] 
- HD tomorrow 
  
CKD Stage III : 
- baseline Cr 1.2-1.3 mg/dl prior to KAYLEEN 
  
History of Hemorraghic Shock from Upper GI bleed last admission - S/P emergent clipping by EDG 7/13 
- S/P embolization of gastric/diuodenal artery 7/12 in IR 
- s/p clip and hemospray on 8/1 
- general surgery following Blood loss anemia: 
- 2 units of PRBCs overnight 
- transfuse PRN w/ HD  
  
HTN: 
- BP stable 
  
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 
  
Chronic Alcoholism Interval History: 
Seen and examined. EGD findings noted.  hgb dropped to 5.7 overnight and required 2 units of blood. HD ok yesterday. No cp or sob. Still w/ melena today. Current Medications: all current  Medications have been eviewed in [facility] Review of Systems: Pertinent items are noted in HPI. Objective: 
Vitals:   
Vitals:  
 08/02/19 0800 08/02/19 0900 08/02/19 0903 08/02/19 1159 BP: 113/57 (!) 74/39 101/51 126/45 Pulse: 70 70 70 70 Resp: 15 15 14 16 Temp: 98.8 °F (37.1 °C)   98.4 °F (36.9 °C) SpO2: 100%  100% 100% Weight:      
 
Intake and Output: 
No intake/output data recorded. 07/31 1901 - 08/02 0700 In: 0551 [I.V.:1000] Out: 1300 Physical Examination: 
General: NAD,Conversant Neck:  Supple, no mass Resp:  Lungs CTA B/L, no wheezing , normal respiratory effort CV:  RRR,  no murmur or rub,no LE edema GI:  Soft, NT, + Bowel sounds, no hepatosplenomegaly Neurologic:  Non focal 
Psych:             AAO x 3 appropriate affect Skin:  No Rash :  No callejas []    High complexity decision making was performed 
[]    Patient is at high-risk of decompensation with multiple organ involvement Lab Data Personally Reviewed: I have reviewed all the pertinent labs, microbiology data and radiology studies during assessment. Recent Labs 08/02/19 1951 08/01/19 
0132 * 140  
K 3.4* 3.8 * 104 CO2 29 25 GLU 84 135* BUN 22* 21* CREA 3.89* 5.64* CA 8.3* 8.9 ALB  --  2.9*  
SGOT  --  31 ALT  --  12 INR  --  1.1 Recent Labs 08/02/19 
1259 08/02/19 
9719 08/01/19 
1927 08/01/19 
5362 WBC  --  5.8  --  9.3 HGB 7.6* 7.4* 5.7* 7.9*  
HCT 23.3* 23.1* 19.0* 27.0*  
PLT  --  175  --  275 No results found for: SDES Lab Results Component Value Date/Time Culture result: NO GROWTH AFTER 23 HOURS 08/01/2019 08:36 AM  
 Culture result: NO GROWTH 4 DAYS 07/05/2019 12:25 PM  
 Culture result: NO GROWTH 4 DAYS 07/05/2019 12:25 PM  
 
Recent Results (from the past 24 hour(s)) HGB & HCT Collection Time: 08/01/19  7:27 PM  
Result Value Ref Range HGB 5.7 (LL) 12.1 - 17.0 g/dL HCT 19.0 (L) 36.6 - 50.3 % METABOLIC PANEL, BASIC Collection Time: 08/02/19  8:44 AM  
Result Value Ref Range Sodium 146 (H) 136 - 145 mmol/L Potassium 3.4 (L) 3.5 - 5.1 mmol/L Chloride 111 (H) 97 - 108 mmol/L  
 CO2 29 21 - 32 mmol/L Anion gap 6 5 - 15 mmol/L Glucose 84 65 - 100 mg/dL BUN 22 (H) 6 - 20 MG/DL Creatinine 3.89 (H) 0.70 - 1.30 MG/DL  
 BUN/Creatinine ratio 6 (L) 12 - 20 GFR est AA 19 (L) >60 ml/min/1.73m2 GFR est non-AA 15 (L) >60 ml/min/1.73m2 Calcium 8.3 (L) 8.5 - 10.1 MG/DL  
CBC W/O DIFF Collection Time: 08/02/19  8:44 AM  
Result Value Ref Range WBC 5.8 4.1 - 11.1 K/uL  
 RBC 2.60 (L) 4.10 - 5.70 M/uL HGB 7.4 (L) 12.1 - 17.0 g/dL HCT 23.1 (L) 36.6 - 50.3 % MCV 88.8 80.0 - 99.0 FL  
 MCH 28.5 26.0 - 34.0 PG  
 MCHC 32.0 30.0 - 36.5 g/dL  
 RDW 15.1 (H) 11.5 - 14.5 % PLATELET 213 563 - 485 K/uL MPV 10.1 8.9 - 12.9 FL  
 NRBC 0.0 0  WBC ABSOLUTE NRBC 0.00 0.00 - 0.01 K/uL HGB & HCT Collection Time: 08/02/19 12:59 PM  
Result Value Ref Range HGB 7.6 (L) 12.1 - 17.0 g/dL HCT 23.3 (L) 36.6 - 50.3 % Betsey Silva MD 
[address] [place] [address], [address] [facility] Phone - [phone] Fax - [phone] 
[email or web address]. [email or web address]

## 2019-08-02 NOTE — PERIOP NOTES
TISSEEL 10ML WAS GIVEN TO THE STERILE FIELD TO BE USED BY MD DURING PROCEDURE 
LOT: [id number] EXP: 2/28/2021

## 2019-08-02 NOTE — PROGRESS NOTES
Hospitalist Progress Note Alex Ramos MD 
Answering service: [phone] OR [phone] from in house phone Date of Service:  2019 NAME:  Gema Cui :  1945 MRN:  [id number] Admission Summary:  
Mr Shaun Kidd is a 73 y/o AAM with MHx of HTN, ESRD on HD(MWF), recent GI bleed s/p clip and embolization and SSS s/p pacemaker presents to the hospital with c/o hematemesis. Patient was recently discharged from hospital after having hemorrhagic shock from upper GI bleed s/ps/p clip and embolization. He also developed renal failure on HD. Interval history / Subjective:  
Patient seen and examined this morning. Family at bedside. He is still blood in his stool. He denied nausea, vomiting, hematemesis or abdominal pain. Vital signs stable. Hgb up post transfusion Assessment & Plan: # Upper GI bleed due to duodenal ulcer With hx of hemorrhagic shock on last admission - s/p EGD which showed 15 mm ulceration with ovesco clip at the site proximal duodenal bulb treated with hemospray  
- Keep NPO  
- continue with gentle hydration due to hx of CHF/dialysis - continue with IV PPI BID  
- monitor H/H q4hrly, transfuse if hgb<7 
- GI and Surgery following  
  
# Chronic anemia from recent GI loss Has hx of 6u PRBC transfusion on last admission  
- hgb dropped to 5.4 
- s/p 2u PRBC transfusion last night  
- monitor H/H q4hrly, transfuse if hgb<7 
  
# KAYLEEN/ESRD on HD  
- Nephrology following, in house HD  
  
# HTN: hold home meds due to episodic hypotension # Chronic systolic heart failure: NYHA l-ll on presentation- no acute exacerbation # SSS s/p pacemaker  
  
DVT prop: SCDs GI prop: PPI 
  
Code: Full Dispo: home once stable Function status: walks with a cane/[facility] Problems  Date Reviewed: 2019 Codes Class Noted POA Upper GI bleed ICD-10-CM: K92.2 ICD-9-CM: 578.9  8/1/2019 Unknown Review of Systems:  
Pertinent positive mentioned in interval hx/HPI. Other systems reviewed and negative Vital Signs:  
 Last 24hrs VS reviewed since prior progress note. Most recent are: 
Visit Vitals /45 (BP 1 Location: Left arm, BP Patient Position: At rest) Pulse 70 Temp 98.4 °F (36.9 °C) Resp 16 Wt 59.3 kg (130 lb 12.8 oz) SpO2 100% BMI 19.89 kg/m² Intake/Output Summary (Last 24 hours) at 8/2/2019 1430 Last data filed at 8/2/2019 0900 Gross per 24 hour Intake 620 ml Output 1000 ml Net -380 ml Physical Examination:  
 
 
     
Constitutional:  No acute distress, pale looking ENT:  Oral mucous dry, oropharynx benign. Neck supple, Resp:  CTA bilaterally. No wheezing/rhonchi/rales. No accessory muscle use CV:  Regular rhythm, normal rate, no murmurs, gallops, rubs GI:  Soft, non distended, non tender. normoactive bowel sounds, no hepatosplenomegaly Musculoskeletal:  No edema, warm, 2+ pulses throughout Neurologic:  Moves all extremities. AAOx3, CN II-XII reviewed Data Review:  
I personally reviewed labs and imaging Labs:  
 
Recent Labs 08/02/19 
1259 08/02/19 
3264  08/01/19 
6981 WBC  --  5.8  --  9.3 HGB 7.6* 7.4*   < > 7.9*  
HCT 23.3* 23.1*   < > 27.0*  
PLT  --  175  --  275  
 < > = values in this interval not displayed. Recent Labs 08/02/19 
8533 08/01/19 
5598 * 140  
K 3.4* 3.8 * 104 CO2 29 25 BUN 22* 21* CREA 3.89* 5.64* GLU 84 135* CA 8.3* 8.9 Recent Labs 08/01/19 
5092 SGOT 31 ALT 12  
 TBILI 0.3 TP 6.3* ALB 2.9*  
GLOB 3.4 Recent Labs 08/01/19 
4455 INR 1.1 PTP 10.9 No results for input(s): FE, TIBC, PSAT, FERR in the last 72 hours. No results found for: FOL, RBCF No results for input(s): PH, PCO2, PO2 in the last 72 hours. No results for input(s): CPK, CKNDX, TROIQ in the last 72 hours. No lab exists for component: CPKMB Lab Results Component Value Date/Time Cholesterol, total 134 07/06/2019 03:50 AM  
 HDL Cholesterol 56 07/06/2019 03:50 AM  
 LDL, calculated 68 07/06/2019 03:50 AM  
 Triglyceride 50 07/06/2019 03:50 AM  
 CHOL/HDL Ratio 2.4 07/06/2019 03:50 AM  
 
Lab Results Component Value Date/Time Glucose (POC) 83 07/25/2019 04:06 PM  
 Glucose (POC) 82 07/17/2019 05:13 PM  
 Glucose (POC) 92 07/17/2019 12:51 PM  
 Glucose (POC) 175 (H) 07/11/2019 07:56 PM  
 Glucose (POC) 87 07/05/2019 12:02 PM  
 
No results found for: COLOR, APPRN, SPGRU, REFSG, ANGELI, PROTU, GLUCU, [place], BILU, UROU, TREVOR, LEUKU, GLUKE, EPSU, BACTU, WBCU, RBCU, CASTS, UCRY Medications Reviewed:  
 
Current Facility-Administered Medications Medication Dose Route Frequency  sucralfate (CARAFATE) tablet 1 g  1 g Oral AC&HS  sodium chloride (NS) flush 5-40 mL  5-40 mL IntraVENous Q8H  
 sodium chloride (NS) flush 5-40 mL  5-40 mL IntraVENous PRN  
 acetaminophen (TYLENOL) tablet 650 mg  650 mg Oral Q4H PRN  
 sodium chloride (NS) flush 5-40 mL  5-40 mL IntraVENous Q8H  
 sodium chloride (NS) flush 5-40 mL  5-40 mL IntraVENous PRN  pantoprazole (PROTONIX) 40 mg in sodium chloride 0.9% 10 mL injection  40 mg IntraVENous Q12H  
 0.9% sodium chloride infusion 250 mL  250 mL IntraVENous PRN  
 
______________________________________________________________________ EXPECTED LENGTH OF STAY: 4d 12h ACTUAL LENGTH OF STAY:          1 Hollie Layton MD  
Patient has given Verbal permission to discuss medical care with  
persons present in the room and and also with contact as listed on face sheet.

## 2019-08-02 NOTE — ROUTINE PROCESS
Bedside and Verbal shift change report given to Sony Millan RN (oncoming nurse) by Kavitha Unger RN (offgoing nurse). Report included the following information SBAR, Kardex, Intake/Output, MAR, Recent Results and Cardiac Rhythm PACED, NSR at times (with no pacer spikes). Robin Cornell

## 2019-08-02 NOTE — ANESTHESIA PREPROCEDURE EVALUATION
Relevant Problems No relevant active problems Anesthetic History No history of anesthetic complications Review of Systems / Medical History Patient summary reviewed, nursing notes reviewed and pertinent labs reviewed Pulmonary Within defined limits Neuro/Psych Within defined limits Cardiovascular Hypertension: well controlled Pacemaker GI/Hepatic/Renal 
  
 
 
Renal disease: dialysis and ESRD Endo/Other Within defined limits Pertinent negatives: No morbid obesity Other Findings Physical Exam 
 
Airway Mallampati: I 
TM Distance: > 6 cm Neck ROM: normal range of motion Mouth opening: Normal 
 
 Cardiovascular Regular rate and rhythm,  S1 and S2 normal,  no murmur, click, rub, or gallop Dental 
 
Dentition: Edentulous Pulmonary Breath sounds clear to auscultation Abdominal 
GI exam deferred Other Findings Anesthetic Plan ASA: 4 Anesthesia type: general 
 
Monitoring Plan: Arterial line and CVP Induction: Intravenous Anesthetic plan and risks discussed with: Patient

## 2019-08-02 NOTE — CONSULTS
[facility] at [facility] 
Inpatient Consultation Admit Date: 8/1/2019 Reason for Consultation: UGI bleeding - duodenal ulcer HPI: 
Michelle Cutler is a 72 y.o. male w/ PMHx as outlined below whom we are asked to see in consultation by Dr. Lennie Roberts for the above complaint. Pt was admitted on 8/1 w/ hematemesis and weakness. Pt was in the hospital in early July w/ UGI bleeding and was diagnosed w/ a duodenal ulcer. He had IR embolization and EGD w/ clipping of ulcer. He also had resp failure requiring intubation and pressors; and a PPM placed for SSS. He also had a partial SBO d/t a small, reducible umbilical hernia. Gen surgery was consulted and Dr Zen Hitchcock followed the patient. He had an EGD yesterday and the clip was noted on a 15mm ulcer which was believed to be the culprit of this episode of bleeding. A hemospray was placed on the area. He had a hgb of 5.7 last evening (post procedure) and rec'd 2U of blood. It is reported that he had 4 black stools yesterday and one this am.  The patient denies abd pain, n/v - no dizziness or lightheadness; H/H are scheduled every 4 hours. Patient Active Problem List  
 Diagnosis Date Noted  Upper GI bleed 08/01/2019  Vitamin D deficiency 11/18/2014  Arthritis of wrist, right, degenerative 11/18/2014  History of wrist fracture, Right 11/18/2014  Overweight (BMI 25.0-29.9) 11/18/2014  
 HTN (hypertension) 11/11/2014  Blurred vision, bilateral 11/11/2014 Past Medical History:  
Diagnosis Date  Arthritic-like pain  Hypertension Past Surgical History:  
Procedure Laterality Date  HX PACEMAKER  07/16/2019  
 medtronic dual chamber VVIR   IR INSERT NON TUNL CVC OVER 5 YRS  7/13/2019  IR INSERT TUNL CVC W/O PORT OVER 5 YR  7/25/2019  IR OCCL TXCATH HEMMORAGE W SI  7/13/2019  MN INS NEW/RPLCMT PRM PM W/TRANSV ELTRD ATRIAL&VENT N/A 7/16/2019 INSERT PPM DUAL performed by Malka Freeman MD at [address], [facility] Social History Tobacco Use  Smoking status: Former Smoker Start date: 1966 Last attempt to quit: 1984 Years since quittin.7  Smokeless tobacco: Never Used Substance Use Topics  Alcohol use: Yes Alcohol/week: 11.7 standard drinks Types: 14 drink(s) per week Family History Problem Relation Age of Onset  Diabetes Mother  Hypertension Mother  Heart Disease Mother  Asthma Mother 26 [facility] Arthritis-osteo Mother  Diabetes Father  Hypertension Father  Asthma Father  Arthritis-osteo Father  Diabetes Sister  Gout Sister  Asthma Brother Prior to Admission medications Medication Sig Start Date End Date Taking? Authorizing Provider  
pantoprazole (PROTONIX) 40 mg tablet Take 1 Tab by mouth daily. 19  Yes Yumiko Rae MD  
therapeutic multivitamin [facility]) tablet Take 1 Tab by mouth daily. Yes Provider, Historical  
aspirin delayed-release 81 mg tablet Take  by mouth daily. Yes Provider, Historical  
 
Current Facility-Administered Medications Medication Dose Route Frequency  sodium chloride (NS) flush 5-40 mL  5-40 mL IntraVENous Q8H  
 sodium chloride (NS) flush 5-40 mL  5-40 mL IntraVENous PRN  
 acetaminophen (TYLENOL) tablet 650 mg  650 mg Oral Q4H PRN  
 sodium chloride (NS) flush 5-40 mL  5-40 mL IntraVENous Q8H  
 sodium chloride (NS) flush 5-40 mL  5-40 mL IntraVENous PRN  pantoprazole (PROTONIX) 40 mg in sodium chloride 0.9% 10 mL injection  40 mg IntraVENous Q12H  
 0.9% sodium chloride infusion 250 mL  250 mL IntraVENous PRN No Known Allergies Subjective:  
 
Review of Systems: A comprehensive review of systems was negative except for that written in the History of Present Illness. Objective:  
 
Blood pressure 101/51, pulse 70, temperature 98.8 °F (37.1 °C), resp.  rate 14, weight 130 lb 12.8 oz (59.3 kg), SpO2 100 %. Temp (24hrs), Av.3 °F (37.4 °C), Min:98.6 °F (37 °C), Max:100.2 °F (37.9 °C) Recent Labs 19 
4828 19 
1927 19 
6815 WBC 5.8  --  9.3 HGB 7.4* 5.7* 7.9*  
HCT 23.1* 19.0* 27.0*  
  --  275 Recent Labs 19 
0346 19 
1926 * 140  
K 3.4* 3.8 * 104 CO2 29 25 GLU 84 135* BUN 22* 21* CREA 3.89* 5.64* CA 8.3* 8.9 ALB  --  2.9* TBILI  --  0.3 SGOT  --  31 ALT  --  12 INR  --  1.1 No results for input(s): AML, LPSE in the last 72 hours. Intake/Output Summary (Last 24 hours) at 2019 1015 Last data filed at 2019 6027 Gross per 24 hour Intake 620 ml Output 1300 ml Net -680 ml  
 
 
_____________________ Physical Exam:  
 
General:  Alert, cooperative, no distress, appears stated age, answers some questions, most answered by son and daughter via the phone Eyes:   Sclera clear. Throat: Lips, mucosa, and tongue normal.  
Neck: Supple, symmetrical, trachea midline. Lungs:   Clear to auscultation bilaterally. Heart:  Regular rate and rhythm. Abdomen:   Normal BS, flat, Soft, non-tender. Small umbilical hernia felt; No organomegaly. Extremities: Extremities normal, atraumatic, no cyanosis or edema. Skin: Skin color, texture, turgor normal. No rashes or lesions. Assessment:  
Active Problems: 
  Upper GI bleed (2019) Plan:  
 
Cont to monitor H/H every 4 hours Dr Margaret Almendarez to see Thank you for allowing us to participate in the care of this patient. Total time spent with patient: 25 minutes. Signed By: Fletcher Adan NP 2019 I have independently examined the patient and have reviewed the chart. I agree with the above plan. The patient has a recurrent bleeding ulcer that appear to have stopped currently.   His recent hgb have been stable and his vitals are normal.  He will continue to have dark bloody BM till all of the blood passes. He will be NPO for today, start clears tomorrow if he is not showing any signs of bleeding. I do not think there is any more role for radiology to coil anything else. If he does rebleed he would need oversewing of the ulcer with pyloroplasty open but is not needed now, no resection of the ulcer or distal gastrectomy is recommended. He will stay on PPI BID and I started carafate QID. We will be following.  
 
Ludmila Gaming MD

## 2019-08-02 NOTE — PROGRESS NOTES
Spiritual Care Partner Volunteer visited patient in room 419 on 8.02.19. Documented by: : Rev. Piyush Almaguer. Juan Jose Pickard; [facility], to contact [phone] [address] call: [phone]

## 2019-08-02 NOTE — PROGRESS NOTES
[facility] 
[facility], [address] GI PROGRESS NOTE Nolan People, [address] office [phone] NP in-hospital cell phone M-F until 4:30 After 5pm or on weekends, please call  for physician on call NAME: Katrina Andersen :  1945 MRN:  [id number] Subjective: He reports 2-3 episodes of melena today. No abdominal pain or nausea. Objective: VITALS:  
Last 24hrs VS reviewed since prior progress note. Most recent are: 
Visit Vitals /45 (BP 1 Location: Left arm, BP Patient Position: At rest) Pulse 70 Temp 98.4 °F (36.9 °C) Resp 16 Wt 59.3 kg (130 lb 12.8 oz) SpO2 100% BMI 19.89 kg/m² PHYSICAL EXAM: 
General: Cooperative, no acute distress   
Neurologic:  Alert and oriented X 3. HEENT: EOMI, no scleral icterus Lungs:  CTA bilaterally. No wheezing Heart:  S1 S2 Abdomen: Soft, non-distended, no tenderness. +Bowel sounds Extremities: No edema Psych:   Good insight. Not anxious or agitated. Lab Data Reviewed:  
 
Recent Results (from the past 24 hour(s)) HGB & HCT Collection Time: 19  7:27 PM  
Result Value Ref Range HGB 5.7 (LL) 12.1 - 17.0 g/dL HCT 19.0 (L) 36.6 - 50.3 % METABOLIC PANEL, BASIC Collection Time: 19  8:44 AM  
Result Value Ref Range Sodium 146 (H) 136 - 145 mmol/L Potassium 3.4 (L) 3.5 - 5.1 mmol/L Chloride 111 (H) 97 - 108 mmol/L  
 CO2 29 21 - 32 mmol/L Anion gap 6 5 - 15 mmol/L Glucose 84 65 - 100 mg/dL BUN 22 (H) 6 - 20 MG/DL Creatinine 3.89 (H) 0.70 - 1.30 MG/DL  
 BUN/Creatinine ratio 6 (L) 12 - 20 GFR est AA 19 (L) >60 ml/min/1.73m2 GFR est non-AA 15 (L) >60 ml/min/1.73m2 Calcium 8.3 (L) 8.5 - 10.1 MG/DL  
CBC W/O DIFF Collection Time: 19  8:44 AM  
Result Value Ref Range WBC 5.8 4.1 - 11.1 K/uL  
 RBC 2.60 (L) 4.10 - 5.70 M/uL HGB 7.4 (L) 12.1 - 17.0 g/dL HCT 23.1 (L) 36.6 - 50.3 % MCV 88.8 80.0 - 99.0 FL  
 MCH 28.5 26.0 - 34.0 PG  
 MCHC 32.0 30.0 - 36.5 g/dL  
 RDW 15.1 (H) 11.5 - 14.5 % PLATELET 211 598 - 971 K/uL MPV 10.1 8.9 - 12.9 FL  
 NRBC 0.0 0  WBC ABSOLUTE NRBC 0.00 0.00 - 0.01 K/uL Assessment:  
· GI bleed: hematemesis. Hgb 7.4 status post 2 units pRBC's · Large duodenal ulcer with visible vessel, clipped, status post IR embolization (7/20/19). EGD 8/1/19: large clot in fundus, ovesco clip in fundus; 15 mm ulceration with ovesco clip at the site proximal duodenal bulb treated with hemospray Patient Active Problem List  
Diagnosis Code  
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  
 History of wrist fracture, Right Z87.81  
 Overweight (BMI 25.0-29. 9) E66.3  Upper GI bleed K92.2 Plan: · PPI and carafate · Monitor CBC, transfuse as necessary · Further plans per surgery Signed By: Avelino Kaur NP   
 8/2/2019  12:36 PM 
  
 
 This patient was seen and examined by me in a face-to-face visit today. I reviewed the medical record including lab work, imaging and other provider notes. I confirmed the interval history as described above. I spoke to the patient, discussing our findings and plans. I discussed this case in detail with Noy GILLIS. I formulated an updated  assessment of this patient and guided our treatment plan. I agree with the above progress note. I agree with the history, exam and assessment and plan as outlined in the note. I would like to add the following:  
 
Abd: normoactive BS, nt, nd, no rebound/guarding. Recurrent hematemesis. Going down to OR. Dr. Cat Laura

## 2019-08-02 NOTE — PROGRESS NOTES
NUTRITION COMPLETE ASSESSMENT 
 
RECOMMENDATIONS:  
1. Advance diet to clear liquids tomorrow per GI notes. - recommend regular, low fiber, 2gm Na once cleared for solids 2. Add supplements as diet advanced: - Clears: Ensure Clear TID 
 - Fulls: Ensure Enlive BID (chocolate) 2. Follow sodium levels 3. Daily weights Interventions/Plan:  
Food/Nutrient Delivery:  (diet per GI) Commercial supplement(per GI) Nutrition Education:(low fiber) Assessment:  
Reason for Assessment: [x]BPA/MST Referral  
 
Diet: NPO Supplements: none Nutritionally Significant Medications: [x] Reviewed & Includes: protonix, carafate, D5 @ 50ml/hr Meal Intake:  
Patient Vitals for the past 100 hrs: 
 % Diet Eaten 08/02/19 0900 0 % Pre-Hospitalization: 
Usual Appetite: Good Diet at Home: regular, low salt Vitamins/Supplements: Yes(Ensure) Subjective: \"I was doing ok with eating. \" Wife with many questions regarding diet. Objective: 
Pt admitted for UGIB. PMHx: HTN, ESRD on HD(MWF), recent GI bleed s/p clip and embolization. UGI bleed this admit d/c duodenal ulcer. EGD yesterday with hemospray. Labs now stable. PPI and carafate rx. Possible diet advancement tomorrow noted. New to HD during admit last month. Sodium elevated today, D5 started. Pt and wife visited. Renal diet ed provided last admit to wife, discussed low fiber diet in-depth during this visit with handouts given. Pt agreeable to chocolate Ensure (K+ and Phos WNL). See supplement recommendations for as diet advanced. Wt stable since last admit but muscle/fat wasting observed. Only 84% IBW Wt Readings from Last 10 Encounters:  
08/02/19 59.3 kg (130 lb 12.8 oz)  
07/29/19 60.2 kg (132 lb 11.5 oz)  
12/16/14 81.2 kg (179 lb)  
11/18/14 82.6 kg (182 lb)  
11/11/14 85.6 kg (188 lb 12.8 oz) Patient meets criteria for Severe Chronic Protein Calorie Malnutrition as evidenced by:  
ASPEN Malnutrition Criteria Acute Illness, Chronic Illness, or Social/Enviornmental: Chronic illness Body Fat: Severe Muscle Mass: Severe ASPEN Malnutrition Score - Chronic Illness: 12 
Chronic Illness - Malnutrition Diagnosis: Severe malnutrition. Will follow for diet advancement per GI, PO intake, wt trends, and additional education needs PRN. Estimated Nutrition Needs:  
Kcals/day: 1500 Kcals/day(1500-1800kcal) Protein: 78 g(78-90g (1.3-1.5g/kg)) Fluid: 1500 ml(on HD) Based On: Kcal/kg - specify (Comment)(25-30kcal/kg on HD) Weight Used: Actual wt(60kg) Pt expected to meet estimated nutrient needs:  []   Yes     []  No [x] Unable to predict at this time Nutrition Diagnosis:  
1. Underweight(Increased protein/energy needs ) related to ESRD as evidenced by on HD; muscle/fat wasting; 84% IBW 
 
2. Inadequate oral intake related to altered GI fx as evidenced by UGI bleed; NPO status Goals:   
 Diet advancement and tolerance of at least full liquids with supplements in 2-3 days; wt maintenance/gain Monitoring & Evaluation: - Total energy intake, Liquid meal replacement, Protein intake - Weight/weight change, GI, Electrolyte and renal profile Previous Nutrition Goals Met:   N/A Previous Recommendations:    N/A Education & Discharge Needs: 
 [] None Identified 
 [x] Identified and addressed  
 [] Participated in care plan, discharge planning, and/or interdisciplinary rounds Cultural, Latter day and ethnic food preferences identified: None Skin Integrity: [x]Intact  []Other Edema: [x]None []Other Last BM: 8/2 Food Allergies: []None []Other Diet Restrictions: Cultural/Episcopalian Preference(s): None Anthropometrics:   
Weight Loss Metrics 8/2/2019 7/29/2019 12/16/2014 11/18/2014 11/11/2014 Today's Wt 130 lb 12.8 oz 132 lb 11.5 oz 179 lb 182 lb 188 lb 12.8 oz BMI 19.89 kg/m2 20.18 kg/m2 27.22 kg/m2 27.68 kg/m2 28.71 kg/m2 Weight Source: Bed Height: 5' 8\" (172.7 cm),   
 Body mass index is 19.89 kg/m². IBW : 69.9 kg (154 lb), % IBW (Calculated): 84.94 % 
 ,   
 
Labs:   
Lab Results Component Value Date/Time Sodium 146 (H) 08/02/2019 08:44 AM  
 Potassium 3.4 (L) 08/02/2019 08:44 AM  
 Chloride 111 (H) 08/02/2019 08:44 AM  
 CO2 29 08/02/2019 08:44 AM  
 Glucose 84 08/02/2019 08:44 AM  
 BUN 22 (H) 08/02/2019 08:44 AM  
 Creatinine 3.89 (H) 08/02/2019 08:44 AM  
 Calcium 8.3 (L) 08/02/2019 08:44 AM  
 Magnesium 1.8 07/29/2019 03:22 AM  
 Phosphorus 4.3 07/29/2019 03:22 AM  
 Albumin 2.9 (L) 08/01/2019 06:15 AM  
 
Cristal Montaño RD [address] , Pager #[phone] or [phone]

## 2019-08-02 NOTE — PROGRESS NOTES
Problem: Falls - Risk of 
Goal: *Absence of Falls Description Document Kathya Ku Fall Risk and appropriate interventions in the flowsheet. Outcome: Progressing Towards Goal 
Note:  
Fall Risk Interventions: 
Mobility Interventions: Bed/chair exit alarm, Communicate number of staff needed for ambulation/transfer, PT Consult for mobility concerns, Patient to call before getting OOB Mentation Interventions: Adequate sleep, hydration, pain control, Bed/chair exit alarm, Door open when patient unattended, Toileting rounds, Update white board Elimination Interventions: Bed/chair exit alarm, Call light in reach, Patient to call for help with toileting needs, Stay With Me (per policy), Toilet paper/wipes in reach, Toileting schedule/hourly rounds, Urinal in reach Problem: Patient Education: Go to Patient Education Activity Goal: Patient/Family Education Outcome: Progressing Towards Goal 
  
Problem: Patient Education: Go to Patient Education Activity Goal: Patient/Family Education Outcome: Progressing Towards Goal 
  
Problem: Upper and Lower GI Bleed: Day 1 Goal: Activity/Safety Outcome: Progressing Towards Goal 
Goal: Consults, if ordered Outcome: Progressing Towards Goal 
Goal: Diagnostic Test/Procedures Outcome: Progressing Towards Goal 
Goal: Nutrition/Diet Outcome: Progressing Towards Goal 
Goal: Discharge Planning Outcome: Progressing Towards Goal 
Goal: Medications Outcome: Progressing Towards Goal 
Goal: Respiratory Outcome: Progressing Towards Goal 
Goal: Treatments/Interventions/Procedures Outcome: Progressing Towards Goal 
Goal: Psychosocial 
Outcome: Progressing Towards Goal 
Goal: *Optimal pain control at patient's stated goal 
Outcome: Progressing Towards Goal 
Goal: *Hemodynamically stable Outcome: Progressing Towards Goal 
Goal: *Demonstrates progressive activity Outcome: Progressing Towards Goal 
  
Problem: Chronic Renal Failure Goal: *Fluid and electrolytes stabilized Outcome: Progressing Towards Goal

## 2019-08-02 NOTE — PROGRESS NOTES
TRANSFER - IN REPORT: 
 
Verbal report received from DEIDRARN(name) on Dana Jay  being received from [facility] 
(unit) for ordered procedure Report consisted of patients Situation, Background, Assessment and  
Recommendations(SBAR). Information from the following report(s) SBAR, Kardex, Intake/Output, MAR, Recent Results and Pre Procedure Checklist was reviewed with the receiving nurse. Opportunity for questions and clarification was provided. Assessment completed upon patients arrival to unit and care assumed.

## 2019-08-02 NOTE — PROGRESS NOTES
Post Fall Documentation Dora Richardson witnessed/unwitnessed fall occurred on 08/02/2019 (Date) at 36 (Time). The answers to the following questions summarize the fall: In the patient's own words,: 
· What were you attempting to do when you fell? Returning to bed from restroom. · Do you know why you fell? Felt dizzy and lightheaded · Do you have any pain/discomfort or any other complaints? \"No\" · Which part of your body made contact with the floor or other object? Jett Starks Nurse: 
? Was this an assisted fall? yes ? Was fall witnessed? Yes     By Whom? Wife and daughter ? If witnessed, what part of the body made contact with the floor or other object? Jett Starks ? Patients mental status after the fall/when found: Alert and oriented ? Any apparent injury:  No apparent injury ? Immediate interventions for injury/suspected injury? No interventions needed ? Patient assisted back to bed? Assist X2 
? Name of provider notified and time, any comments? Dr. Niels Silveira @ [phone], Dr. Bernardo Jacobson @ [phone]      
? Name of family member notified and time: Daughter Brandt Massey) and wife Aunrody Lucas) at bedside at time of fall. Immediate VS and physical assessment documented in flow sheets. Neuro assessment every hour x 4 (for potential head injury or unwitnessed fall) documented in flow sheets. Layla Perez [phone]: Staff notified primary nurse that patient fell returning to bed from using the restroom. While patient sitting on the floor pt began vomitting bright red blood with large clots. Skin assessed no injuries note to skin and pt reports no pain or discomfort. Pt daughter assisted pt with fall to the grown. Charge nurse and nursing supervisor made aware. 1635: Dr. Niels Silveira notified and at the bedside, orders received. 1640: Dr. Bernardo Jacobson stat paged [phone]: Dr. Bernardo Jacobson at bedside. 1737: Pt transported off unit to OR. Problem: Upper and Lower GI Bleed: Day 1 Goal: Consults, if ordered Outcome: Progressing Towards Goal 
Note:  
General Surgeon consult called and following patient. Problem: Pressure Injury - Risk of 
Goal: *Prevention of pressure injury Description Document Eugenio Scale and appropriate interventions in the flowsheet. Outcome: Progressing Towards Goal 
Note:  
Pressure Injury Interventions: Activity Interventions: Assess need for specialty bed, Increase time out of bed, Pressure redistribution bed/mattress(bed type), PT/OT evaluation Mobility Interventions: Assess need for specialty bed, Float heels, HOB 30 degrees or less, Pressure redistribution bed/mattress (bed type), PT/OT evaluation, Turn and reposition approx. every two hours(pillow and wedges) Nutrition Interventions: Discuss nutritional consult with provider, Document food/fluid/supplement intake, Offer support with meals,snacks and hydration Friction and Shear Interventions: Apply protective barrier, creams and emollients, Foam dressings/transparent film/skin sealants, HOB 30 degrees or less, Minimize layers, Sit at 90-degree angle, Transferring/repositioning devices, Transfer aides:transfer board/Raul lift/ceiling lift Problem: Falls - Risk of 
Goal: *Absence of Falls Description Document Loyde Becky Fall Risk and appropriate interventions in the flowsheet. Outcome: Not Progressing Towards Goal 
Note:  
Fall Risk Interventions: 
Mobility Interventions: Assess mobility with egress test, Bed/chair exit alarm, Communicate number of staff needed for ambulation/transfer, OT consult for ADLs, Patient to call before getting OOB, PT Consult for mobility concerns, Strengthening exercises (ROM-active/passive), Utilize walker, cane, or other assistive device, Utilize gait belt for transfers/ambulation, PT Consult for assist device competence Mentation Interventions: Adequate sleep, hydration, pain control, Door open when patient unattended, Bed/chair exit alarm, Evaluate medications/consider consulting pharmacy, Eyeglasses and hearing aids, Familiar objects from home, Family/sitter at bedside, Increase mobility, Gait belt with transfers/ambulation, More frequent rounding, Reorient patient, Self-releasing belt, Toileting rounds, Update white board, Room close to nurse's station Medication Interventions: Assess postural VS orthostatic hypotension, Evaluate medications/consider consulting pharmacy, Patient to call before getting OOB, Teach patient to arise slowly, Utilize gait belt for transfers/ambulation, Bed/chair exit alarm Elimination Interventions: Call light in reach, Bed/chair exit alarm, Elevated toilet seat, Patient to call for help with toileting needs, Toileting schedule/hourly rounds, Urinal in reach, Stay With Me (per policy), Toilet paper/wipes in reach Pt fell 8/2/19 at 1630 returning to bed from restroom using walker with daughter at pt side.

## 2019-08-02 NOTE — PROGRESS NOTES
Called to the bedside urgently for vomiting bright red blood. He appears to be rebleeding and will need oversewing of the bleeding duodenal ulcer in the OR today. I will discuss this with Dr Black Hameed. I have discussed the above procedure with the patient in detail. We reviewed the benefits and possible complications of the surgery which include bleeding, infection, damage to adjacent organs, venous thromboembolism, need for repeat surgery, death and other unforseen complications. The patient agreed to proceed with the surgery.

## 2019-08-03 NOTE — ANESTHESIA PROCEDURE NOTES
Central Line Placement Start time: 8/2/2019 9:10 PM 
End time: 8/2/2019 9:24 PM 
Performed by: Albin Garcia MD 
Authorized by: Albin Garcia MD  
 
Indications: vascular access, central pressure monitoring and need for vasopressors Preanesthetic Checklist: patient identified, risks and benefits discussed, anesthesia consent, site marked, patient being monitored and timeout performed Timeout Time: 21:10 Pre-procedure: All elements of maximal sterile barrier technique followed? Yes   
2% Chlorhexidine for cutaneous antisepsis, Hand hygiene performed prior to catheter insertion and Ultrasound guidance Sterile Ultrasound Technique followed?: Yes Procedure:  
Prep:  Chlorhexidine Location:  Internal jugular Orientation:  Left Patient position:  Trendelenburg Catheter type:  Quad lumen Catheter size:  8.5 Fr Catheter length:  16 cm Number of attempts:  1 Successful placement: Yes Assessment:  
Post-procedure:  Catheter secured and sterile dressing applied Assessment:  Blood return through all ports Insertion:  Uncomplicated Patient tolerance:  Patient tolerated the procedure well with no immediate complications

## 2019-08-03 NOTE — PROGRESS NOTES
0045: TRANSFER - IN REPORT: 
Verbal report received from Lizz Junior RN(name) on Yuko Leiva  being received from PACU(unit) for routine post - op Report consisted of patients Situation, Background, Assessment and Recommendations(SBAR). Information from the following report(s) SBAR, Kardex, Intake/Output and MAR was reviewed with the receiving nurse. Opportunity for questions and clarification was provided. Assessment completed upon patients arrival to unit and care assumed. Primary Nurse Xochilt Pulido RN and Cayden Manning RN performed a dual skin assessment on this patient No impairment noted Current Bed:  
Total Care SPORT (air with burgundy cover) Eugenio score is 19 
 
0100: Dr. Juan Antonio Tilley at bedside assessing pt. Orders received see mar. 0500: AM labs drawn and sent. 5: Daughter called unit and updated. 0730: Bedside shift change report given to CHRISTINA Wright (oncoming nurse) by Alicia Damon RN (offgoing nurse). Report included the following information SBAR, Kardex, MAR and Recent Results.

## 2019-08-03 NOTE — PROGRESS NOTES
0730 Bedside shift change report given to Jacqueline (oncoming nurse) by Blake Ch (offgoing nurse). Report included the following information SBAR, Kardex, Procedure Summary, Intake/Output, MAR, Recent Results and Cardiac Rhythm Paced. 0800 Pt awake and alert, oriented x4 but drowsy. Expecting HD within the hour. 1000 Dr. Sarah Knapp aware of HGB. Will await afternoon results before deciding to transfuse 
1200 HD complete, pt continues to have sbps 180s. Page out to hospitalist, orders received. 1400 Family at bedside, educated about need for blood and keeping NPO 
 
1930 Bedside shift change report given to Blake Ch (oncoming nurse) by Jacqueline (offgoing nurse). Report included the following information SBAR, Kardex, Procedure Summary, Intake/Output, MAR, Recent Results and Cardiac Rhythm Paced.

## 2019-08-03 NOTE — ANESTHESIA POSTPROCEDURE EVALUATION
Post-Anesthesia Evaluation and Assessment Patient: Geronimo Burns MRN: [id number]  SSN: [id number] YOB: 1945  Age: 72 y.o. Sex: male I have evaluated the patient and they are stable and ready for discharge from the PACU. Cardiovascular Function/Vital Signs Visit Vitals /58 Pulse 70 Temp (P) 36.3 °C (97.4 °F) Resp 17 Ht 5' 8\" (1.727 m) Wt 59.3 kg (130 lb 12.8 oz) SpO2 100% BMI 19.89 kg/m² Patient is status post General anesthesia for Procedure(s): LAPAROTOMY OVERSOW DUODENAL BLEED PYLOROPLASTY. Nausea/Vomiting: None Postoperative hydration reviewed and adequate. Pain: 
Pain Scale 1: (P) Adult Nonverbal Pain Scale (08/02/19 2046) Pain Intensity 1: 0 (08/02/19 0903) Managed Neurological Status: At baseline Mental Status, Level of Consciousness: Alert and  oriented to person, place, and time Pulmonary Status:  
O2 Device: (P) Ventilator (08/02/19 2046) Adequate oxygenation and airway patent Complications related to anesthesia: None Post-anesthesia assessment completed. No concerns Signed By: Aquilino Jones MD   
 August 2, 2019 Procedure(s): LAPAROTOMY OVERSOW DUODENAL BLEED PYLOROPLASTY. general 
 
<BSHSIANPOST> Vitals Value Taken Time /58 8/2/2019  9:15 PM  
Temp Pulse 70 8/2/2019  9:25 PM  
Resp 16 8/2/2019  9:25 PM  
SpO2 99 % 8/2/2019  9:25 PM  
Vitals shown include unvalidated device data.

## 2019-08-03 NOTE — PERIOP NOTES
2046 Pt received to PACU 5a. Placed on vent upon arrival per RT. Precedex infusion continued from OR per anesthesia. 2100 Dr. Crista Portillo at bedside. Received clarification of IVF orders to infuse LR @ 125ml/hr and repeat H&H Q 6H post-op. Notified of NGT output 100ml of bright red bloody drainage since admission to PACU. 
 
2110 LIJ quad lumen central line placed per Dr. Ana María Becerra. Pt tolerated procedure well. 2120 Pt opening eyes, TONEY and following simple commands. Precedex infusion stopped per Dr. Ana María Becerra orders. Orders received from Dr. Ana María Becerra to change pt to CPAP and extubate pt if tolerates CPAP trial well and meets criteria for extubation. RT notified of orders. 2140 Pt vent settings changed to CPAP per RT.  
 
2200 Pt tolerating cpap well. VSS. 02 sats 100% on 50%FI02. -425. Resp even and unlabored. 2215 Pt extubated per RT and placed on 02 NC @ 3L. Resp even and unlabored VSS. 02 sats 100%. 2241 PRBC transfusion started per orders. 275 [facility] Dr. Carolyn Celestin updated on pt condition. Notified pt extubated per anesthesia. VSS. Notified of lab results. Orders received. 2320 Family at bedside to visit with pt. Report called to CCU.  
 
2335 Pt sbp 80-90's. NGT output level remains at 150. Abd soft and non distended. Pt denies any complaints. PBRC's rate increased. Dr. Ana María Becerra called and notified of change in pt condition. New orders received to transfuse additional unit of PRBCs and keep 2 units ahead. Blood blank notified of orders and informed pt already has orders in place to keep 2 units ahead. 2340 Hemocue done per orders, results 10.6. Will send H&H to confirm. 2350 Stat H&H drawn and sent. Additional 100ml bright red blood noted from NGT. Dr. Ana María Becerra updated and new orders received to transfuse 10 pack of platelets and 2 units of FFP. 0005 Dr. Carolyn Celestin updated on pt condition. NGT output level up to 350ml (200ml out since 2335). Notified of orders received from anesthesia.  No new orders received at this time. 6450 Updated Dr. Joe Cuevas on pt condition. -130's. May transfer pt to CCU.  
 
0025 Transfusion completed, pt tolerated well. Next PRBC transfusion started per orders. CCU called and updated on changes since previous report. Notified of new orders received. 0034 Pt transferred to CCU 19 via bed with monitors in place, 02 @ 2l NC accompanied by RN.

## 2019-08-03 NOTE — DIALYSIS
[facility] Dialysis Team [place]  [phone] Vitals   Pre   Post   Assessment   Pre   Post    
Temp  Temp: 97.9 °F (36.6 °C) (08/03/19 0925)  98.7KG LOC  A+OX3 SLEEPY A+OX3 SLEEPY  
HR   Pulse (Heart Rate): 70(HD STARTED) (08/03/19 0925) 70 Lungs   CLEAR TO AUSCULTATION ON 2 LPM VIA NASAL CANNULA  CLEAR 
REMAINS ON 2 LPM VIA NASAL CANNULA SPO2 100% B/P   BP: 173/60 (08/03/19 0925) 179/53 Cardiac   PACED MONITORED  PACED MONITORED Resp   Resp Rate: 14 (08/03/19 0925) 14 Skin   WARM AND DRY  WARM AND DRY Pain level  Pain Intensity 1: 0 (08/03/19 0800) 0/10 Edema  NONE 
 
 NONE Orders: Duration:   Start:    0925 End:    1225 Total:   3 HRS Dialyzer:   Dialyzer/Set Up [facility] (08/03/19 4393) K Bath:   Dialysate K (mEq/L): 2 (08/03/19 0925) Ca Bath:   Dialysate CA (mEq/L): 2.5 (08/03/19 0925) Na/Bicarb:   Dialysate NA (mEq/L): 140 (08/03/19 0925) Target Fluid Removal:   Goal/Amount of Fluid to Remove (mL): 1000 mL (08/03/19 0925) Access Type & Location:   RIGHT CHEST [facility] DRESSING CLEAN DRY AND INTACT BIOPATCH INTACT DATED 8/1/19 +ASP/NS FLUSH ART/VENOUS PORTS Labs Obtained/Reviewed Critical Results Called   Date when labs were drawn- 
Hgb-   
HGB Date Value Ref Range Status 08/03/2019 8.6 (L) 12.1 - 17.0 g/dL Final  
 
K-   
Potassium Date Value Ref Range Status 08/03/2019 4.1 3.5 - 5.1 mmol/L Final  
 
Ca-  
Calcium Date Value Ref Range Status 08/03/2019 7.6 (L) 8.5 - 10.1 MG/DL Final  
 
Bun-  
BUN Date Value Ref Range Status 08/03/2019 29 (H) 6 - 20 MG/DL Final  
 
Creat-  
Creatinine Date Value Ref Range Status 08/03/2019 4.67 (H) 0.70 - 1.30 MG/DL Final  
 
  
Medications/ Blood Products Given Name   Dose   Route and Time NONE Blood Volume Processed (BVP):    64 L Net Fluid Removed:  1000 ML Comments Time Out Done: 5943 Primary Nurse Rpt Pre:EVA Mueller Riding 
 Primary Nurse Rpt Post:DICK KRUGER,EQ Pt Education:PROCEDURAL Care Plan:CONITNUE HD TTS AND AS ORDRED BY NEPHROLOGIST Tx Summary: PATIENT TOLERATED FULL 3 HRS OF HD, UF 1KG REMOVED, AT END OF TX ALL POSSIBLE BLOOD RETURNED IN CIRCUIT WITH 300 ML NS,ART/VENOUS PORTS FLUSHED WITH 10 ML NS, STERILE CAPS APPLIED,RIGHT CHEST PERMACATH  DRESSING CLEAN DRY AND INTACT DATED BIOPATCH INTACT 8/1/19, REMAINS IN CCU BED 19,CALL BELL WITHIN REACH, BED IN LOWEST POSITION Admiting Diagnosis:GI ULCER Pt's previous clinic-[facility] Consent signed - Informed Consent Verified: Yes (08/03/19 5287) Jacintoita Consent - VEIRFIED Hepatitis Status- NEGATIVE SUSCETIBLE 7/13/19 Machine #- Machine Number: [id number]  [id number]) (08/03/19 1539) Telemetry status-MONITORED PACED Pre-dialysis wt. - Pre-Dialysis Weight: 61.7 kg (136 lb 0.4 oz) (08/03/19 2461)

## 2019-08-03 NOTE — PROGRESS NOTES
Progress Note Patient: Acacia Abarca MRN: [id number]  SSN: [id number] YOB: 1945  Age: 72 y.o. Sex: male Admit Date: 2019 
 
1 Day Post-Op Procedure:  Procedure(s): LAPAROTOMY OVERSOW DUODENAL BLEED PYLOROPLASTY Subjective:  
 
Patient hungry and complains of some pain Objective:  
 
Visit Vitals /76 Pulse 70 Temp 97.6 °F (36.4 °C) Resp 12 Ht 5' 8\" (1.727 m) Wt 136 lb 0.4 oz (61.7 kg) SpO2 100% BMI 20.68 kg/m² Temp (24hrs), Av.8 °F (36.6 °C), Min:97.4 °F (36.3 °C), Max:98.4 °F (36.9 °C) Physical Exam:   
Gen- Alert in NAD Lungs-CTA H-RRR Abd- Soft appropriately tender. LORRI serosanginous NGT no longer bloody Data Review: images and reports reviewed Lab Review: All lab results for the last 24 hours reviewed. Recent Results (from the past 24 hour(s)) HGB & HCT Collection Time: 19 12:59 PM  
Result Value Ref Range HGB 7.6 (L) 12.1 - 17.0 g/dL HCT 23.3 (L) 36.6 - 50.3 % SAMPLES BEING HELD Collection Time: 19  4:45 PM  
Result Value Ref Range SAMPLES BEING HELD 1RED,1PST COMMENT Add-on orders for these samples will be processed based on acceptable specimen integrity and analyte stability, which may vary by analyte. PROTHROMBIN TIME + INR Collection Time: 19  4:46 PM  
Result Value Ref Range INR 1.1 0.9 - 1.1 Prothrombin time 11.4 (H) 9.0 - 11.1 sec CBC WITH AUTOMATED DIFF Collection Time: 19  4:46 PM  
Result Value Ref Range WBC 14.4 (H) 4.1 - 11.1 K/uL  
 RBC 2.29 (L) 4.10 - 5.70 M/uL HGB 6.5 (L) 12.1 - 17.0 g/dL HCT 20.6 (L) 36.6 - 50.3 % MCV 90.0 80.0 - 99.0 FL  
 MCH 28.4 26.0 - 34.0 PG  
 MCHC 31.6 30.0 - 36.5 g/dL  
 RDW 15.6 (H) 11.5 - 14.5 % PLATELET 459 040 - 313 K/uL MPV 10.4 8.9 - 12.9 FL  
 NRBC 0.0 0  WBC ABSOLUTE NRBC 0.00 0.00 - 0.01 K/uL NEUTROPHILS 60 32 - 75 % LYMPHOCYTES 25 12 - 49 % MONOCYTES 8 5 - 13 % EOSINOPHILS 6 0 - 7 % BASOPHILS 1 0 - 1 % IMMATURE GRANULOCYTES 0 %  
 ABS. NEUTROPHILS 8.6 (H) 1.8 - 8.0 K/UL  
 ABS. LYMPHOCYTES 3.6 (H) 0.8 - 3.5 K/UL  
 ABS. MONOCYTES 1.2 (H) 0.0 - 1.0 K/UL  
 ABS. EOSINOPHILS 0.9 (H) 0.0 - 0.4 K/UL  
 ABS. BASOPHILS 0.1 0.0 - 0.1 K/UL  
 ABS. IMM. GRANS. 0.0 K/UL  
 DF MANUAL    
 RBC COMMENTS ANISOCYTOSIS 
1+ METABOLIC PANEL, BASIC Collection Time: 08/02/19  4:46 PM  
Result Value Ref Range Sodium 145 136 - 145 mmol/L Potassium 3.4 (L) 3.5 - 5.1 mmol/L Chloride 110 (H) 97 - 108 mmol/L  
 CO2 24 21 - 32 mmol/L Anion gap 11 5 - 15 mmol/L Glucose 220 (H) 65 - 100 mg/dL BUN 27 (H) 6 - 20 MG/DL Creatinine 4.74 (H) 0.70 - 1.30 MG/DL  
 BUN/Creatinine ratio 6 (L) 12 - 20 GFR est AA 15 (L) >60 ml/min/1.73m2 GFR est non-AA 12 (L) >60 ml/min/1.73m2 Calcium 8.3 (L) 8.5 - 10.1 MG/DL PROTHROMBIN TIME + INR Collection Time: 08/02/19  9:18 PM  
Result Value Ref Range INR 1.6 (H) 0.9 - 1.1 Prothrombin time 15.9 (H) 9.0 - 11.1 sec CBC WITH AUTOMATED DIFF Collection Time: 08/02/19  9:18 PM  
Result Value Ref Range WBC 6.3 4.1 - 11.1 K/uL  
 RBC 2.62 (L) 4.10 - 5.70 M/uL HGB 7.8 (L) 12.1 - 17.0 g/dL HCT 24.0 (L) 36.6 - 50.3 % MCV 91.6 80.0 - 99.0 FL  
 MCH 29.8 26.0 - 34.0 PG  
 MCHC 32.5 30.0 - 36.5 g/dL  
 RDW 14.1 11.5 - 14.5 % PLATELET 72 (L) 591 - 400 K/uL MPV 10.2 8.9 - 12.9 FL  
 NRBC 0.0 0  WBC ABSOLUTE NRBC 0.00 0.00 - 0.01 K/uL NEUTROPHILS 88 (H) 32 - 75 % BAND NEUTROPHILS 1 0 - 6 % LYMPHOCYTES 5 (L) 12 - 49 % MONOCYTES 2 (L) 5 - 13 % EOSINOPHILS 0 0 - 7 % BASOPHILS 0 0 - 1 % METAMYELOCYTES 1 (H) 0 % MYELOCYTES 3 (H) 0 % IMMATURE GRANULOCYTES 0 %  
 ABS. NEUTROPHILS 5.6 1.8 - 8.0 K/UL  
 ABS. LYMPHOCYTES 0.3 (L) 0.8 - 3.5 K/UL  
 ABS. MONOCYTES 0.1 0.0 - 1.0 K/UL  
 ABS. EOSINOPHILS 0.0 0.0 - 0.4 K/UL  
 ABS. BASOPHILS 0.0 0.0 - 0.1 K/UL ABS. IMM. GRANS. 0.0 K/UL  
 DF MANUAL    
 RBC COMMENTS ANISOCYTOSIS 1+ 
    
 RBC COMMENTS BASOPHILIC STIPPLING 
PRESENT 
    
METABOLIC PANEL, BASIC Collection Time: 08/02/19  9:18 PM  
Result Value Ref Range Sodium 152 (H) 136 - 145 mmol/L Potassium 3.5 3.5 - 5.1 mmol/L Chloride 116 (H) 97 - 108 mmol/L  
 CO2 24 21 - 32 mmol/L Anion gap 12 5 - 15 mmol/L Glucose 175 (H) 65 - 100 mg/dL BUN 27 (H) 6 - 20 MG/DL Creatinine 4.27 (H) 0.70 - 1.30 MG/DL  
 BUN/Creatinine ratio 6 (L) 12 - 20 GFR est AA 17 (L) >60 ml/min/1.73m2 GFR est non-AA 14 (L) >60 ml/min/1.73m2 Calcium 7.2 (L) 8.5 - 10.1 MG/DL  
LACTIC ACID Collection Time: 08/02/19  9:18 PM  
Result Value Ref Range Lactic acid 4.6 (HH) 0.4 - 2.0 MMOL/L  
POC HEMOGLOBIN Collection Time: 08/02/19 11:53 PM  
Result Value Ref Range Hemoglobin (POC) 10.7 (L) 12.1 - 17.0 g/dL CBC W/O DIFF Collection Time: 08/03/19 12:03 AM  
Result Value Ref Range WBC 12.9 (H) 4.1 - 11.1 K/uL  
 RBC 3.01 (L) 4.10 - 5.70 M/uL HGB 9.0 (L) 12.1 - 17.0 g/dL HCT 26.7 (L) 36.6 - 50.3 % MCV 88.7 80.0 - 99.0 FL  
 MCH 29.9 26.0 - 34.0 PG  
 MCHC 33.7 30.0 - 36.5 g/dL  
 RDW 13.9 11.5 - 14.5 % PLATELET 91 (L) 112 - 400 K/uL MPV 10.0 8.9 - 12.9 FL  
 NRBC 0.0 0  WBC ABSOLUTE NRBC 0.00 0.00 - 0.01 K/uL PLATELETS, ALLOCATE Collection Time: 08/03/19 12:15 AM  
Result Value Ref Range Unit number [id number] Blood component type PLPH LR,PAS3 Unit division 00 Status of unit ISSUED Unit number [id number] Blood component type PLPH LR,PAS2 Unit division 00 Status of unit ISSUED   
PLASMA, ALLOCATE Collection Time: 08/03/19 12:30 AM  
Result Value Ref Range Unit number [id number] Blood component type FP 24h, Thaw Unit division 00 Status of unit ISSUED Unit number [id number] Blood component type FP 24h, Thaw Unit division 00  Status of unit ISSUED   
 CBC WITH AUTOMATED DIFF Collection Time: 08/03/19  5:16 AM  
Result Value Ref Range WBC 10.4 4.1 - 11.1 K/uL  
 RBC 2.90 (L) 4.10 - 5.70 M/uL HGB 8.6 (L) 12.1 - 17.0 g/dL HCT 25.8 (L) 36.6 - 50.3 % MCV 89.0 80.0 - 99.0 FL  
 MCH 29.7 26.0 - 34.0 PG  
 MCHC 33.3 30.0 - 36.5 g/dL  
 RDW 14.6 (H) 11.5 - 14.5 % PLATELET 91 (L) 102 - 400 K/uL MPV 10.5 8.9 - 12.9 FL  
 NRBC 0.0 0  WBC ABSOLUTE NRBC 0.00 0.00 - 0.01 K/uL NEUTROPHILS 78 (H) 32 - 75 % LYMPHOCYTES 10 (L) 12 - 49 % MONOCYTES 11 5 - 13 % EOSINOPHILS 0 0 - 7 % BASOPHILS 0 0 - 1 % IMMATURE GRANULOCYTES 1 (H) 0.0 - 0.5 % ABS. NEUTROPHILS 8.1 (H) 1.8 - 8.0 K/UL  
 ABS. LYMPHOCYTES 1.1 0.8 - 3.5 K/UL  
 ABS. MONOCYTES 1.1 (H) 0.0 - 1.0 K/UL  
 ABS. EOSINOPHILS 0.0 0.0 - 0.4 K/UL  
 ABS. BASOPHILS 0.0 0.0 - 0.1 K/UL  
 ABS. IMM. GRANS. 0.1 (H) 0.00 - 0.04 K/UL  
 DF AUTOMATED METABOLIC PANEL, BASIC Collection Time: 08/03/19  5:16 AM  
Result Value Ref Range Sodium 150 (H) 136 - 145 mmol/L Potassium 4.1 3.5 - 5.1 mmol/L Chloride 116 (H) 97 - 108 mmol/L  
 CO2 25 21 - 32 mmol/L Anion gap 9 5 - 15 mmol/L Glucose 140 (H) 65 - 100 mg/dL BUN 29 (H) 6 - 20 MG/DL Creatinine 4.67 (H) 0.70 - 1.30 MG/DL  
 BUN/Creatinine ratio 6 (L) 12 - 20 GFR est AA 15 (L) >60 ml/min/1.73m2 GFR est non-AA 12 (L) >60 ml/min/1.73m2 Calcium 7.6 (L) 8.5 - 10.1 MG/DL  
LACTIC ACID Collection Time: 08/03/19  5:16 AM  
Result Value Ref Range Lactic acid 3.6 (HH) 0.4 - 2.0 MMOL/L Assessment:  
 
Hospital Problems  Date Reviewed: 8/3/2019 Codes Class Noted POA Upper GI bleed ICD-10-CM: K92.2 ICD-9-CM: 578.9  8/1/2019 Unknown Plan/Recommendations/Medical Decision Making:  
Bleeding seems to have stopped- continue Carafate and PPI Continue to monitor the H/H Hd per renal  
May be OOB today after HD Contiue ICU care Contiue NPO and NGT

## 2019-08-03 NOTE — OP NOTES
[address] 
OPERATIVE REPORT Name:  Kwesi Ravi 
MR#:  [id number] :  1945 ACCOUNT #:  [de-identified] DATE OF SERVICE:  2019 PREOPERATIVE DIAGNOSIS:  Duodenal bleeding ulcer. POSTOPERATIVE DIAGNOSIS:  Duodenal bleeding ulcer. PROCEDURES PERFORMED:  Laparotomy with oversewing of duodenal bleeding ulcer, pyloroplasty, and Jed patch of pyloroplasty. SURGEON:  Stephanie Lemos MD 
 
ASSISTANT:  Flor Gao MD 
 
ASSISTANT:  Radha Adams SA 
 
ANESTHESIA:  General. 
 
COMPLICATIONS:  None. SPECIMENS REMOVED:  None. IMPLANTS:  None. ESTIMATED BLOOD LOSS:  1500 mL. FINDINGS:  Massive bleeding from duodenal ulcer oversewn with multiple silk sutures. Pyloroplasty with Heineke-Mikulicz closure, Tisseel, and Jed patch over top with the drain of the gastric closure. INDICATIONS FOR THE OPERATION:  The patient is a 81-year-old male who has a bleeding ulcer that is recurrent of the duodenum that is needing repair in the operating room and hemostasis to stop the bleeding of the ulcer. He was hemodynamically stable prior to surgery. Dr. Nusrat Escobar was necessary for the operation due to the complexity of the operation and need for extra hand and extra help with decision making during the operation. DESCRIPTION OF THE OPERATION:  The patient was met in the preoperative holding area. The H and P was updated. Consent was signed. All risks and benefits were explained to the patient prior to the start of the operation. He was taken back to the operating room. He was lying in a supine position. The abdomen was prepped and draped in standard sterile fashion. Time-out was called. Antibiotics were given. SCDs were on the lower extremities. Fernandez catheter was inserted. We started the operation by making a vertical midline incision through the subcutaneous tissue dissecting down to the linea alba with the Bovie cautery.   We dissected into the peritoneal cavity sharply and then opened up our incision superiorly and inferiorly to below the xiphoid and above the umbilicus. We dissected down to the area of the ulcer. There was some fatty omentum that was adherent to the area over top of the duodenum, which we took down with a Sonicision device. Once that was taken down, we tried to expose our duodenum a little bit further. In the pylorus, we could feel what appeared to be an Ovesco clip in the ulcer in the duodenal bulb area. We mobilized our duodenum anteriorly as well as we could and then made our gastrotomy. We sharply dissected with the Bovie cautery into the stomach, just on the stomach side of the pylorus, and we incised through the pylorus area and then to the duodenal bulb. Once into the duodenal bulb, we found that there was massive bleeding from the duodenal ulcer which was very difficult to control. We used compression of that area to be able to identify the area of bleeding. Once we found the bleeding, we placed sutures superiorly, inferiorly, in the right and left lateral portions of that ulcer, which did not control bleeding. We placed three more central sutures directly over the top and through the ulcer with the last stitch controlling the ulcer bleeding very well. Once that ulcer was hemostatic and not bleeding, we tied down all of our sutures and inspected that area thoroughly. We suctioned out the stomach and all the clot out of the stomach and then all the clot out of the duodenum. Once all that was suctioned out, we investigated that area closer for at least 20 minutes and made sure it was hemostatic, it was. Once we were convinced that it was hemostatic, we then closed our opening in the stomach and the duodenum using a Heineke-Mikulicz technique closing our pyloroplasty.   We closed it with a running 3-0 Vicryl suture in a running fashion for a single layer closure of the first layer and then used multiple 2-0 silk sutures for an interrupted closure over top for a 2-layer closure. We also sprayed Tisseel over top of that area and took a piece of omentum and laid that over the top and performed a Jed patch using 2-0 silk sutures over top to seal that area up. Once that appeared to be all sealed up, we sprayed Tisseel again one more time and placed a 19-Iraqi Ramón drain over top of that area. We then washed out the abdominal cavity with 2 liters of warm saline irrigation. All the counts were correct. We had removed everything out of the abdominal cavity. We felt our NG tube in the stomach and then we closed the midline fascia a #1 running PDS suture. With the fascia closed, we then used staples on the skin and a dry sterile dressing to complete the operation. Drain stitch was placed with a 2-0 nylon suture. We then had completed the operation. Dr. Rios was present and scrubbed during the entire operation. The counts were correct. MD LINSEY Russell/[email or web address]_I/ANTHONY_LAUREN_P 
D:  08/02/2019 20:53 T:  08/03/2019 6:14 
JOB #:  [id number]

## 2019-08-03 NOTE — BRIEF OP NOTE
BRIEF OPERATIVE NOTE Date of Procedure: 8/2/2019 Preoperative Diagnosis: DUODENAL ULCER BLEED Postoperative Diagnosis: DUODENAL BLEEDING ULCER Procedure(s): LAPAROTOMY OVERSOW OF DUODENAL BLEEDING ULCER, PYLOROPLASTY, Jed patch of pyloroplasty Surgeon(s) and Role: 
   Blair Bosworth, MD - Primary Toni Mclain, MD - Assisting Surgical Staff: 
Circ-1: Cara Brock RN 
Circ-Relief: Colt Gonzalez Scrub Tech-1: Azul Pal Surg Asst-1: Luz Rush Float Staff: Samy Hardin RN Event Time In Time Out Incision Start 08/02/2019 1840 Incision Close 08/02/2019 2030 Anesthesia: General  
Estimated Blood Loss: 1500ml Specimens: * No specimens in log * Findings: massive bleeding from the duodenal ulcer, ulcer oversown with multiple silk sutures, pyloroplasty with Henike Mikulitz closure with Tisseal and Jed patch, Drain over the gastric closure Complications: none Implants: * No implants in log *

## 2019-08-03 NOTE — ANESTHESIA PROCEDURE NOTES
Arterial Line Placement Start time: 8/2/2019 7:52 PM 
End time: 8/2/2019 8:55 PM 
Performed by: Delvis Nicole MD 
Authorized by: Delvis Nicole MD  
 
Pre-Procedure Indications:  Arterial pressure monitoring and blood sampling Preanesthetic Checklist: patient identified, risks and benefits discussed, anesthesia consent, patient being monitored, timeout performed and patient being monitored Timeout Time: 19:52 Procedure:  
Prep:  Chlorhexidine Seldinger Technique?: Yes Orientation:  Left Location:  Radial artery Catheter size:  20 G Number of attempts:  1 Cont Cardiac Output Sensor: No   
 
Assessment:  
Post-procedure:  Line secured and sterile dressing applied Patient Tolerance:  Patient tolerated the procedure well with no immediate complications

## 2019-08-03 NOTE — PERIOP NOTES
TRANSFER - OUT REPORT: 
 
Verbal report given to Rodríguez VASQUEZ(name) on Wai West  being transferred to [address] [place] [address] Drive 19(unit) for routine post - op Report consisted of patients Situation, Background, Assessment and  
Recommendations(SBAR). Time Pre op antibiotic given:ancef 2gm IV @ [address] Anesthesia Stop time: 2100 Fernandez Present on Transfer to floor:yes Order for Fernandez on Chart:yes Discharge Prescriptions with Chart:no Information from the following report(s) SBAR, ED Summary, Procedure Summary, Intake/Output and Recent Results was reviewed with the receiving nurse. Opportunity for questions and clarification was provided. Is the patient on 02? YES 
     L/Min 2 Other n/a Is the patient on a monitor? YES Is the nurse transporting with the patient? YES Surgical Waiting Area notified of patient's transfer from PACU? YES The following personal items collected during your admission accompanied patient upon transfer:  
Dental Appliance: Dental Appliances: None Vision: Visual Aid: Glasses, At home Hearing Aid:   
Jewelry:   
Clothing:   
Other Valuables: Other Valuables: Sent home Valuables sent to safe:

## 2019-08-03 NOTE — PROGRESS NOTES
Hospitalist Progress Note Augustine Miller MD 
Answering service: [phone] OR [phone] from in house phone Date of Service:  8/3/2019 NAME:  Jude Burris :  1945 MRN:  [id number] Admission Summary:  
Mr Do Singh is a 71 y/o AAM with MHx of HTN, ESRD on HD(MWF), recent GI bleed s/p clip and embolization and SSS s/p pacemaker presents to the hospital with c/o hematemesis. Patient was recently discharged from hospital after having hemorrhagic shock from upper GI bleed s/ps/p clip and embolization. He also developed renal failure on HD. Interval history / Subjective:  
Patient seen and examined this afternoon. Family at bedside  
24hr: Patient had episode of massive hematemesis yesterday afternoon. He was then taken to OR. Assessment & Plan: # Upper GI bleed due to duodenal ulcer With hx of hemorrhagic shock on last admission - s/p EGD which showed 15 mm ulceration with ovesco clip at the site proximal duodenal bulb treated with hemospray  
- s/p laparotomy oversow duodenal bleed pyloroplasty  
- Keep NPO  
- continue with gentle hydration due to hx of CHF/dialysis - continue with IV PPI BID  
- s/p 4u PRBC, 2u FFP, 2u plt transfusion  
- monitor H/H q4hrly, transfuse if hgb<7 
- GI and Surgery following  
  
# Acute on Chronic anemia from recent GI loss Has hx of 6u PRBC transfusion on last admission  
- hgb dropped to 5.4 
- s/p 4u PRBC transfusion last night  
- monitor H/H q4hrly, transfuse if hgb<7 
  
# KAYLEEN/ESRD on HD  
- Nephrology following, in house HD  
  
# HTN: hold home meds due to episodic hypotension # Chronic systolic heart failure: NYHA l-ll on presentation- no acute exacerbation # SSS s/p pacemaker  
  
DVT prop: SCDs GI prop: PPI 
  
Code: Full Dispo: home once stable Function status: walks with a cane/Walker Hospital Problems  Date Reviewed: 8/3/2019 Codes Class Noted POA Upper GI bleed ICD-10-CM: K92.2 ICD-9-CM: 578.9  8/1/2019 Unknown Review of Systems:  
Pertinent positive mentioned in interval hx/HPI. Other systems reviewed and negative Vital Signs:  
 Last 24hrs VS reviewed since prior progress note. Most recent are: 
Visit Vitals BP (!) 149/37 Pulse 70 Temp 98.6 °F (37 °C) Resp 10 Ht 5' 8\" (1.727 m) Wt 61.7 kg (136 lb 0.4 oz) SpO2 100% BMI 20.68 kg/m² Intake/Output Summary (Last 24 hours) at 8/3/2019 1807 Last data filed at 8/3/2019 1600 Gross per 24 hour Intake 4739.7 ml Output 1850 ml Net 2889.7 ml Physical Examination:  
 
 
     
Constitutional:  No acute distress, pale looking, on NGT to suction ENT:  Oral mucous dry, oropharynx benign. Neck supple, Resp:  CTA bilaterally. No wheezing/rhonchi/rales. No accessory muscle use CV:  Regular rhythm, normal rate, no murmurs, gallops, rubs GI:  Soft, non distended, non tender. normoactive bowel sounds, no hepatosplenomegaly Musculoskeletal:  No edema, warm, 2+ pulses throughout Neurologic:  Moves all extremities. AAOx3, CN II-XII reviewed Data Review:  
I personally reviewed labs and imaging Labs:  
 
Recent Labs 08/03/19 
1512 08/03/19 
5328 08/03/19 
0516 08/03/19 
0003 WBC  --   --  10.4 12.9* HGB 7.5* 7.2* 8.6* 9.0*  
HCT 22.3* 21.8* 25.8* 26.7*  
PLT  --   --  91* 91* Recent Labs 08/03/19 
7921 08/02/19 
2118 08/02/19 
1646 * 152* 145  
K 4.1 3.5 3.4*  
* 116* 110* CO2 25 24 24 BUN 29* 27* 27* CREA 4.67* 4.27* 4.74* * 175* 220* CA 7.6* 7.2* 8.3* Recent Labs 08/01/19 
4297 SGOT 31 ALT 12  
 TBILI 0.3 TP 6.3* ALB 2.9*  
GLOB 3.4 Recent Labs 08/02/19 
2118 08/02/19 
1646 08/01/19 
2351 INR 1.6* 1.1 1.1 PTP 15.9* 11.4* 10.9 No results for input(s): FE, TIBC, PSAT, FERR in the last 72 hours. No results found for: FOL, RBCF No results for input(s): PH, PCO2, PO2 in the last 72 hours. No results for input(s): CPK, CKNDX, TROIQ in the last 72 hours. No lab exists for component: CPKMB Lab Results Component Value Date/Time Cholesterol, total 134 07/06/2019 03:50 AM  
 HDL Cholesterol 56 07/06/2019 03:50 AM  
 LDL, calculated 68 07/06/2019 03:50 AM  
 Triglyceride 50 07/06/2019 03:50 AM  
 CHOL/HDL Ratio 2.4 07/06/2019 03:50 AM  
 
Lab Results Component Value Date/Time Glucose (POC) 83 07/25/2019 04:06 PM  
 Glucose (POC) 82 07/17/2019 05:13 PM  
 Glucose (POC) 92 07/17/2019 12:51 PM  
 Glucose (POC) 175 (H) 07/11/2019 07:56 PM  
 Glucose (POC) 87 07/05/2019 12:02 PM  
 
No results found for: COLOR, APPRN, SPGRU, REFSG, ANGELI, PROTU, GLUCU, Shelva Edge, BILU, UROU, TREVOR, LEUKU, GLUKE, EPSU, BACTU, WBCU, RBCU, CASTS, UCRY Medications Reviewed:  
 
Current Facility-Administered Medications Medication Dose Route Frequency  propofol (DIPRIVAN) infusion  0-50 mcg/kg/min IntraVENous TITRATE  
 HYDROmorphone (PF) (DILAUDID) injection 0.5-1 mg  0.5-1 mg IntraVENous Q3H PRN  
 hydrALAZINE (APRESOLINE) 20 mg/mL injection 10 mg  10 mg IntraVENous Q6H PRN  
 sucralfate (CARAFATE) tablet 1 g  1 g Oral AC&HS  alteplase (CATHFLO) 1 mg in sterile water (preservative free) 1 mL injection  1 mg InterCATHeter PRN  
 bacitracin 500 unit/gram packet 1 Packet  1 Packet Topical PRN  
 lactated Ringers infusion  50 mL/hr IntraVENous CONTINUOUS  
 PHENYLephrine (ARTURO-SYNEPHRINE) 30 mg in 0.9% sodium chloride 250 mL infusion   mcg/min IntraVENous TITRATE  sodium chloride (NS) flush 5-40 mL  5-40 mL IntraVENous Q8H  
 sodium chloride (NS) flush 5-40 mL  5-40 mL IntraVENous PRN  
 acetaminophen (TYLENOL) tablet 650 mg  650 mg Oral Q4H PRN  
 sodium chloride (NS) flush 5-40 mL  5-40 mL IntraVENous Q8H  
 sodium chloride (NS) flush 5-40 mL  5-40 mL IntraVENous PRN  
  pantoprazole (PROTONIX) 40 mg in sodium chloride 0.9% 10 mL injection  40 mg IntraVENous Q12H  
 
______________________________________________________________________ EXPECTED LENGTH OF STAY: 4d 12h ACTUAL LENGTH OF STAY:          2 Parris Julian MD  
Patient has given Verbal permission to discuss medical care with  
persons present in the room and and also with contact as listed on face sheet.

## 2019-08-03 NOTE — INTERDISCIPLINARY ROUNDS
IDR/SLIDR Summary Patient: Latricia Narayanan MRN: [id number]    Age: 72 y.o. YOB: 1945 Room/Bed: [address] [place] [address] Admit Diagnosis: Upper GI bleed [K92.2]  Principal Diagnosis: <principal problem not specified>  
Goals: Stop bleed, VSS, Dialysis Readmission: YES  Quality Measure: SCIP and CHF VTE Prophylaxis: Mechanical 
Influenza Vaccine screening completed? NO Pneumococcal Vaccine screening completed? NO Mobility needs: Yes   Nutrition plan:Yes 
Consults: P. T and Case Management Financial concerns:No  Escalated to CM? NO 
RRAT Score: 15   Interventions:H2H Testing due for pt today? YES 
LOS: 2 days Expected length of stay TBD days Discharge plan: Home (TBD)   PCP: Franchesca Manning DO Transportation needs: No   
Days before discharge:two or more days before discharge Discharge disposition: Home (TBD) Signed:  
 
Heriberto Dugan RN 
8/3/2019 
7:06 AM

## 2019-08-03 NOTE — ADDENDUM NOTE
Addendum  created 08/03/19 0546 by Titus Freeman MD  
 Intraprocedure Blocks edited, Sign clinical note

## 2019-08-03 NOTE — PROGRESS NOTES
.. 
 
 
                       
[address] Clari Steel YOB: 1945 Assessment & Plan: KAYLEEN - HD dependent - 2/2 ATN from hemorrhagic shock during his last admission 
- HD TTS at [address] [facility] 
  
CKD Stage III : 
- baseline Cr 1.2-1.3 mg/dl prior to KAYLEEN 
  
History of Hemorraghic Shock from Upper GI bleed last admission - S/P emergent clipping by EDG 7/13 
- S/P embolization of gastric/diuodenal artery 7/12 in IR 
- s/p clip and hemospray on 8/1 - S/P LAPAROTOMY OVERSOW OF DUODENAL BLEEDING ULCER, PYLOROPLASTY, Jed patch of pyloroplasty ON 8/2 
  
Blood loss anemia: 
- transfuse PRN w/ HD  
  
HTN: 
- BP stable 
  
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 
  
Chronic Alcoholism   
 
 
Subjective:  
CC: ESRD TTS 
HPI: Patient seen and examined. OR note read. This am pt states he is comfortable and not sob. ROS: denies f/c/ns, cp. Current Facility-Administered Medications Medication Dose Route Frequency  propofol (DIPRIVAN) infusion  0-50 mcg/kg/min IntraVENous TITRATE  
 0.9% sodium chloride infusion 250 mL  250 mL IntraVENous PRN  
 HYDROmorphone (PF) (DILAUDID) injection 0.5-1 mg  0.5-1 mg IntraVENous Q3H PRN  
 0.9% sodium chloride infusion 250 mL  250 mL IntraVENous PRN  
 sucralfate (CARAFATE) tablet 1 g  1 g Oral AC&HS  
 0.9% sodium chloride infusion 250 mL  250 mL IntraVENous PRN  
 0.9% sodium chloride infusion 250 mL  250 mL IntraVENous PRN  
 alteplase (CATHFLO) 1 mg in sterile water (preservative free) 1 mL injection  1 mg InterCATHeter PRN  
 bacitracin 500 unit/gram packet 1 Packet  1 Packet Topical PRN  
 lactated Ringers infusion  50 mL/hr IntraVENous CONTINUOUS  
 PHENYLephrine (ARTURO-SYNEPHRINE) 30 mg in 0.9% sodium chloride 250 mL infusion   mcg/min IntraVENous TITRATE  sodium chloride (NS) flush 5-40 mL  5-40 mL IntraVENous Q8H  
 sodium chloride (NS) flush 5-40 mL  5-40 mL IntraVENous PRN  
  acetaminophen (TYLENOL) tablet 650 mg  650 mg Oral Q4H PRN  
 sodium chloride (NS) flush 5-40 mL  5-40 mL IntraVENous Q8H  
 sodium chloride (NS) flush 5-40 mL  5-40 mL IntraVENous PRN  pantoprazole (PROTONIX) 40 mg in sodium chloride 0.9% 10 mL injection  40 mg IntraVENous Q12H Objective:  
 
Vitals: 
Blood pressure 125/76, pulse 70, temperature 97.6 °F (36.4 °C), resp. rate 12, height 5' 8\" (1.727 m), weight 61.7 kg (136 lb 0.4 oz), SpO2 100 %. Temp (24hrs), Av.8 °F (36.6 °C), Min:97.4 °F (36.3 °C), Max:98.4 °F (36.9 °C) Intake and Output: 
701 -  1900 In: 450 Out: -  
 190 -  0700 In: 4459.7 [I.V.:2235] Out: 800 [Urine:40; Drains:260] Physical Exam:              
 Patient is intubated:  no 
 
Physical Examination:  
GENERAL ASSESSMENT: NAD HEENT:Nontraumatic CHEST: CTA HEART: S1S2 :Fernandez:  
EXTREMITY: EDEMA 
NEURO:Grossly non focal 
 
 
   
ECG/rhythm: 
 
Data Review No results for input(s): TNIPOC in the last 72 hours. No lab exists for component: ITNL No results for input(s): CPK, CKMB, TROIQ in the last 72 hours. Recent Labs 19 
4662 19 
0003 19 
2118 19 
1646  19 
5747 *  --  152* 145   < > 140  
K 4.1  --  3.5 3.4*   < > 3.8 *  --  116* 110*   < > 104 CO2 25  --  24 24   < > 25 BUN 29*  --  27* 27*   < > 21* CREA 4.67*  --  4.27* 4.74*   < > 5.64* *  --  175* 220*   < > 135* CA 7.6*  --  7.2* 8.3*   < > 8.9 ALB  --   --   --   --   --  2.9* WBC 10.4 12.9* 6.3 14.4*   < > 9.3 HGB 8.6* 9.0* 7.8* 6.5*   < > 7.9*  
HCT 25.8* 26.7* 24.0* 20.6*   < > 27.0*  
PLT 91* 91* 72* 220   < > 275  
 < > = values in this interval not displayed. Recent Labs 19 
2118 19 
1646 19 
9319 INR 1.6* 1.1 1.1 PTP 15.9* 11.4* 10.9 Needs: urine analysis, urine sodium, protein and creatinine No results found for: Zohreh Olson Discussed with:  PT  
 
 Wilton Sherwood MD 
8/3/2019 [facility]: 
[email or web address] Ya Dominguez office: 
[address] [place] [address], [facility] [address] [place], [address] [facility] Phone: [phone] Fax :     [phone] [place] office: 
[address] [facility], [address] Phone - [phone] Fax - [phone]

## 2019-08-04 NOTE — INTERDISCIPLINARY ROUNDS
IDR/SLIDR Summary Patient: Lisa Collins MRN: [id number]    Age: 72 y.o. YOB: 1945 Room/Bed: [address] [place] [address] Admit Diagnosis: Upper GI bleed [K92.2]  Principal Diagnosis: <principal problem not specified>  
Goals: Stop bleed, VSS, Dialysis Readmission: YES  Quality Measure: SCIP and CHF VTE Prophylaxis: Mechanical 
Influenza Vaccine screening completed? NO Pneumococcal Vaccine screening completed? NO Mobility needs: Yes   Nutrition plan:Yes 
Consults: P. T and Case Management Financial concerns:No  Escalated to CM? NO 
RRAT Score: 15   Interventions:H2H Testing due for pt today? YES 
LOS: 2 days Expected length of stay TBD days Discharge plan: Home (TBD)   PCP: Amelia Borrego DO Transportation needs: No   
Days before discharge:two or more days before discharge Discharge disposition: Home (TBD) Signed:  
 
Clarine Goldmann, RN 
8/3/2019 
7:06 AM

## 2019-08-04 NOTE — PROGRESS NOTES
0730 Bedside shift change report given to Jacqueline (oncoming nurse) by Roberta Swain (offgoing nurse). Report included the following information SBAR, Kardex, Procedure Summary, Intake/Output, MAR, Recent Results and Cardiac Rhythm Paced. 1930 Bedside shift change report given to [facility] (oncoming nurse) by Jacqueline (offgoing nurse). Report included the following information SBAR, Kardex, Procedure Summary, Intake/Output, MAR, Recent Results and Cardiac Rhythm Paced.

## 2019-08-04 NOTE — PROGRESS NOTES
Hospitalist Progress Note Alex Ramos MD 
Answering service: [phone] OR [phone] from in house phone Date of Service:  2019 NAME:  Gema Cui :  1945 MRN:  [id number] Admission Summary:  
Mr Shaun Kidd is a 71 y/o AAM with MHx of HTN, ESRD on HD(MWF), recent GI bleed s/p clip and embolization and SSS s/p pacemaker presents to the hospital with c/o hematemesis. Patient was recently discharged from hospital after having hemorrhagic shock from upper GI bleed s/ps/p clip and embolization. He also developed renal failure on HD. Interval history / Subjective:  
Patient seen and examined this afternoon. He is still on NGT, feeling better. He denied abdominal pain. Drain in place. NGT output minimal.  
Hct stable only requiring one unit last night. No event reported Assessment & Plan: # Upper GI bleed due to duodenal ulcer With hx of hemorrhagic shock on last admission - s/p EGD which showed 15 mm ulceration with ovesco clip at the site proximal duodenal bulb treated with hemospray  
- s/p laparotomy oversow duodenal bleed pyloroplasty  
- Keep NPO  
- continue with gentle hydration due to hx of CHF/dialysis - continue with IV PPI BID  
- s/p 5u PRBC, 2u FFP, 2u plt transfusion  
- monitor H/H q4hrly, transfuse if hgb<7 
- keep NGT in place - GI and Surgery following  
  
# Acute on Chronic anemia from recent GI loss Has hx of 6u PRBC transfusion on last admission  
- hgb dropped to 5.4 
- s/p 5u PRBC transfusion on this admission  
- monitor H/H q4hrly, transfuse if hgb<7 
  
# KAYLEEN/ESRD on HD  
- Nephrology following, in house HD  
  
# HTN: hold home meds due to episodic hypotension # Chronic systolic heart failure: NYHA l-ll on presentation- no acute exacerbation # SSS s/p pacemaker  
  
DVT prop: SCDs GI prop: PPI 
  
Code: Full Dispo: home once stable Function status: walks with a cane/Walker Hospital Problems  Date Reviewed: 8/3/2019 Codes Class Noted POA Upper GI bleed ICD-10-CM: K92.2 ICD-9-CM: 578.9  8/1/2019 Unknown Review of Systems:  
Pertinent positive mentioned in interval hx/HPI. Other systems reviewed and negative Vital Signs:  
 Last 24hrs VS reviewed since prior progress note. Most recent are: 
Visit Vitals /44 (BP 1 Location: Left arm, BP Patient Position: At rest) Pulse 93 Temp 99.3 °F (37.4 °C) Resp 11 Ht 5' 8\" (1.727 m) Wt 63.4 kg (139 lb 12.4 oz) SpO2 100% BMI 21.25 kg/m² Intake/Output Summary (Last 24 hours) at 8/4/2019 1604 Last data filed at 8/4/2019 1000 Gross per 24 hour Intake 1354.4 ml Output 525 ml Net 829.4 ml Physical Examination:  
 
 
     
Constitutional:  No acute distress, pale looking, on NGT   
ENT:  Oral mucous dry, oropharynx benign. Neck supple, Resp:  CTA bilaterally. No wheezing/rhonchi/rales. No accessory muscle use CV:  Regular rhythm, normal rate, no murmurs, gallops, rubs GI:  Soft, non distended, non tender. normoactive bowel sounds, no hepatosplenomegaly Musculoskeletal:  No edema, warm, 2+ pulses throughout Neurologic:  Moves all extremities. AAOx3, CN II-XII reviewed Data Review:  
I personally reviewed labs and imaging Labs:  
 
Recent Labs 08/04/19 
1246 08/04/19 
0524  08/03/19 
8260 WBC  --  7.9  --  10.4 HGB 7.8* 8.1*   < > 8.6* HCT 23.8* 24.5*   < > 25.8* PLT  --  129*  --  91*  
 < > = values in this interval not displayed. Recent Labs 08/04/19 
6576 08/03/19 
8339 08/02/19 
2118  150* 152* K 3.7 4.1 3.5 * 116* 116* CO2 29 25 24 BUN 17 29* 27* CREA 3.30* 4.67* 4.27* GLU 87 140* 175* CA 7.7* 7.6* 7.2* Recent Labs 08/04/19 
9885 SGOT 17 ALT 7* AP 52 TBILI 0.5 TP 4.6* ALB 2.4*  
GLOB 2.2 Recent Labs 08/02/19 
2118 08/02/19 1646  
INR 1.6* 1.1 PTP 15.9* 11.4* No results for input(s): FE, TIBC, PSAT, FERR in the last 72 hours. No results found for: FOL, RBCF No results for input(s): PH, PCO2, PO2 in the last 72 hours. No results for input(s): CPK, CKNDX, TROIQ in the last 72 hours. No lab exists for component: CPKMB Lab Results Component Value Date/Time Cholesterol, total 134 07/06/2019 03:50 AM  
 HDL Cholesterol 56 07/06/2019 03:50 AM  
 LDL, calculated 68 07/06/2019 03:50 AM  
 Triglyceride 50 07/06/2019 03:50 AM  
 CHOL/HDL Ratio 2.4 07/06/2019 03:50 AM  
 
Lab Results Component Value Date/Time Glucose (POC) 83 07/25/2019 04:06 PM  
 Glucose (POC) 82 07/17/2019 05:13 PM  
 Glucose (POC) 92 07/17/2019 12:51 PM  
 Glucose (POC) 175 (H) 07/11/2019 07:56 PM  
 Glucose (POC) 87 07/05/2019 12:02 PM  
 
No results found for: COLOR, APPRN, SPGRU, REFSG, ANGELI, PROTU, GLUCU, Merla Locus, BILU, UROU, TREVOR, LEUKU, GLUKE, EPSU, BACTU, WBCU, RBCU, CASTS, UCRY Medications Reviewed:  
 
Current Facility-Administered Medications Medication Dose Route Frequency  HYDROmorphone (PF) (DILAUDID) injection 0.5-1 mg  0.5-1 mg IntraVENous Q3H PRN  
 hydrALAZINE (APRESOLINE) 20 mg/mL injection 10 mg  10 mg IntraVENous Q6H PRN  
 0.9% sodium chloride infusion 250 mL  250 mL IntraVENous PRN  
 sucralfate (CARAFATE) tablet 1 g  1 g Oral AC&HS  alteplase (CATHFLO) 1 mg in sterile water (preservative free) 1 mL injection  1 mg InterCATHeter PRN  
 bacitracin 500 unit/gram packet 1 Packet  1 Packet Topical PRN  
 lactated Ringers infusion  50 mL/hr IntraVENous CONTINUOUS  
 sodium chloride (NS) flush 5-40 mL  5-40 mL IntraVENous Q8H  
 sodium chloride (NS) flush 5-40 mL  5-40 mL IntraVENous PRN  
 acetaminophen (TYLENOL) tablet 650 mg  650 mg Oral Q4H PRN  
 sodium chloride (NS) flush 5-40 mL  5-40 mL IntraVENous Q8H  
 sodium chloride (NS) flush 5-40 mL  5-40 mL IntraVENous PRN  
  pantoprazole (PROTONIX) 40 mg in sodium chloride 0.9% 10 mL injection  40 mg IntraVENous Q12H  
 
______________________________________________________________________ EXPECTED LENGTH OF STAY: 4d 12h ACTUAL LENGTH OF STAY:          3 Ethan Carmichael MD  
Patient has given Verbal permission to discuss medical care with  
persons present in the room and and also with contact as listed on face sheet.

## 2019-08-04 NOTE — PROGRESS NOTES
Progress Note Patient: Ely Curtis MRN: [id number]  SSN: [id number] YOB: 1945  Age: 72 y.o. Sex: male Admit Date: 2019 2 Days Post-Op Procedure:  Procedure(s): LAPAROTOMY OVERSOW DUODENAL BLEED PYLOROPLASTY Subjective:  
 
Patient feeling well today. He did require 1 unit of blood yesterday. Objective:  
 
Visit Vitals /42 (BP 1 Location: Left arm, BP Patient Position: At rest) Pulse 87 Temp 99.4 °F (37.4 °C) Resp 14 Ht 5' 8\" (1.727 m) Wt 139 lb 12.4 oz (63.4 kg) SpO2 100% BMI 21.25 kg/m² Temp (24hrs), Av.9 °F (37.2 °C), Min:98.6 °F (37 °C), Max:99.4 °F (37.4 °C) Physical Exam:   
Gen- Alert in NAD Lungs-CTA H-RRR Abd- S/nt/nd Sameer serous NGT- old blood Data Review: images and reports reviewed Lab Review: All lab results for the last 24 hours reviewed. Recent Results (from the past 24 hour(s)) HGB & HCT Collection Time: 19  3:12 PM  
Result Value Ref Range HGB 7.5 (L) 12.1 - 17.0 g/dL HCT 22.3 (L) 36.6 - 50.3 % HGB & HCT Collection Time: 19  9:19 PM  
Result Value Ref Range HGB 6.9 (L) 12.1 - 17.0 g/dL HCT 20.7 (L) 36.6 - 50.3 % METABOLIC PANEL, COMPREHENSIVE Collection Time: 19  5:24 AM  
Result Value Ref Range Sodium 145 136 - 145 mmol/L Potassium 3.7 3.5 - 5.1 mmol/L Chloride 110 (H) 97 - 108 mmol/L  
 CO2 29 21 - 32 mmol/L Anion gap 6 5 - 15 mmol/L Glucose 87 65 - 100 mg/dL BUN 17 6 - 20 MG/DL Creatinine 3.30 (H) 0.70 - 1.30 MG/DL  
 BUN/Creatinine ratio 5 (L) 12 - 20 GFR est AA 23 (L) >60 ml/min/1.73m2 GFR est non-AA 19 (L) >60 ml/min/1.73m2 Calcium 7.7 (L) 8.5 - 10.1 MG/DL Bilirubin, total 0.5 0.2 - 1.0 MG/DL  
 ALT (SGPT) 7 (L) 12 - 78 U/L  
 AST (SGOT) 17 15 - 37 U/L Alk. phosphatase 52 45 - 117 U/L Protein, total 4.6 (L) 6.4 - 8.2 g/dL Albumin 2.4 (L) 3.5 - 5.0 g/dL Globulin 2.2 2.0 - 4.0 g/dL A-G Ratio 1.1 1.1 - 2.2    
CBC W/O DIFF Collection Time: 08/04/19  5:24 AM  
Result Value Ref Range WBC 7.9 4.1 - 11.1 K/uL  
 RBC 2.72 (L) 4.10 - 5.70 M/uL HGB 8.1 (L) 12.1 - 17.0 g/dL HCT 24.5 (L) 36.6 - 50.3 % MCV 90.1 80.0 - 99.0 FL  
 MCH 29.8 26.0 - 34.0 PG  
 MCHC 33.1 30.0 - 36.5 g/dL  
 RDW 15.1 (H) 11.5 - 14.5 % PLATELET 303 (L) 931 - 400 K/uL MPV 10.4 8.9 - 12.9 FL  
 NRBC 0.0 0  WBC ABSOLUTE NRBC 0.00 0.00 - 0.01 K/uL Assessment:  
 
Hospital Problems  Date Reviewed: 8/3/2019 Codes Class Noted POA Upper GI bleed ICD-10-CM: K92.2 ICD-9-CM: 578.9  8/1/2019 Unknown Plan/Recommendations/Medical Decision Making:  
Doing well - No sign at this point of active rebleeding. Ok to remove the callejas Continue NPO for now Do not replace NGT if it comes out again. Continue to monitor blood counts. OOB

## 2019-08-04 NOTE — PROGRESS NOTES
1930: Bedside shift change report given to Asher Coy RN (oncoming nurse) by Reina Chan RN (offgoing nurse). Report included the following information SBAR, Kardex, Intake/Output and Accordion. 2100: CHG bath refused, routine H/H drawn. [phone]: NP paged about Hgb 6.9. Orders received for 1 unit PRBC. 
0430: Pt pulled out NGT. New NG placed, kub xray in. 
0730: Bedside shift change report given to CHRISTINA Wright (oncoming nurse) by Asher Coy RN (offgoing nurse). Report included the following information SBAR, Kardex, Intake/Output and MAR.

## 2019-08-05 NOTE — PROGRESS NOTES
Responded to Code Blue in CCU 19  Consulted with nurse. Provided support to staff. No family present at time of visit. Advised of  Availability. Chaplains will follow as able and/or needed. Jose Araiza,  Intern, MDiv 
[phone] 11 ([id number])

## 2019-08-05 NOTE — PROGRESS NOTES
Critical Care Documentation Name: Raf Razo YOB: 1945 MRN: [id number] Admission Date: 8/1/2019  6:01 AM 
 
Date of service: 8/5/2019 Active Diagnoses: 
 
Hospital Problems  Date Reviewed: 8/3/2019 Codes Class Noted POA Upper GI bleed ICD-10-CM: K92.2 ICD-9-CM: 578.9  8/1/2019 Unknown Chief Complaint: 
Code blue, cardiac arrest 
 
Clinical Presentation: 
Code blue called at [address]. Nursing responded to ventilator alarm and decreased blood pressure per arterial line. Patient unresponsive, faint pulse, no spontaneous respirations. Patient with large amounts of srinivasan blood from mouth and rectum. PEA on monitor, CPR was initiated. Dr Kristi Gregorio present. Dr Rocha Patient paged by nursing and present. Physical Exam:  
Patient non responsive, no spontaneous respirations, CPR in progress PEA on monitor during pulse check No spontaneous respirations, intubated by anesthesiology, bag valve ventilations in process Bed linens saturated in srinivasan blood per rectum, large amount of blood tinged secretions from mouth Data Reviewed: All diagnostic labs and studies have been personally reviewed by myself. Assessment and Plan: Hypotension leading to cardiopulmonary arrest, likely secondary to acute blood loss - Arrest with PEA, given multiple rounds of ACLS drugs with CPR, intubated by anesthesia 
- ROSC was achieved twice for a short period, remained hypotensive despite multiple pressors prior to returning to PEA 
- Emergent uncrossmatched blood transfusion given - 6u PRBC, 1u FFP, 1u PLT 
- Ongoing blood loss, linens saturated due to rectal bleeding, ~1.5L blood tinged secretions from mouth/NGT 
- Family spoken to on the phone and in person by Dr Kristi Gregorio. Decision made to cease efforts after third episode of arrest. Chaplain Washburn present. - Time of death at 80.  
 
Medications Administered:  
Sedation: [ ] yes Bradford.Ehrich ] no  
 Anxiolytics: [ ] yes Madhu.Hora ] no Antiarrhythmics: Madhu.Hora ] yes [ ] no Antihypertensives: [ ] yes Madhu.Hora ] no  
Pressors: Madhu.Hora ] yes [ ] no IVF's: Madhu.Hora ] yes [ ] no  
 
 
Critical Care Attestation: This patient is unstable and critically ill. Due to a high probability of clinically significant, life threatening deterioration, the patient required my highest level of preparedness to intervene emergently and I personally spent this critical care time directly and personally managing the patient. This critical care time included obtaining a history; examining the patient; pulse oximetry; ordering and review of studies; arranging urgent treatment with development of a management plan; evaluation of patient's response to treatment; frequent reassessment; and, discussions with other providers and/or family. This critical care time was performed to assess and manage the high probability of imminent, life-threatening deterioration that could result in multi-organ failure and death. It was exclusive of separately billable procedures, treating other patients, and teaching time. Time Spent:  
 
Total critical care time: Approximately 80 minutes Yoly Ortega NP 
8/5/2019 
01:55 AM

## 2019-08-05 NOTE — PROGRESS NOTES
Death Note Events prior to patients death: 
Called to bedside at 01:55 AM for unresponsive and pulseless patient. On exam, no heart sounds or breath sounds were noted after 1 minute of auscultation. Pupils were fixed and dilated without pupillary light reflex. Patient was pronounced dead on 8/5/2019  at [address] [place] [address]. Date/Time of death: 8/5/2019 at [address] [place] [address]. Cause: 
Immediate: Cardiac arrest, Upper GI bleed Underlying cause:  
Hospital Problems  Date Reviewed: 8/3/2019 Codes Class Noted POA Upper GI bleed ICD-10-CM: K92.2 ICD-9-CM: 578.9  8/1/2019 Unknown Physician Pronouncing Death: Griselda Sas, NP Attending Physician of Record: Darron Sanchez MD  
 
Events related to death: 
Was code called: YES  notified: Cindy Newton Family notified: Litzy Lou Autopsy requested: NO 
Death certificate completed: NO 
Code Status Prior to Death: Full Code Discharge summary and death certificate will be completed by: lAexandria Frances MD

## 2019-08-05 NOTE — PROCEDURES
Emergent Intubation Performed by: Lawrence Celis MD 
Authorized by: Lawrence Celis MD  
 
Emergent Intubation: Location:  ICU Date/Time:  8/5/2019 12:35 AM 
  Spontaneous Ventilation: absent Level of Consciousness: unresponsive Preoxygenated: Yes Airway Documentation: Airway:  ETT - Cuffed Technique:  Direct laryngoscopy Insertion Site:  Oral 
Blade Type:  Sarath Monk Blade Size:  2 ETT size (mm):  8.0 ETT Line Temo:  Gumline ETT Insertion depth (cm):  22 Placement verified by: EtCO2 and BBS Attempts:  1 Difficult airway: No

## 2019-08-05 NOTE — DISCHARGE SUMMARY
Discharge Summary PATIENT ID: Félix Burns MRN: [id number] YOB: 1945 DATE OF ADMISSION: 8/1/2019  6:01 AM   
DATE OF DISCHARGE: 08/05/19 PRIMARY CARE PROVIDER: Julio César Gale DO  
 
 
DISCHARGING PROVIDER: Mirna Archibald MD   
To contact this individual call [phone] and ask the  to page. If unavailable ask to be transferred the Adult Hospitalist Department. CONSULTATIONS: IP CONSULT TO HOSPITALIST 
IP CONSULT TO GASTROENTEROLOGY 
IP CONSULT TO NEPHROLOGY 
IP CONSULT TO INTENSIVIST 
IP CONSULT TO GENERAL SURGERY 
 
 
PROCEDURES/SURGERIES: Procedure(s): LAPAROTOMY OVERSOW DUODENAL BLEED PYLOROPLASTY IMAGING Xr Abd (kub) Result Date: 8/4/2019 IMPRESSION: 1. Enteric tube extends into the distal stomach. Persistent small bowel distention. 2. New small right pleural effusion. Xr Abd (kub) Result Date: 8/2/2019 IMPRESSION: Non-obstructive bowel gas pattern. . NG tube placement as described. Xr Chest Alban Mercedes Result Date: 8/2/2019 IMPRESSION: ET tube placement as described. Bibasilar interstitial prominence with atelectasis. Small right pleural effusion. .  . [id number] [facility] COURSE: # Upper GI bleed due to duodenal ulcer With hx of hemorrhagic shock on last admission - s/p EGD which showed 15 mm ulceration with ovesco clip at the site proximal duodenal bulb treated with hemospray 8/1 
- s/p laparotomy oversow duodenal bleed pyloroplasty 8/2 
- on gentle hydration due to hx of CHF/dialysis  
- on IV PPI BID and sucralfate - s/p 5u PRBC, 2u FFP, 2u plt transfusion--before going into PEA  
- monitored H/H q4hrly 
- on NGT in place - GI and Surgery following  
  
# Acute on Chronic anemia from recent GI loss Has hx of 6u PRBC transfusion on last admission  
- hgb dropped to 5.4 
- s/p 5u PRBC transfusion on this admission  
- monitor H/H q4hrly, transfuse if hgb<7 
   
# KAYLEEN/ESRD on HD  
- Nephrology following, in house HD  
  
# HTN: hold home meds due to episodic hypotension # Chronic systolic heart failure: NYHA l-ll on presentation- no acute exacerbation # SSS s/p pacemaker  
 
On 08/05 night patient went on sudden PEA and the following happened and done # Hemorrhagic shock caused cardiopulmonary arrest secondary to acute GI bleed - Arrest with PEA, given multiple rounds of ACLS drugs with CPR, intubated by anesthesia 
- ROSC was achieved twice for a short period, remained hypotensive despite multiple pressors prior to returning to PEA 
- Emergent uncrossmatched blood transfusion given - 6u PRBC, 1u FFP, 1u PLT 
- Ongoing blood loss, linens saturated due to rectal bleeding, ~1.5L blood tinged secretions from mouth/NGT 
- Family spoken to on the phone and in person by Dr Asad Slaughter. Decision made to cease efforts after third episode of arrest. Chaplain Washburn present. - Time of death at 80. DISCHARGE DIAGNOSES / PLAN:   
# Hemorrhagic shock caused cardiopulmonary arrest/death secondary to acute GI bleed # Upper GI bleed due to duodenal ulcer # Acute on Chronic anemia from recent GI loss # KAYLEEN/ESRD on HD # HTN # Chronic systolic heart failure # SSS s/p pacemaker Patient Active Problem List  
Diagnosis Code  
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  
 History of wrist fracture, Right Z87.81  
 Overweight (BMI 25.0-29. 9) E66.3  Upper GI bleed K92.2 Recent Results (from the past 24 hour(s)) HGB & HCT Collection Time: 08/04/19 12:46 PM  
Result Value Ref Range HGB 7.8 (L) 12.1 - 17.0 g/dL HCT 23.8 (L) 36.6 - 50.3 % HGB & HCT Collection Time: 08/04/19  7:02 PM  
Result Value Ref Range HGB 7.5 (L) 12.1 - 17.0 g/dL HCT 23.2 (L) 36.6 - 50.3 % GLUCOSE, POC  Collection Time: 08/05/19 12:29 AM  
 Result Value Ref Range Glucose (POC) 89 65 - 100 mg/dL Performed by Orlin Mora EMERGENT RELEASE OF UNCROSSMATCHED RED CELLS Collection Time: 08/05/19 12:50 AM  
Result Value Ref Range Crossmatch Expiration 08/08/2019 ABO/Rh(D) B POSITIVE Antibody screen NEG Unit number [id number] Blood component type RC LR Unit division 00 Status of unit ISSUED Crossmatch result Compatible Unit number [id number] Blood component type RC LR Unit division 00 Status of unit ISSUED Crossmatch result Compatible Unit number [id number] Blood component type RC LR Unit division 00 Status of unit ISSUED Crossmatch result Compatible Unit number [id number] Blood component type RC LR Unit division 00 Status of unit ISSUED Crossmatch result Compatible Unit number [id number] Blood component type RC LR Unit division 00 Status of unit ISSUED Crossmatch result Compatible Unit number [id number] Blood component type RC LR Unit division 00 Status of unit ISSUED Crossmatch result Compatible Unit number [id number] Blood component type RC LR Unit division 00 Status of unit ALLOCATED Crossmatch result Compatible Unit number [id number] Blood component type RC LR Unit division 00 Status of unit ALLOCATED Crossmatch result Compatible MAGNESIUM Collection Time: 08/05/19 12:50 AM  
Result Value Ref Range Magnesium 2.1 1.6 - 2.4 mg/dL CK W/ REFLX CKMB Collection Time: 08/05/19 12:50 AM  
Result Value Ref Range CK 69 39 - 308 U/L  
CBC WITH AUTOMATED DIFF Collection Time: 08/05/19 12:50 AM  
Result Value Ref Range WBC 6.0 4.1 - 11.1 K/uL  
 RBC 1.63 (L) 4.10 - 5.70 M/uL HGB 4.7 (LL) 12.1 - 17.0 g/dL HCT 15.6 (LL) 36.6 - 50.3 % MCV 95.7 80.0 - 99.0 FL  
 MCH 28.8 26.0 - 34.0 PG  
 MCHC 30.1 30.0 - 36.5 g/dL RDW 15.5 (H) 11.5 - 14.5 % PLATELET 48 (LL) 047 - 400 K/uL MPV 10.7 8.9 - 12.9 FL  
 NRBC 4.8 (H) 0  WBC ABSOLUTE NRBC 0.29 (H) 0.00 - 0.01 K/uL NEUTROPHILS 7 (L) 32 - 75 % BAND NEUTROPHILS 2 0 - 6 % LYMPHOCYTES 89 (H) 12 - 49 % MONOCYTES 2 (L) 5 - 13 % EOSINOPHILS 0 0 - 7 % BASOPHILS 0 0 - 1 % IMMATURE GRANULOCYTES 0 %  
 ABS. NEUTROPHILS 0.5 (L) 1.8 - 8.0 K/UL  
 ABS. LYMPHOCYTES 5.4 (H) 0.8 - 3.5 K/UL  
 ABS. MONOCYTES 0.1 0.0 - 1.0 K/UL  
 ABS. EOSINOPHILS 0.0 0.0 - 0.4 K/UL  
 ABS. BASOPHILS 0.0 0.0 - 0.1 K/UL  
 ABS. IMM. GRANS. 0.0 K/UL  
 DF MANUAL    
 RBC COMMENTS ANISOCYTOSIS 1+ 
    
 RBC COMMENTS MACROCYTOSIS 1+ 
    
 RBC COMMENTS OVALOCYTES PRESENT 
    
METABOLIC PANEL, COMPREHENSIVE Collection Time: 08/05/19 12:50 AM  
Result Value Ref Range Sodium 154 (H) 136 - 145 mmol/L Potassium 4.8 3.5 - 5.1 mmol/L Chloride 116 (H) 97 - 108 mmol/L  
 CO2 21 21 - 32 mmol/L Anion gap 17 (H) 5 - 15 mmol/L Glucose 140 (H) 65 - 100 mg/dL BUN 36 (H) 6 - 20 MG/DL Creatinine 4.18 (H) 0.70 - 1.30 MG/DL  
 BUN/Creatinine ratio 9 (L) 12 - 20 GFR est AA 17 (L) >60 ml/min/1.73m2 GFR est non-AA 14 (L) >60 ml/min/1.73m2 Calcium 7.7 (L) 8.5 - 10.1 MG/DL Bilirubin, total 0.4 0.2 - 1.0 MG/DL  
 ALT (SGPT) 7 (L) 12 - 78 U/L  
 AST (SGOT) 23 15 - 37 U/L Alk. phosphatase 64 45 - 117 U/L Protein, total 3.2 (L) 6.4 - 8.2 g/dL Albumin 1.6 (L) 3.5 - 5.0 g/dL Globulin 1.6 (L) 2.0 - 4.0 g/dL A-G Ratio 1.0 (L) 1.1 - 2.2 PHOSPHORUS Collection Time: 08/05/19 12:50 AM  
Result Value Ref Range Phosphorus 5.2 (H) 2.6 - 4.7 MG/DL  
TROPONIN I Collection Time: 08/05/19 12:50 AM  
Result Value Ref Range Troponin-I, Qt. 0.18 (H) <0.05 ng/mL PLASMA, ALLOCATE Collection Time: 08/05/19  1:00 AM  
Result Value Ref Range Unit number [id number] Blood component type FP 24h, Thaw Unit division 00  Status of unit ISSUED   
 GLUCOSE, POC Collection Time: 08/05/19  1:26 AM  
Result Value Ref Range Glucose (POC) 208 (H) 65 - 100 mg/dL Performed by Isabela Del Toro PLATELETS, ALLOCATE Collection Time: 08/05/19  1:30 AM  
Result Value Ref Range Unit number [id number] Blood component type PLPH LR,PAS1 Unit division 00 Status of unit ISSUED   
 
 
 
CHRONIC MEDICAL DIAGNOSES: 
Problem List as of 8/5/2019 Date Reviewed: 8/3/2019 Codes Class Noted - Resolved Upper GI bleed ICD-10-CM: K92.2 ICD-9-CM: 578.9  8/1/2019 - Present Vitamin D deficiency ICD-10-CM: E55.9 ICD-9-CM: 268.9  11/18/2014 - Present Arthritis of wrist, right, degenerative ICD-10-CM: M19.031 ICD-9-CM: 715.93  11/18/2014 - Present History of wrist fracture, Right ICD-10-CM: Z87.81 ICD-9-CM: V15.51  11/18/2014 - Present Overweight (BMI 25.0-29. 9) ICD-10-CM: P97.9 ICD-9-CM: 278.02  11/18/2014 - Present HTN (hypertension) ICD-10-CM: I10 
ICD-9-CM: 401.9  11/11/2014 - Present Blurred vision, bilateral ICD-10-CM: H53.8 ICD-9-CM: 368.8  11/11/2014 - Present RESOLVED: Hemorrhagic shock ([facility] 75.) ICD-10-CM: R57.8 ICD-9-CM: 785.59  7/23/2019 - 7/29/2019 RESOLVED: Acute blood loss anemia ICD-10-CM: D62 
ICD-9-CM: 285.1  7/23/2019 - 7/29/2019 RESOLVED: Respiratory failure with hypoxia ([facility] ICD-10-CM: J96.91 
ICD-9-CM: 518.81  7/23/2019 - 7/29/2019 RESOLVED: KAYLEEN (acute kidney injury) ([facility] 75.) ICD-10-CM: N17.9 ICD-9-CM: 584.9  7/23/2019 - 7/29/2019 RESOLVED: Acute CHF (congestive heart failure) (HCC) ICD-10-CM: I50.9 ICD-9-CM: 428.0  7/5/2019 - 7/29/2019 RESOLVED: Pleural effusion, right ICD-10-CM: J90 ICD-9-CM: 511.9  7/5/2019 - 7/29/2019 Signed:  
Natasha Willard MD 
8/5/2019 
7:30 AM

## 2019-08-05 NOTE — PROGRESS NOTES
Pt had been doing well over the past 2 days. He had had a relatively stable H/h during this time. Additionally the output from his NGT had cleared. This evening patient had sudden onset of hypotension and lost his pulse. The code blue was called . A lot of blood was noted from the mouth and there was significant bleeding from below. This is consistent with rebleed from the ulcer which at the time of surgery did bleed significantly and briskly  . They were able to get a pulse back but with significant hypotension . He received massive transfusion. He did code a few more times and eventually went into PEA. During this period he continued to have bleeding from the NGT. During this time discussions were held with the family regarding the situation Pt eventually  .

## 2019-08-05 NOTE — PROGRESS NOTES
1930: Bedside shift change report given to [facility], RN (oncoming nurse) by Pushpa Lott RN (offgoing nurse). Report included the following information SBAR, Kardex, Intake/Output and MAR.  
 
2200: CHG bath completed without complications or pt complaints. 2230: Pt able to transfer from bed to bsc and back without much assistance. 2536-9155: Patient alarming apnea and hypotensive on art line. Entered room, Patient unresponsive. Patient with faint pulse. Dennis started at 300. Began bagging patient and noticed copious amounts of srinivasan red blood expelling from mouth. Patient PEA. Code Blue called - ACLS protocol started - see code blue documentation for further details. 0044: ROSC achieved. Patient paced and hypotensive. 2104: Patient hypotensive and PEA. Code Blue called - ACLS protocol started - see code blue documentation for further details. 0107: ROSC achieved. Patient paced and remains hypotensive. 0153: Patient hypotensive and PEA. Code blue called - ACLS protocol started - see code blue documentation for further details. 0155: Patient PEA. Code called. Physician called time of death. 0158: Family updated.  with family. Patient cleaned up and family at bedside. [facility], nursing supervisor, and MD aware. 0345: Family still bedside. Will provide most post mortem care once family leaves.

## 2019-08-05 NOTE — PROGRESS NOTES
Spiritual Care Assessment/Progress Note [address] 
 
 
NAME: Maggi Phelps      MRN: [id number] AGE: 72 y.o. SEX: male Sikhism Affiliation: No preference Language: English  
 
8/5/2019     Total Time (in minutes): 20 Spiritual Assessment begun in [facility] 4 CORONARY CARE through conversation with: 
  
    []Patient        [x] Family    [] Friend(s) Reason for Consult: Code Blue/99 Spiritual beliefs: (Please include comment if needed) [x] Identifies with a elenita tradition:     
   [x] Supported by a elenita community:        
   [] Claims no spiritual orientation:       
   [] Seeking spiritual identity:            
   [] Adheres to an individual form of spirituality:       
   [] Not able to assess:                   
 
    
Identified resources for coping:  
   [x] Prayer                           
   [] Music                  [] Guided Imagery [x] Family/friends                 [] Pet visits [] Devotional reading                         [] Unknown 
   [] Other:                                      
 
 
Interventions offered during this visit: (See comments for more details) Patient Interventions: Spiritual care volunteer support Family/Friend(s): Affirmation of emotions/emotional suffering, Affirmation of elenita, Coping skills reviewed/reinforced, End of life issues discussed, Iconic (affirming the presence of God/Higher Power), Normalization of emotional/spiritual concerns, Prayer (actual), Prayer (assurance of), Sikhism beliefs/image of God discussed Plan of Care: 
 
 [x] Support spiritual and/or cultural needs  
 [] Support AMD and/or advance care planning process    
 [] Support grieving process 
 [] Coordinate Rites and/or Rituals  
 [] Coordination with community clergy [] No spiritual needs identified at this time 
 [] Detailed Plan of Care below (See Comments)  [] Make referral to Music Therapy 
[] Make referral to Pet Therapy [] Make referral to Addiction services 
[] Make referral to [facility] 
[] Make referral to Spiritual Care Partner 
[] No future visits requested       
[x] Follow up visits as needed Comments: Responded to Code Blue in CCU 19. Consulted with nurse. Provided compassionate listening as wife of pt shared about their 43 years of marriage. Wife of pt spoke very fondly about the pt. Advised of  Availability. Chaplains will follow as able and/or needed. Camryn Morales,  Intern, MDiv 
[phone])

## 2019-08-06 LAB
ABO + RH BLD: NORMAL
BACTERIA SPEC CULT: NORMAL
BLD PROD TYP BPU: NORMAL
BLOOD GROUP ANTIBODIES SERPL: NORMAL
BPU ID: NORMAL
CROSSMATCH RESULT,%XM: NORMAL
SERVICE CMNT-IMP: NORMAL
SPECIMEN EXP DATE BLD: NORMAL
STATUS OF UNIT,%ST: NORMAL
UNIT DIVISION, %UDIV: 0

## 2019-08-06 NOTE — CDMP QUERY
Query 2 of 2 On 8/5 - Patient had recurrent GI Bleed. \"This evening patient had sudden onset of hypotension and lost his pulse. The code blue was called . A lot of blood was noted from the mouth and there was significant bleeding from below. This is consistent with rebleed from the ulcer which at the time of surgery did bleed significantly and briskly. They were able to get a pulse back but with significant hypotension. \" 
Multiple Transfusions and vasopressors given. If possible, please further clarify patient's hypotension: 
_____  Shock (Hemorrhagic, Hypovolemic, Cardiogenic, or other), 
_____  Hypotension only, or 
_____  Clinically unable to determine. Thank you, Irineo Sumner RN, BSN, [facility] Clinical  
[facility] 
[phone]

## 2019-08-06 NOTE — CDMP QUERY
Pt admitted with GIB due to duodenal ulcer and has a malnutrition documented by RD. Please further specify type of malnutrition. ? Malnutrition (severe) ? Underweight 
? Other (please specify) ? Unable to determine The medical record reflects the following: 
  Risk Factors: GIB, ESRD Clinical Indicators: BMI 19.89%, Treatment: RD consult, Fulls: Ensure Enlive BID (chocolate) Recommend regular, low fiber, 2gm Na once cleared for solids RD consult:  
   Patient meets criteria for Severe Chronic Protein Calorie Malnutrition as evidenced by:  
   ASPEN Malnutrition Criteria Acute Illness, Chronic Illness, or Social/Enviornmental: Chronic illness Body Fat: Severe Muscle Mass: Severe ASPEN Malnutrition Score - Chronic Illness: 12 
   Chronic Illness - Malnutrition Diagnosis: Severe malnutrition. Thank you, Ruddy Fagan, RN, BSN, [facility] Clinical  
[facility] 
[phone]

## 2019-09-04 ENCOUNTER — TELEPHONE (OUTPATIENT)
Dept: INTERNAL MEDICINE CLINIC | Age: 74
End: 2019-09-04

## 2019-09-04 NOTE — TELEPHONE ENCOUNTER
Daughter faxed a form for life insurance asking for a physicians statement. Daughter wants to know if she will be able to pick this up by the end of the week?   Please contact her to let her know the status- she is on perm to release

## 2019-09-13 NOTE — TELEPHONE ENCOUNTER
Called and informed that we received the second fax from 9/11/2019 and that it's awaiting MD's return to the office for review, as he's not in this week. Verbalized understanding with no further questions.

## 2019-11-04 NOTE — Clinical Note
Called and requested refill(s): pt  Medications: traZODone (DESYREL) 50 MG tablet  Amount: 30 day supply  Pharmacy to be sent to: [facility]  Call back number: [phone]  Okay to leave detailed voice message: yes     Catheter used: Left 4. Catheter inserted.

## 2024-08-13 NOTE — PROGRESS NOTES
[facility] Progress note Name: Chavo Sylvester MRN: [id number] : 1945 Hospital: [address] Date: 7/10/2019 IMPRESSION:  
· Severe PHTN- by ECHO PASP 79 EF 50% no AV/MV disease. Portopulmonary HTN- ETOH abuse and hypoalbuminemic ? Occult cirrhosis · HTN 
· Right effusion-exudate- ? From [facility] or other DDX is hepatic hydrothorax · SBO- conservative MGMT  
  
RECOMMENDATIONS:  
· RHC pending · Await cytology from right effusion; still pending · If PH confirmed will need w/u- VQ scan/sleep study/CTD panel- will get RUQ liver US eval for cirrhosis and PV patency - direction of flow. · Pt is acutely ill and at risk for decline due to PHTN 
· O2 titraiton above 90% · Chest x-ray tomorrow Subjective:  
 
7/10 
stable  This patient has been seen and evaluated at the request of Dr. Cosme Lindo for PHTN. Patient is a 72 y.o. male h/o ETOH abuse presents with 6 months progressive Lozano and presented with large right effusion- tapped- exudative. Negative micro. Thoracic placed pigtail and now out. ECHO with severe PHTN. Pt alert denies h/o PE, CTD, no h/o lung disease. Past Medical History:  
Diagnosis Date  Arthritic-like pain  Hypertension History reviewed. No pertinent surgical history. Prior to Admission medications Medication Sig Start Date End Date Taking? Authorizing Provider  
therapeutic multivitamin [facility]) tablet Take 1 Tab by mouth daily. Yes Provider, Historical  
ergocalciferol (VITAMIN D2) 50,000 unit capsule Take 50,000 Units by mouth every . Yes Provider, Historical  
aspirin delayed-release 81 mg tablet Take  by mouth daily. Yes Provider, Historical  
 
No Known Allergies Social History Tobacco Use  Smoking status: Former Smoker Start date: 1966 Last attempt to quit: 1984 Years since quittin.6  Smokeless tobacco: Never Used Substance Use Topics  Alcohol use: Yes Alcohol/week: 7.0 oz Types: 14 drink(s) per week Family History Problem Relation Age of Onset  Diabetes Mother  Hypertension Mother  Heart Disease Mother  Asthma Mother Chelsy Banda Arthritis-osteo Mother  Diabetes Father  Hypertension Father  Asthma Father  Arthritis-osteo Father  Diabetes Sister  Gout Sister  Asthma Brother Current Facility-Administered Medications Medication Dose Route Frequency  furosemide (LASIX) tablet 40 mg  40 mg Oral DAILY  aspirin delayed-release tablet 81 mg  81 mg Oral DAILY  sodium chloride (NS) flush 5-40 mL  5-40 mL IntraVENous Q8H  
 [Held by provider] heparin (porcine) injection 5,000 Units  5,000 Units SubCUTAneous Q8H  
 lactobac ac& pc-s.therm-b.anim (KERLINE Q/RISAQUAD)  1 Cap Oral DAILY  thiamine HCL (B-1) tablet 100 mg  100 mg Oral DAILY  folic acid (FOLVITE) tablet 1 mg  1 mg Oral DAILY  therapeutic multivitamin (THERAGRAN) tablet 1 Tab  1 Tab Oral DAILY  amLODIPine (NORVASC) tablet 5 mg  5 mg Oral BID And  
 lisinopril (PRINIVIL, ZESTRIL) tablet 20 mg  20 mg Oral BID Review of Systems: A comprehensive review of systems was negative except for that written in the HPI. Objective:  
Vital Signs:   
Visit Vitals /44 (BP 1 Location: Right arm, BP Patient Position: At rest) Pulse 61 Temp 98.1 °F (36.7 °C) Resp 21 Ht 5' 8\" (1.727 m) Wt 68.4 kg (150 lb 12.7 oz) SpO2 98% BMI 22.93 kg/m² O2 Device: Room air O2 Flow Rate (L/min): 2 l/min Temp (24hrs), Av.4 °F (36.9 °C), Min:97.8 °F (36.6 °C), Max:99 °F (37.2 °C) Intake/Output:  
Last shift:      No intake/output data recorded. Last 3 shifts:  1901 - 07/10 0700 In: 959 [P.O.:609; I.V.:350] Out: - Intake/Output Summary (Last 24 hours) at 7/10/2019 1132 Last data filed at 2019 1222 Gross per 24 hour Intake 240 ml Output  Net 240 ml Physical Exam:  
General:  Alert, cooperative, no distress, appears stated age. Head:  Normocephalic, without obvious abnormality, atraumatic. Eyes:  Conjunctivae/corneas clear. Nose: Nares normal. Septum midline Neck: Supple, symmetrical, trachea midline Lungs:   Clear to auscultation bilaterally. Heart:  Regular rate and rhythm, S1, S2 normal, no murmur, click, rub or gallop. Abdomen:   Soft, non-tender. Bowel sounds normal. No masses,  No organomegaly. Extremities: Extremities normal, atraumatic, no cyanosis or edema. Pulses: 2+ and symmetric all extremities. Skin: Skin color, texture, turgor normal. No rashes or lesions Data review: No results found for this or any previous visit (from the past 24 hour(s)). Imaging: 
I have personally reviewed the patients radiographs and have reviewed the reports: 
7/5 CT chest moderate to large layering right effusion and RLL ATX LLL ASD no ILD changes no masses- YURY Ash 
 [de-identified] : 1/2 a pack for the past 10 years  [Employed] : employed [Smoke Detector] : smoke detector [Carbon Monoxide Detector] : carbon monoxide detector [Guns at Home] : guns at home [Safety elements used in home] : safety elements used in home [Seat Belt] :  uses seat belt [Sunscreen] : uses sunscreen [FreeTextEntry2] :  [] : No [de-identified] : off 1 year Yes

## 2025-05-30 NOTE — PROGRESS NOTES
It is important to understand that the testing you had completed in the emergency department is not comprehensive and does not substitute regular follow-up with your primary care physician. There many non-emergent things that can still cause many problems if left unaddressed.    Please return to the emergency department if you develop and new/worsening symptoms (high fever not responding to Tylenol/Motrin taken as instructed on the bottle, severe headache or chest pain, difficulty breathing, vomiting) or if you have any other concerns. Otherwise please follow up with your regular doctor(s) in the next 1-2 days.    The CM reviewed previous CM notes- patient lives at home with his wife and has supportive daughter. The patient is currently intubated, sedated and not medically stable at this time. The patient is receiving CVVH currently. PT/OT following for needs- CM will await further assessment from therapy when appropriate, patient is not stable at this time. The patient will continue to follow for transitions of care.  PERNELL Coreas

## (undated) DEVICE — INFECTION CONTROL KIT SYS

## (undated) DEVICE — SUTURE VCRL SZ 3-0 L18IN ABSRB UD L26MM SH 1/2 CIR J864D

## (undated) DEVICE — SUTURE COAT VCRL SZ 4-0 L18IN ABSRB UD L19MM PS-2 1/2 CIR J496G

## (undated) DEVICE — CANN NASAL O2 CAPNOGRAPHY AD -- FILTERLINE

## (undated) DEVICE — CLIP LIG L TI MTL LIG SYS 24 COUNT HORZ

## (undated) DEVICE — ENDO CARRY-ON PROCEDURE KIT INCLUDES ENZYMATIC SPONGE, GAUZE, BIOHAZARD LABEL, TRAY, LUBRICANT, DIRTY SCOPE LABEL, WATER LABEL, TRAY, DRAWSTRING PAD, AND DEFENDO 4-PIECE KIT.: Brand: ENDO CARRY-ON PROCEDURE KIT

## (undated) DEVICE — SUTURE PERMAHAND SZ 2-0 L18IN NONABSORBABLE BLK L26MM SH C012D

## (undated) DEVICE — DBD-PACK,LAPAROTOMY,2 REINFORCED GOWNS: Brand: MEDLINE

## (undated) DEVICE — HEMOSPRAY ENDOSCOPIC HEMOSTAT: Brand: HEMOSPRAY

## (undated) DEVICE — DRAPE,UTILTY,TAPE,15X26, 4EA/PK: Brand: MEDLINE

## (undated) DEVICE — BAG BELONG PT PERS CLEAR HANDL

## (undated) DEVICE — HANDPIECE LAP L23MM DIA5MM VES SEAL CUT CRV JAW PSTL GRP

## (undated) DEVICE — REM POLYHESIVE ADULT PATIENT RETURN ELECTRODE: Brand: VALLEYLAB

## (undated) DEVICE — 3M™ IOBAN™ 2 ANTIMICROBIAL INCISE DRAPE 6650EZ: Brand: IOBAN™ 2

## (undated) DEVICE — ZINACTIVE USE 2641837 CLIP LIG M BLU TI HRT SHP WIRE HORZ 600 PER BX

## (undated) DEVICE — KIT HND CTRL 3 W STPCOCK ROT END 54IN PREM HI PRSS TBNG AT

## (undated) DEVICE — Device: Brand: PADPRO

## (undated) DEVICE — STERILE POLYISOPRENE POWDER-FREE SURGICAL GLOVES WITH EMOLLIENT COATING: Brand: PROTEXIS

## (undated) DEVICE — Device

## (undated) DEVICE — MEDI-VAC YANK SUCT HNDL W/TPRD BULBOUS TIP: Brand: CARDINAL HEALTH

## (undated) DEVICE — SYR 50ML SLIP TIP NSAF LF STRL --

## (undated) DEVICE — SOLUTION IRRIG 1000ML H2O STRL BLT

## (undated) DEVICE — TELFA NON-ADHERENT PADS PREPAK: Brand: TELFA

## (undated) DEVICE — SURGICAL PROCEDURE PACK BASIN MAJ SET CUST NO CAUT

## (undated) DEVICE — SUTURE COAT VCRL PC 5 PRECIS COSM CONVENTIONAL CUT PRIM 3 8 J823H

## (undated) DEVICE — DISSECTOR ULTRASONIC L26CM CRDLSS W/ TORQ WRNCH SONICISION

## (undated) DEVICE — 1200 GUARD II KIT W/5MM TUBE W/O VAC TUBE: Brand: GUARDIAN

## (undated) DEVICE — SUT ETHLN 2-0 18IN FS BLK --

## (undated) DEVICE — PENCIL ES L3M BTTN SWCH S STL HEX LOK BLDE ELECTRD HOLSTER

## (undated) DEVICE — SLIM BODY SKIN STAPLER: Brand: APPOSE ULC

## (undated) DEVICE — HANDLE LT SNAP ON ULT DURABLE LENS FOR TRUMPF ALC DISPOSABLE

## (undated) DEVICE — PACK PROCEDURE SURG HRT CATH

## (undated) DEVICE — SEALANT FIBRIN 10 CC FRZN PRE FILLED SYR TISSEEL

## (undated) DEVICE — DRAPE SURG W25XL50IN E OPN CIR BND BG

## (undated) DEVICE — ROCKER SWITCH PENCIL BLADE ELECTRODE, HOLSTER: Brand: EDGE

## (undated) DEVICE — PLASMABLADE PS200-040 4.0: Brand: PLASMABLADE™

## (undated) DEVICE — DRSG AQUACEL SURG 3.5 X 10IN -- CONVERT TO ITEM 370183

## (undated) DEVICE — MEDI-VAC NON-CONDUCTIVE SUCTION TUBING: Brand: CARDINAL HEALTH

## (undated) DEVICE — DRESSING HEMSTAT W4XL4IN 4 PLY WHT IMPREG KAOLIN HYDRPHLC

## (undated) DEVICE — GARMENT,MEDLINE,DVT,INT,CALF,MED, GEN2: Brand: MEDLINE

## (undated) DEVICE — SUTURE VCRL SZ 3-0 L27IN ABSRB VLT L26MM SH 1/2 CIR J316H

## (undated) DEVICE — KIT MFLD ISOLATN NACL CNTRST PRT TBNG SPIK W/ PRSS TRNSDUC

## (undated) DEVICE — BULB SYRINGE, IRRIGATION WITH PROTECTIVE CAP, 60 CC, INDIVIDUALLY WRAPPED: Brand: DOVER

## (undated) DEVICE — BRACE ORTH CLOSURE XL AD BK SHLDR BLK QUIKDRAW PRO

## (undated) DEVICE — RETRIEVER ROTHNET 230CM LNG --

## (undated) DEVICE — SUTURE PDS II SZ 1 L36IN ABSRB VLT L48MM CTX 1/2 CIR Z371T

## (undated) DEVICE — ESOPHAGEAL/PYLORIC/COLONIC/BILIARY WIREGUIDED BALLOON DILATATION CATHETER: Brand: CRE™ PRO

## (undated) DEVICE — STRAP,POSITIONING,KNEE/BODY,FOAM,4X60": Brand: MEDLINE

## (undated) DEVICE — APPLICATOR BNDG 1MM ADH PREMIERPRO EXOFIN

## (undated) DEVICE — KIT INTRO 7FR L14CM DIA0.038IN PEEL AWAY DI-LOCK DIL CLOSE

## (undated) DEVICE — KENDALL RADIOLUCENT FOAM MONITORING ELECTRODE RECTANGULAR SHAPE: Brand: KENDALL

## (undated) DEVICE — BITE BLK ENDOSCP AD 54FR GRN POLYETH ENDOSCP W STRP SLD

## (undated) DEVICE — CATHETER DIAG 6FR L110CM INTRO 6FR BLLN DIA9MM 1CC PULM ART

## (undated) DEVICE — SUTURE VCRL SZ 1 L36IN ABSRB VLT L48MM CTX 1/2 CIR J371H

## (undated) DEVICE — KIT INTRO 9FR L14CM DIA0.038IN PEEL AWAY DI-LOCK DIL CLOSE

## (undated) DEVICE — Device: Brand: MEDICAL ACTION INDUSTRIES

## (undated) DEVICE — TOWEL SURG W17XL27IN STD BLU COT NONFENESTRATED PREWASHED

## (undated) DEVICE — CATH IV AUTOGRD BC BLU 22GA 25 -- INSYTE

## (undated) DEVICE — BAG TRASH WST 122 CM 183 CM 1400 CC FEMALE LUER PVC DEPOT

## (undated) DEVICE — SPONGE LAP 18X18IN STRL -- 5/PK

## (undated) DEVICE — SOLIDIFIER FLUID 3000 CC ABSORB

## (undated) DEVICE — BW-412T DISP COMBO CLEANING BRUSH: Brand: SINGLE USE COMBINATION CLEANING BRUSH

## (undated) DEVICE — TUBING HYDR IRR --

## (undated) DEVICE — ANGIOGRAPHY KIT

## (undated) DEVICE — SOLUTION IV 1000ML 0.9% SOD CHL

## (undated) DEVICE — SET ADMIN 16ML TBNG L100IN 2 Y INJ SITE IV PIGGY BK DISP

## (undated) DEVICE — SYRINGE MED 20ML STD CLR PLAS LUERLOCK TIP N CTRL DISP

## (undated) DEVICE — SYR ASSEMB INFL BLLN 60ML --

## (undated) DEVICE — DRAIN SURG 19FR SIL RND HUBLESS W/ 0.25IN BEND TRCR BLAK

## (undated) DEVICE — CABLE PACE ALGTR CLP SAF 12FT --

## (undated) DEVICE — Z DISCONTINUED NO SUB IDED SET EXTN W/ 4 W STPCOCK M SPIN LOK 36IN

## (undated) DEVICE — PINNACLE INTRODUCER SHEATH: Brand: PINNACLE

## (undated) DEVICE — DRAIN CHN 19FR L0.25MM DIA6.3MM SIL RND HUBLESS FULL FLUT

## (undated) DEVICE — NEEDLE HYPO 18GA L1.5IN PNK S STL HUB POLYPR SHLD REG BVL

## (undated) DEVICE — KENDALL RADIOLUCENT FOAM MONITORING ELECTRODE -RECTANGULAR SHAPE: Brand: KENDALL

## (undated) DEVICE — (D)PREP SKN CHLRAPRP APPL 26ML -- CONVERT TO ITEM 371833

## (undated) DEVICE — CTRL URINE TOX S2E X100 --